# Patient Record
Sex: FEMALE | Race: WHITE | NOT HISPANIC OR LATINO | Employment: UNEMPLOYED | URBAN - METROPOLITAN AREA
[De-identification: names, ages, dates, MRNs, and addresses within clinical notes are randomized per-mention and may not be internally consistent; named-entity substitution may affect disease eponyms.]

---

## 2017-03-01 ENCOUNTER — LAB VISIT (OUTPATIENT)
Dept: LAB | Facility: HOSPITAL | Age: 37
End: 2017-03-01
Attending: OBSTETRICS & GYNECOLOGY
Payer: MEDICAID

## 2017-03-01 ENCOUNTER — OFFICE VISIT (OUTPATIENT)
Dept: OBSTETRICS AND GYNECOLOGY | Facility: CLINIC | Age: 37
End: 2017-03-01
Payer: MEDICAID

## 2017-03-01 VITALS
SYSTOLIC BLOOD PRESSURE: 110 MMHG | WEIGHT: 127.63 LBS | DIASTOLIC BLOOD PRESSURE: 60 MMHG | BODY MASS INDEX: 22.61 KG/M2 | HEIGHT: 63 IN

## 2017-03-01 DIAGNOSIS — R10.2 PELVIC PAIN IN FEMALE: ICD-10-CM

## 2017-03-01 DIAGNOSIS — Z00.00 ANNUAL PHYSICAL EXAM: Primary | ICD-10-CM

## 2017-03-01 DIAGNOSIS — Z01.419 ENCOUNTER FOR GYNECOLOGICAL EXAMINATION WITHOUT ABNORMAL FINDING: ICD-10-CM

## 2017-03-01 DIAGNOSIS — N76.0 ACUTE VAGINITIS: ICD-10-CM

## 2017-03-01 DIAGNOSIS — N80.9 ENDOMETRIOSIS: ICD-10-CM

## 2017-03-01 DIAGNOSIS — N94.6 DYSMENORRHEA: ICD-10-CM

## 2017-03-01 LAB
ALBUMIN SERPL BCP-MCNC: 4.4 G/DL
ALP SERPL-CCNC: 47 U/L
ALT SERPL W/O P-5'-P-CCNC: 13 U/L
ANION GAP SERPL CALC-SCNC: 8 MMOL/L
AST SERPL-CCNC: 15 U/L
BASOPHILS # BLD AUTO: 0.03 K/UL
BASOPHILS NFR BLD: 0.3 %
BILIRUB SERPL-MCNC: 0.4 MG/DL
BUN SERPL-MCNC: 9 MG/DL
CALCIUM SERPL-MCNC: 9.6 MG/DL
CHLORIDE SERPL-SCNC: 103 MMOL/L
CO2 SERPL-SCNC: 28 MMOL/L
CREAT SERPL-MCNC: 0.9 MG/DL
DIFFERENTIAL METHOD: ABNORMAL
EOSINOPHIL # BLD AUTO: 0 K/UL
EOSINOPHIL NFR BLD: 0.5 %
ERYTHROCYTE [DISTWIDTH] IN BLOOD BY AUTOMATED COUNT: 12.9 %
EST. GFR  (AFRICAN AMERICAN): >60 ML/MIN/1.73 M^2
EST. GFR  (NON AFRICAN AMERICAN): >60 ML/MIN/1.73 M^2
GLUCOSE SERPL-MCNC: 92 MG/DL
HCT VFR BLD AUTO: 41.3 %
HGB BLD-MCNC: 14.1 G/DL
LYMPHOCYTES # BLD AUTO: 1.4 K/UL
LYMPHOCYTES NFR BLD: 16.7 %
MCH RBC QN AUTO: 30.9 PG
MCHC RBC AUTO-ENTMCNC: 34.1 %
MCV RBC AUTO: 91 FL
MONOCYTES # BLD AUTO: 0.5 K/UL
MONOCYTES NFR BLD: 5.9 %
NEUTROPHILS # BLD AUTO: 6.6 K/UL
NEUTROPHILS NFR BLD: 76.6 %
PLATELET # BLD AUTO: 348 K/UL
PMV BLD AUTO: 10.2 FL
POTASSIUM SERPL-SCNC: 4.3 MMOL/L
PROT SERPL-MCNC: 7.4 G/DL
RBC # BLD AUTO: 4.56 M/UL
SODIUM SERPL-SCNC: 139 MMOL/L
TSH SERPL DL<=0.005 MIU/L-ACNC: 1.42 UIU/ML
WBC # BLD AUTO: 8.63 K/UL

## 2017-03-01 PROCEDURE — 80053 COMPREHEN METABOLIC PANEL: CPT

## 2017-03-01 PROCEDURE — 36415 COLL VENOUS BLD VENIPUNCTURE: CPT

## 2017-03-01 PROCEDURE — 85025 COMPLETE CBC W/AUTO DIFF WBC: CPT

## 2017-03-01 PROCEDURE — 99385 PREV VISIT NEW AGE 18-39: CPT | Mod: S$PBB,,, | Performed by: OBSTETRICS & GYNECOLOGY

## 2017-03-01 PROCEDURE — 84443 ASSAY THYROID STIM HORMONE: CPT

## 2017-03-01 PROCEDURE — 99999 PR PBB SHADOW E&M-NEW PATIENT-LVL III: CPT | Mod: PBBFAC,,, | Performed by: OBSTETRICS & GYNECOLOGY

## 2017-03-01 PROCEDURE — 86304 IMMUNOASSAY TUMOR CA 125: CPT

## 2017-03-01 RX ORDER — FOLIC ACID 1 MG/1
TABLET ORAL
Status: ON HOLD | COMMUNITY
Start: 2017-02-16 | End: 2017-03-30 | Stop reason: HOSPADM

## 2017-03-01 RX ORDER — FLUOXETINE 10 MG/1
CAPSULE ORAL
COMMUNITY
Start: 2013-05-07 | End: 2017-03-23 | Stop reason: CLARIF

## 2017-03-01 NOTE — PROGRESS NOTES
Subjective:      Chief Complaint:  PELVIC   PAIN    DYSMENORRHEA      IRREG  CYCLE  Chief Complaint   Patient presents with    Annual Exam       Menstrual History:    OB History     No data available                                                 GR      1    Menarche age: 11     Patient's last menstrual period was 2017.               Objective:        History of Present Illness AND  Examination detailed DICTATE:      HISTORY OF PRESENT ILLNESS:  The patient is a 36-year-old lady,  1, para   1, new patient to [facility], new patient to me.  The patient is complaining of   pelvic pain, dysmenorrhea of the irregular cycles, painful sexual activity and   dyspareunia.  The patient is not on any birth control medication, went to the ER   2 weeks ago in [place], which she was told that ovarian cyst, apparently did a   Pap smear, which was negative.  At that time not on any birth control pill.  At   the present has been on, in the past without any.  The patient is having   reasonably regular cycles, severe dysmenorrhea, painful and cramping, last four   months irregular cycles.  Pain increasing.  Apparently the patient has sudden   onset of pain, increasing in intensity, present since painful sexual activity.    Also, pain with urination and bowel movement, also complaining of some vaginal   discharge.    PHYSICAL EXAMINATION:  VITAL SIGNS:  Blood pressure 110/60, weight 127.  BREASTS:  No lumps, masses, discharge, skin changes, retraction, nipple changes.    Axilla negative.  ABDOMEN:  Normal.  No guarding, rebound, however some discomfort and pain on   deep palpation.  PELVIC:  External normal.  Vulva normal.  [place], urethral and [place] glands   are negative.  Vagina, mild discharge noted.  Cervix clear.  Uterus, normal   size, shape, position.  Right adnexa is normal.  Left is indurated most likely   an ovarian cyst, very painful, tender uterosacral ligaments, nodules, when it   was stretched it was  rather painful and the patient states that the pain she has   with sexual activity is comparable to the pain that she experienced with   examination.  RECTAL:  External is negative.    IMPRESSION:  Pelvic pain, dysmenorrhea, most likely endometriosis.    PLAN:  We will repeat a pelvic ultrasound.  We will do a CA-125.  CBC and   comprehensive metabolic panel and TSH and then we will decide on followup and   treatment after these procedures have been done.      JIV/HN  dd: 03/01/2017 13:36:56 (CST)  td: 03/02/2017 04:15:45 (CST)  Doc ID   #[id number]  Job ID #[id number]    CC:       Physical Exam   Constitutional: She is oriented to person, place, and time. She appears well-developed and well-nourished. No distress.   HENT:   Head: Normocephalic.   Mouth/Throat: Oropharynx is clear.  Eyes: Pupils are equal, round, and reactive to light.   Neck: Neck supple. No tracheal deviation present.   Cardiovascular: Normal rate, regular rhythm and normal heart sounds. No murmur heard.  Pulmonary/Chest: Effort normal and breath sounds normal. No respiratory distress. She has no wheezes. She has no rales. She exhibits no tenderness.   Abdominal: Bowel sounds are normal. She exhibits no distension and no mass. There is no tenderness. There is no rebound and no guarding.  Musculoskeletal: Normal range of motion.   Lymphadenopathy:        Right: No inguinal adenopathy present.        Left: No inguinal adenopathy present.   Neurological: She is alert and oriented.  Skin: Skin is warm. No rash noted.        Review of Systems  Review of Systems   Normal ROS:   Constitutional: Negative for fever, chills, activity change fatigue and unexpected weight change.   HENT: Negative for nosebleeds, congestion.  Eyes: Negative for visual disturbance.   Respiratory: Negative for shortness of breath and wheezing.    Cardiovascular: Negative for chest pain, palpitations.   Gastrointestinal: Negative for abdominal pain, diarrhea, constipation,    Musculoskeletal: Negative for back pain.   Allergic/Immunologic: Negative for environmental allergies and food allergies.   Neurological: Negative .   Hematological: Negative for adenopath   Psychiatric/Behavioral: Negative f    Assessment:      Diagnosis:PELVIC   PAIN   DYSMENORRHEA   ENDOMETRIOSIS       Plan:      Return in 2  week

## 2017-03-01 NOTE — PATIENT INSTRUCTIONS

## 2017-03-02 ENCOUNTER — TELEPHONE (OUTPATIENT)
Dept: OBSTETRICS AND GYNECOLOGY | Facility: CLINIC | Age: 37
End: 2017-03-02

## 2017-03-02 LAB
CANCER AG125 SERPL-ACNC: 33 U/ML
CANDIDA RRNA VAG QL PROBE: NEGATIVE
G VAGINALIS RRNA GENITAL QL PROBE: POSITIVE
T VAGINALIS RRNA GENITAL QL PROBE: NEGATIVE

## 2017-03-02 NOTE — TELEPHONE ENCOUNTER
-DR Francis,   This young lady is requesting to speak directly with you   Thank you,  Trish            ---- Message from Kathy Garcia sent at 3/2/2017 10:17 AM CST -----  Contact: self  Pt is requesting a call from provider in regards to previous discuss towards plan of care. Please call pt @ [phone]        Thanks

## 2017-03-06 DIAGNOSIS — N76.1 CHRONIC VAGINITIS: Primary | ICD-10-CM

## 2017-03-06 RX ORDER — METRONIDAZOLE 500 MG/1
500 TABLET ORAL 3 TIMES DAILY
Qty: 30 TABLET | Refills: 1 | Status: SHIPPED | OUTPATIENT
Start: 2017-03-06 | End: 2017-03-16

## 2017-03-07 ENCOUNTER — TELEPHONE (OUTPATIENT)
Dept: OBSTETRICS AND GYNECOLOGY | Facility: CLINIC | Age: 37
End: 2017-03-07

## 2017-03-07 NOTE — TELEPHONE ENCOUNTER
----- Message from Eugenio Francis MD sent at 3/6/2017 12:28 PM CST -----  Medication ordered, please   prescription at the pharmacy you have listed on file in our system .

## 2017-03-07 NOTE — TELEPHONE ENCOUNTER
Notes Recorded by Silvia Damico, LPN on 3/7/2017 at 1:30 PM  Called pt no answer. LM asking pt to return our call

## 2017-03-08 ENCOUNTER — TELEPHONE (OUTPATIENT)
Dept: OBSTETRICS AND GYNECOLOGY | Facility: CLINIC | Age: 37
End: 2017-03-08

## 2017-03-08 NOTE — TELEPHONE ENCOUNTER
Notes Recorded by Corey Stahl LPN on 3/8/2017 at 10:44 AM  MS LANDERS RETURNED CALL, INFORMED HER THAT DR CORDERO REVIEWED HER RESENT LAB RESULT AND THAT SHE HAS BACTERIAL VAGINOSIS, INFORMED HER THAT DR CORDERO SENT ANTIBX FLAGYL TO [facility], AND ALSO SHE CAN USE OVER THE COUNTER REPHRESH TO HELP WITH ITCHING, INFORMED THERE THERE ARE SEVERAL DIFFERENT TYPES SHE CAN USE, PT STATED HER UNDERSTANDING

## 2017-03-08 NOTE — TELEPHONE ENCOUNTER
Notes Recorded by Corey Stahl LPN on 3/8/2017 at 10:27 AM  2ND CALL ATTEMPT TO REACH PT TO GIVE HER LAB RESULTS  ------

## 2017-03-08 NOTE — TELEPHONE ENCOUNTER
----- Message from Kathy Garcia sent at 3/8/2017  9:36 AM CST -----  Contact: self  Pt  returned office call          Thanks

## 2017-03-09 ENCOUNTER — TELEPHONE (OUTPATIENT)
Dept: OBSTETRICS AND GYNECOLOGY | Facility: CLINIC | Age: 37
End: 2017-03-09

## 2017-03-09 NOTE — TELEPHONE ENCOUNTER
----- Message from Agustina Erazo sent at 3/9/2017  3:32 PM CST -----  Contact: [phone]  Pt wants to speak to the nurse  before scheduling her surgery Please call pt at your earliest convenience.  Thanks !

## 2017-03-09 NOTE — TELEPHONE ENCOUNTER
Spoke with pt. Pt stated she is in pain and needs rx. Pt informed that Dr. Francis would like for her to have her pelvic ultrasound done prior to giving pt anything for pain. Pt said that she did get an ultrasound at an ER that she went to and has requested that we call them to request it. Pt asked me to speak with Daphne [phone]. Informed pt that I would request ultrasound and contact her once it is received and physician has reviewed it.

## 2017-03-10 ENCOUNTER — TELEPHONE (OUTPATIENT)
Dept: OBSTETRICS AND GYNECOLOGY | Facility: CLINIC | Age: 37
End: 2017-03-10

## 2017-03-10 NOTE — TELEPHONE ENCOUNTER
RETURNED CALL TO PT , INFORMED HER THAT DR CORDERO IS NOT IN TODAY, SHE ASKED IF WE OBTAINED THE ENDO VAGINAL U.S. REPORT, INFORMED HER THAT WE HAD DR FRANKEL READ IT AND IT IS NORMAL, PT STATED SHE NEEDS SOMETHING FOR SEVERE ABD PAIN , INFORMED HER THAT THERE ARE NO DOCTORS HERE TODAY THAT CAN SEE HER , THAT WE CAN CHANGE HER NEXT WED. APPT TO MON WITH DR CORDERO, NEW APPT MADE FOR 3/13/17 @ 3:30PM, PT STATED HER UNDERSTANDING

## 2017-03-10 NOTE — TELEPHONE ENCOUNTER
----- Message from Peytonaraseli Teran sent at 3/10/2017  8:29 AM CST -----  Contact: self  Pt request to speak to the nurse regarding about a x-ray. Please contact the pt at [phone]. Thanks!

## 2017-03-13 ENCOUNTER — OFFICE VISIT (OUTPATIENT)
Dept: OBSTETRICS AND GYNECOLOGY | Facility: CLINIC | Age: 37
End: 2017-03-13
Payer: MEDICAID

## 2017-03-13 VITALS
WEIGHT: 125.88 LBS | SYSTOLIC BLOOD PRESSURE: 118 MMHG | BODY MASS INDEX: 22.3 KG/M2 | DIASTOLIC BLOOD PRESSURE: 77 MMHG | HEIGHT: 63 IN

## 2017-03-13 DIAGNOSIS — N83.202 CYST OF LEFT OVARY: ICD-10-CM

## 2017-03-13 DIAGNOSIS — R10.2 PELVIC PAIN IN FEMALE: Primary | ICD-10-CM

## 2017-03-13 DIAGNOSIS — N80.9 ENDOMETRIOSIS: ICD-10-CM

## 2017-03-13 PROCEDURE — 99213 OFFICE O/P EST LOW 20 MIN: CPT | Mod: PBBFAC | Performed by: OBSTETRICS & GYNECOLOGY

## 2017-03-13 PROCEDURE — 99212 OFFICE O/P EST SF 10 MIN: CPT | Mod: S$PBB,,, | Performed by: OBSTETRICS & GYNECOLOGY

## 2017-03-13 PROCEDURE — 99999 PR PBB SHADOW E&M-EST. PATIENT-LVL III: CPT | Mod: PBBFAC,,, | Performed by: OBSTETRICS & GYNECOLOGY

## 2017-03-13 RX ORDER — TRAMADOL HYDROCHLORIDE 50 MG/1
50 TABLET ORAL EVERY 6 HOURS PRN
Qty: 20 TABLET | Refills: 1 | Status: ON HOLD | OUTPATIENT
Start: 2017-03-13 | End: 2017-03-30 | Stop reason: HOSPADM

## 2017-03-13 RX ORDER — FLUOXETINE HYDROCHLORIDE 20 MG/1
CAPSULE ORAL
Status: ON HOLD | COMMUNITY
Start: 2017-03-04 | End: 2017-03-30 | Stop reason: HOSPADM

## 2017-03-13 RX ORDER — CEPHALEXIN 500 MG/1
CAPSULE ORAL
Status: ON HOLD | COMMUNITY
Start: 2017-03-04 | End: 2017-03-30 | Stop reason: HOSPADM

## 2017-03-13 RX ORDER — PHENAZOPYRIDINE HYDROCHLORIDE 200 MG/1
TABLET, FILM COATED ORAL
COMMUNITY
Start: 2017-03-04 | End: 2017-03-23

## 2017-03-13 RX ORDER — CYANOCOBALAMIN 1000 UG/ML
INJECTION, SOLUTION INTRAMUSCULAR; SUBCUTANEOUS
Status: ON HOLD | COMMUNITY
Start: 2017-03-06 | End: 2017-03-30 | Stop reason: HOSPADM

## 2017-03-13 NOTE — MR AVS SNAPSHOT
[facility] - OB/ GYN  [address] [facility] [address]  [address]  [place] [address]  Phone: [phone]                  Shruthi Richard   3/13/2017 3:30 PM   Office Visit    Description:  Female : 1980   Provider:  Eugenio Francis MD   Department:  [facility]/ GYN           Reason for Visit     Follow-up           Diagnoses this Visit        Comments    Pelvic pain in female    -  Primary     Endometriosis         Cyst of left ovary                To Do List           Future Appointments        Provider Department Dept Phone    3/13/2017 3:30 PM Eugenio Francis MD [facility] OB/ -[phone]      Goals (5 Years of Data)     None      Follow-Up and Disposition     Return in about 6 weeks (around 2017).       These Medications        Disp Refills Start End    tramadol (ULTRAM) 50 mg tablet 20 tablet 1 3/13/2017 3/13/2018    Take 1 tablet (50 mg total) by mouth every 6 (six) hours as needed for Pain. - Oral    Pharmacy: [facility] [address] [place] [address]. Ph #: [phone]         [facility] On Call     [facility] On Call Nurse Care Line -  Assistance  Registered nurses in the [facility] On Call Center provide clinical advisement, health education, appointment booking, and other advisory services.  Call for this free service at [phone].             Medications           Message regarding Medications     Verify the changes and/or additions to your medication regime listed below are the same as discussed with your clinician today.  If any of these changes or additions are incorrect, please notify your healthcare provider.        START taking these NEW medications        Refills    tramadol (ULTRAM) 50 mg tablet 1    Sig: Take 1 tablet (50 mg total) by mouth every 6 (six) hours as needed for Pain.    Class: Print    Route: Oral           Verify that the below list of medications is an accurate representation of the medications you are currently taking.  If none  "reported, the list may be blank. If incorrect, please contact your healthcare provider. Carry this list with you in case of emergency.           Current Medications     cyanocobalamin 1,000 mcg/mL injection     fluoxetine (PROZAC) 10 MG capsule PROZAC 20 MG CAPS    fluoxetine (PROZAC) 20 MG capsule     folic acid (FOLVITE) 1 MG tablet     cephALEXin (KEFLEX) 500 MG capsule     metronidazole (FLAGYL) 500 MG tablet Take 1 tablet (500 mg total) by mouth 3 (three) times daily.    phenazopyridine (PYRIDIUM) 200 MG tablet     tramadol (ULTRAM) 50 mg tablet Take 1 tablet (50 mg total) by mouth every 6 (six) hours as needed for Pain.           Clinical Reference Information           Your Vitals Were     BP Height Weight Last Period BMI    118/77 5' 3" (1.6 m) 57.1 kg (125 lb 14.1 oz) 03/01/2017 22.3 kg/m2      Blood Pressure          Most Recent Value    BP  118/77      Allergies as of 3/13/2017     No Known Allergies      Immunizations Administered on Date of Encounter - 3/13/2017     None      [facility] Sign-Up     Activating your [facility] account is as easy as 1-2-3!     1) Visit [email or web address], select Sign Up Now, enter this activation code and your date of birth, then select Next.  [email or web address]  Expires: 4/15/2017  2:15 PM      2) Create a username and password to use when you visit [facility] in the future and select a security question in case you lose your password and select Next.    3) Enter your e-mail address and click Sign Up!    Additional Information  If you have questions, please e-mail [email or web address] or call [phone] to talk to our [facility] staff. Remember, [facility] is NOT to be used for urgent needs. For medical emergencies, dial 911.         Language Assistance Services     ATTENTION: Language assistance services are available, free of charge. Please call [phone].      ATENCIÓN: Si habla español, tiene a ledesma disposición servicios gratuitos de asistencia lingüística. Llame al " [phone].     JAYLENE Ý: N?u b?n nói Ti?ng Vi?t, có các d?ch v? h? tr? ngôn ng? mi?n phí dành cho b?n. G?i s? [phone].         [facility] [place] - [facility]/ GYN complies with applicable Federal civil rights laws and does not discriminate on the basis of race, color, national origin, age, disability, or sex.

## 2017-03-13 NOTE — PROGRESS NOTES
Subjective:      Chief Complaint:    Chief Complaint   Patient presents with    Follow-up       Menstrual History:    OB History     No data available          Menarche age: 11     Patient's last menstrual period was 2017.           Objective:        History of Present Illness AND  Examination detailed DICTATE:       The patient is 36-year-old  1, para 1, was seen approximately a week to   10 days ago because of severe pelvic pain.  The suspected diagnosis at that time   was endometriosis.  The patient received the benefit of CBC, comprehensive   metabolic panel and CA-125.  The only abnormality is slightly elevated CA-125,   which is compatible with possibility of endometriosis, pelvic ultrasound at   [facility]. essentially was normal.  The patient is still complaining of   severe pain.    PLAN:  We gave her Ultram 50 mg every week q.12h.    RECOMMENDATIONS:  Laparoscopy as soon as possible to make the diagnosis and   possibly treat the endometriosis.      NANCY/IN  dd: 2017 13:20:01 (CDT)  td: 2017 09:03:40 (CDT)  Doc ID   #[id number]  Job ID #[id number]    CC:           Assessment:      Diagnosis: pelvic   Pain   endometriosis       Plan:      Return in 6  weeks

## 2017-03-16 ENCOUNTER — TELEPHONE (OUTPATIENT)
Dept: OBSTETRICS AND GYNECOLOGY | Facility: CLINIC | Age: 37
End: 2017-03-16

## 2017-03-16 NOTE — TELEPHONE ENCOUNTER
----- Message from Agustina Erazo sent at 3/16/2017  1:04 PM CDT -----  Contact: [phone]  Pt is wanting to know if she can get in with a male provider sooner than the 3/31 pt is trying to schedule with someone as soon as possible Please call pt at your earliest convenience.  Thanks !

## 2017-03-16 NOTE — TELEPHONE ENCOUNTER
Spoke with patient, patient aware Dr Francis does not perform any surgeries. Patient voiced understanding, and has been set up with a consult to discuss the lap per Dr Francis with Dr Rae on 3/31, no further questions.

## 2017-03-16 NOTE — TELEPHONE ENCOUNTER
----- Message from Agustina Erazo sent at 3/16/2017  8:46 AM CDT -----  Contact: [phone]  Pt is requesting for her surgery to be scheduled Please call pt at your earliest convenience.  Thanks !

## 2017-03-20 ENCOUNTER — OFFICE VISIT (OUTPATIENT)
Dept: OBSTETRICS AND GYNECOLOGY | Facility: CLINIC | Age: 37
End: 2017-03-20
Payer: MEDICAID

## 2017-03-20 VITALS
HEIGHT: 63 IN | DIASTOLIC BLOOD PRESSURE: 60 MMHG | SYSTOLIC BLOOD PRESSURE: 84 MMHG | WEIGHT: 126.75 LBS | BODY MASS INDEX: 22.46 KG/M2

## 2017-03-20 DIAGNOSIS — R10.2 PELVIC PAIN IN FEMALE: Primary | ICD-10-CM

## 2017-03-20 DIAGNOSIS — Z11.3 SCREEN FOR STD (SEXUALLY TRANSMITTED DISEASE): ICD-10-CM

## 2017-03-20 DIAGNOSIS — N80.9 ENDOMETRIOSIS: ICD-10-CM

## 2017-03-20 DIAGNOSIS — N83.202 CYST OF LEFT OVARY: ICD-10-CM

## 2017-03-20 DIAGNOSIS — N94.6 DYSMENORRHEA: ICD-10-CM

## 2017-03-20 DIAGNOSIS — Z12.4 CERVICAL CANCER SCREENING: ICD-10-CM

## 2017-03-20 PROCEDURE — 87624 HPV HI-RISK TYP POOLED RSLT: CPT

## 2017-03-20 PROCEDURE — 99999 PR PBB SHADOW E&M-EST. PATIENT-LVL III: CPT | Mod: PBBFAC,,, | Performed by: OBSTETRICS & GYNECOLOGY

## 2017-03-20 PROCEDURE — 99213 OFFICE O/P EST LOW 20 MIN: CPT | Mod: PBBFAC | Performed by: OBSTETRICS & GYNECOLOGY

## 2017-03-20 PROCEDURE — 99213 OFFICE O/P EST LOW 20 MIN: CPT | Mod: S$PBB,,, | Performed by: OBSTETRICS & GYNECOLOGY

## 2017-03-20 PROCEDURE — 87591 N.GONORRHOEAE DNA AMP PROB: CPT

## 2017-03-20 PROCEDURE — 88175 CYTOPATH C/V AUTO FLUID REDO: CPT

## 2017-03-20 NOTE — LETTER
March 21, 2017      Eugenio Francis MD  [address]  [address]  [facility] [address]           [facility]  [address] [facility]  [address]  [facility] [place] [facility] [address]  Phone: [phone]          Patient: Shruthi Richard   MR Number: [id number]   YOB: 1980   Date of Visit: 3/20/2017       Dear Dr. Eugenio Francis:    Thank you for referring Shruthi Richard to me for evaluation. Attached you will find relevant portions of my assessment and plan of care.    If you have questions, please do not hesitate to call me. I look forward to following Shruthi Richard along with you.    Sincerely,    Melo Lopez MD    Enclosure  CC:  No Recipients    If you would like to receive this communication electronically, please contact [email or web address] or [phone] to request more information on [employer] access.    For providers and/or their staff who would like to refer a patient to [facility], please contact us through our one-stop-shop provider referral line, [facility] , at [phone].    If you feel you have received this communication in error or would no longer like to receive these types of communications, please e-mail [email or web address]

## 2017-03-20 NOTE — MR AVS SNAPSHOT
"    [facility] [place] [place] - OB/ GYN  [address] [facility]  [address]  [place] [address]  Phone: [phone]                  Shruthi Richard   3/20/2017 2:30 PM   Office Visit    Description:  Female : 1980   Provider:  Melo Lopez MD   Department:  [facility] [place] [place] - OB/ GYN           Reason for Visit     Surgical Consult                To Do List           Goals (5 Years of Data)     None      [facility] On Call     [facility] On Call Nurse Care Line -  Assistance  Registered nurses in the [facility] On Call Center provide clinical advisement, health education, appointment booking, and other advisory services.  Call for this free service at [phone].             Medications           Message regarding Medications     Verify the changes and/or additions to your medication regime listed below are the same as discussed with your clinician today.  If any of these changes or additions are incorrect, please notify your healthcare provider.             Verify that the below list of medications is an accurate representation of the medications you are currently taking.  If none reported, the list may be blank. If incorrect, please contact your healthcare provider. Carry this list with you in case of emergency.           Current Medications     cephALEXin (KEFLEX) 500 MG capsule     cyanocobalamin 1,000 mcg/mL injection     fluoxetine (PROZAC) 10 MG capsule PROZAC 20 MG CAPS    fluoxetine (PROZAC) 20 MG capsule     folic acid (FOLVITE) 1 MG tablet     phenazopyridine (PYRIDIUM) 200 MG tablet     tramadol (ULTRAM) 50 mg tablet Take 1 tablet (50 mg total) by mouth every 6 (six) hours as needed for Pain.           Clinical Reference Information           Your Vitals Were     BP Height Weight Last Period BMI    84/60 (BP Location: Left arm, Patient Position: Sitting, BP Method: Manual) 5' 3" (1.6 m) 57.5 kg (126 lb 12.2 oz) 2017 (Exact Date) 22.46 kg/m2      Blood Pressure          Most Recent Value    BP  (!)  84/60    "   Allergies as of 3/20/2017     No Known Allergies      Immunizations Administered on Date of Encounter - 3/20/2017     None      [facility] Sign-Up     Activating your [facility] account is as easy as 1-2-3!     1) Visit [email or web address], select Sign Up Now, enter this activation code and your date of birth, then select Next.  [email or web address]  Expires: 4/15/2017  2:15 PM      2) Create a username and password to use when you visit [facility] in the future and select a security question in case you lose your password and select Next.    3) Enter your e-mail address and click Sign Up!    Additional Information  If you have questions, please e-mail [email or web address] or call [phone] to talk to our [facility] staff. Remember, [facility] is NOT to be used for urgent needs. For medical emergencies, dial 911.         Language Assistance Services     ATTENTION: Language assistance services are available, free of charge. Please call [phone].      ATENCIÓN: Si habla ulises, tiene a ledesma disposición servicios gratuitos de asistencia lingüística. Llame al [phone].     CHÚ Ý: N?u b?n nói Ti?ng Vi?t, có các d?ch v? h? tr? ngôn ng? mi?n phí dành cho b?n. G?i s? [phone].         [facility] [place]/ GYN complies with applicable Federal civil rights laws and does not discriminate on the basis of race, color, national origin, age, disability, or sex.

## 2017-03-21 DIAGNOSIS — G89.29 CHRONIC PELVIC PAIN IN FEMALE: Primary | ICD-10-CM

## 2017-03-21 DIAGNOSIS — R10.2 CHRONIC PELVIC PAIN IN FEMALE: Primary | ICD-10-CM

## 2017-03-21 LAB
C TRACH DNA SPEC QL NAA+PROBE: NOT DETECTED
N GONORRHOEA DNA SPEC QL NAA+PROBE: NOT DETECTED

## 2017-03-21 NOTE — PROGRESS NOTES
"[facility]  Ambulatory Clinic  Obstetrics & Gynecology    Visit Date:  3/20/2017    Chief Complaint:  Chronic pelvic pain    History of Present Illness:    Shruthi Richard is a 36 y.o. , new pt to me, referred by Dr. Francis for endometriosis and discuss laparoscopy.    Pt is requesting laparoscopy for further evaluation of her pelvic pain.    Pain last throughout month, worst with periods, and relieved with pain medications.      Pt had a recent pelvic ultrasound done at ED which showed a small physiologic left ovarian cyst; otherwise, no acute findings.    Periods are regular, heavy and painful.    Pt has attempted OCP in the past with some improvement in her symptoms but does not like the way OCP "changes my mood".    Pt is considering Mirena IUD.    Her current method of family planning is condoms/natural family planning.  Patient's last menstrual period was 2017 (exact date).    Pt denies h/o abnormal pap, last pap unsure.    Pt denies active sexually transmitted infections.    Pt performs monthly self breast examination, non-smoker, uses seat belts, and denies abuse.     Pt denies vaginal discharge, bloating, early satiety, unintentional weight loss, breast mass/skin changes, GI or urinary complaints.       present for visit.    Past History:  Gynecologic history as noted above.    Review of Systems:      GENERAL:  No fever, fatigue, excessive weight gain or loss  HEENT:  No headaches, hearing changes, visual disturbance  RESPIRATORY:  No cough, shortness of breath  CARDIOVASCULAR:  No chest pain, heart palpitations, leg swelling  BREAST:  No lump, pain, nipple discharge, skin changes  GASTROINTESTINAL:  No nausea, vomiting, constipation, diarrhea, rectal bleeding   GENITOURINARY:  See HPI  ENDOCRINE:  No heat or cold intolerance  HEMATOLOGIC:  No easy bruisability or bleeding   LYMPHATICS:  No enlarged nodes  MUSCULOSKELETAL:  No joint pain or swelling  SKIN:  No " "rash, lesions, jaundice  NEUROLOGIC:  No dizziness, weakness, syncope  PSYCHIATRIC:  No homicidal/suicidal ideations    Physical Exam:     BP (!) 84/60 (BP Location: Left arm, Patient Position: Sitting, BP Method: Manual)  Ht 5' 3" (1.6 m)  Wt 57.5 kg (126 lb 12.2 oz)  LMP 2017 (Exact Date)  BMI 22.46 kg/m2  Pulse 60, Resp rate 16  Patient's last menstrual period was 2017 (exact date).     GENERAL:  No acute distress, well-nourished  HEENT:  Atraumatic, anicteric, moist mucus membranes. Neck supple w/o masses.  BREAST:  Symmetric, nontender, no obvious masses, adenopathy, skin changes or nipple discharge.  LUNGS:  Clear to auscultation  HEART:  Regular rate and rhythm, no murmurs, gallops, or rubs  ABDOMEN:  Soft, non-tender, non-distended, normoactive bowel sounds, no obvious organomegaly  EXT:  Symmetric w/o cramping, claudication, or edema. +2 distal pulses.  SKIN:  No rashes or bruising  PSYCH:  Mood and affect appropriate  GENITOURINARY:  NFEG no lesion. No vaginal or cervical lesion. No bleeding or discharge. No CMT. Uterus and ovaries small, NT. Wet prep negative. Declined rectal exam. No obvious external lesions.    Chaperone present for exam.    UPT negative 3/20/2017    Pelvic U/S:  3/9/2017        Assessment:     36 y.o. :    1. Chronic pelvic pain  2. Possible endometriosis  3. Abnormal uterine bleeding  4. Dysmenorrhea    Plan:    We discussed her condition in great detail.  Pt is requesting laparoscopy for further evaluation of her pelvic pain.  We also discuss hormonal therapy including OCP and depo lupron which she is not interested at this time.  Pt is requesting Mirena IUD for cycle control and improve bleeding/dysmenorrhea.   Pelvic/abd precautions.  Risks, benefits, and alternatives to Mirena/laparoscopy reviewed.  We discussed supportive care measures, pelvic rest, NSAIDs prn.  Go to ER if pain worsened.      Pap smear.    Gonorrhea/chlamydia testing.    Encourage healthy " lifestyle modifications, monthly self breast exams, Ca/Vit D.    F/u with PCP for health maintenance.    Will schedule pre-op pending OR scheduling.    Return sooner as needed.      Pt voiced understanding.        Melo Lopez MD

## 2017-03-23 ENCOUNTER — OFFICE VISIT (OUTPATIENT)
Dept: OBSTETRICS AND GYNECOLOGY | Facility: CLINIC | Age: 37
End: 2017-03-23
Payer: MEDICAID

## 2017-03-23 ENCOUNTER — HOSPITAL ENCOUNTER (OUTPATIENT)
Dept: PREADMISSION TESTING | Facility: HOSPITAL | Age: 37
Discharge: HOME | End: 2017-03-23
Attending: OBSTETRICS & GYNECOLOGY
Payer: MEDICAID

## 2017-03-23 VITALS
BODY MASS INDEX: 22.35 KG/M2 | OXYGEN SATURATION: 98 % | HEART RATE: 67 BPM | DIASTOLIC BLOOD PRESSURE: 61 MMHG | RESPIRATION RATE: 16 BRPM | SYSTOLIC BLOOD PRESSURE: 91 MMHG | WEIGHT: 126.13 LBS | TEMPERATURE: 98 F | HEIGHT: 63 IN

## 2017-03-23 VITALS
WEIGHT: 127.13 LBS | SYSTOLIC BLOOD PRESSURE: 92 MMHG | HEIGHT: 63 IN | BODY MASS INDEX: 22.53 KG/M2 | DIASTOLIC BLOOD PRESSURE: 60 MMHG

## 2017-03-23 DIAGNOSIS — G89.29 CHRONIC PELVIC PAIN IN FEMALE: ICD-10-CM

## 2017-03-23 DIAGNOSIS — G89.29 CHRONIC PELVIC PAIN IN FEMALE: Primary | ICD-10-CM

## 2017-03-23 DIAGNOSIS — R10.2 CHRONIC PELVIC PAIN IN FEMALE: Primary | ICD-10-CM

## 2017-03-23 DIAGNOSIS — R10.2 CHRONIC PELVIC PAIN IN FEMALE: ICD-10-CM

## 2017-03-23 LAB
BASOPHILS # BLD AUTO: 0.03 K/UL
BASOPHILS NFR BLD: 0.4 %
DIFFERENTIAL METHOD: NORMAL
EOSINOPHIL # BLD AUTO: 0.1 K/UL
EOSINOPHIL NFR BLD: 1.4 %
ERYTHROCYTE [DISTWIDTH] IN BLOOD BY AUTOMATED COUNT: 12.6 %
HCG INTACT+B SERPL-ACNC: <1.2 MIU/ML
HCT VFR BLD AUTO: 40 %
HGB BLD-MCNC: 13.9 G/DL
HPV16 DNA SPEC QL NAA+PROBE: NEGATIVE
HPV16+18+H RISK 12 DNA CVX-IMP: NEGATIVE
HPV18 DNA SPEC QL NAA+PROBE: NEGATIVE
LYMPHOCYTES # BLD AUTO: 2.3 K/UL
LYMPHOCYTES NFR BLD: 28.5 %
MCH RBC QN AUTO: 30.2 PG
MCHC RBC AUTO-ENTMCNC: 34.8 %
MCV RBC AUTO: 87 FL
MONOCYTES # BLD AUTO: 0.4 K/UL
MONOCYTES NFR BLD: 5 %
NEUTROPHILS # BLD AUTO: 5.2 K/UL
NEUTROPHILS NFR BLD: 64.6 %
PLATELET # BLD AUTO: 319 K/UL
PMV BLD AUTO: 10.4 FL
RBC # BLD AUTO: 4.61 M/UL
WBC # BLD AUTO: 7.99 K/UL

## 2017-03-23 PROCEDURE — 85025 COMPLETE CBC W/AUTO DIFF WBC: CPT

## 2017-03-23 PROCEDURE — 99999 PR PBB SHADOW E&M-EST. PATIENT-LVL II: CPT | Mod: PBBFAC,,, | Performed by: OBSTETRICS & GYNECOLOGY

## 2017-03-23 PROCEDURE — 84702 CHORIONIC GONADOTROPIN TEST: CPT

## 2017-03-23 PROCEDURE — 36415 COLL VENOUS BLD VENIPUNCTURE: CPT

## 2017-03-23 PROCEDURE — 99499 UNLISTED E&M SERVICE: CPT | Mod: S$PBB,,, | Performed by: OBSTETRICS & GYNECOLOGY

## 2017-03-23 RX ORDER — ONDANSETRON 4 MG/1
8 TABLET, ORALLY DISINTEGRATING ORAL EVERY 8 HOURS PRN
Status: DISCONTINUED | OUTPATIENT
Start: 2017-03-23 | End: 2017-03-30 | Stop reason: HOSPADM

## 2017-03-23 RX ORDER — LAMOTRIGINE 25 MG/1
25 TABLET ORAL NIGHTLY
COMMUNITY
End: 2017-04-25 | Stop reason: DRUGHIGH

## 2017-03-23 RX ORDER — QUETIAPINE FUMARATE 50 MG/1
50 TABLET, FILM COATED ORAL NIGHTLY
Status: ON HOLD | COMMUNITY
End: 2017-03-30 | Stop reason: HOSPADM

## 2017-03-23 RX ORDER — LAMOTRIGINE 150 MG/1
150 TABLET ORAL NIGHTLY
Status: ON HOLD | COMMUNITY
End: 2017-03-30 | Stop reason: HOSPADM

## 2017-03-23 NOTE — IP AVS SNAPSHOT
[place] [address] [place] [address]  Phone: [phone]           Patient Discharge Instructions    Our goal is to set you up for success. This packet includes information on your condition, medications, and your home care. It will help you to care for yourself so you don't get sicker.     Please ask your nurse if you have any questions.        There are many details to remember when preparing for your surgery. Here is what you will need to do, please ask your nurse if there are more specific instructions and if you have any questions:    1. 24 hours before procedure Do not smoke or drink alcoholic beverages 24 hours prior to your procedure    2. Eating before procedure Do not eat or drink anything 8 hours before your procedure - this includes gum, mints, and candy.     3. Day of procedure Please remove all jewelry for the procedure. If you wear contact lenses, dentures, hearing aids or glasses, bring a container to put them in during your surgery and give to a family member for safekeeping.  If your doctor has scheduled you for an overnight stay, bring a small overnight bag with any personal items that you need.    4. After procedure Make arrangements in advance for transportation home by a responsible adult. It is not safe to drive a vehicle during the 24 hours following surgery.     PLEASE NOTE: You may be contacted the day before your surgery to confirm your surgery date and arrival time. The Surgery schedule has many variables which may affect the time of your surgery case. Family members should be available if your surgery time changes.                [facility]  Unless otherwise directed by your provider, please contact [facility], our nurse care line that is available for 24/7 assistance.     [phone] (toll-free)    Registered nurses in the [facility] provide clinical advisement, health education, appointment booking, and other advisory  services.                    ** Verify the list of medication(s) below is accurate and up to date. Carry this with you in case of emergency. If your medications have changed, please notify your healthcare provider.             Medication List      TAKE these medications        Additional Info                      cephALEXin 500 MG capsule   Commonly known as:  KEFLEX   Refills:  0      Begin Date    AM    Noon    PM    Bedtime       cyanocobalamin 1,000 mcg/mL injection   Refills:  0      Begin Date    AM    Noon    PM    Bedtime       fluoxetine 20 MG capsule   Commonly known as:  PROZAC   Refills:  0   What changed:  Another medication with the same name was removed. Continue taking this medication, and follow the directions you see here.      Begin Date    AM    Noon    PM    Bedtime       folic acid 1 MG tablet   Commonly known as:  FOLVITE   Refills:  0      Begin Date    AM    Noon    PM    Bedtime       * lamotrigine 150 MG Tab   Commonly known as:  LAMICTAL   Refills:  0   Dose:  150 mg    Instructions:  Take 150 mg by mouth every evening. Takes total 175 mg daily     Begin Date    AM    Noon    PM    Bedtime       * lamotrigine 25 MG tablet   Commonly known as:  LAMICTAL   Refills:  0   Dose:  25 mg    Instructions:  Take 25 mg by mouth every evening. Total 175 mg daily     Begin Date    AM    Noon    PM    Bedtime       NON FORMULARY MEDICATION   Refills:  0    Instructions:  Keto Os supplement for weight loss, management.     Begin Date    AM    Noon    PM    Bedtime       SEROQUEL 50 MG tablet   Refills:  0   Dose:  50 mg   Generic drug:  quetiapine    Instructions:  Take 50 mg by mouth every evening.     Begin Date    AM    Noon    PM    Bedtime       tramadol 50 mg tablet   Commonly known as:  ULTRAM   Quantity:  20 tablet   Refills:  1   Dose:  50 mg    Instructions:  Take 1 tablet (50 mg total) by mouth every 6 (six) hours as needed for Pain.     Begin Date    AM    Noon    PM    Bedtime       *  Notice:  This list has 2 medication(s) that are the same as other medications prescribed for you. Read the directions carefully, and ask your doctor or other care provider to review them with you.               Please bring to all follow up appointments:    1. A copy of your discharge instructions.  2. All medicines you are currently taking in their original bottles.  3. Identification and insurance card.    Please arrive 15 minutes ahead of scheduled appointment time.    Please call 24 hours in advance if you must reschedule your appointment and/or time.        Your Future Surgeries/Procedures     Mar 30, 2017   Surgery with Melo Lopez MD   [facility] ([facility])    [place] [id number] [phone]                  Discharge Instructions         Your surgery is scheduled for __Thursday March 30, 2017__________________.    Call [phone] between 2 p.m. and 5 p.m. on   __Wednesday_____________ to find out your arrival time for the day of your surgery.      Please report to SAME DAY SURGERY UNIT on the 2nd FLOOR at _______ a.m.  Use front door entrance. The doors open at 0530 am.          INSTRUCTIONS IMPORTANT!!!  ¨ Do not eat or drink after 12 midnight-including water. OK to brush teeth, no   gum, candy or mints!      _x___  Prep instructions:    SHOWER     _x___  Please shower using Hibiclens soap the night before AND  the morning of  your surgery/procedure. Do not use Hibiclens on your face or genitals        x       If your surgery is around your belly button (Navel) be sure to wash inside your  belly button also. Rinse hibiclens off completely.  _x___  No shaving of procedural area at least 4-5 days before surgery due to increased risk of skin irritation and/or possible infection.  _x___  Do not wear makeup, including mascara. WEARING EYE MAKEUP MAY LEAD TO SERIOUS EYE INJURY during surgery.  _x___  No powder, lotions or creams to your body.  _x___  You may  "wear only deodorant on the day of surgery.  _x___  Please remove all jewelry, including piercings and leave at home.  _x___  No money or valuables needed. Please leave at home.  You may bring your cell phone.  _x___  If going home the same day, arrange for a ride home. You will not be able to   drive if Anesthesia was used.    _x___  Wear loose fitting clothing. Allow for dressings, bandages.  _x___  Stop Aspirin, Ibuprofen, Motrin and Aleve at least 3-5 days before surgery, unless otherwise instructed by your doctor, or the nurse.              You MAY use Tylenol/acetaminophen until day of surgery.  _x___  Call MD for temperature above 101 degrees.        _x___ Stop taking any Fish Oil supplement or any Vitamins that contain Vitamin E at least 5 days prior to surgery.          I have read or had read and explained to me, and understand the above information.  Additional comments or instructions:Please call   [phone] if you have any questions regarding the instructions above.                 Admission Information     Date & Time Provider Department CSN    3/23/2017  3:30 PM Melo Lopez MD [facility] [id number]      Care Providers     Provider Role Specialty Primary office phone    Melo Lopez MD Attending Provider Obstetrics and Gynecology [phone]      Your Vitals Were     BP Pulse Temp Resp Height Weight    91/61 (BP Location: Left arm, Patient Position: Sitting, BP Method: Automatic) 67 97.5 °F (36.4 °C) (Oral) 16 5' 3" (1.6 m) 57.2 kg (126 lb 1.7 oz)    Last Period SpO2 BMI          02/22/2017 (Exact Date) 98% 22.34 kg/m2        Recent Lab Values     No lab values to display.      Allergies as of 3/23/2017        Reactions    Gluten Protein Other (See Comments)    Sever stomach ache, very tired feeling      Advance Directives     An advance directive is a document which, in the event you are no longer able to make decisions for yourself, tells your healthcare team what kind of treatment " you do or do not want to receive, or who you would like to make those decisions for you.  If you do not currently have an advance directive, [facility] encourages you to create one.  For more information call:  [phone]), [phone] ([phone]),  or log on to [email or web address].        Language Assistance Services     ATTENTION: Language assistance services are available, free of charge. Please call [phone].      ATENCIÓN: Si habla espcindy, tiene a ledesma disposición servicios gratuitos de asistencia lingüística. Llame al [phone].     CHÚ Ý: N?u b?n nói Ti?ng Vi?t, có các d?ch v? h? tr? ngôn ng? mi?n phí dành cho b?n. G?i s? [phone].        [facility] Sign-Up     Activating your [facility] account is as easy as 1-2-3!     1) Visit [email or web address], select Sign Up Now, enter this activation code and your date of birth, then select Next.  [email or web address]  Expires: 4/15/2017  2:15 PM      2) Create a username and password to use when you visit [facility] in the future and select a security question in case you lose your password and select Next.    3) Enter your e-mail address and click Sign Up!    Additional Information  If you have questions, please e-mail [email or web address] or call [phone] to talk to our [facility] staff. Remember, [facility] is NOT to be used for urgent needs. For medical emergencies, dial 911.          [facility] complies with applicable Federal civil rights laws and does not discriminate on the basis of race, color, national origin, age, disability, or sex.

## 2017-03-23 NOTE — PRE-PROCEDURE INSTRUCTIONS
Pre-operative instructions, medication directives and pain scales reviewed with patient.  Instructed to wash with Hibiclens prior to surgery as directed.   All questions the patient had  were answered. Re-assurance about surgical procedure and day of surgery routine given as needed. The patient verbalized understanding of the pre-op instructions.

## 2017-03-23 NOTE — DISCHARGE INSTRUCTIONS
Your surgery is scheduled for __Thursday March 30, 2017__________________.    Call [phone] between 2 p.m. and 5 p.m. on   __Wednesday_____________ to find out your arrival time for the day of your surgery.      Please report to SAME DAY SURGERY UNIT on the 2nd FLOOR at _______ a.m.  Use front door entrance. The doors open at 0530 am.          INSTRUCTIONS IMPORTANT!!!  ¨ Do not eat or drink after 12 midnight-including water. OK to brush teeth, no   gum, candy or mints!      _x___  Prep instructions:    SHOWER     _x___  Please shower using Hibiclens soap the night before AND  the morning of  your surgery/procedure. Do not use Hibiclens on your face or genitals        x       If your surgery is around your belly button (Navel) be sure to wash inside your  belly button also. Rinse hibiclens off completely.  _x___  No shaving of procedural area at least 4-5 days before surgery due to increased risk of skin irritation and/or possible infection.  _x___  Do not wear makeup, including mascara. WEARING EYE MAKEUP MAY LEAD TO SERIOUS EYE INJURY during surgery.  _x___  No powder, lotions or creams to your body.  _x___  You may wear only deodorant on the day of surgery.  _x___  Please remove all jewelry, including piercings and leave at home.  _x___  No money or valuables needed. Please leave at home.  You may bring your cell phone.  _x___  If going home the same day, arrange for a ride home. You will not be able to   drive if Anesthesia was used.    _x___  Wear loose fitting clothing. Allow for dressings, bandages.  _x___  Stop Aspirin, Ibuprofen, Motrin and Aleve at least 3-5 days before surgery, unless otherwise instructed by your doctor, or the nurse.              You MAY use Tylenol/acetaminophen until day of surgery.  _x___  Call MD for temperature above 101 degrees.        _x___ Stop taking any Fish Oil supplement or any Vitamins that contain Vitamin E at least 5 days prior to surgery.          I have read or had  read and explained to me, and understand the above information.  Additional comments or instructions:Please call   [phone] if you have any questions regarding the instructions above.

## 2017-03-23 NOTE — MR AVS SNAPSHOT
[facility] - OB/ GYN  [address] [facility] [address]  [place] [address]  Phone: [phone]                  Shruthi Richard   3/23/2017 2:00 PM   Office Visit    Description:  Female : 1980   Provider:  Melo Lopez MD   Department:  [facility] - OB/ [facility]YN           Reason for Visit     Pre-op Exam                To Do List           Future Appointments        Provider Department Dept Phone    3/23/2017  3:30 PM PRE-ADMIT 1, [facility] [phone]      Your Future Surgeries/Procedures     Mar 30, 2017   Surgery with Melo Lopez MD   [facility] ([facility])    [address] [place] [address] [phone]              Goals (5 Years of Data)     None      [facility]     [facility] Nurse Care Line -  Assistance  Registered nurses in the [facility] provide clinical advisement, health education, appointment booking, and other advisory services.  Call for this free service at [phone].             Medications           Message regarding Medications     Verify the changes and/or additions to your medication regime listed below are the same as discussed with your clinician today.  If any of these changes or additions are incorrect, please notify your healthcare provider.             Verify that the below list of medications is an accurate representation of the medications you are currently taking.  If none reported, the list may be blank. If incorrect, please contact your healthcare provider. Carry this list with you in case of emergency.           Current Medications     cephALEXin (KEFLEX) 500 MG capsule     cyanocobalamin 1,000 mcg/mL injection     fluoxetine (PROZAC) 10 MG capsule PROZAC 20 MG CAPS    fluoxetine (PROZAC) 20 MG capsule     folic acid (FOLVITE) 1 MG tablet     phenazopyridine (PYRIDIUM) 200 MG tablet     tramadol (ULTRAM) 50 mg tablet Take 1 tablet (50 mg total) by mouth every 6  "(six) hours as needed for Pain.           Clinical Reference Information           Your Vitals Were     BP Height Weight Last Period BMI    92/60 (BP Location: Left arm, Patient Position: Sitting, BP Method: Manual) 5' 3" (1.6 m) 57.6 kg (127 lb 1.5 oz) 02/22/2017 (Exact Date) 22.51 kg/m2      Blood Pressure          Most Recent Value    BP  92/60      Allergies as of 3/23/2017     No Known Allergies      Immunizations Administered on Date of Encounter - 3/23/2017     None      [facility] Sign-Up     Activating your [facility] account is as easy as 1-2-3!     1) Visit [email or web address], select Sign Up Now, enter this activation code and your date of birth, then select Next.  [email or web address]  Expires: 4/15/2017  2:15 PM      2) Create a username and password to use when you visit [facility] in the future and select a security question in case you lose your password and select Next.    3) Enter your e-mail address and click Sign Up!    Additional Information  If you have questions, please e-mail [email or web address] or call [phone] to talk to our [facility] staff. Remember, [facility] is NOT to be used for urgent needs. For medical emergencies, dial 911.         Language Assistance Services     ATTENTION: Language assistance services are available, free of charge. Please call [phone].      ATENCIÓN: Si habla español, tiene a ledesma disposición servicios gratuitos de asistencia lingüística. Llame al [phone].     [facility] Ý: N?u b?n nói Ti?ng Vi?t, có các d?ch v? h? tr? ngôn ng? mi?n phí dành cho b?n. G?i s? [phone].         [facility] complies with applicable Federal civil rights laws and does not discriminate on the basis of race, color, national origin, age, disability, or sex.        "

## 2017-03-24 ENCOUNTER — TELEPHONE (OUTPATIENT)
Dept: OBSTETRICS AND GYNECOLOGY | Facility: CLINIC | Age: 37
End: 2017-03-24

## 2017-03-24 NOTE — TELEPHONE ENCOUNTER
----- Message from Peyton Teran sent at 3/24/2017 10:58 AM CDT -----  Contact: [facility] Pharmacist  Regarding about the Mirena. Request to speak to the nurse because there were some information missing on the form. Please contact [phone]. Thanks!

## 2017-03-26 NOTE — H&P
"[facility]  History & Physical  Obstetrics & Gynecology    Visit Date:  3/23/2017    Chief Complaint:  Pre-op for diagnostic laparoscopy    History of Present Illness:      Shruthi Richard is a 36 y.o.  here for pre-op for a diagnostic laparoscopy.    Pt is requesting pelvic laparoscopy due to chronic pelvic pain and possible endometriosis.     Pt states "I need to know what is going one inside my pelvis, I am in pain all the time".     Pt is resolute in her decision to proceed with surgery.    Otherwise, pt is in her usual state of health.    Significant other present for visit.    Past Medical History:      Diagnosis Date    Bipolar affective disorder, rapid cycling     bipolar 1    Vaginal delivery     x1     Past Surgical History:      History reviewed. No pertinent surgical history.    Medications:     Current Outpatient Prescriptions on File Prior to Visit   Medication Sig Dispense Refill    cephALEXin (KEFLEX) 500 MG capsule       cyanocobalamin 1,000 mcg/mL injection       fluoxetine (PROZAC) 20 MG capsule       folic acid (FOLVITE) 1 MG tablet       tramadol (ULTRAM) 50 mg tablet Take 1 tablet (50 mg total) by mouth every 6 (six) hours as needed for Pain. 20 tablet 1     Allergies:     Allergen Reactions    Gluten protein Other (See Comments)     Sever stomach ache, very tired feeling     Gynecologic History:      Denies active STI or h/o abnormal Pap     Social History:      Denies tobacco, alcohol abuse or illicit drug use  Denies domestic abuse     Family History:      Noncontributory    Review of Systems:      GENERAL:  No fever, fatigue, excessive weight gain or loss  HEENT:  No head injury, headaches, hearing changes, visual disturbance  RESPIRATORY:  No cough, shortness of breath  CARDIOVASCULAR:  No chest pain, heart palpitations, leg swelling  GASTROINTESTINAL:  No nausea, vomiting, constipation, diarrhea, abd pain, rectal bleeding   GENITOURINARY:  See " "HPI.  HEMATOLOGIC:  No easy bruisability or bleeding   LYMPHATICS:  No enlarged nodes  MUSCULOSKELETAL:  No joint pain or swelling  SKIN:  No rash, lesions, jaundice  NEUROLOGIC:  No dizziness, weakness, syncope  PSYCHIATRIC:  No depression, anxiety or mood swings     Physical Exam:     BP 92/60 (BP Location: Left arm, Patient Position: Sitting, BP Method: Manual)  Ht 5' 3" (1.6 m)  Wt 57.6 kg (127 lb 1.5 oz)  LMP 2017 (Exact Date)  BMI 22.51 kg/m2  Pulse 68, Resp rate 16     GENERAL: NAD, well-nourished  HEENT: NCAT, moist mucus membranes, anicteric, neck supple  LUNGS: CTA-B  HEART: RRR  ABDOMEN: Diffuse tenderness in lower pelvis. Non-distended. Normoactive BS. No obvious organomegaly.  EXT: Symmetric w/o cramping, claudication, or edema. +2 distal pulses.  SKIN: No rashes or bruising  NEURO: Grossly intact bilaterally  PSYCH: Mood and affect appropriate  GENITOURINARY:  NFEG no lesion. No vaginal or cervical lesion. No bleeding or discharge. No CMT. Mild uterine tenderness. Uterus and ovaries small, mobile. Wet prep negative. Declined rectal exam. No obvious external lesions.    Labs/studies:    Lab Results   Component Value Date    WBC 7.99 2017    HGB 13.9 2017    HCT 40.0 2017    MCV 87 2017     2017         Assessment:     36 y.o.  with:    1. Chronic pelvic pain  2. Dysmenorrhea    Plan:    We discussed her condition in great detail.  The proposed procedure will be a diagnostic laparoscopy, possible lysis of pelvic adhesions and ablation of endometrial implants if feasible as determined at the time of surgery.  Pt is resolute in her decision to proceed with surgery.        Pt was informed of the risks, benefits, and alternatives of pelvic laparoscopy.  Risks included but were not limited to bleeding, infection, injury to surrounding organs or tissues, injury to bladder and/or urinary tract, injury to bowel and/or intestinal obstruction, damage to major " blood vessels, hemorrhage requiring transfusion of blood products, hysterectomy +/- bilateral salpingo-oophorectomy due to injury or bleeding, ovarian failure requiring hormone administration, pulmonary embolism, fistula formation, urinary and/or fecal incontinence, sexual dysfunction/pain, chronic pain, hernia, failure of wound to heal, permanent/disfiguring scarring, and even death.  In the event of a hysterectomy, pt was counseled extensively on the inability to become pregnant and expressed an understanding of this.  Pt was also counseled that a bilateral oophorectomy will result in surgical menopause for pre/perimenopausal women and the long term health consequences thereof.  Pt was counseled in the possibility of laparotomy if extensive adhesions or bleeding were encountered.  Pt was counseled on the risk additional future surgery and/or treatment due to complications resulting from this surgery.  The risks, benefits, and alternatives to blood transfusion was also discussed.  Pt expressed an understanding of risks involved, all questions answered, and informed consent was obtained for the procedure and blood transfusion.    Surgery tentatively scheduled for 3/30/2017.    Pre-op instructions reviewed.      All questions answered, pt voiced understanding.        Melo Lopez MD

## 2017-03-26 NOTE — PROGRESS NOTES
Pt here for pre-op for diagnostic laparoscopy.  Please see pre-op H&P for complete details.    Melo Lopez MD

## 2017-03-29 ENCOUNTER — ANESTHESIA EVENT (OUTPATIENT)
Dept: SURGERY | Facility: HOSPITAL | Age: 37
End: 2017-03-29
Payer: MEDICAID

## 2017-03-30 ENCOUNTER — SURGERY (OUTPATIENT)
Age: 37
End: 2017-03-30

## 2017-03-30 ENCOUNTER — ANESTHESIA (OUTPATIENT)
Dept: SURGERY | Facility: HOSPITAL | Age: 37
End: 2017-03-30
Payer: MEDICAID

## 2017-03-30 ENCOUNTER — HOSPITAL ENCOUNTER (OUTPATIENT)
Facility: HOSPITAL | Age: 37
Discharge: HOME | End: 2017-03-30
Attending: OBSTETRICS & GYNECOLOGY | Admitting: OBSTETRICS & GYNECOLOGY
Payer: MEDICAID

## 2017-03-30 VITALS
DIASTOLIC BLOOD PRESSURE: 54 MMHG | HEIGHT: 63 IN | HEART RATE: 64 BPM | OXYGEN SATURATION: 99 % | TEMPERATURE: 98 F | RESPIRATION RATE: 20 BRPM | BODY MASS INDEX: 22.35 KG/M2 | WEIGHT: 126.13 LBS | SYSTOLIC BLOOD PRESSURE: 79 MMHG

## 2017-03-30 DIAGNOSIS — G89.29 CHRONIC FEMALE PELVIC PAIN: ICD-10-CM

## 2017-03-30 DIAGNOSIS — R10.2 CHRONIC PELVIC PAIN IN FEMALE: ICD-10-CM

## 2017-03-30 DIAGNOSIS — G89.29 CHRONIC PELVIC PAIN IN FEMALE: ICD-10-CM

## 2017-03-30 DIAGNOSIS — Z98.890 STATUS POST LAPAROSCOPY: Primary | ICD-10-CM

## 2017-03-30 DIAGNOSIS — R10.2 CHRONIC FEMALE PELVIC PAIN: ICD-10-CM

## 2017-03-30 LAB
ABO + RH BLD: NORMAL
BLD GP AB SCN CELLS X3 SERPL QL: NORMAL
POCT GLUCOSE: 92 MG/DL (ref 70–110)

## 2017-03-30 PROCEDURE — 25000003 PHARM REV CODE 250: Performed by: REGISTERED NURSE

## 2017-03-30 PROCEDURE — 25000003 PHARM REV CODE 250: Performed by: ANESTHESIOLOGY

## 2017-03-30 PROCEDURE — 63600175 PHARM REV CODE 636 W HCPCS: Performed by: ANESTHESIOLOGY

## 2017-03-30 PROCEDURE — 86850 RBC ANTIBODY SCREEN: CPT

## 2017-03-30 PROCEDURE — 71000039 HC RECOVERY, EACH ADD'L HOUR: Performed by: OBSTETRICS & GYNECOLOGY

## 2017-03-30 PROCEDURE — 86900 BLOOD TYPING SEROLOGIC ABO: CPT

## 2017-03-30 PROCEDURE — 63600175 PHARM REV CODE 636 W HCPCS: Performed by: REGISTERED NURSE

## 2017-03-30 PROCEDURE — 71000015 HC POSTOP RECOV 1ST HR: Performed by: OBSTETRICS & GYNECOLOGY

## 2017-03-30 PROCEDURE — 71000016 HC POSTOP RECOV ADDL HR: Performed by: OBSTETRICS & GYNECOLOGY

## 2017-03-30 PROCEDURE — 36000708 HC OR TIME LEV III 1ST 15 MIN: Performed by: OBSTETRICS & GYNECOLOGY

## 2017-03-30 PROCEDURE — 25000003 PHARM REV CODE 250: Performed by: OBSTETRICS & GYNECOLOGY

## 2017-03-30 PROCEDURE — 37000009 HC ANESTHESIA EA ADD 15 MINS: Performed by: OBSTETRICS & GYNECOLOGY

## 2017-03-30 PROCEDURE — 37000008 HC ANESTHESIA 1ST 15 MINUTES: Performed by: OBSTETRICS & GYNECOLOGY

## 2017-03-30 PROCEDURE — 36000709 HC OR TIME LEV III EA ADD 15 MIN: Performed by: OBSTETRICS & GYNECOLOGY

## 2017-03-30 PROCEDURE — 49320 DIAG LAPARO SEPARATE PROC: CPT | Mod: ,,, | Performed by: OBSTETRICS & GYNECOLOGY

## 2017-03-30 PROCEDURE — 71000033 HC RECOVERY, INTIAL HOUR: Performed by: OBSTETRICS & GYNECOLOGY

## 2017-03-30 PROCEDURE — D9220A PRA ANESTHESIA: Mod: QX,CRNA,, | Performed by: REGISTERED NURSE

## 2017-03-30 PROCEDURE — D9220A PRA ANESTHESIA: Mod: QK,ANES,, | Performed by: ANESTHESIOLOGY

## 2017-03-30 RX ORDER — IBUPROFEN 600 MG/1
600 TABLET ORAL EVERY 6 HOURS PRN
Qty: 60 TABLET | Refills: 0 | Status: SHIPPED | OUTPATIENT
Start: 2017-03-30 | End: 2017-04-13 | Stop reason: SDUPTHER

## 2017-03-30 RX ORDER — LIDOCAINE HYDROCHLORIDE 10 MG/ML
1 INJECTION, SOLUTION EPIDURAL; INFILTRATION; INTRACAUDAL; PERINEURAL ONCE AS NEEDED
Status: DISCONTINUED | OUTPATIENT
Start: 2017-03-30 | End: 2017-03-30 | Stop reason: HOSPADM

## 2017-03-30 RX ORDER — DIPHENHYDRAMINE HYDROCHLORIDE 50 MG/ML
25 INJECTION INTRAMUSCULAR; INTRAVENOUS EVERY 4 HOURS PRN
Status: DISCONTINUED | OUTPATIENT
Start: 2017-03-30 | End: 2017-03-30 | Stop reason: HOSPADM

## 2017-03-30 RX ORDER — LIDOCAINE HCL/PF 100 MG/5ML
SYRINGE (ML) INTRAVENOUS
Status: DISCONTINUED | OUTPATIENT
Start: 2017-03-30 | End: 2017-03-30

## 2017-03-30 RX ORDER — ROCURONIUM BROMIDE 10 MG/ML
INJECTION, SOLUTION INTRAVENOUS
Status: DISCONTINUED | OUTPATIENT
Start: 2017-03-30 | End: 2017-03-30

## 2017-03-30 RX ORDER — FENTANYL CITRATE 50 UG/ML
INJECTION, SOLUTION INTRAMUSCULAR; INTRAVENOUS
Status: DISCONTINUED | OUTPATIENT
Start: 2017-03-30 | End: 2017-03-30

## 2017-03-30 RX ORDER — KETOROLAC TROMETHAMINE 30 MG/ML
30 INJECTION, SOLUTION INTRAMUSCULAR; INTRAVENOUS EVERY 6 HOURS
Status: DISCONTINUED | OUTPATIENT
Start: 2017-03-30 | End: 2017-03-30 | Stop reason: HOSPADM

## 2017-03-30 RX ORDER — OXYCODONE AND ACETAMINOPHEN 5; 325 MG/1; MG/1
1 TABLET ORAL EVERY 4 HOURS PRN
Qty: 30 TABLET | Refills: 0 | Status: SHIPPED | OUTPATIENT
Start: 2017-03-30 | End: 2017-04-13

## 2017-03-30 RX ORDER — SODIUM CHLORIDE, SODIUM LACTATE, POTASSIUM CHLORIDE, CALCIUM CHLORIDE 600; 310; 30; 20 MG/100ML; MG/100ML; MG/100ML; MG/100ML
INJECTION, SOLUTION INTRAVENOUS CONTINUOUS
Status: DISCONTINUED | OUTPATIENT
Start: 2017-03-30 | End: 2017-03-30 | Stop reason: SDUPTHER

## 2017-03-30 RX ORDER — PROPOFOL 10 MG/ML
VIAL (ML) INTRAVENOUS
Status: DISCONTINUED | OUTPATIENT
Start: 2017-03-30 | End: 2017-03-30

## 2017-03-30 RX ORDER — DIPHENHYDRAMINE HCL 25 MG
25 CAPSULE ORAL EVERY 4 HOURS PRN
Status: DISCONTINUED | OUTPATIENT
Start: 2017-03-30 | End: 2017-03-30 | Stop reason: HOSPADM

## 2017-03-30 RX ORDER — DIPHENHYDRAMINE HYDROCHLORIDE 50 MG/ML
25 INJECTION INTRAMUSCULAR; INTRAVENOUS EVERY 6 HOURS PRN
Status: DISCONTINUED | OUTPATIENT
Start: 2017-03-30 | End: 2017-03-30 | Stop reason: HOSPADM

## 2017-03-30 RX ORDER — ONDANSETRON 2 MG/ML
INJECTION INTRAMUSCULAR; INTRAVENOUS
Status: DISCONTINUED
Start: 2017-03-30 | End: 2017-03-30 | Stop reason: HOSPADM

## 2017-03-30 RX ORDER — HYDROCODONE BITARTRATE AND ACETAMINOPHEN 5; 325 MG/1; MG/1
1 TABLET ORAL EVERY 4 HOURS PRN
Status: DISCONTINUED | OUTPATIENT
Start: 2017-03-30 | End: 2017-03-30 | Stop reason: HOSPADM

## 2017-03-30 RX ORDER — NEOSTIGMINE METHYLSULFATE 1 MG/ML
INJECTION, SOLUTION INTRAVENOUS
Status: DISCONTINUED | OUTPATIENT
Start: 2017-03-30 | End: 2017-03-30

## 2017-03-30 RX ORDER — GLYCOPYRROLATE 0.2 MG/ML
INJECTION INTRAMUSCULAR; INTRAVENOUS
Status: DISCONTINUED | OUTPATIENT
Start: 2017-03-30 | End: 2017-03-30

## 2017-03-30 RX ORDER — HYDROMORPHONE HYDROCHLORIDE 2 MG/ML
0.2 INJECTION, SOLUTION INTRAMUSCULAR; INTRAVENOUS; SUBCUTANEOUS EVERY 5 MIN PRN
Status: COMPLETED | OUTPATIENT
Start: 2017-03-30 | End: 2017-03-30

## 2017-03-30 RX ORDER — ONDANSETRON 8 MG/1
8 TABLET, ORALLY DISINTEGRATING ORAL EVERY 8 HOURS PRN
Status: DISCONTINUED | OUTPATIENT
Start: 2017-03-30 | End: 2017-03-30 | Stop reason: HOSPADM

## 2017-03-30 RX ORDER — KETOROLAC TROMETHAMINE 30 MG/ML
INJECTION, SOLUTION INTRAMUSCULAR; INTRAVENOUS
Status: DISCONTINUED | OUTPATIENT
Start: 2017-03-30 | End: 2017-03-30

## 2017-03-30 RX ORDER — ONDANSETRON 2 MG/ML
INJECTION INTRAMUSCULAR; INTRAVENOUS
Status: DISCONTINUED | OUTPATIENT
Start: 2017-03-30 | End: 2017-03-30

## 2017-03-30 RX ORDER — IBUPROFEN 600 MG/1
600 TABLET ORAL EVERY 6 HOURS PRN
Status: DISCONTINUED | OUTPATIENT
Start: 2017-03-30 | End: 2017-03-30 | Stop reason: HOSPADM

## 2017-03-30 RX ORDER — IBUPROFEN 400 MG/1
800 TABLET ORAL EVERY 8 HOURS
Status: DISCONTINUED | OUTPATIENT
Start: 2017-03-31 | End: 2017-03-30 | Stop reason: HOSPADM

## 2017-03-30 RX ORDER — MEPERIDINE HYDROCHLORIDE 50 MG/ML
12.5 INJECTION INTRAMUSCULAR; INTRAVENOUS; SUBCUTANEOUS ONCE AS NEEDED
Status: DISCONTINUED | OUTPATIENT
Start: 2017-03-30 | End: 2017-03-30 | Stop reason: HOSPADM

## 2017-03-30 RX ORDER — PHENYLEPHRINE HYDROCHLORIDE 10 MG/ML
INJECTION INTRAVENOUS
Status: DISCONTINUED | OUTPATIENT
Start: 2017-03-30 | End: 2017-03-30

## 2017-03-30 RX ORDER — SODIUM CHLORIDE 0.9 G/100ML
IRRIGANT IRRIGATION
Status: DISCONTINUED | OUTPATIENT
Start: 2017-03-30 | End: 2017-03-30 | Stop reason: HOSPADM

## 2017-03-30 RX ORDER — SODIUM CHLORIDE, SODIUM LACTATE, POTASSIUM CHLORIDE, CALCIUM CHLORIDE 600; 310; 30; 20 MG/100ML; MG/100ML; MG/100ML; MG/100ML
INJECTION, SOLUTION INTRAVENOUS CONTINUOUS
Status: DISCONTINUED | OUTPATIENT
Start: 2017-03-30 | End: 2017-03-30 | Stop reason: HOSPADM

## 2017-03-30 RX ORDER — MIDAZOLAM HYDROCHLORIDE 1 MG/ML
INJECTION, SOLUTION INTRAMUSCULAR; INTRAVENOUS
Status: DISCONTINUED | OUTPATIENT
Start: 2017-03-30 | End: 2017-03-30

## 2017-03-30 RX ORDER — LORAZEPAM 2 MG/ML
0.25 INJECTION INTRAMUSCULAR ONCE AS NEEDED
Status: COMPLETED | OUTPATIENT
Start: 2017-03-30 | End: 2017-03-30

## 2017-03-30 RX ADMIN — HYDROMORPHONE HYDROCHLORIDE 0.2 MG: 2 INJECTION INTRAMUSCULAR; INTRAVENOUS; SUBCUTANEOUS at 01:03

## 2017-03-30 RX ADMIN — ROCURONIUM BROMIDE 40 MG: 10 INJECTION, SOLUTION INTRAVENOUS at 12:03

## 2017-03-30 RX ADMIN — HYDROCODONE BITARTRATE AND ACETAMINOPHEN 1 TABLET: 5; 325 TABLET ORAL at 03:03

## 2017-03-30 RX ADMIN — ONDANSETRON 4 MG: 2 INJECTION, SOLUTION INTRAMUSCULAR; INTRAVENOUS at 01:03

## 2017-03-30 RX ADMIN — HYDROMORPHONE HYDROCHLORIDE 0.2 MG: 2 INJECTION INTRAMUSCULAR; INTRAVENOUS; SUBCUTANEOUS at 02:03

## 2017-03-30 RX ADMIN — SODIUM CHLORIDE 500 ML: 0.9 IRRIGANT IRRIGATION at 12:03

## 2017-03-30 RX ADMIN — PHENYLEPHRINE HYDROCHLORIDE 100 MCG: 10 INJECTION INTRAVENOUS at 12:03

## 2017-03-30 RX ADMIN — DIPHENHYDRAMINE HYDROCHLORIDE 25 MG: 50 INJECTION INTRAMUSCULAR; INTRAVENOUS at 02:03

## 2017-03-30 RX ADMIN — GLYCOPYRROLATE 0.6 MG: 0.2 INJECTION, SOLUTION INTRAMUSCULAR; INTRAVENOUS at 01:03

## 2017-03-30 RX ADMIN — MIDAZOLAM HYDROCHLORIDE 2 MG: 1 INJECTION, SOLUTION INTRAMUSCULAR; INTRAVENOUS at 12:03

## 2017-03-30 RX ADMIN — FENTANYL CITRATE 100 MCG: 50 INJECTION INTRAMUSCULAR; INTRAVENOUS at 12:03

## 2017-03-30 RX ADMIN — LIDOCAINE HYDROCHLORIDE 100 MG: 20 INJECTION, SOLUTION INTRAVENOUS at 12:03

## 2017-03-30 RX ADMIN — LORAZEPAM 0.25 MG: 2 INJECTION, SOLUTION INTRAMUSCULAR; INTRAVENOUS at 02:03

## 2017-03-30 RX ADMIN — GLYCOPYRROLATE 0.2 MG: 0.2 INJECTION, SOLUTION INTRAMUSCULAR; INTRAVENOUS at 12:03

## 2017-03-30 RX ADMIN — NEOSTIGMINE METHYLSULFATE 5 MG: 1 INJECTION INTRAVENOUS at 01:03

## 2017-03-30 RX ADMIN — KETOROLAC TROMETHAMINE 30 MG: 30 INJECTION, SOLUTION INTRAMUSCULAR; INTRAVENOUS at 01:03

## 2017-03-30 RX ADMIN — PROPOFOL 170 MG: 10 INJECTION, EMULSION INTRAVENOUS at 12:03

## 2017-03-30 RX ADMIN — SODIUM CHLORIDE, SODIUM LACTATE, POTASSIUM CHLORIDE, AND CALCIUM CHLORIDE: .6; .31; .03; .02 INJECTION, SOLUTION INTRAVENOUS at 11:03

## 2017-03-30 NOTE — DISCHARGE INSTRUCTIONS
***  BATHING:                   You may shower after your dressing is removed, but no tub baths, hot tubs, saunas or swimming until you see the doctor.    DRESSING:  ? Remove your bandage ________________. If there are skin tapes over the incision, leave them in place. They will start to come off in 5-7 days.  ACTIVITY LEVEL: If you have received sedation or an anesthetic, you may feel sleepy for   several hours. Rest until you are more awake. Gradually resume your normal activities  ? No heavy lifting or straining, nothing over 10 lbs., like a gallon of milk.  ? Pelvic rest- no sex, tampons or douching until follow up or instructed by doctor.  DIET:  You may resume your home diet. If nausea is present, increase your diet gradually with fluids and bland foods.    Medications:  Pain medication should be taken only if needed and as directed. If antibiotics are prescribed, the medication should be taken until completed. You will be given an updated list of you medications.  ? No driving, alcoholic beverages or signing legal documents for next 24 hours or while taking pain medication    CALL THE DOCTOR:    For any obvious bleeding (some dried blood over the incision is normal).      Redness, swelling, foul smell around incision or fever over 101.   Shortness of breath, Coughing up Bloody Sputum or Pains or Swelling in your calves.   Persistent pain or nausea not relieved by medication.   If vaginal bleeding is in excess of a normal period.   Problems urinating    If any unusual problems or difficulties occur contact your doctor. If you cannot contact your doctor but feel your signs and symptoms warrant a physicians attention return to the emergency room.    Fall Prevention  Millions of people fall every year and injure themselves. You may have had anesthesia or sedation which may increase your risk of falling. You may have health issues that put you at an increased risk of falling.     Here are ways to reduce  your risk of falling.  ·   · Make your home safe by keeping walkways clear of objects you may trip over.  · Use non-slip pads under rugs. Do not use area rugs or small throw rugs.  · Use non-slip mats in bathtubs and showers.  · Install handrails and lights on staircases.  · Do not walk in poorly lit areas.  · Do not stand on chairs or wobbly ladders.  · Use caution when reaching overhead or looking upward. This position can cause a loss of balance.  · Be sure your shoes fit properly, have non-slip bottoms and are in good condition.   · Wear shoes both inside and out. Avoid going barefoot or wearing slippers.  · Be cautious when going up and down stairs, curbs, and when walking on uneven sidewalks.  · If your balance is poor, consider using a cane or walker.  · If your fall was related to alcohol use, stop or limit alcohol intake.   · If your fall was related to use of sleeping medicines, talk to your doctor about this. You may need to reduce your dosage at bedtime if you awaken during the night to go to the bathroom.    · To reduce the need for nighttime bathroom trips:  ¨ Avoid drinking fluids for several hours before going to bed  ¨ Empty your bladder before going to bed  ¨ Men can keep a urinal at the bedside  · Stay as active as you can. Balance, flexibility, strength, and endurance all come from exercise. They all play a role in preventing falls. Ask your healthcare provider which types of activity are right for you.  · Get your vision checked on a regular basis.  · If you have pets, know where they are before you stand up or walk so you don't trip over them.  · Use night lights.

## 2017-03-30 NOTE — INTERVAL H&P NOTE
The patient has been examined and the H&P has been reviewed:    I concur with the findings and no changes have occurred since H&P was written.    Anesthesia/Surgery risks, benefits and alternative options discussed and understood by patient/family.          Active Hospital Problems    Diagnosis  POA    Chronic female pelvic pain [R10.2, G89.29]  Yes      Resolved Hospital Problems    Diagnosis Date Resolved POA   No resolved problems to display.

## 2017-03-30 NOTE — ANESTHESIA POSTPROCEDURE EVALUATION
"Anesthesia Post Evaluation    Patient: Shruthi Richard    Procedure(s) Performed: Procedure(s) (LRB):  LAPAROSCOPY-DIAGNOSTIC (N/A)    Final Anesthesia Type: general  Patient location during evaluation: PACU  Patient participation: Yes- Able to Participate  Level of consciousness: awake  Post-procedure vital signs: reviewed and stable  Pain management: adequate  Airway patency: patent  PONV status at discharge: No PONV  Anesthetic complications: no      Cardiovascular status: stable  Respiratory status: unassisted  Hydration status: euvolemic  Follow-up not needed.        Visit Vitals    BP (!) 99/55    Pulse (!) 58    Temp 36.6 °C (97.9 °F) (Oral)    Resp (!) 21    Ht 5' 3" (1.6 m)    Wt 57.2 kg (126 lb 1.7 oz)    LMP 02/22/2017 (Exact Date)    SpO2 99%    BMI 22.34 kg/m2       Pain/Sangita Score: Pain Assessment Performed: Yes (3/30/2017  1:21 PM)  Presence of Pain: complains of pain/discomfort (3/30/2017  1:21 PM)  Pain Rating Prior to Med Admin: 10 (3/30/2017  1:56 PM)  Sangita Score: 10 (3/30/2017  1:50 PM)      "

## 2017-03-30 NOTE — OP NOTE
[facility]   Operative Report   Obstetrics & Gynecology     Date of Surgery:  3/30/2017     Surgeon:  Melo Lopez MD     Preoperative diagnosis:     Chronic pelvic pain     Postoperative diagnosis:     Chronic pelvic pain  Adenomyosis    Procedure performed:      Diagnostic laparoscopy    Anesthesia:  General      IV Fluids:  700 mL of LR     Urine Output:  400 mL, clear at end of procedure       Estimated Blood Loss:  <5 mL with no replacements     Specimens:  None                Findings:       Boggy uterus; otherwise normal appearing  Right and left fallopian tubes and ovaries normal appearing  Anterior and posterior cul-de-sac clear  Pelvic side walls clear without gross lymphadenopathy  Omentum, appendix, small and large bowel in operative field normal appearing  Liver edge with normal appearance    Complications:  None    Indications for the Procedure:      See H&P for complete details.  In brief, Shruthi Richard is a 36 y.o.  with chronic pelvic pain for diagnostic laparoscopy.  Pt is resolute in her decision to proceed with surgery.    Pt was informed of the risks, benefits, and alternatives of pelvic laparoscopy. Risks included but were not limited to bleeding, infection, injury to surrounding organs or tissues, injury to bladder and/or urinary tract, injury to bowel and/or intestinal obstruction, damage to major blood vessels, hemorrhage requiring transfusion of blood products, hysterectomy +/- bilateral salpingo-oophorectomy due to injury or bleeding, ovarian failure requiring hormone administration, pulmonary embolism, fistula formation, urinary and/or fecal incontinence, sexual dysfunction/pain, chronic pain, hernia, failure of wound to heal, permanent/disfiguring scarring, and even death. In the event of a hysterectomy, pt was counseled extensively on the inability to become pregnant and expressed an understanding of this. Pt was also counseled that a bilateral  oophorectomy will result in surgical menopause for pre/perimenopausal women and the long term health consequences thereof. Pt was counseled in the possibility of laparotomy if extensive adhesions or bleeding were encountered. Pt was counseled on the risk additional future surgery and/or treatment due to complications resulting from this surgery. The risks, benefits, and alternatives to blood transfusion was also discussed. Pt expressed an understanding of risks involved, all questions answered, and informed consent was obtained for the procedure and blood transfusion.     Description of the Procedure:      Pt was taken to the operating room where a time out was performed to confirm the correct patient and correct procedure.  Pt was then placed in the dorsal lithotomy position with legs supported in Norris stirrups and care was taken to pad all pressure points.  General anesthesia was obtained without difficulty.  A Pat hugger was placed to maintain control of core body temperature.  Pt was then prepped and draped in a normal sterile fashion.  A santillan catheter was inserted.  A weighted speculum was then inserted into the vaginal.  The anterior lip of cervix was visualized and grasped using a single-tooth tenaculum.  The cervix was serially dilated with Hegar dilators for the introduction of a SCI SolutionlLookmash uterine manipulator.  The weight speculum and single tooth tenaculum was then removed.  Hemostasis was noted.  Attention was turned to the abdomen for laparoscopy.  A small vertical incision was made infraumbilical.  The Veress needle was introduced into the peritoneum.  Placement of the needle was confirmed with normal saline drop test.  Pneumoperitoneum was then established with ~3 liters of carbon dioxide and the Veress needle removed.  A 5 mm trocar was inserted through the paraumbilical incision into the peritoneum under direct visualization with the laparoscope and the pneumoperitoneum was maintained using carbon  dioxide.  One 5 mm secondary port was placed in right lower quadrant.  Survery of the pelvis revealed the above findings.  No obvious endometrial implants noted.  The pneumoperitoneum was evacuated.  All instruments and ports where removed from abdomen under direct visualization.  Excellent hemostasis was noted.  All skin incisions were closed using 4-0 vicryl.  The uterine manipulator was removed from the cervix.  Good hemostasis was confirmed.  Pt tolerated the procedure well.  Sponge, lap and needle counts were correct time two.  Pt was transferred to the PACU in stable condition.  Findings and expectations were discussed with pt.  All of her questions were answered to her satisfaction and pt voiced understanding.       Melo Lopez MD

## 2017-03-30 NOTE — ANESTHESIA PREPROCEDURE EVALUATION
03/30/2017  Shruthi Richard is a 36 y.o., female.    [facility] Anesthesia Evaluation    I have reviewed the Patient Summary Reports.     I have reviewed the Medications.     Review of Systems  Anesthesia Hx:  Neg history of prior surgery.   Hematology/Oncology:  Hematology Normal        Pulmonary:  Pulmonary Normal    Renal/:  Renal/ Normal     Hepatic/GI:  Hepatic/GI Normal    Endocrine:  Endocrine Normal    Psych:   Psychiatric History          Physical Exam  General:  Well nourished    Airway/Jaw/Neck:  Airway Findings: Mouth Opening: Normal Tongue: Normal  General Airway Assessment: Adult  Mallampati: II  TM Distance: 4 - 6 cm  Jaw/Neck Findings:  Neck ROM: Normal ROM      Dental:  Dental Findings: In tact   Chest/Lungs:  Chest/Lungs Findings: Normal Respiratory Rate     Heart/Vascular:  Heart Findings: Rate: Normal        Mental Status:  Mental Status Findings:  Cooperative         Anesthesia Plan  Type of Anesthesia, risks & benefits discussed:  Anesthesia Type:  general  Patient's Preference:   Intra-op Monitoring Plan: standard ASA monitors  Intra-op Monitoring Plan Comments:   Post Op Pain Control Plan:   Post Op Pain Control Plan Comments:   Induction:   IV  Beta Blocker:  Patient is not currently on a Beta-Blocker (No further documentation required).       Informed Consent: Patient understands risks and agrees with Anesthesia plan.  Questions answered. Anesthesia consent signed with patient.  ASA Score: 2     Day of Surgery Review of History & Physical:    H&P update referred to the provider.         Ready For Surgery From Anesthesia Perspective.

## 2017-03-30 NOTE — IP AVS SNAPSHOT
[place] [address] [address] [place] [address]  Phone: [phone]           Patient Discharge Instructions   Our goal is to set you up for success. This packet includes information on your condition, medications, and your home care.  It will help you care for yourself to prevent having to return to the hospital.     Please ask your nurse if you have any questions.      There are many details to remember when preparing to leave the hospital. Here is what you will need to do:    1. Take your medicine. If you are prescribed medications, review your Medication List on the following pages. You may have new medications to  at the pharmacy and others that you'll need to stop taking. Review the instructions for how and when to take your medications. Talk with your doctor or nurses if you are unsure of what to do.     2. Go to your follow-up appointments. Specific follow-up information is listed in the following pages. Your may be contacted by a nurse or clinical provider about future appointments. Be sure we have all of the phone numbers to reach you. Please contact your provider's office if you are unable to make an appointment.     3. Watch for warning signs. Your doctor or nurse will give you detailed warning signs to watch for and when to call for assistance. These instructions may also include educational information about your condition. If you experience any of warning signs to your health, call your doctor.           [facility]  Unless otherwise directed by your provider, please   contact [facility], our nurse care line   that is available for 24/7 assistance.     [phone] (toll-free)     Registered nurses in the [facility]   provide: appointment scheduling, clinical advisement, health education, and other advisory services.                  ** Verify the list of medication(s) below is accurate and up to date. Carry this with you in case of emergency.  If your medications have changed, please notify your healthcare provider.             Medication List      START taking these medications        Additional Info                      ibuprofen 600 MG tablet   Commonly known as:  ADVIL,MOTRIN   Quantity:  60 tablet   Refills:  0   Dose:  600 mg    Instructions:  Take 1 tablet (600 mg total) by mouth every 6 (six) hours as needed for Pain.     Begin Date    AM    Noon    PM    Bedtime       oxycodone-acetaminophen 5-325 mg per tablet   Commonly known as:  PERCOCET   Quantity:  30 tablet   Refills:  0   Dose:  1 tablet    Instructions:  Take 1 tablet by mouth every 4 (four) hours as needed for Pain.     Begin Date    AM    Noon    PM    Bedtime         CHANGE how you take these medications        Additional Info                      lamotrigine 25 MG tablet   Commonly known as:  LAMICTAL   Refills:  0   Dose:  25 mg   What changed:  Another medication with the same name was removed. Continue taking this medication, and follow the directions you see here.    Instructions:  Take 25 mg by mouth every evening. Total 175 mg daily     Begin Date    AM    Noon    PM    Bedtime         STOP taking these medications     cephALEXin 500 MG capsule   Commonly known as:  KEFLEX       cyanocobalamin 1,000 mcg/mL injection       fluoxetine 20 MG capsule   Commonly known as:  PROZAC       folic acid 1 MG tablet   Commonly known as:  FOLVITE       NON FORMULARY MEDICATION       SEROQUEL 50 MG tablet   Generic drug:  quetiapine       tramadol 50 mg tablet   Commonly known as:  ULTRAM            Where to Get Your Medications      These medications were sent to [facility] [id number] [address]     Phone:  [phone]     ibuprofen 600 MG tablet    oxycodone-acetaminophen 5-325 mg per tablet                  Please bring to all follow up appointments:    1. A copy of your discharge instructions.  2. All medicines you are  currently taking in their original bottles.  3. Identification and insurance card.    Please arrive 15 minutes ahead of scheduled appointment time.    Please call 24 hours in advance if you must reschedule your appointment and/or time.        Follow-up Information     Follow up with Melo Lopez MD. Schedule an appointment as soon as possible for a visit in 4 weeks.    Specialty:  Obstetrics and Gynecology    Contact information:    [address] [place], [place] [id number]  [phone]          Discharge Instructions     Future Orders    Activity as tolerated     Call MD for:  difficulty breathing, headache or visual disturbances     Call MD for:  extreme fatigue     Call MD for:  hives     Call MD for:  persistent dizziness or light-headedness     Call MD for:  persistent nausea and vomiting     Call MD for:  redness, tenderness, or signs of infection (pain, swelling, redness, odor or green/yellow discharge around incision site)     Call MD for:  severe uncontrolled pain     Call MD for:  temperature >100.4     Diet general     Questions:    Total calories:      Fat restriction, if any:      Protein restriction, if any:      Na restriction, if any:      Fluid restriction:      Additional restrictions:      No dressing needed         Discharge Instructions       ***  BATHING:                   You may shower after your dressing is removed, but no tub baths, hot tubs, saunas or swimming until you see the doctor.    DRESSING:  ? Remove your bandage ________________. If there are skin tapes over the incision, leave them in place. They will start to come off in 5-7 days.  ACTIVITY LEVEL: If you have received sedation or an anesthetic, you may feel sleepy for   several hours. Rest until you are more awake. Gradually resume your normal activities  ? No heavy lifting or straining, nothing over 10 lbs., like a gallon of milk.  ? Pelvic rest- no sex, tampons or douching until follow up or instructed by  doctor.  DIET:  You may resume your home diet. If nausea is present, increase your diet gradually with fluids and bland foods.    Medications:  Pain medication should be taken only if needed and as directed. If antibiotics are prescribed, the medication should be taken until completed. You will be given an updated list of you medications.  ? No driving, alcoholic beverages or signing legal documents for next 24 hours or while taking pain medication    CALL THE DOCTOR:    For any obvious bleeding (some dried blood over the incision is normal).      Redness, swelling, foul smell around incision or fever over 101.   Shortness of breath, Coughing up Bloody Sputum or Pains or Swelling in your calves.   Persistent pain or nausea not relieved by medication.   If vaginal bleeding is in excess of a normal period.   Problems urinating    If any unusual problems or difficulties occur contact your doctor. If you cannot contact your doctor but feel your signs and symptoms warrant a physicians attention return to the emergency room.    Fall Prevention  Millions of people fall every year and injure themselves. You may have had anesthesia or sedation which may increase your risk of falling. You may have health issues that put you at an increased risk of falling.     Here are ways to reduce your risk of falling.  ·   · Make your home safe by keeping walkways clear of objects you may trip over.  · Use non-slip pads under rugs. Do not use area rugs or small throw rugs.  · Use non-slip mats in bathtubs and showers.  · Install handrails and lights on staircases.  · Do not walk in poorly lit areas.  · Do not stand on chairs or wobbly ladders.  · Use caution when reaching overhead or looking upward. This position can cause a loss of balance.  · Be sure your shoes fit properly, have non-slip bottoms and are in good condition.   · Wear shoes both inside and out. Avoid going barefoot or wearing slippers.  · Be cautious when going up  "and down stairs, curbs, and when walking on uneven sidewalks.  · If your balance is poor, consider using a cane or walker.  · If your fall was related to alcohol use, stop or limit alcohol intake.   · If your fall was related to use of sleeping medicines, talk to your doctor about this. You may need to reduce your dosage at bedtime if you awaken during the night to go to the bathroom.    · To reduce the need for nighttime bathroom trips:  ¨ Avoid drinking fluids for several hours before going to bed  ¨ Empty your bladder before going to bed  ¨ Men can keep a urinal at the bedside  · Stay as active as you can. Balance, flexibility, strength, and endurance all come from exercise. They all play a role in preventing falls. Ask your healthcare provider which types of activity are right for you.  · Get your vision checked on a regular basis.  · If you have pets, know where they are before you stand up or walk so you don't trip over them.  · Use night lights.    Discharge References/Attachments     LAPAROSCOPY, HOW IT IS DONE (ENGLISH)    LAPAROSCOPY, PELVIC (ENGLISH)    LAPAROSCOPY, WHY IT IS DONE (ENGLISH)    ACETAMINOPHEN; OXYCODONE TABLETS (ENGLISH)    IBUPROFEN TABLETS AND CAPSULES (ENGLISH)        Primary Diagnosis     Your primary diagnosis was:  Chronic Pelvic Pain In Female      Admission Information     Date & Time Provider Department CSN    3/30/2017 10:15 AM Melo Lopez MD [facility] [id number]      Care Providers     Provider Role Specialty Primary office phone    Melo Lopez MD Attending Provider Obstetrics and Gynecology [phone]    Melo Lopez MD Surgeon  Obstetrics and Gynecology [phone]      Your Vitals Were     BP Pulse Temp Resp Height Weight    79/52 (BP Location: Right arm, Patient Position: Lying, BP Method: Automatic) 62 98 °F (36.7 °C) (Oral) 20 5' 3" (1.6 m) 57.2 kg (126 lb 1.7 oz)    Last Period SpO2 BMI          02/22/2017 (Exact Date) 99% 22.34 kg/m2      "   Recent Lab Values     No lab values to display.      Allergies as of 3/30/2017        Reactions    Gluten Protein Other (See Comments)    Sever stomach ache, very tired feeling      Advance Directives     An advance directive is a document which, in the event you are no longer able to make decisions for yourself, tells your healthcare team what kind of treatment you do or do not want to receive, or who you would like to make those decisions for you.  If you do not currently have an advance directive, [facility] encourages you to create one.  For more information call:  [phone]), [phone] ([phone]),  or log on to [email or web address].        Language Assistance Services     ATTENTION: Language assistance services are available, free of charge. Please call [phone].      ATENCIÓN: Si habla español, tiene a ledesma disposición servicios gratuitos de asistencia lingüística. Llame al [phone].     [facility] Ý: N?u b?n nói Ti?ng Vi?t, có các d?ch v? h? tr? ngôn ng? mi?n phí dành cho b?n. G?i s? [phone].        [facility] Sign-Up     Activating your [facility] account is as easy as 1-2-3!     1) Visit [email or web address], select Sign Up Now, enter this activation code and your date of birth, then select Next.  [email or web address]  Expires: 4/15/2017  2:15 PM      2) Create a username and password to use when you visit [facility] in the future and select a security question in case you lose your password and select Next.    3) Enter your e-mail address and click Sign Up!    Additional Information  If you have questions, please e-mail [email or web address] or call [phone] to talk to our [facility] staff. Remember, [facility] is NOT to be used for urgent needs. For medical emergencies, dial 911.          [facility] complies with applicable Federal civil rights laws and does not discriminate on the basis of race, color, national origin, age, disability, or sex.

## 2017-03-31 DIAGNOSIS — R11.0 POSTOPERATIVE NAUSEA: Primary | ICD-10-CM

## 2017-03-31 DIAGNOSIS — Z98.890 POSTOPERATIVE NAUSEA: Primary | ICD-10-CM

## 2017-03-31 RX ORDER — PROMETHAZINE HYDROCHLORIDE 12.5 MG/1
12.5 TABLET ORAL EVERY 6 HOURS PRN
Qty: 20 TABLET | Refills: 0 | Status: SHIPPED | OUTPATIENT
Start: 2017-03-31 | End: 2017-12-11

## 2017-03-31 NOTE — DISCHARGE SUMMARY
"[facility]    Discharge Summary  Obstetrics & Gynecology      Date of Admission:  3/30/2017    Date of Discharge:  3/30/2017    Date of Surgery:  3/30/2017    Admitting Diagnosis:       Chronic pelvic pain     Discharge Diagnosis:    Same    Procedure performed:      Diagnostic laparoscopy    Brief Hospital Course:      See H&P for complete details. In brief, Shruthi Richard is a 36 y.o.  with chronic pelvic pain for diagnostic laparoscopy.  Pt tolerated the surgery well without complication.  Please see operative report for further details.  Following the surgery, pt was transferred to the PACU in stable condition.  Pt had an uncomplicated post-operative course.  Prior to discharge, pt was without major complaints.  Her pain was well-controlled.  Her vital signs where physiologic and stable.  Physical exam showed no acute findings.  Pt had no active vaginal bleeding.  Surgical incisions was hemostatic.  Pt was ambulating, tolerating oral intake, and voiding without difficulty.  Pt had satisfied routine post-op discharge criteria and expressed the desire to go home.        Pertinent Labs/Studies:      Recent Results (from the past 336 hour(s))   CBC auto differential    Collection Time: 17  4:10 PM   Result Value Ref Range    WBC 7.99 3.90 - 12.70 K/uL    Hemoglobin 13.9 12.0 - 16.0 g/dL    Hematocrit 40.0 37.0 - 48.5 %    Platelets 319 150 - 350 K/uL     Discharge Exam:      Temp:  [97.6 °F (36.4 °C)-98 °F (36.7 °C)] 98 °F (36.7 °C)  Pulse:  [56-74] 64  Resp:  [16-20] 20  SpO2:  [92 %-100 %] 99 %  BP: ()/(48-58) 79/54    BP (!) 79/54 (BP Location: Right arm, Patient Position: Lying, BP Method: Automatic)  Pulse 64  Temp 98 °F (36.7 °C) (Oral)   Resp 20  Ht 5' 3" (1.6 m)  Wt 57.2 kg (126 lb 1.7 oz)  LMP 2017 (Exact Date)  SpO2 99%  BMI 22.34 kg/m2    GENERAL:  No acute distress. Alert and oriented x 3.   NECK:  Supple, FROM.  LUNGS:  Clear to auscultation " bilaterally.   HEART:  Regular rate and rhythm with physiologic heart sounds.   ABDOMEN:  Soft, non-tender, no guarding, rigidity, or rebound.   INCISION:  Clean, dry, & intact.   EXT:  No cramping, claudication or edema.  Good skin turgor.  +2 distal pulses, symmetric.  Full range of motion.   NEURO:  Grossly intact bilaterally.   PSYCH:  Mood and affect appropriate.   PERINEUM:  Intact, no bleeding.    Discharge Condition:  Stable      Discharge Disposition:  Home       Discharge Medications:     Motrin 600 mg 1 tab p.o. q6h prn pain, disp #60, refill 0  Percocet 5/325 mg 1 tab p.o. q4-6h prn breakthrough pain, disp #30, refill 0   Phenergan 12.5 mg 1 tab p.o. q6h prn nausea, disp #20, refill 0  Discharge Activites:      Activities as tolerated with adequate rest  Pelvic rest for 4 weeks   No heavy lifting greater 10 lbs or vigorous activities   Showers only  Avoid driving and operating machinery while taking narcotics or other sedative medications.  Patient voiced understanding.    Discharge Diet:  Regular diet    Discharge Instructions:      Post-op care instructions and precautions, clinical/sugical findings, and hospital course reviewed with patient prior to discharge.  All of her questions were answered to her satisfaction.  Pt voiced understanding and agrees with discharge.  Pt was instructed to contact the clinic or emergency department for any concerns including but not limited to an increase in her vaginal bleeding, abdominal pain, foul vaginal discharge, weakness, decrease activity level, decrease appetite, difficulty with voiding or having bowel movements, wound breakdown, perineal pain or breakdown, fever >100.4, shortness of breath, chest pain, dizziness or feeling faint, pain or swelling in her extremities, headaches not relieved with rest and Tylenol, abnormal bleeding/bruising, or any other health concerns.      Follow-up Visit:  4 weeks for post-op visit pending pathology results, or sooner if any  problems.       Melo Lopez MD

## 2017-04-04 ENCOUNTER — TELEPHONE (OUTPATIENT)
Dept: OBSTETRICS AND GYNECOLOGY | Facility: CLINIC | Age: 37
End: 2017-04-04

## 2017-04-04 NOTE — TELEPHONE ENCOUNTER
Surya for pt stating her BC device has arrived at the office and to call the office when she begins her cycle to schedule an apt for insertion. aide

## 2017-04-10 ENCOUNTER — TELEPHONE (OUTPATIENT)
Dept: OBSTETRICS AND GYNECOLOGY | Facility: CLINIC | Age: 37
End: 2017-04-10

## 2017-04-10 NOTE — TELEPHONE ENCOUNTER
----- Message from Kathy Garcia sent at 4/10/2017 10:48 AM CDT -----  Contact: self  Pt is requesting a direct call from provider. Does not want to speak with nurse  [phone]      Thanks

## 2017-04-10 NOTE — TELEPHONE ENCOUNTER
----- Message from Millie Palacios sent at 4/10/2017 10:24 AM CDT -----  Contact: self  Pt is requesting a call back concerning her mirana.[phone]      04/10/2017 10:43 AM  mirena insertion appt scheduled.

## 2017-04-13 ENCOUNTER — TELEPHONE (OUTPATIENT)
Dept: OBSTETRICS AND GYNECOLOGY | Facility: CLINIC | Age: 37
End: 2017-04-13

## 2017-04-13 DIAGNOSIS — G89.29 CHRONIC PELVIC PAIN IN FEMALE: Primary | ICD-10-CM

## 2017-04-13 DIAGNOSIS — R10.2 CHRONIC PELVIC PAIN IN FEMALE: Primary | ICD-10-CM

## 2017-04-13 RX ORDER — IBUPROFEN 600 MG/1
600 TABLET ORAL EVERY 6 HOURS PRN
Qty: 60 TABLET | Refills: 0 | Status: SHIPPED | OUTPATIENT
Start: 2017-04-13 | End: 2017-10-05 | Stop reason: ALTCHOICE

## 2017-04-13 RX ORDER — OXYCODONE AND ACETAMINOPHEN 7.5; 325 MG/1; MG/1
1 TABLET ORAL EVERY 4 HOURS PRN
Qty: 30 TABLET | Refills: 0 | Status: SHIPPED | OUTPATIENT
Start: 2017-04-13 | End: 2017-04-26 | Stop reason: SDUPTHER

## 2017-04-13 NOTE — TELEPHONE ENCOUNTER
----- Message from Emily Vieira sent at 4/13/2017  4:10 PM CDT -----  Contact: Self  Pt states she's in severe pain and med's are not working. Pt can be reached @ [phone].    04/13/2017 4:24 PM  Request routed to  for different rx for pain

## 2017-04-13 NOTE — TELEPHONE ENCOUNTER
Pt is requesting refill of her pain medication.  Pt has chronic pelvic pain.  Pt is s/p diagnostic laparoscopy on 3/30/17.  Pt was advised to call office to make follow up appointment.  Pelvic/GI precautions.  Go to urgent care/ED if pain worsened.   Voiced understanding.

## 2017-04-19 ENCOUNTER — TELEPHONE (OUTPATIENT)
Dept: OBSTETRICS AND GYNECOLOGY | Facility: CLINIC | Age: 37
End: 2017-04-19

## 2017-04-19 NOTE — TELEPHONE ENCOUNTER
----- Message from Peytonaraseli Teran sent at 4/19/2017 10:50 AM CDT -----  Contact: self   Pt request to reschedule her mirena insertion appointment to this week. Pt states that she started her cycle yesterday and she was told that the mirena has to be insert when she is on her period. Please contact the pt at [phone]. Thanks!       Date 11:01 AM  mirena insertion rescheduled to 4/25/17

## 2017-04-25 ENCOUNTER — TELEPHONE (OUTPATIENT)
Dept: OBSTETRICS AND GYNECOLOGY | Facility: CLINIC | Age: 37
End: 2017-04-25

## 2017-04-25 ENCOUNTER — PROCEDURE VISIT (OUTPATIENT)
Dept: OBSTETRICS AND GYNECOLOGY | Facility: CLINIC | Age: 37
End: 2017-04-25
Payer: MEDICAID

## 2017-04-25 VITALS
DIASTOLIC BLOOD PRESSURE: 70 MMHG | SYSTOLIC BLOOD PRESSURE: 102 MMHG | BODY MASS INDEX: 22.86 KG/M2 | WEIGHT: 129 LBS | HEIGHT: 63 IN

## 2017-04-25 DIAGNOSIS — Z30.430 ENCOUNTER FOR INSERTION OF MIRENA IUD: Primary | ICD-10-CM

## 2017-04-25 DIAGNOSIS — Z32.02 NEGATIVE PREGNANCY TEST: ICD-10-CM

## 2017-04-25 LAB
B-HCG UR QL: NEGATIVE
CTP QC/QA: YES

## 2017-04-25 PROCEDURE — 81025 URINE PREGNANCY TEST: CPT | Mod: PBBFAC | Performed by: OBSTETRICS & GYNECOLOGY

## 2017-04-25 PROCEDURE — 58300 INSERT INTRAUTERINE DEVICE: CPT | Mod: PBBFAC | Performed by: OBSTETRICS & GYNECOLOGY

## 2017-04-25 PROCEDURE — 58300 INSERT INTRAUTERINE DEVICE: CPT | Mod: S$PBB,,, | Performed by: OBSTETRICS & GYNECOLOGY

## 2017-04-25 RX ORDER — LAMOTRIGINE 150 MG/1
150 TABLET ORAL DAILY
COMMUNITY
Start: 2017-03-25 | End: 2020-02-28 | Stop reason: CLARIF

## 2017-04-25 RX ORDER — CLONAZEPAM 2 MG/1
2 TABLET ORAL 2 TIMES DAILY
COMMUNITY
Start: 2017-04-24 | End: 2020-02-28 | Stop reason: CLARIF

## 2017-04-25 RX ORDER — LEVONORGESTREL 52 MG/1
INTRAUTERINE DEVICE INTRAUTERINE
Refills: 0 | COMMUNITY
Start: 2017-03-24 | End: 2017-05-08

## 2017-04-25 RX ORDER — FLUOXETINE HYDROCHLORIDE 20 MG/1
40 CAPSULE ORAL
COMMUNITY
Start: 2017-04-03 | End: 2017-10-05 | Stop reason: DRUGHIGH

## 2017-04-25 RX ORDER — PANTOPRAZOLE SODIUM 40 MG/1
TABLET, DELAYED RELEASE ORAL
COMMUNITY
Start: 2017-04-18 | End: 2017-10-05 | Stop reason: ALTCHOICE

## 2017-04-25 NOTE — MR AVS SNAPSHOT
[facility] - OB/ GYN  [address] [facility] [address]  [place] [address]  Phone: [phone]                  Shruthi Richard   2017 10:40 AM   Procedure visit    Description:  Female : 1980   Provider:  Melo Lopez MD   Department:  [facility] OB/ GYN           Reason for Visit     Procedure           Diagnoses this Visit        Comments    Negative pregnancy test    -  Primary            To Do List           Future Appointments        Provider Department Dept Phone    2017 10:30 AM Melo Lopez MD [facility] OB/ -[phone]      Goals (5 Years of Data)     None      [facility] On Call     [facility] On Call Nurse Care Line -  Assistance  Unless otherwise directed by your provider, please contact [facility], our nurse care line that is available for  assistance.     Registered nurses in the [facility] provide: appointment scheduling, clinical advisement, health education, and other advisory services.  Call: [phone] (toll free)               Medications           Message regarding Medications     Verify the changes and/or additions to your medication regime listed below are the same as discussed with your clinician today.  If any of these changes or additions are incorrect, please notify your healthcare provider.             Verify that the below list of medications is an accurate representation of the medications you are currently taking.  If none reported, the list may be blank. If incorrect, please contact your healthcare provider. Carry this list with you in case of emergency.           Current Medications     clonazePAM (KLONOPIN) 2 MG Tab     fluoxetine (PROZAC) 20 MG capsule     ibuprofen (ADVIL,MOTRIN) 600 MG tablet Take 1 tablet (600 mg total) by mouth every 6 (six) hours as needed for Pain.    lamotrigine (LAMICTAL) 100 MG tablet     MIRENA 20 mcg/24 hr (5 years) IUD TO BE INSERTED ONE TIME BY PRESCRIBER. ROUTE INTRAUTERINE.     "oxycodone-acetaminophen (PERCOCET) 7.5-325 mg per tablet Take 1 tablet by mouth every 4 (four) hours as needed for Pain.    pantoprazole (PROTONIX) 40 MG tablet     promethazine (PHENERGAN) 12.5 MG Tab Take 1 tablet (12.5 mg total) by mouth every 6 (six) hours as needed (nausea).           Clinical Reference Information           Your Vitals Were     BP Height Weight Last Period BMI    102/70 (BP Location: Left arm, Patient Position: Sitting, BP Method: Manual) 5' 3" (1.6 m) 58.5 kg (128 lb 15.5 oz) 04/17/2017 (Exact Date) 22.85 kg/m2      Blood Pressure          Most Recent Value    BP  102/70      Allergies as of 4/25/2017     Gluten Protein      Immunizations Administered on Date of Encounter - 4/25/2017     None      [facility] Sign-Up     Activating your [facility] account is as easy as 1-2-3!     1) Visit [email or web address], select Sign Up Now, enter this activation code and your date of birth, then select Next.  [email or web address]  Expires: 6/9/2017 11:11 AM      2) Create a username and password to use when you visit [facility] in the future and select a security question in case you lose your password and select Next.    3) Enter your e-mail address and click Sign Up!    Additional Information  If you have questions, please e-mail [email or web address] or call [phone] to talk to our [facility] staff. Remember, [facility] is NOT to be used for urgent needs. For medical emergencies, dial 911.         Language Assistance Services     ATTENTION: Language assistance services are available, free of charge. Please call [phone].      ATENCIÓN: Si habla español, tiene a ledesma disposición servicios gratuitos de asistencia lingüística. Llame al [phone].     CHÚ Ý: N?u b?n nói Ti?ng Vi?t, có các d?ch v? h? tr? ngôn ng? mi?n phí dành cho b?n. G?i s? [phone].         [facility] - OB/ GYN complies with applicable Federal civil rights laws and does not discriminate on the basis of race, color, national origin, " age, disability, or sex.

## 2017-04-25 NOTE — TELEPHONE ENCOUNTER
----- Message from Peyton NEENA Teran sent at 4/25/2017  3:16 PM CDT -----  Contact: self   Pt request pain medication. Pt states that she is in a lot pain due to mirena insertion. Please contact the pt at [phone]. Thanks!

## 2017-04-25 NOTE — TELEPHONE ENCOUNTER
Spoke to pt and informed her she can take OCP for pain either Aleve or Advil and she informed me she can't because she has ulcers. I also informed her what she is feeling is normal for the next 3 days but she insist it's painful because she already has uterine pain. Told her I will send Dr. Lopez a message and see if he can call something in. Pt also spoke to the nurse Trish and she will be sending a message to the MD for a refill on percocet. kt

## 2017-04-25 NOTE — PROCEDURES
"[facility]  Ambulatory Clinic   Obstetrics & Gynecology    Date:  2017    Procedure:  Mirena IUD insertion    LMP:  Patient's last menstrual period was 2017 (exact date).    UPT:  Negative    Indication:  IUD contraception    History:  Shruthi Richard is a 36 y.o. , here for Mirena IUD insertion.  Pt has no major complaints today.   Pt is doing well post-op from diagnostic laparoscopy.  Incisions well healed.    Consents:  We discussed the risks, benefits, indications, and alternatives to IUD use. She understands that with IUD insertion there is a risk of bleeding, infection, uterine perforation, and expulsion of device. All of her questions were answered to her satisfaction. Pt voiced understanding, Informed consents obtained.     Vitals:  /70 (BP Location: Left arm, Patient Position: Sitting, BP Method: Manual)  Ht 5' 3" (1.6 m)  Wt 58.5 kg (128 lb 15.5 oz)  LMP 2017 (Exact Date)  BMI 22.85 kg/m2    Physical Exam:  Abdomen soft, non-tender, no masses. External genitalia, vaginal wall and cervix without gross abnormality.  Bimanual exam reveals a small, non-tender, mid-plain uterus, without adnexal mass or tenderness.     Procedure Details:      A time out was performed to confirmed the correct patient and procedure.    The cervix was visualized with a speculum.     Cervix was cleaned with betadine.      A single tooth tenaculum was placed on the anterior lip of the cervix.     The uterus sounds to ~8 cm using sterile technique.     The IUD was loaded and placed high in the uterine fundus without difficulty using sterile technique.     The string was then cut with a ~3 cm tail.    The tenaculum and speculum were removed.      The patient tolerated the procedure well.  Sterile technique was maintained.  Hemostasis noted.  VSSAF, pain scale 0/10 at end of procedure.    Assessment:      Encounter for insertion of intrauterine contraceptive device (Mirena, Lot: " [id number] EXP: 12/19)    Plan:      Post IUD placement counseling and precautions discussed.    Manage post IUD placement pain with NSAIDS, Tylenol prn.    Pt advised on how to check for IUD strings.    Pt was clearly reminded that the Mirena IUD will need to be removed within 5 years from date of insertion.    Pt is also requesting a trial of 1 month of depo lupron for her severe dysmenorrhea/chronic pelvic pain.  Risks, benefits, and alternatives to lupron reviewed.  Will order and call pt for injection.    Return 4 weeks for IUD check, or sooner for any concerns.  All questions answered, pt voiced understanding.  Go to ER for any emergencies.        Melo Lopez MD

## 2017-04-26 ENCOUNTER — TELEPHONE (OUTPATIENT)
Dept: OBSTETRICS AND GYNECOLOGY | Facility: CLINIC | Age: 37
End: 2017-04-26

## 2017-04-26 DIAGNOSIS — G89.29 CHRONIC PELVIC PAIN IN FEMALE: ICD-10-CM

## 2017-04-26 DIAGNOSIS — R10.2 CHRONIC PELVIC PAIN IN FEMALE: ICD-10-CM

## 2017-04-26 RX ORDER — OXYCODONE AND ACETAMINOPHEN 7.5; 325 MG/1; MG/1
1 TABLET ORAL EVERY 4 HOURS PRN
Qty: 30 TABLET | Refills: 0 | Status: SHIPPED | OUTPATIENT
Start: 2017-04-26 | End: 2017-05-08 | Stop reason: SDUPTHER

## 2017-04-26 NOTE — TELEPHONE ENCOUNTER
----- Message from Corey Stahl LPN sent at 4/25/2017  3:47 PM CDT -----  Contact: self   Hi Dr Lopez,   What would you like me to tell this pt. She did have Percocet ordered on 4/13/17,  She states she is having like a deep, stabbing pain. She has an ulcer so she can not take ibuprofen, i told her to apply moist heat /heating pad to help relax her muscles and reduce the spasms. What should we do next  Thanks   neida    ----- Message -----     From: Peyton Teran     Sent: 4/25/2017   3:16 PM       To: Jessica [facility] Staff    Pt request pain medication. Pt states that she is in a lot pain due to mirena insertion. Please contact the pt at [phone]. Thanks!

## 2017-04-26 NOTE — TELEPHONE ENCOUNTER
Pt called, requesting refill of percocet.  Risks, benefits, and alternatives to percocet reviewed.  Pt advised to do not drink alcohol, drive, operate heavy machinery, work or swim while taking this medication as it can cause mental impairment.  Voiced understanding.

## 2017-05-02 ENCOUNTER — TELEPHONE (OUTPATIENT)
Dept: OBSTETRICS AND GYNECOLOGY | Facility: CLINIC | Age: 37
End: 2017-05-02

## 2017-05-02 DIAGNOSIS — G89.29 CHRONIC PELVIC PAIN IN FEMALE: Primary | ICD-10-CM

## 2017-05-02 DIAGNOSIS — R10.2 CHRONIC PELVIC PAIN IN FEMALE: Primary | ICD-10-CM

## 2017-05-02 DIAGNOSIS — N94.6 DYSMENORRHEA: ICD-10-CM

## 2017-05-02 NOTE — TELEPHONE ENCOUNTER
----- Message from Talita Acosta sent at 5/1/2017  3:24 PM CDT -----  Contact: self  Patient is asking if depo injection came in ?     [phone]  LL

## 2017-05-08 ENCOUNTER — OFFICE VISIT (OUTPATIENT)
Dept: OBSTETRICS AND GYNECOLOGY | Facility: CLINIC | Age: 37
End: 2017-05-08
Payer: MEDICAID

## 2017-05-08 ENCOUNTER — TELEPHONE (OUTPATIENT)
Dept: OBSTETRICS AND GYNECOLOGY | Facility: CLINIC | Age: 37
End: 2017-05-08

## 2017-05-08 VITALS
DIASTOLIC BLOOD PRESSURE: 60 MMHG | WEIGHT: 128.75 LBS | SYSTOLIC BLOOD PRESSURE: 115 MMHG | HEIGHT: 63 IN | BODY MASS INDEX: 22.81 KG/M2

## 2017-05-08 DIAGNOSIS — N94.6 DYSMENORRHEA: ICD-10-CM

## 2017-05-08 DIAGNOSIS — R10.2 CHRONIC PELVIC PAIN IN FEMALE: Primary | ICD-10-CM

## 2017-05-08 DIAGNOSIS — Z30.432 ENCOUNTER FOR IUD REMOVAL: ICD-10-CM

## 2017-05-08 DIAGNOSIS — G89.29 CHRONIC PELVIC PAIN IN FEMALE: Primary | ICD-10-CM

## 2017-05-08 PROCEDURE — 58301 REMOVE INTRAUTERINE DEVICE: CPT | Mod: PBBFAC | Performed by: OBSTETRICS & GYNECOLOGY

## 2017-05-08 PROCEDURE — 99999 PR PBB SHADOW E&M-EST. PATIENT-LVL II: CPT | Mod: PBBFAC,,, | Performed by: OBSTETRICS & GYNECOLOGY

## 2017-05-08 PROCEDURE — 99213 OFFICE O/P EST LOW 20 MIN: CPT | Mod: 25,S$PBB,, | Performed by: OBSTETRICS & GYNECOLOGY

## 2017-05-08 PROCEDURE — 58301 REMOVE INTRAUTERINE DEVICE: CPT | Mod: S$PBB,,, | Performed by: OBSTETRICS & GYNECOLOGY

## 2017-05-08 PROCEDURE — 99212 OFFICE O/P EST SF 10 MIN: CPT | Mod: PBBFAC | Performed by: OBSTETRICS & GYNECOLOGY

## 2017-05-08 RX ORDER — OXYCODONE AND ACETAMINOPHEN 7.5; 325 MG/1; MG/1
1 TABLET ORAL EVERY 4 HOURS PRN
Qty: 30 TABLET | Refills: 0 | Status: SHIPPED | OUTPATIENT
Start: 2017-05-08 | End: 2017-06-14 | Stop reason: SDUPTHER

## 2017-05-08 NOTE — TELEPHONE ENCOUNTER
----- Message from Jacob Conway sent at 5/8/2017  9:29 AM CDT -----  Contact: self  Pt is scheduled for an appt today for pain from her IUD. She wants to know if Dr. Lopez would be able to remove it today. Please contact her at [phone].      Thanks   Spoke with pt and informed her that I spoke with  and he stated that she can have her iud removed when she comes in today

## 2017-05-08 NOTE — PROGRESS NOTES
"[facility]  Ambulatory Clinic  Obstetrics & Gynecology    Visit Date:  2017    Chief Complaint:  Chronic pelvic pain, requesting IUD removal    History of Present Illness:    Shruthi Richard is a 36 y.o.  with chronic pelvic pain here requesting IUD removal.    Mirena IUD was placed at her last visit.    Pt reports more pain since IUD placed and is requesting removal.    Pt is interested in trying depo lupron, if her insurance will approved it, or depo provera if she is not able to get lupron.    Pt is considering pregnancy in the near future and will consider total laparoscopic hysterectomy after her pregnancy.    Pt also requesting a refill of her pain medication.    Pt denies vaginal discharge, bloating, early satiety, unintentional weight loss, breast mass/skin changes, GI or urinary complaints.       present for visit.    Review of Systems:      GENERAL:  No fever, fatigue, excessive weight gain or loss  GASTROINTESTINAL:  No nausea, vomiting, constipation, diarrhea, rectal bleeding   GENITOURINARY:  See HPI    Physical Exam:     /60 (BP Location: Left arm, Patient Position: Sitting, BP Method: Manual)  Ht 5' 3" (1.6 m)  Wt 58.4 kg (128 lb 12 oz)  LMP 2017 (Exact Date)  BMI 22.81 kg/m2  Pulse 68, Resp rate 18     GENERAL:  No acute distress, well-nourished  HEENT:  Atraumatic, anicteric, moist mucus membranes. Neck supple w/o masses.  LUNGS:  Clear to auscultation  HEART:  Regular rate and rhythm, no murmurs, gallops, or rubs  ABDOMEN:  Soft, diffuse tenderness in lower pelvis, non-distended, normoactive bowel sounds, no obvious organomegaly  EXT:  Symmetric w/o cramping, claudication, or edema. +2 distal pulses.  PSYCH:  Mood and affect appropriate   GENITOURINARY:  NFEG no lesion. No vaginal or cervical lesion. No bleeding or discharge. No CMT. Uterus and ovaries small, NT. IUD removed. Wet prep negative. Declined rectal exam. No obvious external " lesions.    Chaperone present for exam.    Procedure Note for IUD removal:  (CPT 99690)    A time out was performed to confirmed the correct patient and procedure.  Verbal consent obtained.  A speculum was placed and the cervix was prepped with betadine.  The IUD string was visualized at the external OS and removed with grasping forceps without difficulty.  Patient tolerated the procedure well.  Sterile technique maintained.  Hemostasis noted.  VSSAF, pain scale 0/10 at end of procedure.    Assessment:     36 y.o. :    1. Chronic pelvic pain  2. Adenomyosis  3. Dysmenorrhea    Plan:    We discussed her condition in great detail.  Pt will like to try depo lupron if her insurance will approved.  If not, pt will try depo provera.  Risks, benefits, and alternatives to depo lupro reviewed, rx sent to [facility].  Refill percocet.  Please do not drink alcohol, drive, operate heavy machinery, work or swim while taking this medication as it can cause mental impairment.  Review supportive care measures, pelvic rest, NSAIDs prn.  Go to ER if pain worsened.      Follow-up in 3 months or sooner as needed.      Voiced understanding.        Melo Lopez MD

## 2017-05-10 ENCOUNTER — TELEPHONE (OUTPATIENT)
Dept: OBSTETRICS AND GYNECOLOGY | Facility: CLINIC | Age: 37
End: 2017-05-10

## 2017-05-10 DIAGNOSIS — Z30.41 FAMILY PLANNING, BCP (BIRTH CONTROL PILLS) MAINTENANCE: Primary | ICD-10-CM

## 2017-05-10 RX ORDER — LEVONORGESTREL AND ETHINYL ESTRADIOL 100-20(84)
1 KIT ORAL DAILY
Qty: 90 TABLET | Refills: 3 | Status: SHIPPED | OUTPATIENT
Start: 2017-05-10 | End: 2017-05-15

## 2017-05-10 NOTE — TELEPHONE ENCOUNTER
----- Message from Azalia Lerma MA sent at 5/10/2017  2:02 PM CDT -----  Contact: self  Patient has decided to go with OCP because she feel she has more control over discontinuing the pill if she does not like the side effects. (ins. Is not covering the depo any ways) Thanks   ----- Message -----     From: Kathy Garcia     Sent: 5/10/2017   1:42 PM       To: [facility] Staff    Pt is requesting a call to discuss birth control. Pt states that insurance will not cover shot and is requesting pill. Please call her @ [phone]        Thanks

## 2017-05-15 ENCOUNTER — TELEPHONE (OUTPATIENT)
Dept: OBSTETRICS AND GYNECOLOGY | Facility: CLINIC | Age: 37
End: 2017-05-15

## 2017-05-15 DIAGNOSIS — Z30.011 OCP (ORAL CONTRACEPTIVE PILLS) INITIATION: Primary | ICD-10-CM

## 2017-05-15 RX ORDER — NORGESTIMATE AND ETHINYL ESTRADIOL 0.25-0.035
1 KIT ORAL DAILY
Qty: 90 TABLET | Refills: 3 | Status: SHIPPED | OUTPATIENT
Start: 2017-05-15 | End: 2017-10-05

## 2017-05-15 NOTE — TELEPHONE ENCOUNTER
----- Message from Kathy Garcia sent at 5/12/2017  3:37 PM CDT -----  Contact: self  Pt is requesting a different rx for birth control, one that is covered by insurance. Please call pt @ [phone]        Thanks

## 2017-06-05 PROBLEM — Z00.00 ANNUAL PHYSICAL EXAM: Status: RESOLVED | Noted: 2017-03-01 | Resolved: 2017-06-05

## 2017-06-14 ENCOUNTER — OFFICE VISIT (OUTPATIENT)
Dept: OBSTETRICS AND GYNECOLOGY | Facility: CLINIC | Age: 37
End: 2017-06-14
Payer: MEDICAID

## 2017-06-14 VITALS
BODY MASS INDEX: 22.89 KG/M2 | SYSTOLIC BLOOD PRESSURE: 115 MMHG | WEIGHT: 129.19 LBS | DIASTOLIC BLOOD PRESSURE: 74 MMHG | HEIGHT: 63 IN

## 2017-06-14 DIAGNOSIS — G89.29 CHRONIC PELVIC PAIN IN FEMALE: Primary | ICD-10-CM

## 2017-06-14 DIAGNOSIS — N80.03 UTERUS, ADENOMYOSIS: ICD-10-CM

## 2017-06-14 DIAGNOSIS — N80.9 ENDOMETRIOSIS: ICD-10-CM

## 2017-06-14 DIAGNOSIS — R10.2 CHRONIC PELVIC PAIN IN FEMALE: Primary | ICD-10-CM

## 2017-06-14 PROCEDURE — 99213 OFFICE O/P EST LOW 20 MIN: CPT | Mod: S$PBB,,, | Performed by: OBSTETRICS & GYNECOLOGY

## 2017-06-14 PROCEDURE — 99212 OFFICE O/P EST SF 10 MIN: CPT | Mod: PBBFAC | Performed by: OBSTETRICS & GYNECOLOGY

## 2017-06-14 PROCEDURE — 99999 PR PBB SHADOW E&M-EST. PATIENT-LVL II: CPT | Mod: PBBFAC,,, | Performed by: OBSTETRICS & GYNECOLOGY

## 2017-06-14 RX ORDER — OXYCODONE AND ACETAMINOPHEN 7.5; 325 MG/1; MG/1
1 TABLET ORAL EVERY 4 HOURS PRN
Qty: 30 TABLET | Refills: 0 | Status: SHIPPED | OUTPATIENT
Start: 2017-06-14 | End: 2017-07-25 | Stop reason: SDUPTHER

## 2017-06-16 ENCOUNTER — TELEPHONE (OUTPATIENT)
Dept: OBSTETRICS AND GYNECOLOGY | Facility: CLINIC | Age: 37
End: 2017-06-16

## 2017-06-16 NOTE — TELEPHONE ENCOUNTER
Spoke with pt needs a written referral faxed to the Pain management clinic that she would like to go to.Pts referral will be faxed to [phone] per her request along with last office visit note.

## 2017-06-16 NOTE — TELEPHONE ENCOUNTER
----- Message from Francine Beasley sent at 6/16/2017 12:57 PM CDT -----  Contact: self [phone]  Pt is requesting to speak to you regarding a referral to Pain management . Pls call pt [phone]. Thanks.......Gabbi

## 2017-06-21 ENCOUNTER — TELEPHONE (OUTPATIENT)
Dept: OBSTETRICS AND GYNECOLOGY | Facility: CLINIC | Age: 37
End: 2017-06-21

## 2017-06-21 NOTE — TELEPHONE ENCOUNTER
----- Message from Emily Vieira sent at 6/21/2017  1:26 PM CDT -----  Contact: Self  Pt requesting to have medical records faxed to pain management @ [phone]. Pt can be reached @ [phone].      Patient is aware that she must sign a medical records release form with [facility] or the pain manag. Clinic. sal

## 2017-06-26 PROBLEM — N80.03 UTERUS, ADENOMYOSIS: Status: ACTIVE | Noted: 2017-06-26

## 2017-06-26 PROBLEM — N76.0 ACUTE VAGINITIS: Status: RESOLVED | Noted: 2017-03-01 | Resolved: 2017-06-26

## 2017-06-26 PROBLEM — R10.2 PELVIC PAIN IN FEMALE: Status: RESOLVED | Noted: 2017-03-01 | Resolved: 2017-06-26

## 2017-06-26 NOTE — PROGRESS NOTES
"[facility]  Ambulatory Clinic  Obstetrics & Gynecology    Visit Date:  2017    Chief Complaint:  F/u chronic pelvic pain    History of Present Illness:    Shruthi Richard is a 36 y.o.  here for follow up with chronic pelvic pain.    Pelvic pain is likely secondary to adenomyosis and lesser degree endometriosis.    Pt has been on OCP since her last visit with some improvement in her symptoms.    A trial of depo lupron sent to pharmacy last visit but due to pt's  insurance, she was unable to obtain the medication.    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    Pt is requesting a refill of narcotic pain medication and is requesting referral for pain management.    Pt denies vaginal discharge, bloating, early satiety, unintentional weight loss, breast mass/skin changes, GI or urinary complaints.      Daughter present for visit.    Review of Systems:      GENERAL:  No fever, fatigue, excessive weight gain or loss  GASTROINTESTINAL:  No nausea, vomiting, constipation, diarrhea, rectal bleeding   GENITOURINARY:  See HPI    Physical Exam:     /74 (BP Location: Left arm, Patient Position: Sitting, BP Method: Manual)   Ht 5' 3" (1.6 m)   Wt 58.6 kg (129 lb 3 oz)   LMP 2017 (Exact Date)   BMI 22.88 kg/m²   Pulse 60, Resp rate 16     GENERAL:  No acute distress, well-nourished  LUNGS:  Clear to auscultation  HEART:  Regular rate and rhythm, no murmurs, gallops, or rubs  ABDOMEN:  Soft, mild diffuse tenderness in lower pelvis, non-distended, normoactive bowel sounds, no obvious organomegaly  EXT:  Symmetric w/o cramping, claudication, or edema. +2 distal pulses.  PSYCH:  Mood and affect appropriate   PELVIC: Pt declined.    Chaperone present for exam.    Assessment:     36 y.o. :    1. Chronic pelvic pain  2. Adenomyosis  3. Endometriosis     Plan:    We discussed her " condition in great detail.  Continue OCP.  Risks, benefits, and alternatives to OCP reviewed.  Discuss pelvic physical therapy.  Pt is requesting refill of percocet.  Refer to pain management.  Pt cautioned on drossiness with percocet.  Supportive care measures reviewed, NSAIDs prn.  Pelvic precautions.    F/u in 3 months, or sooner as needed.      Voiced understanding.        Melo Lopez MD

## 2017-06-28 ENCOUNTER — TELEPHONE (OUTPATIENT)
Dept: OBSTETRICS AND GYNECOLOGY | Facility: CLINIC | Age: 37
End: 2017-06-28

## 2017-06-28 NOTE — TELEPHONE ENCOUNTER
----- Message from Agustina Erazo sent at 6/28/2017  3:51 PM CDT -----  Contact: [phone]  Pt is following up to see if the provider received a fax Please call pt at your earliest convenience.  Thanks!

## 2017-06-29 ENCOUNTER — TELEPHONE (OUTPATIENT)
Dept: OBSTETRICS AND GYNECOLOGY | Facility: CLINIC | Age: 37
End: 2017-06-29

## 2017-06-29 NOTE — TELEPHONE ENCOUNTER
----- Message from Emily Vieira sent at 6/29/2017  2:59 PM CDT -----  Contact: Self  Pt called to get recommendation for pain management. Pt can be reached @  [phone]

## 2017-06-30 NOTE — TELEPHONE ENCOUNTER
Called pt no answer. LM letting her know that Dr. Lopez did not have any specific pain management doctors that he would recommend however he would advise her to seek care closer to her place of residence in [place].

## 2017-07-24 ENCOUNTER — TELEPHONE (OUTPATIENT)
Dept: OBSTETRICS AND GYNECOLOGY | Facility: CLINIC | Age: 37
End: 2017-07-24

## 2017-07-24 NOTE — TELEPHONE ENCOUNTER
----- Message from Emily Vieira sent at 7/24/2017  2:39 PM CDT -----  Contact: Self  Pt states she's in a lot of pain and wants to know if she can come in for sooner apt. Pt can be reached @  [phone]

## 2017-07-24 NOTE — TELEPHONE ENCOUNTER
Spoke with pt. Pt wants to discuss surgery for pelvic pain. Appointment scheduled to come in tomorrow. Pt requesting rx for pain in the meantime until she is seen. Pt informed I would route request to Dr. Lopez and contact her with his response.

## 2017-07-25 ENCOUNTER — OFFICE VISIT (OUTPATIENT)
Dept: OBSTETRICS AND GYNECOLOGY | Facility: CLINIC | Age: 37
End: 2017-07-25
Payer: MEDICAID

## 2017-07-25 VITALS
HEIGHT: 63 IN | BODY MASS INDEX: 23.71 KG/M2 | DIASTOLIC BLOOD PRESSURE: 72 MMHG | SYSTOLIC BLOOD PRESSURE: 114 MMHG | WEIGHT: 133.81 LBS

## 2017-07-25 DIAGNOSIS — R10.2 CHRONIC PELVIC PAIN IN FEMALE: Primary | ICD-10-CM

## 2017-07-25 DIAGNOSIS — G89.29 CHRONIC PELVIC PAIN IN FEMALE: Primary | ICD-10-CM

## 2017-07-25 PROCEDURE — 99999 PR PBB SHADOW E&M-EST. PATIENT-LVL II: CPT | Mod: PBBFAC,,, | Performed by: OBSTETRICS & GYNECOLOGY

## 2017-07-25 PROCEDURE — 99212 OFFICE O/P EST SF 10 MIN: CPT | Mod: PBBFAC | Performed by: OBSTETRICS & GYNECOLOGY

## 2017-07-25 PROCEDURE — 99213 OFFICE O/P EST LOW 20 MIN: CPT | Mod: S$PBB,,, | Performed by: OBSTETRICS & GYNECOLOGY

## 2017-07-25 RX ORDER — OXYCODONE AND ACETAMINOPHEN 10; 325 MG/1; MG/1
1 TABLET ORAL EVERY 6 HOURS PRN
Qty: 30 TABLET | Refills: 0 | Status: SHIPPED | OUTPATIENT
Start: 2017-07-25 | End: 2017-10-05 | Stop reason: SDUPTHER

## 2017-07-25 NOTE — TELEPHONE ENCOUNTER
----- Message from Talita Acosta sent at 7/24/2017  3:50 PM CDT -----  Contact: SELF  Calling regarding request for pain medicine . She states she is excruciating pelvic pain . She states even if it's 1 or 2 pills to get her through until her appt tomorrow .      LL

## 2017-07-28 NOTE — PROGRESS NOTES
"[facility]  Ambulatory Clinic  Obstetrics & Gynecology    Visit Date:  2017    Chief Complaint:  F/u chronic pelvic pain    History of Present Illness:    Shruthi Richard is a 36 y.o.  here for follow up with chronic pelvic pain.    Pelvic pain is secondary to suspected adenomyosis and endometriosis.    Pt was referred for pain management since last visit.  Pt has not been able f/u due to problem with "insurance".    Pt is requesting a refill of narcotic pain medication.    Pt is currently on OCP with some improvement in her symptoms.    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    Pt denies vaginal discharge, bloating, early satiety, unintentional weight loss, breast mass/skin changes, GI or urinary complaints.      Review of Systems:      GENERAL:  No fever, fatigue, excessive weight gain or loss  GASTROINTESTINAL:  No nausea, vomiting, constipation, diarrhea, rectal bleeding   GENITOURINARY:  See HPI    Physical Exam:     /72 (BP Location: Left arm, Patient Position: Sitting, BP Method: Manual)   Ht 5' 3" (1.6 m)   Wt 60.7 kg (133 lb 13.1 oz)   LMP 2017 (Exact Date)   BMI 23.71 kg/m²   Pulse 72, Resp rate 18     GENERAL:  No acute distress, well-nourished  LUNGS:  Clear to auscultation  HEART:  Regular rate and rhythm, no murmurs, gallops, or rubs  ABDOMEN:  Soft, mild diffuse tenderness in lower pelvis, non-distended, normoactive bowel sounds, no obvious organomegaly  EXT:  Symmetric w/o cramping, claudication, or edema. +2 distal pulses.  PSYCH:  Mood and affect appropriate   GENITOURINARY:  NFEG no lesion. No vaginal or cervical lesion. No bleeding or discharge. No CMT. Uterus and ovaries small, NT. Wet prep negative. Declined rectal exam. No obvious external lesions.    Chaperone present for exam.    Assessment:     36 y.o. :    1. Chronic pelvic pain  2. Suspect adenomyosis and " endometriosis     Plan:    We discussed her pelvic pain.  No acute GYN findings on pelvic exam.  No alarming sxs today or acute GI/pelvic findings.  Discussed supportive care measures.  NSAIDs prn.  Refill percocet per pt request.  Pt was clearly advised on risk of chronic opiate use and possible dependence.  Pt cautioned to do not drink alcohol, drive, operate heavy machinery, work or swim while taking this medication as it can cause mental impairment.    Continue menstrual control with OCP.  Risks, benefits, and alternatives to OCP reviewed.    Pt was advised to f/u with her PCP to explore other non-GYN causes of her pelvic pain including GI evaluation.  Timely f/u strongly advised.    Pt was referred to pain management for chronic pain management.  List of community provider given to pt.    F/u in 6 months or sooner as needed.      Voiced understanding.        Melo Lopez MD

## 2017-08-02 ENCOUNTER — TELEPHONE (OUTPATIENT)
Dept: OBSTETRICS AND GYNECOLOGY | Facility: CLINIC | Age: 37
End: 2017-08-02

## 2017-08-02 NOTE — TELEPHONE ENCOUNTER
----- Message from Talita Acosta sent at 8/2/2017  2:04 PM CDT -----  Contact: self  Patient is returning Michela's call .    [phone]      --------------------------------------------------  8/2/17 @ 5911n (sherry)   SPOKE WITH MS LANDERS, SHE STATE THAT THE PAIN CLINIC SHE WAS REFERRED TO ([facility]) IS REQUIRING A WRITTEN REFERRAL AND A COPY OF THE PT'S CHART , THEIR FAX # is [phone].    MESSAGE SENT TO YONG AND DR BHATIA

## 2017-08-02 NOTE — TELEPHONE ENCOUNTER
8/2/17 @ 1552 (JOSE)  SPOKE WITH MS LANDERS, INFORMED HER THAT TO GET HER CHART SENT TO ANOTHER PHYSICIANS OFFICE SHE WILL NEED TO COME TO MEDICAL RECORDS AND SIGN A RELEASE FORM FOR MEDICAL RECORDS TO SEND HER CHART OUT.  ALSO INFORMED HER THAT DR BHATIA SUGGEST THAT SHE TRY [facility], THE [facility] AND EVEN CALL MEDICAID TO FIND OUT WHERE SHE CAN GO TO GET HELP WITH HER PAIN, INFORMED HER THAT SHE DOES NOT NEED A REFERRAL BECAUSE SHE HAS MEDICAID AND THAT SHE NEEDS TO ESTABLISH CARE WITH THE NEW PLACE, PT STATED HER UNDERSTANDING

## 2017-08-02 NOTE — TELEPHONE ENCOUNTER
----- Message from Ekta Santo sent at 8/2/2017 12:00 PM CDT -----  Contact: self  Pt calling to discuss ongoing uterine pain. Please call [phone].

## 2017-08-07 ENCOUNTER — TELEPHONE (OUTPATIENT)
Dept: OBSTETRICS AND GYNECOLOGY | Facility: CLINIC | Age: 37
End: 2017-08-07

## 2017-08-07 NOTE — TELEPHONE ENCOUNTER
----- Message from Talita Acosta sent at 8/7/2017 10:44 AM CDT -----  Contact: SELF  Requesting an appt for today . She is upset & in pain .    [phone]    LL

## 2017-09-26 ENCOUNTER — TELEPHONE (OUTPATIENT)
Dept: OBSTETRICS AND GYNECOLOGY | Facility: CLINIC | Age: 37
End: 2017-09-26

## 2017-09-26 NOTE — TELEPHONE ENCOUNTER
----- Message from Ekta Santo sent at 9/26/2017 12:01 PM CDT -----  Contact: self  Pt calling to discuss pain medication. Please call [phone].

## 2017-09-26 NOTE — TELEPHONE ENCOUNTER
Spoke with pt  She is requesting a refill on rx for pain. Pt said that Dr. Lopez said he would fill it for her every three months. Pt informed that Dr. Lopez is not in the office at the moment but that I would route a message to him. Pt will be contacted once rx is submitted.

## 2017-10-05 ENCOUNTER — OFFICE VISIT (OUTPATIENT)
Dept: OBSTETRICS AND GYNECOLOGY | Facility: CLINIC | Age: 37
End: 2017-10-05
Payer: MEDICAID

## 2017-10-05 ENCOUNTER — TELEPHONE (OUTPATIENT)
Dept: OBSTETRICS AND GYNECOLOGY | Facility: CLINIC | Age: 37
End: 2017-10-05

## 2017-10-05 VITALS
WEIGHT: 130.06 LBS | HEIGHT: 63 IN | DIASTOLIC BLOOD PRESSURE: 82 MMHG | SYSTOLIC BLOOD PRESSURE: 118 MMHG | BODY MASS INDEX: 23.04 KG/M2

## 2017-10-05 DIAGNOSIS — G89.29 CHRONIC PELVIC PAIN IN FEMALE: Primary | ICD-10-CM

## 2017-10-05 DIAGNOSIS — R10.2 CHRONIC PELVIC PAIN IN FEMALE: Primary | ICD-10-CM

## 2017-10-05 PROBLEM — N83.202 CYST OF LEFT OVARY: Status: RESOLVED | Noted: 2017-03-13 | Resolved: 2017-10-05

## 2017-10-05 PROBLEM — Z01.419 ENCOUNTER FOR GYNECOLOGICAL EXAMINATION WITHOUT ABNORMAL FINDING: Status: RESOLVED | Noted: 2017-03-01 | Resolved: 2017-10-05

## 2017-10-05 PROCEDURE — 99999 PR PBB SHADOW E&M-EST. PATIENT-LVL III: CPT | Mod: PBBFAC,,, | Performed by: OBSTETRICS & GYNECOLOGY

## 2017-10-05 PROCEDURE — 99213 OFFICE O/P EST LOW 20 MIN: CPT | Mod: PBBFAC | Performed by: OBSTETRICS & GYNECOLOGY

## 2017-10-05 PROCEDURE — 99213 OFFICE O/P EST LOW 20 MIN: CPT | Mod: S$PBB,,, | Performed by: OBSTETRICS & GYNECOLOGY

## 2017-10-05 RX ORDER — OXYCODONE AND ACETAMINOPHEN 10; 325 MG/1; MG/1
1 TABLET ORAL EVERY 6 HOURS PRN
Qty: 30 TABLET | Refills: 0 | Status: SHIPPED | OUTPATIENT
Start: 2017-10-05 | End: 2017-10-20 | Stop reason: SDUPTHER

## 2017-10-05 RX ORDER — LIDOCAINE AND PRILOCAINE 25; 25 MG/G; MG/G
CREAM TOPICAL
Refills: 0 | COMMUNITY
Start: 2017-08-24 | End: 2017-12-11

## 2017-10-05 RX ORDER — FLUOXETINE HYDROCHLORIDE 40 MG/1
40 CAPSULE ORAL DAILY
COMMUNITY
End: 2020-02-28 | Stop reason: CLARIF

## 2017-10-05 NOTE — PROGRESS NOTES
"[facility]  Ambulatory Clinic  Obstetrics & Gynecology    Visit Date:  10/5/2017    Chief Complaint:  F/u chronic pelvic pain    History of Present Illness:    Shruthi Richard is a 36 y.o.  here for follow up with chronic pelvic pain.  Pelvic pain is secondary to suspected adenomyosis and endometriosis.  Pt states she is seeing pain management in [place] and had back nerve block procedure with some help.  Pt is requesting a refill of narcotic pain medication.  Pt was prescribed OCP but admits she does not do take them consistently.  Pt denies vaginal discharge, bloating, early satiety, unintentional weight loss, breast mass/skin changes, GI or urinary complaints.      Review of Systems:      GENERAL:  No fever, fatigue, excessive weight gain or loss  GASTROINTESTINAL:  No nausea, vomiting, constipation, diarrhea, rectal bleeding   GENITOURINARY:  See HPI    Physical Exam:     /82 (BP Location: Left arm, Patient Position: Sitting, BP Method: Small (Manual))   Ht 5' 3" (1.6 m)   Wt 59 kg (130 lb 1.1 oz)   LMP 10/02/2017 (Exact Date)   BMI 23.04 kg/m²   Pulse 64, Resp rate 16     GENERAL:  No acute distress, well-nourished  LUNGS:  CTA-B  HEART:  RRR, no murmurs, gallops, or rubs  ABDOMEN:  Soft, mild diffuse tenderness in lower pelvis, non-distended, normoactive bowel sounds, no obvious organomegaly  EXT:  Symmetric w/o cramping, claudication, or edema. +2 distal pulses.  PSYCH:  Mood and affect appropriate     PELVIC:  NFEG no lesion. No vaginal or cervical lesion. No bleeding or discharge. No CMT. Uterus and ovaries small, NT. Wet prep negative. Declined rectal exam. No obvious external lesions.    Chaperone present for exam.    Assessment:     36 y.o. :    1. Chronic pelvic pain, suspect adenomyosis and endometriosis     Plan:    We discussed her pelvic pain.  No acute GYN findings on pelvic exam.  No alarming sxs today or acute GI/pelvic findings.  Discussed " supportive care measures.  NSAIDs prn.  Refill percocet per pt request.    Pt was clearly advised on risk of chronic opiate use and possible dependence.    Pt cautioned to do not drink alcohol, drive, operate heavy machinery, work or swim while taking this medication as it can cause mental impairment.  Continue menstrual control with OCP.  Risks, benefits, and alternatives to OCP reviewed.  F/u with pain management, pt advised to sign medical release so we can review her treatment with pain mgt.  F/u in 6 months or sooner as needed.    Pt voiced understanding.        Melo Lopez MD

## 2017-10-20 ENCOUNTER — OFFICE VISIT (OUTPATIENT)
Dept: OBSTETRICS AND GYNECOLOGY | Facility: CLINIC | Age: 37
End: 2017-10-20
Payer: MEDICAID

## 2017-10-20 VITALS
WEIGHT: 130.06 LBS | HEIGHT: 63 IN | DIASTOLIC BLOOD PRESSURE: 64 MMHG | SYSTOLIC BLOOD PRESSURE: 112 MMHG | BODY MASS INDEX: 23.04 KG/M2

## 2017-10-20 DIAGNOSIS — N94.6 DYSMENORRHEA: ICD-10-CM

## 2017-10-20 DIAGNOSIS — R10.2 CHRONIC PELVIC PAIN IN FEMALE: Primary | ICD-10-CM

## 2017-10-20 DIAGNOSIS — G89.29 CHRONIC PELVIC PAIN IN FEMALE: Primary | ICD-10-CM

## 2017-10-20 PROCEDURE — 99213 OFFICE O/P EST LOW 20 MIN: CPT | Mod: PBBFAC | Performed by: OBSTETRICS & GYNECOLOGY

## 2017-10-20 PROCEDURE — 99213 OFFICE O/P EST LOW 20 MIN: CPT | Mod: S$PBB,,, | Performed by: OBSTETRICS & GYNECOLOGY

## 2017-10-20 PROCEDURE — 99999 PR PBB SHADOW E&M-EST. PATIENT-LVL III: CPT | Mod: PBBFAC,,, | Performed by: OBSTETRICS & GYNECOLOGY

## 2017-10-20 RX ORDER — OXYCODONE AND ACETAMINOPHEN 10; 325 MG/1; MG/1
1 TABLET ORAL EVERY 6 HOURS PRN
Qty: 30 TABLET | Refills: 0 | Status: SHIPPED | OUTPATIENT
Start: 2017-10-20 | End: 2017-11-22 | Stop reason: SDUPTHER

## 2017-10-26 ENCOUNTER — NURSE TRIAGE (OUTPATIENT)
Dept: ADMINISTRATIVE | Facility: CLINIC | Age: 37
End: 2017-10-26

## 2017-10-27 ENCOUNTER — TELEPHONE (OUTPATIENT)
Dept: OBSTETRICS AND GYNECOLOGY | Facility: CLINIC | Age: 37
End: 2017-10-27

## 2017-10-27 DIAGNOSIS — R10.2 CHRONIC PELVIC PAIN IN FEMALE: Primary | ICD-10-CM

## 2017-10-27 DIAGNOSIS — G89.29 CHRONIC PELVIC PAIN IN FEMALE: Primary | ICD-10-CM

## 2017-10-27 DIAGNOSIS — N94.6 DYSMENORRHEA: ICD-10-CM

## 2017-10-27 NOTE — TELEPHONE ENCOUNTER
Reason for Disposition   [1] Caller requesting NON-URGENT health information AND [2] PCP's office is the best resource    Protocols used: ST INFORMATION ONLY CALL-A-    Dr. Wilson with [facility]  is calling for GYN plan for patient. None noted on chart and advised to called Dr. Lopez office directly.

## 2017-10-27 NOTE — PROGRESS NOTES
"[facility]  Ambulatory Clinic  Obstetrics & Gynecology    Visit Date:  10/20/2017    Chief Complaint:  F/u chronic pelvic pain    History of Present Illness:    Shruthi Richard is a 36 y.o.  here for follow up with chronic pelvic pain.    Pelvic pain is due to suspected adenomyosis and endometriosis.    Pt states she is seeing pain management in [place] for nerve block procedures with only temporary relief.  Pt is here today with her  specifically requesting hysterectomy.  Pt no longer desires medical mgt or other conservative therapy.  Pt is resolute in her decision to proceed with hysterectomy.  Pt has attempt medical mgt with OCP but admits she does not do take them consistently.  Pt states she does not want to pursue any other further attempts at medical mgt or other conservative therapy, and is resolute in her decision to proceed with hysterectomy.   "I can't go on living in pain, I want my uterus out."   "My periods are debilitating, I am in bed all day, I can't function like this".     "I have made up my mind, we don't want anymore kids, and I am ready for hysterectomy".  Pt denies vaginal discharge, bloating, early satiety, unintentional weight loss, breast mass/skin changes, GI or urinary complaints.     is present for entire visit.    Review of Systems:      GENERAL:  No fever, fatigue, excessive weight gain or loss  GASTROINTESTINAL:  No nausea, vomiting, constipation, diarrhea, rectal bleeding   GENITOURINARY:  See HPI    Physical Exam:     /64 (BP Location: Right arm, Patient Position: Sitting, BP Method: Large (Manual))   Ht 5' 3" (1.6 m)   Wt 59 kg (130 lb 1.1 oz)   LMP 10/02/2017 (Exact Date)   BMI 23.04 kg/m²     GENERAL:  No acute distress, well-nourished  LUNGS:  CTA-B  HEART:  RRR, no murmurs, gallops, or rubs  ABDOMEN:  Soft, mild diffuse tenderness in lower pelvis, non-distended, normoactive bowel sounds, no obvious organomegaly  EXT:  " Symmetric w/o cramping, claudication, or edema. +2 distal pulses.  PSYCH:  Mood and affect appropriate   PELVIC:  Pt declined today.  Importance of exam discussed.    Chaperone present for exam.    Assessment:     36 y.o. :    1. Chronic pelvic pain, suspect adenomyosis and endometriosis   2. Severe desmenorrhea    Plan:    We discussed her chronic pelvic pain in great detail.  After a lengthy discussion regarding various management options, pt and  is specifically requesting hysterectomy and any other further attempts at medical mgt or other conservative therapy.  Pt is resolute in her decision to proceed with hysterectomy.  The proposed procedure will be a total laparoscopic hysterectomy with ovarian conservation.  Risks, benefits, and alternatives to hysterectomy reviewed.  Pt understands the implication on her decision on her future reproductive options.    Refill percocet per pt request.  NSAIDs prn.  Pt was clearly advised on risk of chronic opiate use and possible dependence.  Pt cautioned to do not drink alcohol, drive, operate heavy machinery, work or swim while taking this medication as it can cause mental impairment.  F/u with pain management in meantime.  Pelvic/adnexal precautions.    Will schedule surgery for ~2017 per pt request pending OR scheduling.    Return for pre-op early December, or sooner as needed.    Pt/ voiced understanding.        Melo Lopez MD

## 2017-10-27 NOTE — TELEPHONE ENCOUNTER
Reason for Disposition   [1] SEVERE abdominal pain (e.g., excruciating) AND [2] present > 1 hour    Protocols used: ST VAGINAL [id number]

## 2017-10-27 NOTE — TELEPHONE ENCOUNTER
"  Answer Assessment - Initial Assessment Questions  1. DISCHARGE: "Describe the discharge." (e.g., white, yellow, green, gray, foamy, cottage cheese-like)      Clear wit small amount blood   2. ODOR: "Is there a bad odor?"      No   3. ONSET: "When did the discharge begin?"      5:30 pm   4. RASH: "Is there a rash in that area?" If so, ask: "Describe it." (e.g., redness, blisters, sores, bumps)      No   5. ABDOMINAL PAIN: "Are you having any abdominal pain?" If yes: "What does it feel like? " (e.g., crampy, dull, intermittent, constant)       Severe   6. ABDOMINAL PAIN SEVERITY: If present, ask: "How bad is it?"  (e.g., mild, moderate, severe)   - MILD - doesn't interfere with normal activities    - MODERATE - interferes with normal activities or awakens from sleep    - SEVERE - patient doesn't want to move (R/O peritonitis)       severe  7. CAUSE: "What do you think is causing the discharge?"      History of hterine pain    8. OTHER SYMPTOMS: "Do you have any other symptoms?" (e.g., fever, itching, vaginal bleeding, pain with urination)      No   9. PREGNANCY: "Is there any chance you are pregnant?" "When was your last menstrual period?"      No    Protocols used: ST VAGINAL [id number]    "

## 2017-10-30 ENCOUNTER — TELEPHONE (OUTPATIENT)
Dept: OBSTETRICS AND GYNECOLOGY | Facility: CLINIC | Age: 37
End: 2017-10-30

## 2017-10-30 NOTE — TELEPHONE ENCOUNTER
----- Message from Melo Lopez MD sent at 10/27/2017  2:03 PM CDT -----  Regarding: Preop packet  Demetrius Grant,    Please prepare preop packet for pt.    Shruthi Richard          MRN [id number]    Surgery date:  12/21/17    Time:  7:30AM    Procedure:  total laparoscopic hysterectomy    Dx:  chronic pelvic pain, dysmenorrhea    1) Notify pt of surgery date/time     2) Schedule pt pre-op appointment     3) Block assistant schedule - Dr. Olivarez    4) Put in [employer] calender - DONE      Thank you!

## 2017-11-20 ENCOUNTER — TELEPHONE (OUTPATIENT)
Dept: OBSTETRICS AND GYNECOLOGY | Facility: CLINIC | Age: 37
End: 2017-11-20

## 2017-11-20 DIAGNOSIS — G89.29 CHRONIC PELVIC PAIN IN FEMALE: ICD-10-CM

## 2017-11-20 DIAGNOSIS — R10.2 CHRONIC PELVIC PAIN IN FEMALE: ICD-10-CM

## 2017-11-20 NOTE — TELEPHONE ENCOUNTER
----- Message from Jami Moy sent at 11/20/2017 11:35 AM CST -----  Contact: PT /[phone]  Calling TO get refill on [employer] [facility] /Aidan Latham

## 2017-11-21 ENCOUNTER — TELEPHONE (OUTPATIENT)
Dept: OBSTETRICS AND GYNECOLOGY | Facility: CLINIC | Age: 37
End: 2017-11-21

## 2017-11-21 NOTE — TELEPHONE ENCOUNTER
----- Message from Agustina Erazo sent at 11/21/2017  1:14 PM CST -----  Contact: [phone]  Pt is following up on her refill request for percocet Please call pt at your earliest convenience.  Thanks !

## 2017-11-22 ENCOUNTER — TELEPHONE (OUTPATIENT)
Dept: OBSTETRICS AND GYNECOLOGY | Facility: CLINIC | Age: 37
End: 2017-11-22

## 2017-11-22 RX ORDER — OXYCODONE AND ACETAMINOPHEN 10; 325 MG/1; MG/1
1 TABLET ORAL EVERY 6 HOURS PRN
Qty: 30 TABLET | Refills: 0 | Status: ON HOLD | OUTPATIENT
Start: 2017-11-22 | End: 2017-12-15

## 2017-11-22 NOTE — TELEPHONE ENCOUNTER
11/22/17 @ 1629 (JOSE)  CALLED PT AND INFORMED HER THAT DR BHATIA SENT PERCOCET ORDER TO [facility] , PT STATED HER UNDERSTANDING

## 2017-12-11 ENCOUNTER — HOSPITAL ENCOUNTER (OUTPATIENT)
Dept: PREADMISSION TESTING | Facility: HOSPITAL | Age: 37
Discharge: HOME | End: 2017-12-11
Attending: OBSTETRICS & GYNECOLOGY
Payer: MEDICAID

## 2017-12-11 ENCOUNTER — OFFICE VISIT (OUTPATIENT)
Dept: OBSTETRICS AND GYNECOLOGY | Facility: CLINIC | Age: 37
End: 2017-12-11
Payer: MEDICAID

## 2017-12-11 VITALS
BODY MASS INDEX: 23.39 KG/M2 | WEIGHT: 132 LBS | HEART RATE: 71 BPM | RESPIRATION RATE: 18 BRPM | HEIGHT: 63 IN | TEMPERATURE: 97 F

## 2017-12-11 VITALS
SYSTOLIC BLOOD PRESSURE: 102 MMHG | BODY MASS INDEX: 23.43 KG/M2 | HEIGHT: 63 IN | WEIGHT: 132.25 LBS | DIASTOLIC BLOOD PRESSURE: 60 MMHG

## 2017-12-11 DIAGNOSIS — G89.29 CHRONIC PELVIC PAIN IN FEMALE: Primary | ICD-10-CM

## 2017-12-11 DIAGNOSIS — R10.2 CHRONIC PELVIC PAIN IN FEMALE: ICD-10-CM

## 2017-12-11 DIAGNOSIS — Z01.818 PRE-OP TESTING: Primary | ICD-10-CM

## 2017-12-11 DIAGNOSIS — R10.2 CHRONIC PELVIC PAIN IN FEMALE: Primary | ICD-10-CM

## 2017-12-11 DIAGNOSIS — N94.6 DYSMENORRHEA: ICD-10-CM

## 2017-12-11 DIAGNOSIS — G89.29 CHRONIC PELVIC PAIN IN FEMALE: ICD-10-CM

## 2017-12-11 LAB
B-HCG UR QL: NEGATIVE
BASOPHILS # BLD AUTO: 0.02 K/UL
BASOPHILS NFR BLD: 0.3 %
DIFFERENTIAL METHOD: NORMAL
EOSINOPHIL # BLD AUTO: 0.2 K/UL
EOSINOPHIL NFR BLD: 2.4 %
ERYTHROCYTE [DISTWIDTH] IN BLOOD BY AUTOMATED COUNT: 13.3 %
HCT VFR BLD AUTO: 38 %
HGB BLD-MCNC: 12.8 G/DL
LYMPHOCYTES # BLD AUTO: 2.4 K/UL
LYMPHOCYTES NFR BLD: 32.8 %
MCH RBC QN AUTO: 29.8 PG
MCHC RBC AUTO-ENTMCNC: 33.7 G/DL
MCV RBC AUTO: 88 FL
MONOCYTES # BLD AUTO: 0.7 K/UL
MONOCYTES NFR BLD: 9.3 %
NEUTROPHILS # BLD AUTO: 4 K/UL
NEUTROPHILS NFR BLD: 55.2 %
PLATELET # BLD AUTO: 304 K/UL
PMV BLD AUTO: 10.3 FL
RBC # BLD AUTO: 4.3 M/UL
WBC # BLD AUTO: 7.23 K/UL

## 2017-12-11 PROCEDURE — 36415 COLL VENOUS BLD VENIPUNCTURE: CPT

## 2017-12-11 PROCEDURE — 99999 PR PBB SHADOW E&M-EST. PATIENT-LVL III: CPT | Mod: PBBFAC,,, | Performed by: OBSTETRICS & GYNECOLOGY

## 2017-12-11 PROCEDURE — 99213 OFFICE O/P EST LOW 20 MIN: CPT | Mod: PBBFAC | Performed by: OBSTETRICS & GYNECOLOGY

## 2017-12-11 PROCEDURE — 81025 URINE PREGNANCY TEST: CPT

## 2017-12-11 PROCEDURE — 99499 UNLISTED E&M SERVICE: CPT | Mod: S$PBB,,, | Performed by: OBSTETRICS & GYNECOLOGY

## 2017-12-11 PROCEDURE — 85025 COMPLETE CBC W/AUTO DIFF WBC: CPT

## 2017-12-11 RX ORDER — MUPIROCIN 20 MG/G
OINTMENT TOPICAL
Status: CANCELLED | OUTPATIENT
Start: 2017-12-11

## 2017-12-11 RX ORDER — QUETIAPINE FUMARATE 50 MG/1
50 TABLET, FILM COATED ORAL NIGHTLY
COMMUNITY
End: 2018-01-10 | Stop reason: CLARIF

## 2017-12-11 NOTE — DISCHARGE INSTRUCTIONS
Your surgery is scheduled for___12/15/2017______________.    Call [phone] between 2 pm and 5 pm ___12/14/2017_________ to find out your arrival time for the day of surgery.    Report to SAME DAY SURGERY UNIT at _______am on the 2nd floor of the hospital.  Use the front entrance of the hospital before 6 am.  If you need wheelchair assistance, call [phone] from your cell phone,  or call 0 from the courtesy phone in the hospital lobby.    Important instructions:   Do not eat or drink after 12 midnight, including water.  It is okay to brush your teeth.  Do not have gum, candy or mints.     Take only these medications with a small swallow of water on the morning of your surgery._lamictal, prozac__________        Return to the hospital lab on _12/13/2017 or 12/14/2017_________for additional blood test.    For this procedure you will shower at home the night before and also the morning of surgery with HIBICLENS soap provided by the pre op nurse. Do not use this soap on your face or genitals. You will shower a third time here at the hospital on the morning of surgery. Rinse completely after each shower.  Please place clean linens on your bed the night before surgery. Please wear fresh clean clothing after each shower.  No shaving of procedural area at least 4-5 days before surgery due to   increased risk of skin irritation and/or possible infection.     Do not wear make- up, including mascara.     You may wear deodorant only.      Do not wear powder, body lotion or cologne.     Do not wear any jewelry or have any metal on your body.     Please bring any documents given to you by your doctor.     If you are going home on the same day of surgery, you must have arrangements for a ride home.  You will not be able to drive home if you were given anesthesia or sedation.     Stop taking Aspirin, Ibuprofen, Motrin and Aleve at least 7 days before your surgery. You may use Tylenol.     Stop taking fish oil and vitamin  E for least 7 days before surgery.     Wear loose fitting clothes allowing for bandages.     Please leave money and valuables home.       You may bring your cell phone.     Call the doctor if fever or illness should occur before your surgery.    Call [phone] to contact us here at Pre Op Center if needed.

## 2017-12-14 ENCOUNTER — LAB VISIT (OUTPATIENT)
Dept: LAB | Facility: HOSPITAL | Age: 37
End: 2017-12-14
Attending: OBSTETRICS & GYNECOLOGY
Payer: MEDICAID

## 2017-12-14 DIAGNOSIS — Z01.818 PRE-OP TESTING: ICD-10-CM

## 2017-12-14 LAB
ABO + RH BLD: NORMAL
BLD GP AB SCN CELLS X3 SERPL QL: NORMAL

## 2017-12-14 PROCEDURE — 36415 COLL VENOUS BLD VENIPUNCTURE: CPT

## 2017-12-14 PROCEDURE — 86901 BLOOD TYPING SEROLOGIC RH(D): CPT

## 2017-12-14 PROCEDURE — 86900 BLOOD TYPING SEROLOGIC ABO: CPT

## 2017-12-14 NOTE — H&P
[facility]  History & Physical  Obstetrics & Gynecology    Visit Date:  2017    Chief Complaint:  Pre-op for total laparoscopic hysterectomy     History of Present Illness:      Shruthi Richard is a 37 y.o. , here for pre-op for a total laparoscopic hysterectomy.    Pt is requesting definitive treatment with hysterectomy due to chronic pelvic pain and debilitating dysmenorrhea.     Pt is resolute in her decision to proceed with surgery and has declined further medical management or other conservative therapies.      Otherwise, pt is in her usual state of health.    Past Medical History:      Bipolar disorder    Past Surgical History:      Pelvic diagnostic laparoscopy    Medications:     Current Outpatient Prescriptions on File Prior to Visit   Medication Sig Dispense Refill    clonazePAM (KLONOPIN) 2 MG Tab Take 2 mg by mouth 2 (two) times daily.       fluoxetine (PROZAC) 40 MG capsule Take 40 mg by mouth once daily.      lamoTRIgine (LAMICTAL) 150 MG Tab Take 150 mg by mouth once daily.       oxyCODONE-acetaminophen (PERCOCET)  mg per tablet Take 1 tablet by mouth every 6 (six) hours as needed for Pain. 30 tablet 0    QUEtiapine (SEROQUEL) 50 MG tablet Take 50 mg by mouth every evening.       Allergies:     Allergen Reactions    Gluten protein Other (See Comments)     Sever stomach ache, very tired feeling     Gynecologic History:      Denies active STI or abnormal Pap     Social History:      Denies tobacco, alcohol abuse or illicit drug use  Denies domestic abuse     Family History:      Noncontributory    Review of Systems:      GENERAL:  No fever, fatigue, excessive weight gain or loss  HEENT:  No head injury, headaches, hearing changes, visual disturbance  RESPIRATORY:  No cough, shortness of breath  CARDIOVASCULAR:  No chest pain, heart palpitations, leg swelling  GASTROINTESTINAL:  No nausea, vomiting, constipation, diarrhea, abd pain, rectal bleeding  "  GENITOURINARY:  See HPI.  HEMATOLOGIC:  No easy bruisability or bleeding   LYMPHATICS:  No enlarged nodes  MUSCULOSKELETAL:  No joint pain or swelling  SKIN:  No rash, lesions, jaundice  NEUROLOGIC:  No dizziness, weakness, syncope  PSYCHIATRIC:  No depression, anxiety or mood swings     Physical Exam:     /60 (BP Location: Left arm, Patient Position: Sitting, BP Method: Small (Manual))   Ht 5' 3" (1.6 m)   Wt 60 kg (132 lb 4.4 oz)   BMI 23.43 kg/m²      GENERAL: NAD, well-nourished  HEENT: NCAT, moist mucus membranes, anicteric, neck supple  LUNGS: CTA-B  HEART: RRR  ABDOMEN: Soft, non-distented, diffuse lower abd/pelvic pain. Normoactive BS. No obvious organomegaly.  EXT: Symmetric w/o cramping, claudication, or edema. +2 distal pulses.  SKIN: No rashes or bruising  NEURO: Grossly intact bilaterally  PSYCH: Mood and affect appropriate  PELVIC:  NFEG no lesion. No vaginal or cervical lesion. No bleeding or discharge. No CMT. Uterus and ovaries small, NT. Wet prep negative. Declined rectal exam. No obvious external lesions.    Labs/studies:    Lab Results   Component Value Date    WBC 7.23 2017    HGB 12.8 2017    HCT 38.0 2017    MCV 88 2017     2017     Assessment:     37 y.o.  with:    1. Chronic pelvic pain  2. Debilitating dysmenorrhea    Plan:    We discussed her condition in great detail.  The proposed procedure will be a total laparoscopic hysterectomy, bilateral salpingectomy, and ovarian conservation (if the ovaries are not damaged or diseased) if feasible all of which as determined at the time of surgery.  Pt has declined medical management or other conservative therapies, and is resolute in her decision to proceed with surgery.        Pt was informed of the risks, benefits, and alternatives of total laparoscopic hysterectomy.  Risks included but were not limited to bleeding, infection, injury to surrounding organs or tissues, injury to bladder and/or " urinary tract, injury to bowel and/or intestinal obstruction, damage to major blood vessels, hemorrhage requiring transfusion of blood products, ovarian failure requiring hormone administration, pulmonary embolism, fistula formation, urinary and/or fecal incontinence, sexual dysfunction/pain, chronic pain, hernia, failure of wound to heal, permanent/disfiguring scarring, and even death.  In the event of a supracervical hysterectomy, she was also counseled on the need for long term follow-up with pap smears and the possibility of future trachelectomy.  Pt was counseled extensively on the inability to become pregnant following a hysterectomy and expressed an understanding of this.  Pt was also counseled that a bilateral oophorectomy will result in surgical menopause for pre/perimenopausal women and the long term health consequences thereof.  Pt was counseled in the possibility of laparotomy if extensive adhesions or bleeding were encountered.  Pt was counseled on the cancer risk associated with the use of a uterine morcellator.  Pt was counseled that hysterectomy and/or oophorectomy may not result in the resolution of her pain symptoms.  Pt was also counseled on the risk of uterine and/or ovarian cancer diagnosed on post-op pathology which may require additional future surgery and/or treatment.  The risks, benefits, and alternatives to blood transfusion was also discussed.  Pt expressed an understanding of risks involved, all questions answered, and informed consent was obtained for the procedure and blood transfusion.    Surgery tentatively scheduled for 12/15/2017.    Pre-op instructions reviewed.      Pt medically cleared for surgery by PCP.  All questions answered, pt voiced understanding.        Melo Lopez MD

## 2017-12-14 NOTE — PROGRESS NOTES
Pt here for pre-op for total laparoscopic hysterectomy.  Please see pre-op H&P for complete details.    Melo Lopez MD

## 2017-12-15 ENCOUNTER — ANESTHESIA EVENT (OUTPATIENT)
Dept: SURGERY | Facility: HOSPITAL | Age: 37
End: 2017-12-15
Payer: MEDICAID

## 2017-12-15 ENCOUNTER — ANESTHESIA (OUTPATIENT)
Dept: SURGERY | Facility: HOSPITAL | Age: 37
End: 2017-12-15
Payer: MEDICAID

## 2017-12-15 ENCOUNTER — HOSPITAL ENCOUNTER (OUTPATIENT)
Facility: HOSPITAL | Age: 37
Discharge: HOME | End: 2017-12-15
Attending: OBSTETRICS & GYNECOLOGY | Admitting: OBSTETRICS & GYNECOLOGY
Payer: MEDICAID

## 2017-12-15 ENCOUNTER — SURGERY (OUTPATIENT)
Age: 37
End: 2017-12-15

## 2017-12-15 VITALS
BODY MASS INDEX: 23.39 KG/M2 | HEART RATE: 66 BPM | HEIGHT: 63 IN | OXYGEN SATURATION: 98 % | RESPIRATION RATE: 16 BRPM | SYSTOLIC BLOOD PRESSURE: 86 MMHG | TEMPERATURE: 98 F | WEIGHT: 132 LBS | DIASTOLIC BLOOD PRESSURE: 42 MMHG

## 2017-12-15 DIAGNOSIS — Z90.710 STATUS POST LAPAROSCOPIC HYSTERECTOMY: Primary | ICD-10-CM

## 2017-12-15 DIAGNOSIS — N94.6 DYSMENORRHEA: ICD-10-CM

## 2017-12-15 DIAGNOSIS — G89.29 CHRONIC PELVIC PAIN IN FEMALE: ICD-10-CM

## 2017-12-15 DIAGNOSIS — R10.2 CHRONIC PELVIC PAIN IN FEMALE: ICD-10-CM

## 2017-12-15 LAB
BASOPHILS # BLD AUTO: 0.01 K/UL
BASOPHILS NFR BLD: 0.1 %
DIFFERENTIAL METHOD: ABNORMAL
EOSINOPHIL # BLD AUTO: 0 K/UL
EOSINOPHIL NFR BLD: 0.3 %
ERYTHROCYTE [DISTWIDTH] IN BLOOD BY AUTOMATED COUNT: 13 %
HCT VFR BLD AUTO: 31.6 %
HGB BLD-MCNC: 10.8 G/DL
LYMPHOCYTES # BLD AUTO: 1.3 K/UL
LYMPHOCYTES NFR BLD: 11.6 %
MCH RBC QN AUTO: 29.8 PG
MCHC RBC AUTO-ENTMCNC: 34.2 G/DL
MCV RBC AUTO: 87 FL
MONOCYTES # BLD AUTO: 0.7 K/UL
MONOCYTES NFR BLD: 5.8 %
NEUTROPHILS # BLD AUTO: 9.4 K/UL
NEUTROPHILS NFR BLD: 82.2 %
PLATELET # BLD AUTO: 238 K/UL
PMV BLD AUTO: 10 FL
POCT GLUCOSE: 87 MG/DL (ref 70–110)
RBC # BLD AUTO: 3.62 M/UL
WBC # BLD AUTO: 11.42 K/UL

## 2017-12-15 PROCEDURE — 36415 COLL VENOUS BLD VENIPUNCTURE: CPT

## 2017-12-15 PROCEDURE — 71000016 HC POSTOP RECOV ADDL HR: Performed by: OBSTETRICS & GYNECOLOGY

## 2017-12-15 PROCEDURE — 71000033 HC RECOVERY, INTIAL HOUR: Performed by: OBSTETRICS & GYNECOLOGY

## 2017-12-15 PROCEDURE — 63600175 PHARM REV CODE 636 W HCPCS: Performed by: NURSE ANESTHETIST, CERTIFIED REGISTERED

## 2017-12-15 PROCEDURE — D9220A PRA ANESTHESIA: Mod: CRNA,,, | Performed by: NURSE ANESTHETIST, CERTIFIED REGISTERED

## 2017-12-15 PROCEDURE — 63600175 PHARM REV CODE 636 W HCPCS: Performed by: ANESTHESIOLOGY

## 2017-12-15 PROCEDURE — 37000008 HC ANESTHESIA 1ST 15 MINUTES: Performed by: OBSTETRICS & GYNECOLOGY

## 2017-12-15 PROCEDURE — 37000009 HC ANESTHESIA EA ADD 15 MINS: Performed by: OBSTETRICS & GYNECOLOGY

## 2017-12-15 PROCEDURE — 25000003 PHARM REV CODE 250: Performed by: ANESTHESIOLOGY

## 2017-12-15 PROCEDURE — 27201423 OPTIME MED/SURG SUP & DEVICES STERILE SUPPLY: Performed by: OBSTETRICS & GYNECOLOGY

## 2017-12-15 PROCEDURE — D9220A PRA ANESTHESIA: Mod: ANES,,, | Performed by: ANESTHESIOLOGY

## 2017-12-15 PROCEDURE — 25000003 PHARM REV CODE 250: Performed by: NURSE ANESTHETIST, CERTIFIED REGISTERED

## 2017-12-15 PROCEDURE — C2628 CATHETER, OCCLUSION: HCPCS | Performed by: OBSTETRICS & GYNECOLOGY

## 2017-12-15 PROCEDURE — 88307 TISSUE EXAM BY PATHOLOGIST: CPT

## 2017-12-15 PROCEDURE — 25000003 PHARM REV CODE 250: Performed by: OBSTETRICS & GYNECOLOGY

## 2017-12-15 PROCEDURE — 71000039 HC RECOVERY, EACH ADD'L HOUR: Performed by: OBSTETRICS & GYNECOLOGY

## 2017-12-15 PROCEDURE — 85025 COMPLETE CBC W/AUTO DIFF WBC: CPT

## 2017-12-15 PROCEDURE — 36000711: Performed by: OBSTETRICS & GYNECOLOGY

## 2017-12-15 PROCEDURE — 71000015 HC POSTOP RECOV 1ST HR: Performed by: OBSTETRICS & GYNECOLOGY

## 2017-12-15 PROCEDURE — 88307 TISSUE EXAM BY PATHOLOGIST: CPT | Mod: 26,,,

## 2017-12-15 PROCEDURE — 58571 TLH W/T/O 250 G OR LESS: CPT | Mod: ,,, | Performed by: OBSTETRICS & GYNECOLOGY

## 2017-12-15 PROCEDURE — 63600175 PHARM REV CODE 636 W HCPCS: Performed by: OBSTETRICS & GYNECOLOGY

## 2017-12-15 PROCEDURE — 58571 TLH W/T/O 250 G OR LESS: CPT | Mod: 80,,, | Performed by: OBSTETRICS & GYNECOLOGY

## 2017-12-15 PROCEDURE — 36000710: Performed by: OBSTETRICS & GYNECOLOGY

## 2017-12-15 RX ORDER — SIMETHICONE 80 MG
80 TABLET,CHEWABLE ORAL EVERY 4 HOURS PRN
Status: DISCONTINUED | OUTPATIENT
Start: 2017-12-15 | End: 2017-12-15 | Stop reason: HOSPADM

## 2017-12-15 RX ORDER — OXYCODONE AND ACETAMINOPHEN 10; 325 MG/1; MG/1
1 TABLET ORAL EVERY 4 HOURS PRN
Status: DISCONTINUED | OUTPATIENT
Start: 2017-12-15 | End: 2017-12-15 | Stop reason: HOSPADM

## 2017-12-15 RX ORDER — ONDANSETRON 2 MG/ML
INJECTION INTRAMUSCULAR; INTRAVENOUS
Status: DISCONTINUED | OUTPATIENT
Start: 2017-12-15 | End: 2017-12-15

## 2017-12-15 RX ORDER — DIPHENHYDRAMINE HCL 25 MG
25 CAPSULE ORAL EVERY 4 HOURS PRN
Status: DISCONTINUED | OUTPATIENT
Start: 2017-12-15 | End: 2017-12-15 | Stop reason: HOSPADM

## 2017-12-15 RX ORDER — FENTANYL CITRATE 50 UG/ML
25 INJECTION, SOLUTION INTRAMUSCULAR; INTRAVENOUS EVERY 5 MIN PRN
Status: DISCONTINUED | OUTPATIENT
Start: 2017-12-15 | End: 2017-12-15 | Stop reason: HOSPADM

## 2017-12-15 RX ORDER — ONDANSETRON 4 MG/1
8 TABLET, ORALLY DISINTEGRATING ORAL EVERY 8 HOURS PRN
Status: DISCONTINUED | OUTPATIENT
Start: 2017-12-15 | End: 2017-12-15 | Stop reason: HOSPADM

## 2017-12-15 RX ORDER — SODIUM CHLORIDE 0.9 % (FLUSH) 0.9 %
3 SYRINGE (ML) INJECTION
Status: DISCONTINUED | OUTPATIENT
Start: 2017-12-15 | End: 2017-12-15 | Stop reason: HOSPADM

## 2017-12-15 RX ORDER — PROPOFOL 10 MG/ML
VIAL (ML) INTRAVENOUS
Status: DISCONTINUED | OUTPATIENT
Start: 2017-12-15 | End: 2017-12-15

## 2017-12-15 RX ORDER — GLYCOPYRROLATE 0.2 MG/ML
INJECTION INTRAMUSCULAR; INTRAVENOUS
Status: DISCONTINUED | OUTPATIENT
Start: 2017-12-15 | End: 2017-12-15

## 2017-12-15 RX ORDER — IBUPROFEN 600 MG/1
600 TABLET ORAL EVERY 6 HOURS PRN
Qty: 60 TABLET | Refills: 0 | Status: SHIPPED | OUTPATIENT
Start: 2017-12-15 | End: 2018-01-10 | Stop reason: ALTCHOICE

## 2017-12-15 RX ORDER — OXYCODONE AND ACETAMINOPHEN 10; 325 MG/1; MG/1
1 TABLET ORAL EVERY 6 HOURS PRN
Qty: 30 TABLET | Refills: 0 | Status: SHIPPED | OUTPATIENT
Start: 2017-12-15 | End: 2017-12-20 | Stop reason: SDUPTHER

## 2017-12-15 RX ORDER — FENTANYL CITRATE 50 UG/ML
INJECTION, SOLUTION INTRAMUSCULAR; INTRAVENOUS
Status: DISCONTINUED | OUTPATIENT
Start: 2017-12-15 | End: 2017-12-15

## 2017-12-15 RX ORDER — MIDAZOLAM HYDROCHLORIDE 1 MG/ML
INJECTION, SOLUTION INTRAMUSCULAR; INTRAVENOUS
Status: DISCONTINUED | OUTPATIENT
Start: 2017-12-15 | End: 2017-12-15

## 2017-12-15 RX ORDER — SUCCINYLCHOLINE CHLORIDE 20 MG/ML
INJECTION INTRAMUSCULAR; INTRAVENOUS
Status: DISCONTINUED | OUTPATIENT
Start: 2017-12-15 | End: 2017-12-15

## 2017-12-15 RX ORDER — METOCLOPRAMIDE HYDROCHLORIDE 5 MG/ML
INJECTION INTRAMUSCULAR; INTRAVENOUS
Status: DISCONTINUED | OUTPATIENT
Start: 2017-12-15 | End: 2017-12-15

## 2017-12-15 RX ORDER — HYDROMORPHONE HYDROCHLORIDE 2 MG/ML
0.2 INJECTION, SOLUTION INTRAMUSCULAR; INTRAVENOUS; SUBCUTANEOUS EVERY 5 MIN PRN
Status: COMPLETED | OUTPATIENT
Start: 2017-12-15 | End: 2017-12-15

## 2017-12-15 RX ORDER — ROCURONIUM BROMIDE 10 MG/ML
INJECTION, SOLUTION INTRAVENOUS
Status: DISCONTINUED | OUTPATIENT
Start: 2017-12-15 | End: 2017-12-15

## 2017-12-15 RX ORDER — ACETAMINOPHEN 10 MG/ML
1000 INJECTION, SOLUTION INTRAVENOUS ONCE
Status: COMPLETED | OUTPATIENT
Start: 2017-12-15 | End: 2017-12-15

## 2017-12-15 RX ORDER — LORAZEPAM 2 MG/ML
0.25 INJECTION INTRAMUSCULAR ONCE AS NEEDED
Status: DISCONTINUED | OUTPATIENT
Start: 2017-12-15 | End: 2017-12-15 | Stop reason: HOSPADM

## 2017-12-15 RX ORDER — CEFAZOLIN SODIUM 2 G/50ML
2 SOLUTION INTRAVENOUS
Status: COMPLETED | OUTPATIENT
Start: 2017-12-15 | End: 2017-12-15

## 2017-12-15 RX ORDER — IBUPROFEN 600 MG/1
600 TABLET ORAL EVERY 6 HOURS PRN
Status: DISCONTINUED | OUTPATIENT
Start: 2017-12-15 | End: 2017-12-15 | Stop reason: HOSPADM

## 2017-12-15 RX ORDER — MUPIROCIN 20 MG/G
OINTMENT TOPICAL
Status: DISCONTINUED | OUTPATIENT
Start: 2017-12-15 | End: 2017-12-15 | Stop reason: HOSPADM

## 2017-12-15 RX ORDER — LIDOCAINE HCL/PF 100 MG/5ML
SYRINGE (ML) INTRAVENOUS
Status: DISCONTINUED | OUTPATIENT
Start: 2017-12-15 | End: 2017-12-15

## 2017-12-15 RX ORDER — SODIUM CHLORIDE, SODIUM LACTATE, POTASSIUM CHLORIDE, CALCIUM CHLORIDE 600; 310; 30; 20 MG/100ML; MG/100ML; MG/100ML; MG/100ML
INJECTION, SOLUTION INTRAVENOUS CONTINUOUS PRN
Status: DISCONTINUED | OUTPATIENT
Start: 2017-12-15 | End: 2017-12-15

## 2017-12-15 RX ADMIN — FENTANYL CITRATE 50 MCG: 50 INJECTION INTRAMUSCULAR; INTRAVENOUS at 08:12

## 2017-12-15 RX ADMIN — FENTANYL CITRATE 100 MCG: 50 INJECTION INTRAMUSCULAR; INTRAVENOUS at 07:12

## 2017-12-15 RX ADMIN — ACETAMINOPHEN 1000 MG: 10 INJECTION, SOLUTION INTRAVENOUS at 09:12

## 2017-12-15 RX ADMIN — METOCLOPRAMIDE 10 MG: 5 INJECTION, SOLUTION INTRAMUSCULAR; INTRAVENOUS at 07:12

## 2017-12-15 RX ADMIN — LIDOCAINE HYDROCHLORIDE 100 MG: 20 INJECTION, SOLUTION INTRAVENOUS at 07:12

## 2017-12-15 RX ADMIN — SODIUM CHLORIDE, SODIUM LACTATE, POTASSIUM CHLORIDE, AND CALCIUM CHLORIDE: .6; .31; .03; .02 INJECTION, SOLUTION INTRAVENOUS at 07:12

## 2017-12-15 RX ADMIN — HYDROMORPHONE HYDROCHLORIDE 0.2 MG: 2 INJECTION INTRAMUSCULAR; INTRAVENOUS; SUBCUTANEOUS at 09:12

## 2017-12-15 RX ADMIN — HYDROMORPHONE HYDROCHLORIDE 0.2 MG: 2 INJECTION INTRAMUSCULAR; INTRAVENOUS; SUBCUTANEOUS at 10:12

## 2017-12-15 RX ADMIN — ONDANSETRON 4 MG: 2 INJECTION, SOLUTION INTRAMUSCULAR; INTRAVENOUS at 07:12

## 2017-12-15 RX ADMIN — FENTANYL CITRATE 25 MCG: 50 INJECTION INTRAMUSCULAR; INTRAVENOUS at 09:12

## 2017-12-15 RX ADMIN — PROPOFOL 100 MG: 10 INJECTION, EMULSION INTRAVENOUS at 07:12

## 2017-12-15 RX ADMIN — MUPIROCIN: 20 OINTMENT TOPICAL at 06:12

## 2017-12-15 RX ADMIN — GLYCOPYRROLATE 0.2 MG: 0.2 INJECTION, SOLUTION INTRAMUSCULAR; INTRAVENOUS at 07:12

## 2017-12-15 RX ADMIN — SUCCINYLCHOLINE CHLORIDE 100 MG: 20 INJECTION, SOLUTION INTRAMUSCULAR; INTRAVENOUS at 07:12

## 2017-12-15 RX ADMIN — SODIUM CHLORIDE, SODIUM LACTATE, POTASSIUM CHLORIDE, AND CALCIUM CHLORIDE 500 ML: 600; 310; 30; 20 INJECTION, SOLUTION INTRAVENOUS at 10:12

## 2017-12-15 RX ADMIN — ROCURONIUM BROMIDE 5 MG: 10 INJECTION, SOLUTION INTRAVENOUS at 07:12

## 2017-12-15 RX ADMIN — SODIUM CHLORIDE, SODIUM LACTATE, POTASSIUM CHLORIDE, AND CALCIUM CHLORIDE: .6; .31; .03; .02 INJECTION, SOLUTION INTRAVENOUS at 08:12

## 2017-12-15 RX ADMIN — MIDAZOLAM HYDROCHLORIDE 2 MG: 1 INJECTION, SOLUTION INTRAMUSCULAR; INTRAVENOUS at 07:12

## 2017-12-15 RX ADMIN — OXYCODONE HYDROCHLORIDE AND ACETAMINOPHEN 1 TABLET: 10; 325 TABLET ORAL at 11:12

## 2017-12-15 RX ADMIN — CEFAZOLIN SODIUM 2 G: 2 SOLUTION INTRAVENOUS at 07:12

## 2017-12-15 RX ADMIN — ROCURONIUM BROMIDE 20 MG: 10 INJECTION, SOLUTION INTRAVENOUS at 07:12

## 2017-12-15 NOTE — OR NURSING
Pts pain unrelieved with 0.8mg dilaudid 25mcg Fentanyl and 1 G OFIRMEV IVPB. MD notified and aware. Dr. Doe at bedside to  pt. Instructed to continue to administered pain meds as ordered. Will continue to monitor pt for any changes.

## 2017-12-15 NOTE — PROGRESS NOTES
BP noted below:     12/15/17 1005   Vital Signs   BP (!) 83/46     MD notified and aware. New orders noted for 500  Cc LR bolus. Will continue to monitor pt for any changes.

## 2017-12-15 NOTE — PLAN OF CARE
Problem: Patient Care Overview  Goal: Plan of Care Review  Outcome: Ongoing (interventions implemented as appropriate)   12/15/17 1109   Coping/Psychosocial   Plan Of Care Reviewed With patient     Recovery care complete. No acute events during recovery phase. AA/O. Sangita score 10/10. No N/V. Afebrile. Adequate spo2s maintained via RA; no SOB noted. All other VSS. NSR per monitor. PRN pain medication administered per MD order. IVF infusing. DSG x3 to ABD  c/d/i with no redness nor swelling noted. Report given to LILI Da Silva RN in [facility]. Pt transported to [facility] via stretcher. Family notified.      Problem: Hysterectomy (Adult)  Goal: Signs and Symptoms of Listed Potential Problems Will be Absent, Minimized or Managed (Hysterectomy)  Signs and symptoms of listed potential problems will be absent, minimized or managed by discharge/transition of care (reference Hysterectomy (Adult) CPG).   Outcome: Ongoing (interventions implemented as appropriate)   12/15/17 1109   Hysterectomy   Problems Assessed (Hysterectomy) all   Problems Present (Hysterectomy) pain     Goal: Anesthesia/Sedation Recovery  Outcome: Outcome(s) achieved Date Met: 12/15/17   12/15/17 1109   Goal/Outcome Evaluation   Anesthesia/Sedation Recovery criteria met for transfer

## 2017-12-15 NOTE — NURSING TRANSFER
Nursing Transfer Note      12/15/2017     Transfer To: [facility]; PER handoff given to LILI Da Silva RN    Transfer From: PACU    Transfer via stretcher    Transfer with IVF    Transported by [employer] sent: N/A    Chart send with patient: Yes    Notified: spouse per Ms. Sahu in family waiting room

## 2017-12-15 NOTE — DISCHARGE INSTRUCTIONS
***  BATHING:                   You may shower after your dressing is removed, but no tub baths, hot tubs, saunas or swimming until you see the doctor.    DRESSING:  ? Remove your bandage ________________. If there are skin tapes over the incision, leave them in place. They will start to come off in 5-7 days.  ACTIVITY LEVEL: If you have received sedation or an anesthetic, you may feel sleepy for   several hours. Rest until you are more awake. Gradually resume your normal activities  ? No heavy lifting or straining, nothing over 10 lbs., like a gallon of milk.  ? Pelvic rest- no sex, tampons or douching until follow up or instructed by doctor.  DIET:  You may resume your home diet. If nausea is present, increase your diet gradually with fluids and bland foods.    Medications:  Pain medication should be taken only if needed and as directed. If antibiotics are prescribed, the medication should be taken until completed. You will be given an updated list of you medications.  ? No driving, alcoholic beverages or signing legal documents for next 24 hours or while taking pain medication    CALL THE DOCTOR:    For any obvious bleeding (some dried blood over the incision is normal).      Redness, swelling, foul smell around incision or fever over 101.   Shortness of breath, Coughing up Bloody Sputum or Pains or Swelling in your calves.   Persistent pain or nausea not relieved by medication.   If vaginal bleeding is in excess of a normal period.   Problems urinating    If any unusual problems or difficulties occur contact your doctor. If you cannot contact your doctor but feel your signs and symptoms warrant a physicians attention return to the emergency room.      Discharge Instructions for Laparoscopic Hysterectomy  You had a procedure called laparoscopic hysterectomy. A surgeon removed your uterus using instruments inserted through small incisions in your abdomen. These incisions may be tender or sore. You may  also have pain in your upper back or shoulders. This is from the gas used to enlarge your abdomen to allow your doctor to see inside your pelvis and perform the procedure. This pain usually goes away in a day or two. It usually takes from 1 to 4 weeks to recover from laparoscopic hysterectomy. Remember, though, that recovery time varies from woman to woman. Here's what you can do to speed your recovery following surgery.  Home care   · Continue the coughing and deep breathing exercises that you learned in the hospital.  · Take your medications exactly as directed by your doctor.  · Avoid constipation.  ¨ Eat fruits, vegetables, and whole grains.  ¨ Drink 6 to 8 glasses of water a day, unless told to do otherwise.  ¨ Use a laxative or a mild stool softener if your doctor says it's OK.  · Shower as usual. Wash your incisions with mild soap and water. Pat dry.  · Don't use oils, powders, or lotions on your incisions.  · Don't put anything in your vagina until your doctor says it's safe to do so. Don't use tampons or douches. Don't have sex.  · If you had both ovaries removed, report hot flashes, mood swings, and irritability to your doctor. There may be medications that can help you.  Activity  · Ask your doctor when you can start driving again. It's usually okay to drive as soon as you are free of pain and able to move comfortably from side to side. Don't drive while you are still taking opioid pain medications.  · Ask others to help with chores and errands while you recover.  · Dont lift anything heavier than 10 pounds for 6 weeks.  · Dont vacuum or do other strenuous activities until the doctor says it's OK.  · Walk as often as you feel able.  · Don't drive for a few days after the surgery. You may drive as soon as you are able to move comfortably from side to side and when you are no longer taking narcotics.  · Climb stairs slowly and pause after every few steps.  Follow-up care  Make a follow-up appointment as  directed by our staff.     When to call your doctor  Call your doctor right away if you have any of the following:  · Fever above 100.4°F (38°C) or chills  · Bright red vaginal bleeding or vaginal bleeding that soaks more than one sanitary pad per hour  · A foul smelling discharge from the vagina  · Trouble urinating or burning when you urinate  · Severe pain or bloating in your abdomen  · Redness, swelling, or drainage at your incision sites  · Shortness of breath or chest pain  · Nausea and vomiting   Date Last Reviewed: 5/19/2015  © 4933-8320 [employer]. [address] [place] [id number]. All rights reserved. This information is not intended as a substitute for professional medical care. Always follow your healthcare professional's instructions.        Caring for Yourself After Hysterectomy     Staying active can help you feel better in mind and body.     After you recover from your hysterectomy, you may feel better than you have in a long time. An active, healthy lifestyle and regular medical care can help you continue to feel good.  Being intimate  After a hysterectomy, sex can be as pleasurable as it was before. Follow your surgeons instructions on when you can resume having intercourse. This is usually within 4 to 8 weeks after the procedure. Other types of sexual activity may be possible sooner. If you experience vaginal dryness during sex, use a lubricant. Be aware that a hysterectomy prevents pregnancy, but does not protect you against sexually transmitted diseases. If you have any concerns, discuss them with your partner and your healthcare provider.  Being aware of your emotions  After a hysterectomy, you may feel relieved to be free of symptoms. You may also feel sad about the changes in your body. If your ovaries were removed, you may go through some natural mood swings as your hormones adjust. Note how you are feeling from day to day. Talk to your healthcare provider if youre  concerned about emotions you are feeling.  Ongoing healthcare  Regular physical exams help to ensure your general health and well-being:  · You will continue to need routine breast exams and pelvic exams. This includes Pap tests if you still have a cervix or if you have a history of certain types of dysplasia or cervical cancer.   · If youre taking hormone therapy, you will need follow-up visits with your healthcare provider to fine-tune your dosage.  What to know about hormone therapy  Hormone therapy (HT) is medicine to replace the hormones made by your ovaries. It may be advised if your ovaries are removed and you have not yet gone through natural menopause. HT helps decrease hot flashes, vaginal dryness, and other symptoms of menopause. It may help reduce bone loss and lessen your risk of developing osteoporosis. But HT may also increase the risk of certain types of health problems in some women. Discuss the pros and cons of HT with your healthcare provider.   Date Last Reviewed: 3/1/2017  © 2833-4032 [employer]. [address] [place] [address]. All rights reserved. This information is not intended as a substitute for professional medical care. Always follow your healthcare professional's instructions.    Fall Prevention  Millions of people fall every year and injure themselves. You may have had anesthesia or sedation which may increase your risk of falling. You may have health issues that put you at an increased risk of falling.     Here are ways to reduce your risk of falling.  ·   · Make your home safe by keeping walkways clear of objects you may trip over.  · Use non-slip pads under rugs. Do not use area rugs or small throw rugs.  · Use non-slip mats in bathtubs and showers.  · Install handrails and lights on staircases.  · Do not walk in poorly lit areas.  · Do not stand on chairs or wobbly ladders.  · Use caution when reaching overhead or looking upward. This position can cause a  loss of balance.  · Be sure your shoes fit properly, have non-slip bottoms and are in good condition.   · Wear shoes both inside and out. Avoid going barefoot or wearing slippers.  · Be cautious when going up and down stairs, curbs, and when walking on uneven sidewalks.  · If your balance is poor, consider using a cane or walker.  · If your fall was related to alcohol use, stop or limit alcohol intake.   · If your fall was related to use of sleeping medicines, talk to your doctor about this. You may need to reduce your dosage at bedtime if you awaken during the night to go to the bathroom.    · To reduce the need for nighttime bathroom trips:  ¨ Avoid drinking fluids for several hours before going to bed  ¨ Empty your bladder before going to bed  ¨ Men can keep a urinal at the bedside  · Stay as active as you can. Balance, flexibility, strength, and endurance all come from exercise. They all play a role in preventing falls. Ask your healthcare provider which types of activity are right for you.  · Get your vision checked on a regular basis.  · If you have pets, know where they are before you stand up or walk so you don't trip over them.  · Use night lights.

## 2017-12-15 NOTE — DISCHARGE SUMMARY
[facility]    Obstetrics & Gynecology  Discharge Summary      Patient Name:  Shruthi Richrad  MRN:  [id number]  Admission Date:  12/15/2017  Discharge Date:   12/15/2017  Hospital Length of Stay:  0  Attending Physician:  Melo Lopez MD  Discharging Physician:  Melo Lopez MD  Primary Care Provider:  Valente Erazo Jr, MD  Date of Surgery:  12/15/2017    Admitting Diagnosis:       Chronic pelvic pain  Debilitating dysmenorrhea  Failed conservative therapy     Discharge Diagnosis:    S/p total laparoscopic hysterectomy and bilateral salpingectomy    Procedure performed:      Total laparoscopic hysterectomy  Bilateral salpingectomy    Brief Hospital Course:      See H&P for complete details.  In brief, Shruthi Richard is a 37 y.o.  with chronic pelvic pain and debilitating dysmenorrhea requesting definitive surgical therapy with total laparoscopic hysterectomy.  Pt declined further medical management or other conservative therapies.  Pt tolerated the surgery well without complication.  Please see operative report for further details.  Following the surgery, pt was transferred to the PACU in stable condition.  Pt had an uncomplicated post-operative course.  Prior to discharge, pt was without major complaints.  Her pain was well-controlled.  Her vital signs where physiologic and stable.  Physical exam showed no acute findings.  Pt was ambulating, tolerating oral intake, and voiding without difficulty.  Pt had satisfied routine post-op discharge criteria and expressed the desire to go home.        Pertinent Labs/Studies:      Recent Results (from the past 336 hour(s))   CBC auto differential    Collection Time: 12/15/17  1:58 PM   Result Value Ref Range    WBC 11.42 3.90 - 12.70 K/uL    Hemoglobin 10.8 (L) 12.0 - 16.0 g/dL    Hematocrit 31.6 (L) 37.0 - 48.5 %    Platelets 238 150 - 350 K/uL   CBC auto differential    Collection Time: 17  1:00 PM   Result Value Ref Range    WBC 7.23  "3.90 - 12.70 K/uL    Hemoglobin 12.8 12.0 - 16.0 g/dL    Hematocrit 38.0 37.0 - 48.5 %    Platelets 304 150 - 350 K/uL     Discharge Exam:      Temp:  [97.7 °F (36.5 °C)-98.1 °F (36.7 °C)] 97.7 °F (36.5 °C)  Pulse:  [] 82  Resp:  [10-20] 14  SpO2:  [95 %-100 %] 96 %  BP: ()/(44-62) 94/58    BP (!) 94/58   Pulse 82   Temp 97.7 °F (36.5 °C) (Oral)   Resp 14   Ht 5' 3" (1.6 m)   Wt 59.9 kg (132 lb)   LMP 11/22/2017 (Approximate)   SpO2 96%   BMI 23.38 kg/m²     Intake/Output Summary (Last 24 hours) at 12/15/17 1440  Last data filed at 12/15/17 1200   Gross per 24 hour   Intake             5400 ml   Output             1225 ml   Net             4175 ml     GENERAL:  No acute distress. Alert and oriented x 3.   NECK:  Supple, FROM.  LUNGS:  Clear to auscultation bilaterally.   HEART:  Regular rate and rhythm with physiologic heart sounds.   ABDOMEN:  Soft, non-tender, no guarding, rigidity, or rebound.   INCISION:  Clean, dry, & intact.   EXT:  No cramping, claudication or edema.  Good skin turgor.  +2 distal pulses, symmetric.  Full range of motion.   NEURO:  Grossly intact bilaterally.   PSYCH:  Mood and affect appropriate.   PERINEUM:  Intact with no active vaginal bleeding    Discharge Condition:  Stable      Discharge Disposition:  Home       Discharge Medications:     Motrin 600 mg 1 tab p.o. q6h prn pain, disp #60, refill 0  Percocet 10/325 mg 1 tab p.o. q4-6h prn breakthrough pain, disp #30, refill 0   Resume home medications as previously prescribed    Discharge Activites:      Activities as tolerated with adequate rest  Pelvic rest for 6 weeks   No heavy lifting greater 10 lbs or vigorous activities   Showers only  Avoid driving and operating machinery while taking narcotics or other sedative medications.  Patient voiced understanding.    Discharge Diet:  Regular diet    Discharge Instructions:      Post-op care instructions and precautions, clinical/sugical findings, and hospital course " reviewed with patient prior to discharge.  All of her questions were answered to her satisfaction.  Pt voiced understanding.  Pt was instructed to contact the clinic or emergency department for any concerns including but not limited to an increase in her vaginal bleeding, abdominal pain, foul vaginal discharge, weakness, decrease activity level, decrease appetite, difficulty with voiding or having bowel movements, wound breakdown, perineal pain or breakdown, fever >100.4, shortness of breath, chest pain, dizziness or feeling faint, pain or swelling in her extremities, headaches not relieved with rest and Tylenol, abnormal bleeding/bruising, or any other health concerns.      Follow-up Visit:  2 weeks for post-op visit pending pathology results, or sooner if any problems.       Melo Lopez MD

## 2017-12-15 NOTE — TRANSFER OF CARE
"Anesthesia Transfer of Care Note    Patient: Shruthi Richard    Procedure(s) Performed: Procedure(s) (LRB):  HYSTERECTOMY-TOTAL LAPAROSCOPIC (TLH) (N/A)    Patient location: PACU    Anesthesia Type: general    Transport from OR: Transported from OR on room air with adequate spontaneous ventilation    Post pain: adequate analgesia    Post assessment: no apparent anesthetic complications and tolerated procedure well    Post vital signs: stable    Level of consciousness: awake    Nausea/Vomiting: no nausea/vomiting    Complications: none    Transfer of care protocol was followed      Last vitals:   Visit Vitals  BP (!) 118/57 (BP Location: Left arm, Patient Position: Lying)   Pulse 100   Temp 36.7 °C (98.1 °F) (Oral)   Resp 18   Ht 5' 3" (1.6 m)   Wt 59.9 kg (132 lb)   LMP 11/22/2017 (Approximate)   SpO2 100%   BMI 23.38 kg/m²     "

## 2017-12-15 NOTE — INTERVAL H&P NOTE
The patient has been examined and the H&P has been reviewed:    I concur with the findings and no changes have occurred since H&P was written.    Anesthesia/Surgery risks, benefits and alternative options discussed and understood by patient/family.          Active Hospital Problems    Diagnosis  POA    Chronic pelvic pain in female [R10.2, G89.29]  Yes      Resolved Hospital Problems    Diagnosis Date Resolved POA   No resolved problems to display.

## 2017-12-15 NOTE — OR NURSING
Pt does not have any pain medicine om board post-op in \[facility]\"". Dr. Lopez notified and aware with new orders noted for PO pain medication. \[facility]\"" notified.

## 2017-12-15 NOTE — OP NOTE
[facility]   Operative Report   Obstetrics & Gynecology     Date of Surgery:  12/15/2017     Surgeon:  Melo Lopez MD     Assistant:  Yonathan Olivarez MD       Preoperative diagnosis:     Chronic pelvic pain  Debilitating dysmenorrhea  Failed conservative therapy     Postoperative diagnosis:     Same    Procedure performed:      Total laparoscopic hysterectomy  Bilateral salpingectomy    Anesthesia:  General      IV Fluids:  2500 mL of LR     Urine Output:  400 mL, clear at end of procedure       Estimated Blood Loss:  50 mL with no replacements     Specimens: Uterus  Bilateral fallopian tubes     Findings:       Boggy, redden, soft appearing uterus, suspect adenomyosis  Right ovary normal appearing  Left ovary normal appearing  Normal appearing bilateral tubes  Anterior and posterior cul-de-sac clear  Pelvic side walls clear without gross lymphadenopathy  Omentum, small and large bowel in operative field normal appearing  Liver edge with normal appearence    Complications:  None    Indications for the Procedure:      See H&P for complete details.  In brief, Shruthi Richard is a 37 y.o.  with chronic pelvic pain and debilitating dysmenorrhea requesting definitive surgical therapy with total laparoscopic hysterectomy.  Pt declined further medical management or other conservative therapies.      Pt was informed of the risks, benefits, and alternatives of a laparoscopic hysterectomy.  Risks included but were not limited to bleeding, infection, injury to surrounding organs or tissues, injury to bladder and/or urinary tract, injury to bowel and/or intestinal obstruction, damage to major blood vessels, hemorrhage requiring transfusion of blood products, ovarian failure requiring hormone administration, pulmonary embolism, fistula formation, urinary and/or fecal incontinence, sexual dysfunction/pain, chronic pain, hernia, failure of wound to heal, permanent/disfiguring scarring, and even death.   In the event of a supracervical hysterectomy, she was also counseled on the need for long term follow-up with pap smears and the possibility of future trachelectomy.  Pt was counseled extensively on the inability to become pregnant following a hysterectomy and expressed an understanding of this.  Pt was also counseled that a bilateral oophorectomy will result in surgical menopause for pre/perimenopausal women.  Pt was counseled in the possibility of laparotomy if extensive adhesions or bleeding were encountered.  Pt was counseled on the cancer risk associated with the use of a uterine morcellator.  Pt was counseled that hysterectomy and/or oophorectomy may not result in the resolution of her pain symptoms.  She was also counseled on the risks, benefits, and alternatives to blood transfusion.  Pt expressed an understanding of risks involved, all questions answered, and informed consent was obtained for the procedure and blood transfusion.    Description of the Procedure:      Pt was taken to the operating room where a time out was performed to confirm the correct patient and correct procedure.  Preoperative prophylactic IV antibiotics was administered prior to the procedure.  Pt was then placed in the dorsal lithotomy position with legs supported in Norris stirrups and care was taken to pad all pressure points.  General anesthesia was obtained without difficulty.  A Pat hugger was placed to maintain control of core body temperature.  Pt was then prepped and draped in a normal sterile fashion.  A santillan catheter was inserted.  ROBBIE uterine manipulator was placed without difficulty.  Attention was turned to the abdomen for laparoscopy.  A small vertical incision was made infraumbilical.  The Veress needle was introduced into the peritoneum.  Placement of the needle was confirmed with normal saline drop test.  Pneumoperitoneum was then established with ~3 liters of carbon dioxide and the Veress needle removed.  A 11 mm  trocar was inserted through the paraumbilical incision into the peritoneum without difficulty and pneumoperitoneum was then maintained using carbon dioxide.   Next, two additional small incisions where made for the secondary trocars, one in the right and left lower quadrant approximately 3 cm from iliac crest.  Two 5 mm ports where introduced under direct visualization.  Survey of the pelvis revealed the above findings.  Attention was then turned to the right side of uterus.  The uterine fundus was grasped with a single tooth tenaculum.  The pelvic courses of the ureters where identified to be well out of the surgical field.  The EnSeal was then used to coagulate and cut the round ligament followed by the utero-ovarian ligament in similar fashion.  The EnSeal was then used to coagulate and cut down the broad ligament to the uterine artery.  A bladder flap was then created and dissected across the uterus at the level of the lower uterine segment.  The bladder was pushed down.  The uterine arteries where then coagulate and cut using the EnSeal.  Attention was turned to the contralateral side of the uterus which was dissected in a similar fashion. Following bilateral ligation of the uterine vessels, the uterus appeared completely blanched.  The cardinal ligaments were identified, coagulated and cut using the EnSeal.  The uterus was then elevated using the uterine manipulator and the level of dissection was noted to be adequate for colpotomy.  The colpotomy incision was made using the Harmonic spatula and the incision was extended circumferentially.  Care was taken to avoid shortening the vaginal cuff.  The uterus was then retracted into the vagina with the uterine manipulator.  The vaginal cuff was closed laparoscopically using V-Loc absorbable suture on an endostitch in an interrupted fashion.  A bilateral salpingectomy then was performed using Enseal and removed through the umbilical port.  Inspection of vaginal cuff  showed good hemostasis with no obvious evidence of injury to bladder, ureters, or bowel.  The pelvic cavity was then throughly irrigated and again excellent hemostasis was noted.  All instruments and ports where removed from abdomen under direct visualization with the laparoscope.  The pneumoperitoneum was evacuated and the paraumbilical port was removed.   Again, hemostasis was assured.  All skin incisions were closed using 4-0 vicryl.  Pt tolerated the procedure well.  All instruments were removed from vagina.  Sponge, lap and needle counts were correct time two.  Pt was transferred to the PACU in stable condition.  Findings and expectations were later discussed with pt.  All of her questions were answered to her satisfaction and pt voiced understanding.       Melo Lopez MD

## 2017-12-15 NOTE — ANESTHESIA PREPROCEDURE EVALUATION
12/15/2017  Shruthi Richard is a 37 y.o., female.    Anesthesia Evaluation     I have reviewed the Nursing Notes.      Review of Systems  Anesthesia Hx:  No problems with previous Anesthesia   Social:  Non-Smoker    Cardiovascular:  Cardiovascular Normal Exercise tolerance: good     Pulmonary:  Pulmonary Normal    Renal/:  Renal/ Normal     Hepatic/GI:  Hepatic/GI Normal    Neurological:  Neurology Normal    Endocrine:  Endocrine Normal    Psych:   Psychiatric History          Physical Exam  General:  Well nourished    Airway/Jaw/Neck:  AIRWAY FINDINGS: Normal           Mental Status:  Mental Status Findings: Normal        Anesthesia Plan  Type of Anesthesia, risks & benefits discussed:  Anesthesia Type:  general  Patient's Preference:   Intra-op Monitoring Plan: standard ASA monitors  Intra-op Monitoring Plan Comments:   Post Op Pain Control Plan:   Post Op Pain Control Plan Comments:   Induction:   IV  Beta Blocker:  Patient is not currently on a Beta-Blocker (No further documentation required).       Informed Consent: Patient understands risks and agrees with Anesthesia plan.  Questions answered. Anesthesia consent signed with patient.  ASA Score: 2     Day of Surgery Review of History & Physical:    H&P update referred to the surgeon.         Ready For Surgery From Anesthesia Perspective.

## 2017-12-19 NOTE — ANESTHESIA POSTPROCEDURE EVALUATION
"Anesthesia Post Evaluation    Patient: Shruthi Richard    Procedure(s) Performed: Procedure(s) (LRB):  HYSTERECTOMY-TOTAL LAPAROSCOPIC (TLH) Bilateral Salpingectomy (N/A)  SALPINGECTOMY-LAPAROSCOPIC (Bilateral)    Final Anesthesia Type: general  Patient location during evaluation: PACU  Patient participation: Yes- Able to Participate  Level of consciousness: awake and alert and oriented  Post-procedure vital signs: reviewed and stable  Airway patency: patent  PONV status at discharge: No PONV  Anesthetic complications: no      Cardiovascular status: blood pressure returned to baseline  Respiratory status: unassisted and spontaneous ventilation  Hydration status: euvolemic  Follow-up not needed.        Visit Vitals  BP (!) 86/42 (BP Location: Left arm, Patient Position: Lying)   Pulse 66   Temp 36.8 °C (98.2 °F) (Oral)   Resp 16   Ht 5' 3" (1.6 m)   Wt 59.9 kg (132 lb)   LMP 11/22/2017 (Approximate)   SpO2 98%   BMI 23.38 kg/m²       Pain/Sangita Score: No Data Recorded      "

## 2017-12-20 ENCOUNTER — HOSPITAL ENCOUNTER (EMERGENCY)
Facility: HOSPITAL | Age: 37
Discharge: HOME | End: 2017-12-20
Attending: EMERGENCY MEDICINE
Payer: MEDICAID

## 2017-12-20 ENCOUNTER — TELEPHONE (OUTPATIENT)
Dept: OBSTETRICS AND GYNECOLOGY | Facility: CLINIC | Age: 37
End: 2017-12-20

## 2017-12-20 VITALS
WEIGHT: 130 LBS | SYSTOLIC BLOOD PRESSURE: 104 MMHG | HEIGHT: 63 IN | OXYGEN SATURATION: 99 % | TEMPERATURE: 99 F | RESPIRATION RATE: 20 BRPM | HEART RATE: 75 BPM | DIASTOLIC BLOOD PRESSURE: 55 MMHG | BODY MASS INDEX: 23.04 KG/M2

## 2017-12-20 DIAGNOSIS — G89.18 POST-OP PAIN: ICD-10-CM

## 2017-12-20 DIAGNOSIS — R35.0 URINARY FREQUENCY: Primary | ICD-10-CM

## 2017-12-20 DIAGNOSIS — G89.29 CHRONIC PELVIC PAIN IN FEMALE: ICD-10-CM

## 2017-12-20 DIAGNOSIS — R10.2 CHRONIC PELVIC PAIN IN FEMALE: ICD-10-CM

## 2017-12-20 LAB
ANION GAP SERPL CALC-SCNC: 6 MMOL/L
BACTERIA #/AREA URNS HPF: NORMAL /HPF
BILIRUB UR QL STRIP: NEGATIVE
BUN SERPL-MCNC: 5 MG/DL
CALCIUM SERPL-MCNC: 8.4 MG/DL
CHLORIDE SERPL-SCNC: 106 MMOL/L
CLARITY UR: CLEAR
CO2 SERPL-SCNC: 25 MMOL/L
COLOR UR: YELLOW
CREAT SERPL-MCNC: 0.7 MG/DL
EST. GFR  (AFRICAN AMERICAN): >60 ML/MIN/1.73 M^2
EST. GFR  (NON AFRICAN AMERICAN): >60 ML/MIN/1.73 M^2
GLUCOSE SERPL-MCNC: 91 MG/DL
GLUCOSE UR QL STRIP: NEGATIVE
HGB UR QL STRIP: NEGATIVE
KETONES UR QL STRIP: NEGATIVE
LEUKOCYTE ESTERASE UR QL STRIP: ABNORMAL
MICROSCOPIC COMMENT: NORMAL
NITRITE UR QL STRIP: NEGATIVE
PH UR STRIP: 8 [PH] (ref 5–8)
POTASSIUM SERPL-SCNC: 4.5 MMOL/L
PROT UR QL STRIP: NEGATIVE
RBC #/AREA URNS HPF: 1 /HPF (ref 0–4)
SODIUM SERPL-SCNC: 137 MMOL/L
SP GR UR STRIP: 1.01 (ref 1–1.03)
SQUAMOUS #/AREA URNS HPF: 14 /HPF
URN SPEC COLLECT METH UR: ABNORMAL
UROBILINOGEN UR STRIP-ACNC: NEGATIVE EU/DL
WBC #/AREA URNS HPF: 4 /HPF (ref 0–5)

## 2017-12-20 PROCEDURE — 25000003 PHARM REV CODE 250: Performed by: EMERGENCY MEDICINE

## 2017-12-20 PROCEDURE — 87186 SC STD MICRODIL/AGAR DIL: CPT

## 2017-12-20 PROCEDURE — 87086 URINE CULTURE/COLONY COUNT: CPT

## 2017-12-20 PROCEDURE — 99283 EMERGENCY DEPT VISIT LOW MDM: CPT

## 2017-12-20 PROCEDURE — 80048 BASIC METABOLIC PNL TOTAL CA: CPT

## 2017-12-20 PROCEDURE — 81000 URINALYSIS NONAUTO W/SCOPE: CPT

## 2017-12-20 PROCEDURE — 87077 CULTURE AEROBIC IDENTIFY: CPT

## 2017-12-20 PROCEDURE — 36415 COLL VENOUS BLD VENIPUNCTURE: CPT

## 2017-12-20 PROCEDURE — 87088 URINE BACTERIA CULTURE: CPT

## 2017-12-20 RX ORDER — HYDROCODONE BITARTRATE AND ACETAMINOPHEN 5; 325 MG/1; MG/1
1 TABLET ORAL
Status: COMPLETED | OUTPATIENT
Start: 2017-12-20 | End: 2017-12-20

## 2017-12-20 RX ORDER — OXYCODONE AND ACETAMINOPHEN 10; 325 MG/1; MG/1
1 TABLET ORAL EVERY 6 HOURS PRN
Qty: 30 TABLET | Refills: 0 | Status: SHIPPED | OUTPATIENT
Start: 2017-12-20 | End: 2017-12-22 | Stop reason: SDUPTHER

## 2017-12-20 RX ADMIN — HYDROCODONE BITARTRATE AND ACETAMINOPHEN 1 TABLET: 5; 325 TABLET ORAL at 04:12

## 2017-12-20 NOTE — ED PROVIDER NOTES
"Encounter Date: 12/20/2017    SCRIBE #1 NOTE: I, Ankit Matthews, am scribing for, and in the presence of, Dr. Rojas .       History     Chief Complaint   Patient presents with    Urinary Retention     vaginal hysterectomy last thursday 12/20/2017 2:58 PM     Chief complaint: Urinary retention       Shruthi Richard is a 37 y.o. female with a hx of vaginal delivery and Bipolar affective disorder who presents to the ED with complaints of urinary urgency and retention x 6 days after a transvaginal total hysterectomy and sublingual oophorectomy. Pt reports frequent trips to the restroom but only urinates "trickles." She states she drinks a lot of water and coffee despite the decrease in urine output. Pt spoke to her OB/GYN and was advised to present to the ED to r/o urinary retention. Allergens include Gluten Protein.       The history is provided by the patient.     Review of patient's allergies indicates:   Allergen Reactions    Gluten protein Other (See Comments)     Sever stomach ache, very tired feeling     Past Medical History:   Diagnosis Date    Bipolar affective disorder, rapid cycling     bipolar 1    Vaginal delivery     x1     Past Surgical History:   Procedure Laterality Date    ABDOMINAL SURGERY      exploratory laparotomy     History reviewed. No pertinent family history.  Social History   Substance Use Topics    Smoking status: Never Smoker    Smokeless tobacco: Never Used    Alcohol use No     Review of Systems   Constitutional: Negative for fever.   HENT: Negative for sore throat.    Eyes: Negative for redness.   Respiratory: Negative for shortness of breath.    Cardiovascular: Negative for chest pain.   Gastrointestinal: Negative for nausea.   Genitourinary: Positive for decreased urine volume and urgency. Negative for dysuria.   Musculoskeletal: Negative for back pain.   Skin: Negative for rash.   Neurological: Negative for weakness.   Hematological: Does not bruise/bleed easily. "       Physical Exam     Initial Vitals [12/20/17 1408]   BP Pulse Resp Temp SpO2   (!) 104/55 75 20 98.7 °F (37.1 °C) 99 %      MAP       71.33         Physical Exam    Nursing note and vitals reviewed.  Constitutional: She appears well-developed and well-nourished.  Non-toxic appearance. No distress.   HENT:   Head: Normocephalic and atraumatic.   Eyes: EOM are normal. Pupils are equal, round, and reactive to light.   Neck: Normal range of motion. Neck supple. No neck rigidity. No JVD present.   Cardiovascular: Normal rate, regular rhythm, normal heart sounds and intact distal pulses. Exam reveals no gallop and no friction rub.    No murmur heard.  Pulmonary/Chest: Breath sounds normal. She has no wheezes. She has no rhonchi. She has no rales.   Abdominal: Soft. Bowel sounds are normal. She exhibits no distension. There is tenderness in the suprapubic area. There is no rigidity, no rebound and no guarding.   Well healed laparoscopic incision without signs of infections.    Musculoskeletal: Normal range of motion.   Neurological: She is alert and oriented to person, place, and time. She has normal strength and normal reflexes. No cranial nerve deficit or sensory deficit. She exhibits normal muscle tone. Coordination normal. GCS eye subscore is 4. GCS verbal subscore is 5. GCS motor subscore is 6.   Skin: Skin is warm and dry.   Psychiatric: She has a normal mood and affect. Her speech is normal and behavior is normal. She is not actively hallucinating.         ED Course   Procedures  Labs Reviewed   URINALYSIS - Abnormal; Notable for the following:        Result Value    Leukocytes, UA Trace (*)     All other components within normal limits   BASIC METABOLIC PANEL - Abnormal; Notable for the following:     BUN, Bld 5 (*)     Calcium 8.4 (*)     Anion Gap 6 (*)     All other components within normal limits   CULTURE, URINE   URINALYSIS MICROSCOPIC             Medical Decision Making:   History:   Old Medical Records:  I decided to obtain old medical records.  Initial Assessment:   37-year-old woman who presents for evaluation for urinary retention.  She has no evidence of urinary retention on bedside ultrasound, post void residual <100.  No evidence of definite UTI.  Occasional bacteria with 4 white blood cells and 1 rbc, nitrite negative.  We'll function normal.  Discussed with OB/GYN on call at [facility] who suggested increasing her ibuprofen to 800 mg 3 times a day on top of her narcotic pain medication.  She is to follow-up with Dr. Lopez.  She is discharged in no acute distress.            Scribe Attestation:   Scribe #1: I performed the above scribed service and the documentation accurately describes the services I performed. I attest to the accuracy of the note.    I, Bob Barrera, personally performed the services described in this documentation. All medical record entries made by the scribe were at my direction and in my presence.  I have reviewed the chart and agree that the record reflects my personal performance and is accurate and complete. Alok Rojas MD.  7:26 PM 12/21/2017          ED Course      Clinical Impression:     1. Urinary frequency    2. Post-op pain                               Alok Rojas MD  12/21/17 1926

## 2017-12-20 NOTE — TELEPHONE ENCOUNTER
----- Message from Yari Del Angel sent at 12/20/2017 11:50 AM CST -----  Contact: self  Refill request for--  oxyCODONE-acetaminophen (PERCOCET)  mg per tablet--- Pharmacy is [facility] in [place]. Pt call back [phone].

## 2017-12-20 NOTE — TELEPHONE ENCOUNTER
Patient has been advised to go to ER for urination retention following surgery. Patient stated she lives an hour away from the office and has been told to go the nearest ER or urgent care. Patient stated she is undecided if she is going to go to ER or urgent care. sal

## 2017-12-22 ENCOUNTER — TELEPHONE (OUTPATIENT)
Dept: OBSTETRICS AND GYNECOLOGY | Facility: CLINIC | Age: 37
End: 2017-12-22

## 2017-12-22 DIAGNOSIS — R10.2 CHRONIC PELVIC PAIN IN FEMALE: ICD-10-CM

## 2017-12-22 DIAGNOSIS — G89.29 CHRONIC PELVIC PAIN IN FEMALE: ICD-10-CM

## 2017-12-22 DIAGNOSIS — G89.18 POSTOPERATIVE PAIN: Primary | ICD-10-CM

## 2017-12-22 RX ORDER — OXYCODONE AND ACETAMINOPHEN 10; 325 MG/1; MG/1
1 TABLET ORAL EVERY 6 HOURS PRN
Qty: 24 TABLET | Refills: 0 | Status: SHIPPED | OUTPATIENT
Start: 2017-12-22 | End: 2018-01-10 | Stop reason: ALTCHOICE

## 2017-12-22 NOTE — TELEPHONE ENCOUNTER
----- Message from Alysa Wilburn sent at 12/22/2017  4:16 PM CST -----  Contact: self  Patient is following up on pain meds. Patient can be reached at [phone].        thanks

## 2017-12-22 NOTE — TELEPHONE ENCOUNTER
----- Message from Jami Moy sent at 12/22/2017 12:11 PM CST -----  Contact: PT/ [phone]  Calling TO speak with nurse regarding medication that was called in certain way for Ins to cover. Pt states [facility] states script was incorrect ,for pain med

## 2017-12-22 NOTE — TELEPHONE ENCOUNTER
Spoke with pharmacy. Pharmacy only gave pt 6 percocets because they didn't have 30 on hand. Remaining rx was voided. Message will be routed to Dr. Lopez so that he can send in new rx for #24 percocets.

## 2017-12-22 NOTE — TELEPHONE ENCOUNTER
Called pt. PT stated pharmacy would not give her the rx for pain because the directions did not change and pt cannot get an early refill because her insurance would not cover it. Pt stated that the refill need to have different directions. I let pt know that I would route a message to Dr. Lopez so that he is aware

## 2017-12-24 LAB — BACTERIA UR CULT: NORMAL

## 2017-12-27 ENCOUNTER — TELEPHONE (OUTPATIENT)
Dept: OBSTETRICS AND GYNECOLOGY | Facility: CLINIC | Age: 37
End: 2017-12-27

## 2017-12-27 DIAGNOSIS — N30.90 CYSTITIS: Primary | ICD-10-CM

## 2017-12-27 RX ORDER — SULFAMETHOXAZOLE AND TRIMETHOPRIM 800; 160 MG/1; MG/1
1 TABLET ORAL 2 TIMES DAILY
Qty: 10 TABLET | Refills: 0 | Status: SHIPPED | OUTPATIENT
Start: 2017-12-27 | End: 2018-01-01

## 2017-12-27 NOTE — TELEPHONE ENCOUNTER
Pt c/o mild dysuria.  Empiric antibx therapy with Bactrim DS bid x 5 days for UTI.  Order UA C&S.  Increase fluids.  Phenazopyridine (Azo OTC) for bladder discomfort.  UTI precautions and hygiene advice.  Pt advised to schedule post-op visit.  Voiced understanding.

## 2017-12-27 NOTE — TELEPHONE ENCOUNTER
----- Message from Azalia Lerma MA sent at 12/27/2017  4:16 PM CST -----  Contact: Self  Please advise  ----- Message -----  From: Emily Vieira  Sent: 12/27/2017   2:43 PM  To: Jessica SUTTON Staff    Pt states she recently had surgery & has had an UTI since then. Pt states ABX are not working & would like to be advised how to handle. Pt can be reached @ [phone].

## 2018-01-02 ENCOUNTER — TELEPHONE (OUTPATIENT)
Dept: OBSTETRICS AND GYNECOLOGY | Facility: CLINIC | Age: 38
End: 2018-01-02

## 2018-01-02 NOTE — TELEPHONE ENCOUNTER
----- Message from Francine Beasley sent at 1/2/2018  8:44 AM CST -----  Contact: self [phone]  Pt is requesting to speak to you regarding a UTI. Pls call pt [phone]. Pt does have a appt on 1-10-18 for post op . Thanks......Gabbi

## 2018-01-10 ENCOUNTER — OFFICE VISIT (OUTPATIENT)
Dept: OBSTETRICS AND GYNECOLOGY | Facility: CLINIC | Age: 38
End: 2018-01-10
Payer: MEDICAID

## 2018-01-10 VITALS
HEIGHT: 63 IN | SYSTOLIC BLOOD PRESSURE: 115 MMHG | DIASTOLIC BLOOD PRESSURE: 84 MMHG | WEIGHT: 136.69 LBS | BODY MASS INDEX: 24.22 KG/M2

## 2018-01-10 DIAGNOSIS — Z09 POSTOP CHECK: Primary | ICD-10-CM

## 2018-01-10 DIAGNOSIS — Z90.710 S/P LAPAROSCOPIC HYSTERECTOMY: ICD-10-CM

## 2018-01-10 PROCEDURE — 99499 UNLISTED E&M SERVICE: CPT | Mod: S$PBB,,, | Performed by: OBSTETRICS & GYNECOLOGY

## 2018-01-10 PROCEDURE — 99999 PR PBB SHADOW E&M-EST. PATIENT-LVL III: CPT | Mod: PBBFAC,,, | Performed by: OBSTETRICS & GYNECOLOGY

## 2018-01-10 PROCEDURE — 99213 OFFICE O/P EST LOW 20 MIN: CPT | Mod: PBBFAC | Performed by: OBSTETRICS & GYNECOLOGY

## 2018-01-10 RX ORDER — CHLORPROMAZINE HYDROCHLORIDE 100 MG/1
TABLET, FILM COATED ORAL
COMMUNITY
Start: 2018-01-09 | End: 2018-12-18

## 2018-01-22 NOTE — PROGRESS NOTES
"[facility]  Ambulatory Clinic  Obstetrics & Gynecology    Visit Date:  1/10/2018    Chief Complaint:  Post-op visit    Subjective:      Shruthi Richard is a 37 y.o.  here for post-op visit.  Pt is s/p total laparoscopic hysterectomy secondary to chronic pelvic pain, dysmenorrhea, failed medical mgt on 2015.  Pt states she is doing well and has no major complaints today.  Pt has resumed regular activities and states she pleased with surgical results stating "my pelvic pain is finally gone".  Pt denies any vaginal bleeding, discharge, pain, incisional problems, GI or  complaints.  Significant other present for visit.    Review of Systems:      Constitutional:  No fever, fatigue  HENT:  No congestion, hearing changes  Eyes:  No visual disturbance  Respiratory:  No cough, shortness of breath  Cardiovascular:  No chest pain, leg swelling  Gastrointestinal:  No abdominal pain, constipation, blood in stool   Genitourinary:  No dysuria, frequency  Musculoskeletal:  No back pain, arthralgias  Skin:  No rash, jaundice  Neurological:  No dizziness, weakness, headache    Objective:     /84 (BP Location: Left arm, Patient Position: Sitting, BP Method: Small (Manual))   Ht 5' 3" (1.6 m)   Wt 62 kg (136 lb 11 oz)   LMP 12/15/2017 (Exact Date)   BMI 24.21 kg/m²      GENERAL:  NAD, well-nourished  HEENT:  NCAT, moist mucus membranes. Neck supple w/o masses.  LUNGS:  CTA-B  HEART:  RRR, no murmurs, gallops, or rubs  ABDOMEN:  Soft, non-tender, non-distended. Normoactive BS. No obvious organomegaly.   Incision - clean, dry, and intact.  Healing appropriately without signs of infection.    EXT:  Symmetric w/o cramping, claudication, or edema. +2 distal pulses.  SKIN:  No rashes or bruising  NEURO:  Grossly intact bilaterally  PSYCH:  Mood and affect appropriate  PELVIC:  Pt declined today.    Chaperone present for exam.    Laboratory:    Lab Results   Component Value Date    WBC 11.42 " 12/15/2017    HGB 10.8 (L) 12/15/2017    HCT 31.6 (L) 12/15/2017    MCV 87 12/15/2017     12/15/2017     Sugical pathology:    Uterus and cervix, and bilateral fallopian tubes-  Cervix-squamous metaplasia, chronic cervicitis, and nabothian cyst.  Endometrium-secretory  Myometrium-no significant histopathologic abnormality  Segments of fallopian tubes with fimbriated ends (2)-benign peritubal cysts.    Surgical pathology reviewed with pt.    Assessment:    37 y.o.  s/p total laparoscopic hysterectomy secondary to chronic pelvic pain, dysmenorrhea, failed medical mgt on 2015 - doing well.    Plan:    Post-op care instructions and precautions reviewed.  Surgical findings and pathology discussed with pt.  Pelvic rest x 6 wks post-op.  Iron supplementation with fergon, colace as directed.  Anemia precautions. Motrin and percocet prn pain.  Wound care instructions and precaution  Return in 4 weeks for post-op visit, or sooner for any concerns.  All of her questions were answered to her satisfactions, pt voiced understanding.       Melo Lopez MD

## 2018-01-29 ENCOUNTER — TELEPHONE (OUTPATIENT)
Dept: OBSTETRICS AND GYNECOLOGY | Facility: CLINIC | Age: 38
End: 2018-01-29

## 2018-01-29 NOTE — TELEPHONE ENCOUNTER
Spoke with pt. Pt thinks she may have a UTI. Pt advised to schedule an appointment. Pt said she could not come in because she is in school. Pt stated she would just go to an urgent care center instead

## 2018-01-29 NOTE — TELEPHONE ENCOUNTER
----- Message from Nikki Golden sent at 1/29/2018  8:13 AM CST -----  Contact: Self   Patient says she had surgery on the 5th and she has really bad cramping now. Please call patient  at [phone].

## 2018-12-18 ENCOUNTER — OFFICE VISIT (OUTPATIENT)
Dept: OBSTETRICS AND GYNECOLOGY | Facility: CLINIC | Age: 38
End: 2018-12-18
Payer: MEDICAID

## 2018-12-18 VITALS
WEIGHT: 124.75 LBS | DIASTOLIC BLOOD PRESSURE: 74 MMHG | HEIGHT: 63 IN | BODY MASS INDEX: 22.11 KG/M2 | SYSTOLIC BLOOD PRESSURE: 110 MMHG

## 2018-12-18 DIAGNOSIS — B96.89 BV (BACTERIAL VAGINOSIS): ICD-10-CM

## 2018-12-18 DIAGNOSIS — Z12.39 BREAST CANCER SCREENING: ICD-10-CM

## 2018-12-18 DIAGNOSIS — Z01.419 WELL WOMAN EXAM WITH ROUTINE GYNECOLOGICAL EXAM: Primary | ICD-10-CM

## 2018-12-18 DIAGNOSIS — N89.8 VAGINAL DISCHARGE: ICD-10-CM

## 2018-12-18 DIAGNOSIS — N76.0 BV (BACTERIAL VAGINOSIS): ICD-10-CM

## 2018-12-18 PROCEDURE — 99999 PR PBB SHADOW E&M-EST. PATIENT-LVL III: CPT | Mod: PBBFAC,,, | Performed by: OBSTETRICS & GYNECOLOGY

## 2018-12-18 PROCEDURE — 99213 OFFICE O/P EST LOW 20 MIN: CPT | Mod: PBBFAC | Performed by: OBSTETRICS & GYNECOLOGY

## 2018-12-18 PROCEDURE — 99396 PREV VISIT EST AGE 40-64: CPT | Mod: S$PBB,,, | Performed by: OBSTETRICS & GYNECOLOGY

## 2018-12-18 RX ORDER — ONDANSETRON 8 MG/1
TABLET, ORALLY DISINTEGRATING ORAL
COMMUNITY
Start: 2018-10-20 | End: 2018-12-18

## 2018-12-18 RX ORDER — PROMETHAZINE HYDROCHLORIDE 25 MG/1
TABLET ORAL
COMMUNITY
Start: 2018-10-20 | End: 2018-12-18

## 2018-12-18 RX ORDER — FLUCONAZOLE 150 MG/1
150 TABLET ORAL
Qty: 6 TABLET | Refills: 0 | Status: SHIPPED | OUTPATIENT
Start: 2018-12-18 | End: 2019-02-04

## 2018-12-18 RX ORDER — KETOROLAC TROMETHAMINE 10 MG/1
TABLET, FILM COATED ORAL
COMMUNITY
Start: 2018-11-29 | End: 2020-02-28 | Stop reason: CLARIF

## 2018-12-18 RX ORDER — BUTALBITAL, ACETAMINOPHEN AND CAFFEINE 50; 325; 40 MG/1; MG/1; MG/1
TABLET ORAL
COMMUNITY
Start: 2018-09-10 | End: 2018-12-18

## 2018-12-18 RX ORDER — METRONIDAZOLE 500 MG/1
500 TABLET ORAL EVERY 12 HOURS
Qty: 28 TABLET | Refills: 0 | Status: SHIPPED | OUTPATIENT
Start: 2018-12-18 | End: 2019-01-01

## 2018-12-18 RX ORDER — CHLORPROMAZINE HYDROCHLORIDE 25 MG/1
TABLET, FILM COATED ORAL
COMMUNITY
Start: 2018-11-06 | End: 2018-12-18

## 2018-12-18 RX ORDER — ONABOTULINUMTOXINA 200 [USP'U]/1
INJECTION, POWDER, LYOPHILIZED, FOR SOLUTION INTRADERMAL; INTRAMUSCULAR
COMMUNITY
Start: 2018-12-11 | End: 2019-02-04

## 2018-12-18 NOTE — PROGRESS NOTES
"[facility]  Ambulatory Clinic  Obstetrics & Gynecology    Visit Date:  2018    Chief Complaint:  Annual GYN exam, vaginal discharge    History of Present Illness:    Shruthi Richard is a 38 y.o.  here for annual GYN exam with c/o vagina discharge.  Pt described discharge as clear, fishy, non bloody, without pelvic pain or abnormal vaginal bleeding for past few days.  Pt s/p TLH with ovarian conversation secondary to chronic pelvic pain in 2017.  Pt reports feeling "so much better" since surgery and is please with results.  Pt denies active sexually transmitted infections.  Pt performs monthly self breast examination, non-smoker, uses seat belts, and denies abuse.   Pt denies vaginal bleeding, vaginal discharge, pelvic pain, bloating, early satiety, unintentional weight loss, breast mass/skin changes, GI or urinary complaints.     present for visit.    Past History:  Gynecologic history as noted above.    Review of Systems:      GENERAL:  No fever, fatigue, excessive weight gain or loss  HEENT:  Chrnoic migraine headaches see roxie.  Denies hearing changes, visual disturbance.  RESPIRATORY:  No cough, shortness of breath  CARDIOVASCULAR:  No chest pain, heart palpitations, leg swelling  BREAST:  No lump, pain, nipple discharge, skin changes  GASTROINTESTINAL:  No nausea, vomiting, constipation, diarrhea, rectal bleeding   GENITOURINARY:  See HPI  ENDOCRINE:  No heat or cold intolerance  HEMATOLOGIC:  No easy bruisability or bleeding   LYMPHATICS:  No enlarged nodes  MUSCULOSKELETAL:  No joint pain or swelling  SKIN:  No rash, lesions, jaundice  NEUROLOGIC:  No dizziness, weakness, syncope  PSYCHIATRIC:  No homicidal/suicidal ideations    Physical Exam:     /74 (BP Location: Right arm, Patient Position: Sitting, BP Method: Medium (Manual))   Ht 5' 3" (1.6 m)   Wt 56.6 kg (124 lb 12.5 oz)   LMP 12/15/2017 (Exact Date)   BMI 22.10 kg/m²      GENERAL:  No acute " distress, well-nourished  HEENT:  Atraumatic, anicteric, moist mucus membranes, neck supple w/o masses.  BREAST:  Symmetric, nontender, no obvious masses, adenopathy, skin changes or nipple discharge.  LUNGS:  Clear to auscultation  HEART:  Regular rate and rhythm, no murmurs, gallops, or rubs  ABDOMEN:  Soft, non-tender, non-distended, normoactive bowel sounds, no obvious organomegaly  EXT:  Symmetric w/o cramping, claudication, or edema. +2 distal pulses.  SKIN:  No rashes or bruising  PSYCH:  Mood and affect appropriate    GENITOURINARY:  VULVAR:  Female external genitalia w/o any obvious lesions. Female hair distribution. Adequate perineal body. Normal urethral meatus. No gross lymphadenopathy.   VAGINA:  Pink, moist, well-rugated. Adequate support. No significant cystocele or rectocele. No obvious lesion. Mild clear fishy discharge.  CERVIX:   Surgically absent. No cuff lesions or tenderness.     UTERUS:  Surgically absent.   ADNEXA:  No masses, non-tender   RECTAL:  Declined. No obvious external lesions.    Chaperone present for exam.    Assessment:     38 y.o.  with h/o TLH with ovarian conservation:    1. Well woman gynecologic exam  2. Bacterial vaginosis    Plan:    A gynecologic health assessment was performed with age appropriate counseling.    Cervical cancer screening - pap not clinically indicated after hysterectomy for benign indications.    STI screening - pt declined.  Safe sex discussed.      Order screening mammogram, pt advised to call and schedule.    Flagyl 500 mg po bid x 7 days for bacterial vaginosis.  No alcohol while on antibx.  Hygiene advice.  Pt also requesting an Rx for diflucan for future use for yeast infection.     Encourage healthy lifestyle modifications, monthly self breast exams, Ca/Vit D, f/u with PCP for health maintenance.    Return in 1 year for annual or sooner as needed.      Pt voiced understanding.        Melo Lopez MD

## 2019-01-30 ENCOUNTER — TELEPHONE (OUTPATIENT)
Dept: OBSTETRICS AND GYNECOLOGY | Facility: CLINIC | Age: 39
End: 2019-01-30

## 2019-01-30 NOTE — TELEPHONE ENCOUNTER
----- Message from Yari chetanvidhya sent at 1/30/2019  3:21 PM CST -----  Contact: self - [phone]  Pt asking to speak to Dr. Lopez. She states she lives very far and would like to consult with him before making an appt to come in.     Pt wanted to make an appointment. Appointment made for feb.4,2019 1:40pm

## 2019-02-04 ENCOUNTER — OFFICE VISIT (OUTPATIENT)
Dept: OBSTETRICS AND GYNECOLOGY | Facility: CLINIC | Age: 39
End: 2019-02-04
Payer: MEDICAID

## 2019-02-04 VITALS
BODY MASS INDEX: 22.15 KG/M2 | HEIGHT: 63 IN | WEIGHT: 125 LBS | SYSTOLIC BLOOD PRESSURE: 102 MMHG | DIASTOLIC BLOOD PRESSURE: 56 MMHG

## 2019-02-04 DIAGNOSIS — G43.109 MIGRAINE WITH AURA AND WITHOUT STATUS MIGRAINOSUS, NOT INTRACTABLE: Primary | ICD-10-CM

## 2019-02-04 PROBLEM — G89.29 CHRONIC PELVIC PAIN IN FEMALE: Status: RESOLVED | Noted: 2017-12-15 | Resolved: 2019-02-04

## 2019-02-04 PROBLEM — N94.6 DYSMENORRHEA: Status: RESOLVED | Noted: 2017-03-01 | Resolved: 2019-02-04

## 2019-02-04 PROBLEM — N80.9 ENDOMETRIOSIS: Status: RESOLVED | Noted: 2017-03-01 | Resolved: 2019-02-04

## 2019-02-04 PROBLEM — Z98.890 STATUS POST LAPAROSCOPY: Status: RESOLVED | Noted: 2017-03-30 | Resolved: 2019-02-04

## 2019-02-04 PROBLEM — R10.2 CHRONIC PELVIC PAIN IN FEMALE: Status: RESOLVED | Noted: 2017-12-15 | Resolved: 2019-02-04

## 2019-02-04 PROBLEM — N80.03 UTERUS, ADENOMYOSIS: Status: RESOLVED | Noted: 2017-06-26 | Resolved: 2019-02-04

## 2019-02-04 PROCEDURE — 99999 PR PBB SHADOW E&M-EST. PATIENT-LVL III: CPT | Mod: PBBFAC,,, | Performed by: OBSTETRICS & GYNECOLOGY

## 2019-02-04 PROCEDURE — 99213 OFFICE O/P EST LOW 20 MIN: CPT | Mod: PBBFAC | Performed by: OBSTETRICS & GYNECOLOGY

## 2019-02-04 PROCEDURE — 99213 PR OFFICE/OUTPT VISIT, EST, LEVL III, 20-29 MIN: ICD-10-PCS | Mod: S$PBB,,, | Performed by: OBSTETRICS & GYNECOLOGY

## 2019-02-04 PROCEDURE — 99213 OFFICE O/P EST LOW 20 MIN: CPT | Mod: S$PBB,,, | Performed by: OBSTETRICS & GYNECOLOGY

## 2019-02-04 PROCEDURE — 99999 PR PBB SHADOW E&M-EST. PATIENT-LVL III: ICD-10-PCS | Mod: PBBFAC,,, | Performed by: OBSTETRICS & GYNECOLOGY

## 2019-02-04 RX ORDER — PROCHLORPERAZINE MALEATE 10 MG
TABLET ORAL
COMMUNITY
Start: 2019-01-09 | End: 2020-05-21 | Stop reason: ALTCHOICE

## 2019-02-05 NOTE — PROGRESS NOTES
"[facility]  Ambulatory Clinic  Obstetrics & Gynecology    Visit Date:  2019    Chief Complaint:  "Requesting checking hormone levels"    History of Present Illness:    Shruthi Richard is a 38 y.o.  here requesting checking hormone levels.  Pt is specifically requesting FSH, LH, and estradiol to rule out "hormonal causes" for her migraine headaches.  Pt has h/o total laparoscopic hysterectomy with ovarian conservation.  Pt reports chronic migraine HA.  Pt is seeing neurologist and reports having extensive head imaging that has been unrevealing.    Pt states she has attempted mutiple therapies for migraine HA including head botox.  Currently, pt does not have HA, reports last HA few days ago.   Pt GYN exam is up to date.  Pt denies vaginal bleeding, vaginal discharge, pelvic pain, bloating, early satiety, unintentional weight loss, breast mass/skin changes, GI or urinary complaints.     present for visit.    Past History:  Gynecologic history as noted above.    Review of Systems:      GENERAL:  No fever, fatigue, excessive weight gain or loss  HEENT:  Chrnoic migraine headaches see roxie.  Denies hearing changes, visual disturbance.  RESPIRATORY:  No cough, shortness of breath  CARDIOVASCULAR:  No chest pain, heart palpitations, leg swelling  BREAST:  No lump, pain, nipple discharge, skin changes  GASTROINTESTINAL:  No nausea, vomiting, constipation, diarrhea, rectal bleeding   GENITOURINARY:  See HPI  ENDOCRINE:  No heat or cold intolerance  NEUROLOGIC:  No dizziness, weakness, syncope  PSYCHIATRIC:  No homicidal/suicidal ideations    Physical Exam:     BP (!) 102/56   Ht 5' 3" (1.6 m)   Wt 56.7 kg (125 lb 0 oz)   LMP 12/15/2017 (Exact Date)   BMI 22.14 kg/m²      GENERAL:  No acute distress, well-nourished  HEENT:  Atraumatic, anicteric, moist mucus membranes, neck supple w/o masses.  LUNGS:  Clear to auscultation  HEART:  Regular rate and rhythm, no murmurs, gallops, or " rubs  ABDOMEN:  Soft, non-tender, non-distended, normoactive bowel sounds, no obvious organomegaly  EXT:  Symmetric w/o cramping, claudication, or edema. +2 distal pulses.  SKIN:  No rashes or bruising  PSYCH:  Mood and affect appropriate  NEURO:  Grossly intact bilaterally    Chaperone present for exam.    Assessment:     38 y.o.  with h/o TLH with ovarian conservation:    1. Chronic migraine headache    Plan:    We discussed physiology of migraine headaches.    Order FSH, LH, estradiol per pt request.  Pt was advised that hormones testing will mostly likely unrevealing/normal in a pre-menopausal pt.  Pt encourage to follow up with neurology and explore homeopathic/alternative therapies for migraines.  Supportive measures discussed.  Neuro precautions.    Screening mammogram ordred, pt advised to call and schedule.    Encourage healthy lifestyle modifications.    F/u with PCP for health maintenance.    Return in 1 year for annual GYN or sooner as needed.      Pt voiced understanding.        Melo Lopez MD

## 2019-11-21 ENCOUNTER — HOSPITAL ENCOUNTER (EMERGENCY)
Facility: HOSPITAL | Age: 39
Discharge: HOME | End: 2019-11-21
Attending: EMERGENCY MEDICINE
Payer: MEDICAID

## 2019-11-21 VITALS
TEMPERATURE: 98 F | HEART RATE: 48 BPM | DIASTOLIC BLOOD PRESSURE: 51 MMHG | HEIGHT: 63 IN | SYSTOLIC BLOOD PRESSURE: 91 MMHG | WEIGHT: 120 LBS | BODY MASS INDEX: 21.26 KG/M2 | RESPIRATION RATE: 20 BRPM | OXYGEN SATURATION: 100 %

## 2019-11-21 DIAGNOSIS — G43.009 MIGRAINE WITHOUT AURA AND WITHOUT STATUS MIGRAINOSUS, NOT INTRACTABLE: Primary | ICD-10-CM

## 2019-11-21 PROCEDURE — 96374 THER/PROPH/DIAG INJ IV PUSH: CPT

## 2019-11-21 PROCEDURE — 63600175 PHARM REV CODE 636 W HCPCS: Performed by: EMERGENCY MEDICINE

## 2019-11-21 PROCEDURE — 96361 HYDRATE IV INFUSION ADD-ON: CPT

## 2019-11-21 PROCEDURE — 96375 TX/PRO/DX INJ NEW DRUG ADDON: CPT

## 2019-11-21 PROCEDURE — 99284 EMERGENCY DEPT VISIT MOD MDM: CPT | Mod: 25

## 2019-11-21 RX ORDER — SODIUM CHLORIDE 9 MG/ML
1000 INJECTION, SOLUTION INTRAVENOUS
Status: COMPLETED | OUTPATIENT
Start: 2019-11-21 | End: 2019-11-21

## 2019-11-21 RX ORDER — METOCLOPRAMIDE HYDROCHLORIDE 5 MG/ML
10 INJECTION INTRAMUSCULAR; INTRAVENOUS
Status: COMPLETED | OUTPATIENT
Start: 2019-11-21 | End: 2019-11-21

## 2019-11-21 RX ORDER — DIPHENHYDRAMINE HYDROCHLORIDE 50 MG/ML
12.5 INJECTION INTRAMUSCULAR; INTRAVENOUS
Status: COMPLETED | OUTPATIENT
Start: 2019-11-21 | End: 2019-11-21

## 2019-11-21 RX ADMIN — SODIUM CHLORIDE 1000 ML: 0.9 INJECTION, SOLUTION INTRAVENOUS at 02:11

## 2019-11-21 RX ADMIN — DIPHENHYDRAMINE HYDROCHLORIDE 12.5 MG: 50 INJECTION INTRAMUSCULAR; INTRAVENOUS at 02:11

## 2019-11-21 RX ADMIN — METOCLOPRAMIDE 10 MG: 5 INJECTION, SOLUTION INTRAMUSCULAR; INTRAVENOUS at 02:11

## 2019-11-21 NOTE — ED PROVIDER NOTES
Encounter Date: 11/21/2019       History 38-year-old female presents emergency department she has a history of migraines she is currently under the care of Dr. Mickey Nugent she states she had Botox injections for her headache office and was supposed to be have a direct admit for her headache however she states that she did not want to be admitted so she did not come for admission patient however shows up today in the emergency department with complaint of headache she states that she has tried all of her medications without relief although she states she has not taken any medications today patient denies associated fevers she states that this headache is similar to her previous headaches in location and intensity     Chief Complaint   Patient presents with    Migraine     for 10 days, hx of migraines     HPI  Review of patient's allergies indicates:   Allergen Reactions    Gluten protein Other (See Comments)     Sever stomach ache, very tired feeling    Compazine [prochlorperazine edisylate]      Anxiety, itching     Past Medical History:   Diagnosis Date    Bipolar affective disorder, rapid cycling     bipolar 1    Migraine headache     Vaginal delivery     x1     Past Surgical History:   Procedure Laterality Date    ABDOMINAL SURGERY      exploratory laparotomy    HYSTERECTOMY  12/15/2017     History reviewed. No pertinent family history.  Social History     Tobacco Use    Smoking status: Never Smoker    Smokeless tobacco: Never Used   Substance Use Topics    Alcohol use: No    Drug use: No     Review of Systems   Constitutional: Negative.    HENT: Negative.    Respiratory: Negative.    Cardiovascular: Negative.    Gastrointestinal: Negative.    Endocrine: Negative.    Genitourinary: Negative.    Musculoskeletal: Negative.    Skin: Negative.    Allergic/Immunologic: Negative.    Neurological: Positive for headaches.   Hematological: Negative.    Psychiatric/Behavioral: Negative.    All other systems  reviewed and are negative.      Physical Exam     Initial Vitals [11/21/19 1121]   BP Pulse Resp Temp SpO2   (!) 107/51 75 16 98.2 °F (36.8 °C) 99 %      MAP       --         Physical Exam    Nursing note and vitals reviewed.  Constitutional: She appears well-developed and well-nourished.   HENT:   Head: Normocephalic and atraumatic.   Right Ear: External ear normal.   Left Ear: External ear normal.   Nose: Nose normal.   Mouth/Throat: Oropharynx is clear and moist.   Eyes: Conjunctivae and EOM are normal. Pupils are equal, round, and reactive to light. Right eye exhibits no discharge. Left eye exhibits no discharge.   Neck: Normal range of motion. Neck supple.   Cardiovascular: Normal rate, regular rhythm, normal heart sounds and intact distal pulses.   Pulmonary/Chest: Breath sounds normal.   Abdominal: Soft. Bowel sounds are normal.   Musculoskeletal: Normal range of motion.   Neurological: She is alert and oriented to person, place, and time. She has normal strength and normal reflexes. No cranial nerve deficit or sensory deficit. She displays a negative Romberg sign. Gait normal. GCS score is 15. GCS eye subscore is 4. GCS verbal subscore is 5. GCS motor subscore is 6.   Skin: Skin is warm and dry.   Psychiatric: She has a normal mood and affect.         ED Course   Procedures  Labs Reviewed - No data to display       Imaging Results    None          Medical Decision Making:   Initial Assessment:   Migraine headache  Differential Diagnosis:   Migraine headache, SAH, Infectious etiology   ED Management:  38-year-old female presents emergency room she states she has headaches on a daily daily basis and is followed by Dr. Mickey Nugent her neurologist recently seen 2 days ago had Botox injections in was instructed to go to the hospital as a direct admit however patient did not come to the hospital for admission she shows appear today requesting further evaluation and/or admission she states however this is a normal  "headache for her she denies worse headache of her life denies is 0 vomiting. Patient appears very well she appears to be in no distress. She reports she has tried all her home medications without relief of symptoms however she states to me she has not taken any medications today.  I did speak with Dr. Mickey Nugent NP, who also came to the emergency department and evaluated the patient personally Dr. Mickey Nugent reports to administer either Phenergan or Benadryl to the patient and discharged her home.  Patient was given Reglan and Benadryl and IV fluids.  She reports a mild relief of her symptoms she states she is ready to go home she reports that she thought she would be admitted to the hospital which is why she came today.  Patient has had no fevers to suggest any type of infectious etiology she denies worse headache of her life or any changes in her normal headaches.  No further imaging or labs were performed, the patient reports to me that she has had every test done and the world to find out why she has headaches "                                 Clinical Impression:       ICD-10-CM ICD-9-CM   1. Migraine without aura and without status migrainosus, not intractable G43.009 346.10                             Agustina Chapman, NATALIYA  11/21/19 1631    "

## 2019-11-21 NOTE — DISCHARGE INSTRUCTIONS
Please follow-up with your neurologist as directed for further evaluation and definitive care  Return if condition becomes worse for any concerns

## 2019-11-30 NOTE — TELEPHONE ENCOUNTER
----- Message from Annette Golden sent at 10/5/2017  9:06 AM CDT -----  Patient is returning Michela's call for her pain medication. Please call at [phone] Thank you!  -----------------------------------------------------------------------  10/5/17 @ 0915a ([facility])  CALL ATTEMPT TO PT UNSUCCESSFUL , MESSAGE LEFT FOR PT TO RETURN CALL TO OFFICE CONCERNING PAIN MEDICATION  
----- Message from Annette Golden sent at 10/5/2017  9:19 AM CDT -----  Patient is returning Trish's call for her pain medication. Please call at [phone] Thank you!  -----------------------------------------------------------  10/5/17 @ 7706L ([facility])  SPOKE WITH MS [facility]ERS, SHE STATED SHE IS IN PAIN WITH HER UTERUS AND IS COMING IN TODAY TO TALK TO DR BHATIA ABOUT A HYSTERECTOMY, SHE HAS GONE TO [facility] THEY DID A PLEXUS BLOCK AND ALSO BURNED NERVE ENDINGS BUT SHE STILL HAS PAIN, THEY WILL NOT GIVE HER PAIN MEDICATION DUE TO HER INSURANCE, SHE IS REQUESTING TO HAVE SOMETHING UNTIL SHE HAS HER HYSTERECTOMY.     MESSAGE FORWARDED TO DR BHATIA FOR LAURA  
increased lateral sway/decreased vitaly

## 2019-12-09 ENCOUNTER — TELEPHONE (OUTPATIENT)
Dept: NEUROLOGY | Facility: CLINIC | Age: 39
End: 2019-12-09

## 2019-12-09 NOTE — TELEPHONE ENCOUNTER
----- Message from Belinda Garcia sent at 12/9/2019  3:03 PM CST -----  Contact: pt  Pt needs appointment for Dr. Mcqueen referred    Pt can be reached at [phone]

## 2019-12-10 ENCOUNTER — TELEPHONE (OUTPATIENT)
Dept: NEUROLOGY | Facility: CLINIC | Age: 39
End: 2019-12-10

## 2019-12-10 NOTE — TELEPHONE ENCOUNTER
----- Message from Kristen Garcia sent at 12/10/2019  9:05 AM CST -----  Contact: patient  Type: Needs Medical Advice  Who Called:  patient  Symptoms (please be specific):  Headaches/migraines  How long has patient had these symptoms: I year in a half  Best Call Back Number: [phone]  Additional Information: Patient states Dr. Baker has referred her to Dr. Mcqueen, patient can get referral from him but has been to the ER multiple times this year.  Patient was diagnosed with Status Migranious.  Please call to advise.  Thanks!

## 2019-12-10 NOTE — TELEPHONE ENCOUNTER
Called patient and informed we are booked out and have no medicaid slots available. Informed patient that I could give her [facility] number and patient was fine with that until she found out Dr. Mcqueen does not go to [facility]. Patient stated she only wanted to see Dr. Mcqueen. Informed patient that I do not know when the next medicaid slot will open up and that we are already booking out till next year. Patient stated to add her to the wait list.

## 2019-12-10 NOTE — TELEPHONE ENCOUNTER
Called patient and left message in regards to scheduling. Informed of no medicaid slots available in message at this time. Provided [place] neurology number in message as well as our number if patient wants to call back.

## 2020-02-13 ENCOUNTER — HOSPITAL ENCOUNTER (EMERGENCY)
Facility: HOSPITAL | Age: 40
Discharge: HOME | End: 2020-02-14
Attending: EMERGENCY MEDICINE
Payer: MEDICAID

## 2020-02-13 VITALS
SYSTOLIC BLOOD PRESSURE: 103 MMHG | BODY MASS INDEX: 22.32 KG/M2 | WEIGHT: 126 LBS | DIASTOLIC BLOOD PRESSURE: 58 MMHG | HEART RATE: 58 BPM | HEIGHT: 63 IN | TEMPERATURE: 98 F | OXYGEN SATURATION: 100 % | RESPIRATION RATE: 14 BRPM

## 2020-02-13 DIAGNOSIS — G44.221 CHRONIC TENSION-TYPE HEADACHE, INTRACTABLE: Primary | ICD-10-CM

## 2020-02-13 LAB
ALBUMIN SERPL BCP-MCNC: 4.2 G/DL (ref 3.5–5.2)
ALP SERPL-CCNC: 39 U/L (ref 55–135)
ALT SERPL W/O P-5'-P-CCNC: 22 U/L (ref 10–44)
ANION GAP SERPL CALC-SCNC: 7 MMOL/L (ref 8–16)
AST SERPL-CCNC: 29 U/L (ref 10–40)
BASOPHILS # BLD AUTO: 0.04 K/UL (ref 0–0.2)
BASOPHILS NFR BLD: 0.4 % (ref 0–1.9)
BILIRUB SERPL-MCNC: 0.9 MG/DL (ref 0.1–1)
BUN SERPL-MCNC: 13 MG/DL (ref 6–20)
CALCIUM SERPL-MCNC: 9.1 MG/DL (ref 8.7–10.5)
CHLORIDE SERPL-SCNC: 101 MMOL/L (ref 95–110)
CO2 SERPL-SCNC: 28 MMOL/L (ref 23–29)
CREAT SERPL-MCNC: 0.7 MG/DL (ref 0.5–1.4)
DIFFERENTIAL METHOD: NORMAL
EOSINOPHIL # BLD AUTO: 0.2 K/UL (ref 0–0.5)
EOSINOPHIL NFR BLD: 2.3 % (ref 0–8)
ERYTHROCYTE [DISTWIDTH] IN BLOOD BY AUTOMATED COUNT: 13.3 % (ref 11.5–14.5)
EST. GFR  (AFRICAN AMERICAN): >60 ML/MIN/1.73 M^2
EST. GFR  (NON AFRICAN AMERICAN): >60 ML/MIN/1.73 M^2
GLUCOSE SERPL-MCNC: 91 MG/DL (ref 70–110)
HCT VFR BLD AUTO: 37.3 % (ref 37–48.5)
HGB BLD-MCNC: 12.1 G/DL (ref 12–16)
IMM GRANULOCYTES # BLD AUTO: 0.02 K/UL (ref 0–0.04)
IMM GRANULOCYTES NFR BLD AUTO: 0.2 % (ref 0–0.5)
LYMPHOCYTES # BLD AUTO: 2.9 K/UL (ref 1–4.8)
LYMPHOCYTES NFR BLD: 32.1 % (ref 18–48)
MCH RBC QN AUTO: 28.2 PG (ref 27–31)
MCHC RBC AUTO-ENTMCNC: 32.4 G/DL (ref 32–36)
MCV RBC AUTO: 87 FL (ref 82–98)
MONOCYTES # BLD AUTO: 0.6 K/UL (ref 0.3–1)
MONOCYTES NFR BLD: 6.3 % (ref 4–15)
NEUTROPHILS # BLD AUTO: 5.3 K/UL (ref 1.8–7.7)
NEUTROPHILS NFR BLD: 58.7 % (ref 38–73)
NRBC BLD-RTO: 0 /100 WBC
PLATELET # BLD AUTO: 310 K/UL (ref 150–350)
PMV BLD AUTO: 10.7 FL (ref 9.2–12.9)
POTASSIUM SERPL-SCNC: 4.2 MMOL/L (ref 3.5–5.1)
PROT SERPL-MCNC: 6.8 G/DL (ref 6–8.4)
RBC # BLD AUTO: 4.29 M/UL (ref 4–5.4)
SODIUM SERPL-SCNC: 136 MMOL/L (ref 136–145)
WBC # BLD AUTO: 8.98 K/UL (ref 3.9–12.7)

## 2020-02-13 PROCEDURE — 99284 EMERGENCY DEPT VISIT MOD MDM: CPT | Mod: 25

## 2020-02-13 PROCEDURE — 25000003 PHARM REV CODE 250

## 2020-02-13 PROCEDURE — 63600175 PHARM REV CODE 636 W HCPCS: Performed by: EMERGENCY MEDICINE

## 2020-02-13 PROCEDURE — 96361 HYDRATE IV INFUSION ADD-ON: CPT

## 2020-02-13 PROCEDURE — 96374 THER/PROPH/DIAG INJ IV PUSH: CPT

## 2020-02-13 PROCEDURE — 80053 COMPREHEN METABOLIC PANEL: CPT

## 2020-02-13 PROCEDURE — 85025 COMPLETE CBC W/AUTO DIFF WBC: CPT

## 2020-02-13 RX ORDER — PANTOPRAZOLE SODIUM 40 MG/1
40 TABLET, DELAYED RELEASE ORAL DAILY
COMMUNITY
End: 2020-02-28 | Stop reason: CLARIF

## 2020-02-13 RX ORDER — FOLIC ACID 1 MG/1
1 TABLET ORAL DAILY
COMMUNITY
End: 2020-02-28 | Stop reason: CLARIF

## 2020-02-13 RX ORDER — TETRACAINE HYDROCHLORIDE 5 MG/ML
2 SOLUTION OPHTHALMIC
Status: COMPLETED | OUTPATIENT
Start: 2020-02-13 | End: 2020-02-13

## 2020-02-13 RX ORDER — DIPHENHYDRAMINE HYDROCHLORIDE 50 MG/ML
12.5 INJECTION INTRAMUSCULAR; INTRAVENOUS
Status: COMPLETED | OUTPATIENT
Start: 2020-02-13 | End: 2020-02-14

## 2020-02-13 RX ORDER — BUTALBITAL, ASPIRIN, AND CAFFEINE 325; 50; 40 MG/1; MG/1; MG/1
1 CAPSULE ORAL EVERY 4 HOURS PRN
Status: ON HOLD | COMMUNITY
End: 2020-09-24 | Stop reason: HOSPADM

## 2020-02-13 RX ORDER — GABAPENTIN 400 MG/1
400 CAPSULE ORAL 3 TIMES DAILY
COMMUNITY
End: 2020-02-28 | Stop reason: CLARIF

## 2020-02-13 RX ORDER — KETOROLAC TROMETHAMINE 30 MG/ML
15 INJECTION, SOLUTION INTRAMUSCULAR; INTRAVENOUS
Status: COMPLETED | OUTPATIENT
Start: 2020-02-13 | End: 2020-02-14

## 2020-02-13 RX ORDER — METOCLOPRAMIDE HYDROCHLORIDE 5 MG/ML
10 INJECTION INTRAMUSCULAR; INTRAVENOUS
Status: COMPLETED | OUTPATIENT
Start: 2020-02-13 | End: 2020-02-13

## 2020-02-13 RX ADMIN — TETRACAINE HYDROCHLORIDE 2 DROP: 5 SOLUTION OPHTHALMIC at 10:02

## 2020-02-13 RX ADMIN — SODIUM CHLORIDE, SODIUM LACTATE, POTASSIUM CHLORIDE, AND CALCIUM CHLORIDE 1000 ML: .6; .31; .03; .02 INJECTION, SOLUTION INTRAVENOUS at 10:02

## 2020-02-13 RX ADMIN — METOCLOPRAMIDE 10 MG: 5 INJECTION, SOLUTION INTRAMUSCULAR; INTRAVENOUS at 10:02

## 2020-02-14 PROCEDURE — 63600175 PHARM REV CODE 636 W HCPCS: Performed by: EMERGENCY MEDICINE

## 2020-02-14 PROCEDURE — 96375 TX/PRO/DX INJ NEW DRUG ADDON: CPT

## 2020-02-14 RX ADMIN — KETOROLAC TROMETHAMINE 15 MG: 30 INJECTION, SOLUTION INTRAMUSCULAR at 12:02

## 2020-02-14 RX ADMIN — DIPHENHYDRAMINE HYDROCHLORIDE 12.5 MG: 50 INJECTION, SOLUTION INTRAMUSCULAR; INTRAVENOUS at 12:02

## 2020-02-14 NOTE — ED PROVIDER NOTES
"Encounter Date: 2/13/2020       History     Chief Complaint   Patient presents with    Eye Pain     told have optic neuritis and possibl MS / reports "pressure behind both eyes / fluid coming from nose when bending over" seen by optho two weeks pta / spoke to them on phone and told to come to ED      39-year-old female with a past medical history of chronic migraines, bipolar disorder presents emergency department with headache.  The patient has been followed for the past year and half by Dr. Mickey Nugent for her chronic headaches.  She has reportedly been on multiple different medications with no relief of symptoms. She was referred to Dr. Coates with Ophthalmology for further evaluation. There has been no formal diagnosis, but reportedly concern for optic neuritis.  Patient states that she has pressure behind both eyes that is worse on the right side.  She intermittently has blurry vision and floaters.  The patient states that she has been experiencing the symptoms at least once a week for past year.  She has had outpatient workups with both Ophthalmology and Neurology including MRI that were reportedly normal however were not performed in our system.  Patient states that today she developed her typical eye pressure/headache.  It is associated with nausea but no vomiting.  She states that she bent forward and fluid came out of her nose.  She denies any other congestion.  She was concerned that it may be CSF.  She called Dr. Mccord who instructed her to come to the emergency department.  She denies any thunderclap nature.  She has no fevers, chills or neck stiffness.        Review of patient's allergies indicates:   Allergen Reactions    Gluten protein Other (See Comments)     Sever stomach ache, very tired feeling    Compazine [prochlorperazine edisylate]      Anxiety, itching     Past Medical History:   Diagnosis Date    Bipolar affective disorder, rapid cycling     bipolar 1    Migraine headache     " Vaginal delivery     x1     Past Surgical History:   Procedure Laterality Date    ABDOMINAL SURGERY      exploratory laparotomy    HYSTERECTOMY  12/15/2017     No family history on file.  Social History     Tobacco Use    Smoking status: Never Smoker    Smokeless tobacco: Never Used   Substance Use Topics    Alcohol use: No    Drug use: No     Review of Systems   Constitutional: Negative for chills, diaphoresis, fatigue and fever.   HENT: Negative for congestion.    Eyes: Positive for visual disturbance.   Respiratory: Negative for cough, shortness of breath, wheezing and stridor.    Cardiovascular: Negative for chest pain and palpitations.   Gastrointestinal: Positive for nausea. Negative for abdominal distention, diarrhea and vomiting.   Genitourinary: Negative for dysuria, frequency, hematuria and urgency.   Musculoskeletal: Negative for back pain.   Skin: Negative for pallor.   Neurological: Positive for headaches. Negative for weakness, light-headedness and numbness.   Psychiatric/Behavioral: Negative for confusion.   All other systems reviewed and are negative.      Physical Exam     Initial Vitals [02/13/20 2016]   BP Pulse Resp Temp SpO2   (!) 145/76 66 14 98.3 °F (36.8 °C) 99 %      MAP       --         Physical Exam    Nursing note and vitals reviewed.  Constitutional: She appears well-developed and well-nourished. No distress.   HENT:   Head: Normocephalic and atraumatic.   Small serous effusions behind bilateral TMs, no sinus tenderness, posterior pharynx unremarkable   Eyes: Conjunctivae and EOM are normal. Pupils are equal, round, and reactive to light.   Intra-ocular pressure 12 bilaterally, no nystagmus   Neck: Normal range of motion. Neck supple.   No meningismus   Cardiovascular: Normal rate, regular rhythm, normal heart sounds and intact distal pulses.   Pulmonary/Chest: Breath sounds normal. She has no wheezes. She has no rhonchi. She has no rales.   Abdominal: Soft. Bowel sounds are  normal. She exhibits no distension. There is no tenderness. There is no rebound.   Musculoskeletal: Normal range of motion.   Neurological: She is alert and oriented to person, place, and time. She has normal strength. No cranial nerve deficit or sensory deficit. GCS score is 15. GCS eye subscore is 4. GCS verbal subscore is 5. GCS motor subscore is 6.   Skin: Skin is warm and dry. Capillary refill takes less than 2 seconds.   Psychiatric: Thought content normal.         ED Course   Procedures  Labs Reviewed   COMPREHENSIVE METABOLIC PANEL - Abnormal; Notable for the following components:       Result Value    Alkaline Phosphatase 39 (*)     Anion Gap 7 (*)     All other components within normal limits   CBC W/ AUTO DIFFERENTIAL          Imaging Results    None          Medical Decision Making:   ED Management:  39-year-old female presents emergency department with a headache. Differential includes tension, migraine, complex migraine, optic neuritis, vascular headache, dehydration, cluster headache, postconcussive syndrome, meningoencephalitis, SAH/ICH, malignancy.  The patient has a normal neurological exam.  Bedside ocular ultrasound reveals optic nerves at upper limits of normal bilaterally.  No gross papilledema.  Patient was given IV fluids and Reglan.  I did call Dr. Azar who states that he is not familiar with the case and that the patient can see him in the morning for close follow-up.  I also discussed the case with Dr. Lundberg who is on-call for Dr. Mickey Nugent.  He also states that he is not familiar with the patient's case.  He recommended admit the patient if she has intractable symptoms to admit and they will evaluate her in the morning to decide on further imaging.  At this point and only that the patient has any indication for an emergent MRI.  Additionally I do not think a CT scan would be of benefit at this point given her chronic sx.  Given her persistent headache my plan was to admit the  patient for further evaluation including possible inpatient MRI.  However, the patient does not want to be admitted.  She states she would rather follow up with Dr. Mccord in the morning and follow up with her scheduled MRI next week.    I think this is reasonable given her normal exam. Given her symptoms I advised her to take antihistamines and decongestants to help with possible sinus congestion. Detailed return precautions were discussed.    Martha Downs MD  Emergency Medicine  02/13/2020 11:38 PM                                     Clinical Impression:       ICD-10-CM ICD-9-CM   1. Chronic tension-type headache, intractable G44.221 339.12                             Martha Downs MD  02/13/20 2338

## 2020-02-14 NOTE — DISCHARGE INSTRUCTIONS
Go to Dr Mccord's office in the morning. Follow up closely with Dr Mickey Nugent as well. Drink plenty of fluids. Take a nonsedating antihistamine such as zyrtec or claritin daily and a decongestant such as sudafed to help with symptoms. Also use flonase daily. Return at once for worsening symptoms.

## 2020-02-14 NOTE — ED TRIAGE NOTES
Pt reports she has hx of migraines which she sees neurology about. Neurology told her to see an optomotrist who stated pt has fluid behind he eyes. Pt states lately when she looks down she has noted some leaking of clear fluid bleeived to be spinal fluid. Pt reports severe headache behind bilateral eyes. Pt was told she has optic neuritis and was told to come to get an MRI with contrast.

## 2020-02-28 ENCOUNTER — HOSPITAL ENCOUNTER (OUTPATIENT)
Facility: HOSPITAL | Age: 40
Discharge: HOME | End: 2020-03-02
Attending: EMERGENCY MEDICINE | Admitting: INTERNAL MEDICINE
Payer: MEDICAID

## 2020-02-28 DIAGNOSIS — R51.9 INTRACTABLE HEADACHE, UNSPECIFIED CHRONICITY PATTERN, UNSPECIFIED HEADACHE TYPE: Primary | ICD-10-CM

## 2020-02-28 PROBLEM — H46.9 OPTIC NEURITIS: Status: ACTIVE | Noted: 2020-02-28

## 2020-02-28 PROBLEM — F41.9 ANXIETY: Status: ACTIVE | Noted: 2020-02-28

## 2020-02-28 PROBLEM — F31.9 BIPOLAR DEPRESSION: Status: ACTIVE | Noted: 2020-02-28

## 2020-02-28 PROCEDURE — 96374 THER/PROPH/DIAG INJ IV PUSH: CPT

## 2020-02-28 PROCEDURE — G0378 HOSPITAL OBSERVATION PER HR: HCPCS

## 2020-02-28 PROCEDURE — 94761 N-INVAS EAR/PLS OXIMETRY MLT: CPT

## 2020-02-28 PROCEDURE — 63600175 PHARM REV CODE 636 W HCPCS: Performed by: EMERGENCY MEDICINE

## 2020-02-28 PROCEDURE — 25000003 PHARM REV CODE 250: Performed by: INTERNAL MEDICINE

## 2020-02-28 PROCEDURE — 99285 EMERGENCY DEPT VISIT HI MDM: CPT | Mod: 25

## 2020-02-28 PROCEDURE — 63600175 PHARM REV CODE 636 W HCPCS: Performed by: NURSE PRACTITIONER

## 2020-02-28 PROCEDURE — 96375 TX/PRO/DX INJ NEW DRUG ADDON: CPT

## 2020-02-28 RX ORDER — CLONAZEPAM 1 MG/1
1 TABLET ORAL 2 TIMES DAILY PRN
Status: DISCONTINUED | OUTPATIENT
Start: 2020-02-28 | End: 2020-03-02 | Stop reason: HOSPADM

## 2020-02-28 RX ORDER — LANOLIN ALCOHOL/MO/W.PET/CERES
800 CREAM (GRAM) TOPICAL
Status: DISCONTINUED | OUTPATIENT
Start: 2020-02-28 | End: 2020-03-02 | Stop reason: HOSPADM

## 2020-02-28 RX ORDER — KETOROLAC TROMETHAMINE 30 MG/ML
30 INJECTION, SOLUTION INTRAMUSCULAR; INTRAVENOUS EVERY 8 HOURS PRN
Status: DISCONTINUED | OUTPATIENT
Start: 2020-02-28 | End: 2020-03-02 | Stop reason: HOSPADM

## 2020-02-28 RX ORDER — LAMOTRIGINE 150 MG/1
150 TABLET ORAL 2 TIMES DAILY
COMMUNITY

## 2020-02-28 RX ORDER — GABAPENTIN 300 MG/1
600 CAPSULE ORAL 3 TIMES DAILY
Status: DISCONTINUED | OUTPATIENT
Start: 2020-02-28 | End: 2020-03-02 | Stop reason: HOSPADM

## 2020-02-28 RX ORDER — PANTOPRAZOLE SODIUM 40 MG/1
40 TABLET, DELAYED RELEASE ORAL 2 TIMES DAILY
Status: DISCONTINUED | OUTPATIENT
Start: 2020-02-28 | End: 2020-03-02 | Stop reason: HOSPADM

## 2020-02-28 RX ORDER — FLUOXETINE HYDROCHLORIDE 20 MG/1
20 CAPSULE ORAL DAILY
COMMUNITY

## 2020-02-28 RX ORDER — HYDROCODONE BITARTRATE AND ACETAMINOPHEN 5; 325 MG/1; MG/1
1 TABLET ORAL EVERY 6 HOURS PRN
Status: DISCONTINUED | OUTPATIENT
Start: 2020-02-28 | End: 2020-03-02 | Stop reason: HOSPADM

## 2020-02-28 RX ORDER — ONDANSETRON 2 MG/ML
4 INJECTION INTRAMUSCULAR; INTRAVENOUS EVERY 6 HOURS PRN
Status: DISCONTINUED | OUTPATIENT
Start: 2020-02-28 | End: 2020-03-02 | Stop reason: HOSPADM

## 2020-02-28 RX ORDER — GABAPENTIN 600 MG/1
600 TABLET ORAL 3 TIMES DAILY
COMMUNITY
End: 2020-05-21 | Stop reason: ALTCHOICE

## 2020-02-28 RX ORDER — POTASSIUM CHLORIDE 20 MEQ/15ML
40 SOLUTION ORAL
Status: DISCONTINUED | OUTPATIENT
Start: 2020-02-28 | End: 2020-03-02 | Stop reason: HOSPADM

## 2020-02-28 RX ORDER — ACETAMINOPHEN 325 MG/1
650 TABLET ORAL EVERY 4 HOURS PRN
Status: DISCONTINUED | OUTPATIENT
Start: 2020-02-28 | End: 2020-03-02 | Stop reason: HOSPADM

## 2020-02-28 RX ORDER — ONDANSETRON 2 MG/ML
4 INJECTION INTRAMUSCULAR; INTRAVENOUS
Status: COMPLETED | OUTPATIENT
Start: 2020-02-28 | End: 2020-02-28

## 2020-02-28 RX ORDER — LAMOTRIGINE 25 MG/1
25 TABLET ORAL 2 TIMES DAILY
Status: DISCONTINUED | OUTPATIENT
Start: 2020-02-28 | End: 2020-03-01

## 2020-02-28 RX ORDER — PANTOPRAZOLE SODIUM 40 MG/1
40 TABLET, DELAYED RELEASE ORAL 2 TIMES DAILY
COMMUNITY
End: 2021-06-29

## 2020-02-28 RX ORDER — FLUOXETINE HYDROCHLORIDE 20 MG/1
20 CAPSULE ORAL DAILY
Status: DISCONTINUED | OUTPATIENT
Start: 2020-02-29 | End: 2020-03-02 | Stop reason: HOSPADM

## 2020-02-28 RX ORDER — CLONAZEPAM 2 MG/1
2 TABLET ORAL 2 TIMES DAILY PRN
COMMUNITY

## 2020-02-28 RX ADMIN — HYDROCODONE BITARTRATE AND ACETAMINOPHEN 1 TABLET: 5; 325 TABLET ORAL at 08:02

## 2020-02-28 RX ADMIN — PANTOPRAZOLE SODIUM 40 MG: 40 TABLET, DELAYED RELEASE ORAL at 08:02

## 2020-02-28 RX ADMIN — GABAPENTIN 600 MG: 300 CAPSULE ORAL at 08:02

## 2020-02-28 RX ADMIN — ONDANSETRON 4 MG: 2 INJECTION INTRAMUSCULAR; INTRAVENOUS at 04:02

## 2020-02-28 RX ADMIN — KETOROLAC TROMETHAMINE 30 MG: 30 INJECTION, SOLUTION INTRAMUSCULAR at 04:02

## 2020-02-28 RX ADMIN — LAMOTRIGINE 25 MG: 25 TABLET ORAL at 08:02

## 2020-02-28 NOTE — SUBJECTIVE & OBJECTIVE
Past Medical History:   Diagnosis Date    Anxiety     Bipolar affective disorder, rapid cycling     bipolar 1    Depression     GERD (gastroesophageal reflux disease)     Migraine headache     Vaginal delivery     x1       Past Surgical History:   Procedure Laterality Date    ABDOMINAL SURGERY      exploratory laparotomy    HYSTERECTOMY  12/15/2017       Review of patient's allergies indicates:   Allergen Reactions    Gluten protein Other (See Comments)     Sever stomach ache, very tired feeling    Prochlorperazine edisylate      Anxiety, itching  Other reaction(s): Unknown       No current facility-administered medications on file prior to encounter.      Current Outpatient Medications on File Prior to Encounter   Medication Sig    butalbital-aspirin-caffeine -40 mg (FIORINAL) -40 mg Cap Take 1 capsule by mouth every 4 (four) hours as needed (migrain).    clonazePAM (KLONOPIN) 2 MG Tab Take 2 mg by mouth 3 (three) times daily.    FLUoxetine 20 MG capsule Take 20 mg by mouth once daily.    gabapentin (NEURONTIN) 600 MG tablet Take 600 mg by mouth 3 (three) times daily.    lamoTRIgine (LAMICTAL) 150 MG Tab Take 25 mg by mouth 2 (two) times daily.    onabotulinumtoxina (BOTOX) 200 unit SolR Inject 200 Units into the muscle once.    pantoprazole (PROTONIX) 40 MG tablet Take 40 mg by mouth 2 (two) times daily.    prochlorperazine (COMPAZINE) 10 MG tablet     [DISCONTINUED] clonazePAM (KLONOPIN) 2 MG Tab Take 2 mg by mouth 2 (two) times daily.     [DISCONTINUED] fluoxetine (PROZAC) 40 MG capsule Take 40 mg by mouth once daily.    [DISCONTINUED] folic acid (FOLVITE) 1 MG tablet Take 1 mg by mouth once daily.    [DISCONTINUED] gabapentin (NEURONTIN) 400 MG capsule Take 400 mg by mouth 3 (three) times daily.    [DISCONTINUED] ketorolac (TORADOL) 10 mg tablet     [DISCONTINUED] lamoTRIgine (LAMICTAL) 150 MG Tab Take 150 mg by mouth once daily.     [DISCONTINUED] pantoprazole (PROTONIX) 40  MG tablet Take 40 mg by mouth once daily.     Family History     None        Tobacco Use    Smoking status: Never Smoker    Smokeless tobacco: Never Used   Substance and Sexual Activity    Alcohol use: No    Drug use: No    Sexual activity: Yes     Partners: Male     Birth control/protection: OCP     Review of Systems   Constitutional: Negative for activity change and appetite change.   HENT: Negative for congestion and dental problem.    Eyes: Negative for discharge and itching.   Respiratory: Negative for shortness of breath.    Cardiovascular: Negative for chest pain.   Gastrointestinal: Negative for abdominal distention and abdominal pain.   Endocrine: Negative for cold intolerance.   Genitourinary: Negative for difficulty urinating and dysuria.   Musculoskeletal: Negative for arthralgias and back pain.   Skin: Negative for color change.   Neurological: Positive for headaches. Negative for dizziness and facial asymmetry.   Hematological: Negative for adenopathy.   Psychiatric/Behavioral: Negative for agitation and behavioral problems.     Objective:     Vital Signs (Most Recent):  Temp: 98.6 °F (37 °C) (02/28/20 1429)  Pulse: 85 (02/28/20 1630)  Resp: 16 (02/28/20 1429)  BP: (!) 93/50 (02/28/20 1630)  SpO2: 97 % (02/28/20 1630) Vital Signs (24h Range):  Temp:  [98.6 °F (37 °C)] 98.6 °F (37 °C)  Pulse:  [85-86] 85  Resp:  [16] 16  SpO2:  [97 %] 97 %  BP: ()/(50-57) 93/50     Weight: 58.1 kg (128 lb)  Body mass index is 22.67 kg/m².    Physical Exam   Constitutional: She is oriented to person, place, and time. No distress.   Eyes: Pupils are equal, round, and reactive to light. EOM are normal.   Neck: Neck supple.   Cardiovascular: Normal rate.   Pulmonary/Chest: Effort normal and breath sounds normal.   Abdominal: Soft. Bowel sounds are normal.   Musculoskeletal: Normal range of motion. She exhibits no edema.   Neurological: She is alert and oriented to person, place, and time.   Skin: Skin is warm.    Psychiatric: She has a normal mood and affect.   Nursing note and vitals reviewed.        CRANIAL NERVES     CN III, IV, VI   Pupils are equal, round, and reactive to light.  Extraocular motions are normal.

## 2020-02-28 NOTE — ASSESSMENT & PLAN NOTE
No evidence of anxiety or panic attack at the moment  Her medication list showing that she take 6 mg of Klonopin a day

## 2020-02-28 NOTE — ASSESSMENT & PLAN NOTE
Patient will get admitted for a lumbar puncture  Recent MRI of the brain was normal  Patient's neurologist will be consulted  She is clinically intact and there are no focal neurological deficits including eye examination

## 2020-02-28 NOTE — HPI
39-year-old lady getting admitted with intractable headache, blurry vision and double vision  The symptom has been going on for about 3 weeks and she saw a neurologist as an outpatient  Every week ago she had an MRI of the brain which was normal and following she was referred to an ophthalmologist  Per patient of the mall just diagnosed her with optic neuritis and some papilledema  She is awaiting to see a neuro ophthalmologist in the month of April  Today her symptoms when worse and she came to the ER following her neurologist advice  Per patient she will be having a lumbar puncture while she is here  She denies any other issues.

## 2020-02-28 NOTE — H&P
[facility]  History & Physical    Patient Name: Shruthi Richard  MRN: [id number]  Admission Date: 2/28/2020  Attending Physician: Kuldip Nelson MD   Primary Care Provider: Valente Erazo Jr, MD         Patient information was obtained from patient and ER records.     Subjective:     Principal Problem:Intractable headache    Chief Complaint:   Chief Complaint   Patient presents with    HAND TINGLING     X 2 DAYS    Blurred Vision     X 2 WEEKS        HPI: 39-year-old lady getting admitted with intractable headache, blurry vision and double vision  The symptom has been going on for about 3 weeks and she saw a neurologist as an outpatient  Every week ago she had an MRI of the brain which was normal and following she was referred to an ophthalmologist  Per patient of the mall just diagnosed her with optic neuritis and some papilledema  She is awaiting to see a neuro ophthalmologist in the month of April  Today her symptoms when worse and she came to the ER following her neurologist advice  Per patient she will be having a lumbar puncture while she is here  She denies any other issues.    Past Medical History:   Diagnosis Date    Anxiety     Bipolar affective disorder, rapid cycling     bipolar 1    Depression     GERD (gastroesophageal reflux disease)     Migraine headache     Vaginal delivery     x1       Past Surgical History:   Procedure Laterality Date    ABDOMINAL SURGERY      exploratory laparotomy    HYSTERECTOMY  12/15/2017       Review of patient's allergies indicates:   Allergen Reactions    Gluten protein Other (See Comments)     Sever stomach ache, very tired feeling    Prochlorperazine edisylate      Anxiety, itching  Other reaction(s): Unknown       No current facility-administered medications on file prior to encounter.      Current Outpatient Medications on File Prior to Encounter   Medication Sig    butalbital-aspirin-caffeine -40 mg (FIORINAL)  -40 mg Cap Take 1 capsule by mouth every 4 (four) hours as needed (migrain).    clonazePAM (KLONOPIN) 2 MG Tab Take 2 mg by mouth 3 (three) times daily.    FLUoxetine 20 MG capsule Take 20 mg by mouth once daily.    gabapentin (NEURONTIN) 600 MG tablet Take 600 mg by mouth 3 (three) times daily.    lamoTRIgine (LAMICTAL) 150 MG Tab Take 25 mg by mouth 2 (two) times daily.    onabotulinumtoxina (BOTOX) 200 unit SolR Inject 200 Units into the muscle once.    pantoprazole (PROTONIX) 40 MG tablet Take 40 mg by mouth 2 (two) times daily.    prochlorperazine (COMPAZINE) 10 MG tablet     [DISCONTINUED] clonazePAM (KLONOPIN) 2 MG Tab Take 2 mg by mouth 2 (two) times daily.     [DISCONTINUED] fluoxetine (PROZAC) 40 MG capsule Take 40 mg by mouth once daily.    [DISCONTINUED] folic acid (FOLVITE) 1 MG tablet Take 1 mg by mouth once daily.    [DISCONTINUED] gabapentin (NEURONTIN) 400 MG capsule Take 400 mg by mouth 3 (three) times daily.    [DISCONTINUED] ketorolac (TORADOL) 10 mg tablet     [DISCONTINUED] lamoTRIgine (LAMICTAL) 150 MG Tab Take 150 mg by mouth once daily.     [DISCONTINUED] pantoprazole (PROTONIX) 40 MG tablet Take 40 mg by mouth once daily.     Family History     None        Tobacco Use    Smoking status: Never Smoker    Smokeless tobacco: Never Used   Substance and Sexual Activity    Alcohol use: No    Drug use: No    Sexual activity: Yes     Partners: Male     Birth control/protection: OCP     Review of Systems   Constitutional: Negative for activity change and appetite change.   HENT: Negative for congestion and dental problem.    Eyes: Negative for discharge and itching.   Respiratory: Negative for shortness of breath.    Cardiovascular: Negative for chest pain.   Gastrointestinal: Negative for abdominal distention and abdominal pain.   Endocrine: Negative for cold intolerance.   Genitourinary: Negative for difficulty urinating and dysuria.   Musculoskeletal: Negative for  arthralgias and back pain.   Skin: Negative for color change.   Neurological: Positive for headaches. Negative for dizziness and facial asymmetry.   Hematological: Negative for adenopathy.   Psychiatric/Behavioral: Negative for agitation and behavioral problems.     Objective:     Vital Signs (Most Recent):  Temp: 98.6 °F (37 °C) (02/28/20 1429)  Pulse: 85 (02/28/20 1630)  Resp: 16 (02/28/20 1429)  BP: (!) 93/50 (02/28/20 1630)  SpO2: 97 % (02/28/20 1630) Vital Signs (24h Range):  Temp:  [98.6 °F (37 °C)] 98.6 °F (37 °C)  Pulse:  [85-86] 85  Resp:  [16] 16  SpO2:  [97 %] 97 %  BP: ()/(50-57) 93/50     Weight: 58.1 kg (128 lb)  Body mass index is 22.67 kg/m².    Physical Exam   Constitutional: She is oriented to person, place, and time. No distress.   Eyes: Pupils are equal, round, and reactive to light. EOM are normal.   Neck: Neck supple.   Cardiovascular: Normal rate.   Pulmonary/Chest: Effort normal and breath sounds normal.   Abdominal: Soft. Bowel sounds are normal.   Musculoskeletal: Normal range of motion. She exhibits no edema.   Neurological: She is alert and oriented to person, place, and time.   Skin: Skin is warm.   Psychiatric: She has a normal mood and affect.   Nursing note and vitals reviewed.        CRANIAL NERVES     CN III, IV, VI   Pupils are equal, round, and reactive to light.  Extraocular motions are normal.            Assessment/Plan:     * Intractable headache  Patient will get admitted for a lumbar puncture  Recent MRI of the brain was normal  Patient's neurologist will be consulted  She is clinically intact and there are no focal neurological deficits including eye examination      Optic neuritis  Per patient recently she was diagnosed with optic neuritis and papilledema  She is awaiting to see a neuro Ophthalmology MD in the month of April      Anxiety  No evidence of anxiety or panic attack at the moment  Her medication list showing that she take 6 mg of Klonopin a day      Bipolar  depression  Suffers from bipolar depression with manic episodes  Stable issue      VTE Risk Mitigation (From admission, onward)         Ordered     IP VTE LOW RISK PATIENT  Once      02/28/20 1643     Place KOLTON hose  Until discontinued      02/28/20 1643     Place sequential compression device  Until discontinued      02/28/20 1643                   Bhavik Gil MD  Department of Hospital Medicine   [facility]

## 2020-02-28 NOTE — ED NOTES
Pressure behind eyes and seeing double/blurred vision, weakness and tingling off and on in both arms and hands  Had an MRI down  Neurologist Told pt to come to Er to get lumbar puncture

## 2020-02-28 NOTE — ASSESSMENT & PLAN NOTE
Per patient recently she was diagnosed with optic neuritis and papilledema  She is awaiting to see a neuro Ophthalmology MD in the month of April

## 2020-02-28 NOTE — CONSULTS
[facility]  Neurology  Consult Note    Patient Name: Shruthi Richard  MRN: [id number]  Admission Date: 2/28/2020  Hospital Length of Stay: 0 days  Code Status: No Order   Attending Provider: Bhavik Gil MD   Consulting Provider: Dr. Mickey Nugent  Consulting Practitioner: Natasha Pink [facility]  Primary Care Physician: Valente Erazo Jr, MD  Principal Problem:<principal problem not specified>    Consults  Subjective:     Chief Complaint:  Blurry Vision, Headache, Tingling of hands    HPI: 39-year-old lady getting admitted with intractable headache, blurry vision and double vision  The symptom has been going on for about 3 weeks and she saw a neurologist as an outpatient  Every week ago she had an MRI of the brain which was normal and following she was referred to an ophthalmologist  Per patient of the mall just diagnosed her with optic neuritis and some papilledema  She is awaiting to see a neuro ophthalmologist in the month of April  Today her symptoms when worse and she came to the ER following her neurologist advice  Per patient she will be having a lumbar puncture while she is here  She denies any other issues.    Neurology Consult Note:  Patient seen, examined, and plan of care discussed with Dr. Mickey Nugent. Patient who is known to the clinic presented to ER after being advised to come to ER by the clinic.  She was recently diagnosed with optic neuritis. She reports having problems with her vision such as diplopia and blurry vision over the last month and has worsened recently.  She also reports tingling in her hands with R>L over the last two weeks.  Additionally, patient c/o HA with nausea over the last few days.  She was seen in clinic recently and MRI was done 1 week ago according to the patient. Patient states she takes gabapentin 800mg TID. On exam, patient noted to have diplopia and decreased sensation to right hand. Discussed with Dr. Mickey Nugent who also spoke with patient. Decision made by patient and  Dr. Mickey Nugent to be admitted, treat headaches, and possibly have LP performed on Monday d/t vision changes.     Called to evaluate patient in ER. Plan of care discussed with Dr. Mickey Nugent and Dr. Nelson (ER Physician).     Past Medical History:   Diagnosis Date    Anxiety     Bipolar affective disorder, rapid cycling     bipolar 1    Depression     GERD (gastroesophageal reflux disease)     Migraine headache     Vaginal delivery     x1       Past Surgical History:   Procedure Laterality Date    ABDOMINAL SURGERY      exploratory laparotomy    HYSTERECTOMY  12/15/2017       Review of patient's allergies indicates:   Allergen Reactions    Gluten protein Other (See Comments)     Sever stomach ache, very tired feeling    Prochlorperazine edisylate      Anxiety, itching  Other reaction(s): Unknown       Current Neurological Medications:   Gabapentin 800mg TID    No current facility-administered medications on file prior to encounter.      Current Outpatient Medications on File Prior to Encounter   Medication Sig    butalbital-aspirin-caffeine -40 mg (FIORINAL) -40 mg Cap Take 1 capsule by mouth every 4 (four) hours as needed (migrain).    clonazePAM (KLONOPIN) 2 MG Tab Take 2 mg by mouth 2 (two) times daily.     fluoxetine (PROZAC) 40 MG capsule Take 40 mg by mouth once daily.    folic acid (FOLVITE) 1 MG tablet Take 1 mg by mouth once daily.    gabapentin (NEURONTIN) 400 MG capsule Take 400 mg by mouth 3 (three) times daily.    ketorolac (TORADOL) 10 mg tablet     lamoTRIgine (LAMICTAL) 150 MG Tab Take 150 mg by mouth once daily.     onabotulinumtoxina (BOTOX) 200 unit SolR Inject 200 Units into the muscle once.    pantoprazole (PROTONIX) 40 MG tablet Take 40 mg by mouth once daily.    prochlorperazine (COMPAZINE) 10 MG tablet       Family History     None        Tobacco Use    Smoking status: Never Smoker    Smokeless tobacco: Never Used   Substance and Sexual Activity    Alcohol  use: No    Drug use: No    Sexual activity: Yes     Partners: Male     Birth control/protection: OCP     Review of Systems   Constitutional: Negative.    HENT: Negative.    Eyes: Positive for visual disturbance.   Respiratory: Negative.    Cardiovascular: Negative.    Gastrointestinal: Negative.    Endocrine: Negative.    Genitourinary: Negative.    Musculoskeletal: Negative.    Allergic/Immunologic: Negative.    Neurological: Positive for numbness and headaches.   Hematological: Negative.    Psychiatric/Behavioral: Negative.      Objective:     Vital Signs (Most Recent):  Temp: 98.6 °F (37 °C) (02/28/20 1429)  Pulse: 86 (02/28/20 1429)  Resp: 16 (02/28/20 1429)  BP: (!) 115/57 (02/28/20 1429)  SpO2: 97 % (02/28/20 1429) Vital Signs (24h Range):  Temp:  [98.6 °F (37 °C)] 98.6 °F (37 °C)  Pulse:  [86] 86  Resp:  [16] 16  SpO2:  [97 %] 97 %  BP: (115)/(57) 115/57     Weight: 58.1 kg (128 lb)  Body mass index is 22.67 kg/m².    Physical Exam   Constitutional: She is oriented to person, place, and time. She appears well-developed and well-nourished.   HENT:   Head: Normocephalic and atraumatic.   Eyes: Pupils are equal, round, and reactive to light. EOM are normal.   Neck: Normal range of motion. Neck supple.   Cardiovascular: Normal rate, regular rhythm, normal heart sounds and intact distal pulses.   Pulmonary/Chest: Effort normal and breath sounds normal.   Abdominal: Soft. Bowel sounds are normal.   Musculoskeletal: Normal range of motion.   Neurological: She is alert and oriented to person, place, and time. A cranial nerve deficit and sensory deficit is present.   Skin: Skin is warm and dry. Capillary refill takes less than 2 seconds.   Psychiatric: She has a normal mood and affect. Her behavior is normal. Judgment and thought content normal.       NEUROLOGICAL EXAMINATION:     MENTAL STATUS   Oriented to person, place, and time.     CRANIAL NERVES     CN III, IV, VI   Pupils are equal, round, and reactive to  light.  Extraocular motions are normal.       Significant Labs:  CMP  Sodium   Date Value Ref Range Status   02/29/2020 138 136 - 145 mmol/L Final     Potassium   Date Value Ref Range Status   02/29/2020 3.7 3.5 - 5.1 mmol/L Final     Chloride   Date Value Ref Range Status   02/29/2020 101 95 - 110 mmol/L Final     CO2   Date Value Ref Range Status   02/29/2020 28 23 - 29 mmol/L Final     Glucose   Date Value Ref Range Status   02/29/2020 91 70 - 110 mg/dL Final     BUN, Bld   Date Value Ref Range Status   02/29/2020 16 6 - 20 mg/dL Final     Creatinine   Date Value Ref Range Status   02/29/2020 0.7 0.5 - 1.4 mg/dL Final     Calcium   Date Value Ref Range Status   02/29/2020 8.8 8.7 - 10.5 mg/dL Final     Total Protein   Date Value Ref Range Status   02/29/2020 6.3 6.0 - 8.4 g/dL Final     Albumin   Date Value Ref Range Status   02/29/2020 3.8 3.5 - 5.2 g/dL Final     Total Bilirubin   Date Value Ref Range Status   02/29/2020 0.7 0.1 - 1.0 mg/dL Final     Comment:     For infants and newborns, interpretation of results should be based  on gestational age, weight and in agreement with clinical  observations.  Premature Infant recommended reference ranges:  Up to 24 hours.............<8.0 mg/dL  Up to 48 hours............<12.0 mg/dL  3-5 days..................<15.0 mg/dL  6-29 days.................<15.0 mg/dL       Alkaline Phosphatase   Date Value Ref Range Status   02/29/2020 49 (L) 55 - 135 U/L Final     AST   Date Value Ref Range Status   02/29/2020 35 10 - 40 U/L Final     ALT   Date Value Ref Range Status   02/29/2020 50 (H) 10 - 44 U/L Final     Anion Gap   Date Value Ref Range Status   02/29/2020 9 8 - 16 mmol/L Final     eGFR if    Date Value Ref Range Status   02/29/2020 >60.0 >60 mL/min/1.73 m^2 Final     eGFR if non    Date Value Ref Range Status   02/29/2020 >60.0 >60 mL/min/1.73 m^2 Final     Comment:     Calculation used to obtain the estimated glomerular filtration  rate  (eGFR) is the CKD-EPI equation.        Lab Results   Component Value Date    WBC 6.57 02/29/2020    HGB 11.1 (L) 02/29/2020    HCT 34.8 (L) 02/29/2020    MCV 87 02/29/2020     02/29/2020       Significant Imaging:   MRI Brain w/o Contrast 2/28/2020  There are no signal abnormalities on diffusion weighted imaging to  suggest acute infarct.There is no extra-axial fluid. The ventricular  system and cortical sulci are normal in size for the patient's age.  Parenchymal signal is normal. The midline structures and  craniocervical junction are normal. The major intracranial physiologic  flow voids are present. There are no signal abnormalities of the  orbits, the paranasal sinuses or the skull base.    Assessment and Plan:    1. Intractable Headache  -MRI Brain w/o Contrast  -Toradol 30mg every 8hrs as needed for headache with pain 7/10 or greater  -Frequent Neuro Checks  -Dr. Mickey Nugent discussed with patient possibility of LP on Monday    2. Diplopia  -MRI w/o Contrast  -Fall Precautions  -Being followed by opthalmology     There are no hospital problems to display for this patient.      VTE Risk Mitigation (From admission, onward)    None          Thank you for your consult. I will follow-up with patient. Please contact us if you have any additional questions.    Natasha Pink, ALETA  Neurology  [facility]    I, Dr. Mickey Nugent, discussed care with my advanced practitioner and agree with above. I have reviewed patient clinical presentation, work up, impression and plan.

## 2020-02-28 NOTE — ED PROVIDER NOTES
Encounter Date: 2/28/2020       History     Chief Complaint   Patient presents with    HAND TINGLING     X 2 DAYS    Blurred Vision     X 2 WEEKS     Patient with a history headaches and double vision.  She is under care of a neurologist.  She had a recent ophtho exam and was diagnosed with possible optic neuritis.  She continues with symptoms of occasional blurry vision and double vision.  She continues with a frontal headache. She does have intermittent paresthesias and feelings of clumsiness to both hands.  No fever or chills. No neck stiffness.  Patient called her neurologist and was told to come here for further evaluation.  Patient did have MRI approximately 1 week ago with no new symptoms currently.        Review of patient's allergies indicates:   Allergen Reactions    Gluten protein Other (See Comments)     Sever stomach ache, very tired feeling    Prochlorperazine edisylate      Anxiety, itching  Other reaction(s): Unknown     Past Medical History:   Diagnosis Date    Anxiety     Bipolar affective disorder, rapid cycling     bipolar 1    Depression     GERD (gastroesophageal reflux disease)     Migraine headache     Vaginal delivery     x1     Past Surgical History:   Procedure Laterality Date    ABDOMINAL SURGERY      exploratory laparotomy    HYSTERECTOMY  12/15/2017     No family history on file.  Social History     Tobacco Use    Smoking status: Never Smoker    Smokeless tobacco: Never Used   Substance Use Topics    Alcohol use: No    Drug use: No     Review of Systems   Constitutional: Negative for chills and fever.   HENT: Negative for congestion.    Eyes: Positive for visual disturbance.   Respiratory: Negative for shortness of breath.    Cardiovascular: Negative for chest pain and palpitations.   Gastrointestinal: Negative for abdominal pain and vomiting.   Genitourinary: Negative for dysuria.   Musculoskeletal: Negative for joint swelling.   Neurological: Negative for headaches.    Psychiatric/Behavioral: Negative for confusion.       Physical Exam     Initial Vitals [02/28/20 1429]   BP Pulse Resp Temp SpO2   (!) 115/57 86 16 98.6 °F (37 °C) 97 %      MAP       --         Physical Exam    Nursing note and vitals reviewed.  Constitutional: She is not diaphoretic. No distress.   HENT:   Head: Normocephalic and atraumatic.   Eyes: Conjunctivae and EOM are normal. Pupils are equal, round, and reactive to light.   Neck: Normal range of motion.   Cardiovascular: Normal rate.   Pulmonary/Chest: Breath sounds normal.   Abdominal: Soft. There is no tenderness.   Musculoskeletal: Normal range of motion.   Neurological: She is alert and oriented to person, place, and time. She has normal strength. No cranial nerve deficit or sensory deficit.   No gross deficits   Skin: No rash noted.   Psychiatric: She has a normal mood and affect.         ED Course   Procedures  Labs Reviewed - No data to display       Imaging Results    None          Medical Decision Making:   History:   Old Medical Records: I decided to obtain old medical records.  ED Management:  Patient presents with some vague neurologic deficits with headache. Patient is also currently being worked up for optic neuritis.  Discussed with patient's neurologist Dr. Mickey Nugent.  He desires no testing in the emergency department.  Neurology did evaluate in the emergency department.  Recommend admission with treatment of headache. They did not desire any laboratory or imaging from the emergency department.  Hospitalist consulted for admission                                 Clinical Impression:       ICD-10-CM ICD-9-CM   1. Intractable headache, unspecified chronicity pattern, unspecified headache type R51 784.0                                Kuldip Nelson MD  02/28/20 [id number]

## 2020-02-29 LAB
ALBUMIN SERPL BCP-MCNC: 3.8 G/DL (ref 3.5–5.2)
ALP SERPL-CCNC: 49 U/L (ref 55–135)
ALT SERPL W/O P-5'-P-CCNC: 50 U/L (ref 10–44)
ANION GAP SERPL CALC-SCNC: 9 MMOL/L (ref 8–16)
AST SERPL-CCNC: 35 U/L (ref 10–40)
BASOPHILS # BLD AUTO: 0.05 K/UL (ref 0–0.2)
BASOPHILS NFR BLD: 0.8 % (ref 0–1.9)
BILIRUB SERPL-MCNC: 0.7 MG/DL (ref 0.1–1)
BUN SERPL-MCNC: 16 MG/DL (ref 6–20)
CALCIUM SERPL-MCNC: 8.8 MG/DL (ref 8.7–10.5)
CHLORIDE SERPL-SCNC: 101 MMOL/L (ref 95–110)
CO2 SERPL-SCNC: 28 MMOL/L (ref 23–29)
CREAT SERPL-MCNC: 0.7 MG/DL (ref 0.5–1.4)
DIFFERENTIAL METHOD: ABNORMAL
EOSINOPHIL # BLD AUTO: 0.2 K/UL (ref 0–0.5)
EOSINOPHIL NFR BLD: 3.5 % (ref 0–8)
ERYTHROCYTE [DISTWIDTH] IN BLOOD BY AUTOMATED COUNT: 13 % (ref 11.5–14.5)
EST. GFR  (AFRICAN AMERICAN): >60 ML/MIN/1.73 M^2
EST. GFR  (NON AFRICAN AMERICAN): >60 ML/MIN/1.73 M^2
GLUCOSE SERPL-MCNC: 91 MG/DL (ref 70–110)
HCT VFR BLD AUTO: 34.8 % (ref 37–48.5)
HGB BLD-MCNC: 11.1 G/DL (ref 12–16)
IMM GRANULOCYTES # BLD AUTO: 0.02 K/UL (ref 0–0.04)
IMM GRANULOCYTES NFR BLD AUTO: 0.3 % (ref 0–0.5)
LYMPHOCYTES # BLD AUTO: 2.6 K/UL (ref 1–4.8)
LYMPHOCYTES NFR BLD: 40.2 % (ref 18–48)
MAGNESIUM SERPL-MCNC: 2.1 MG/DL (ref 1.6–2.6)
MCH RBC QN AUTO: 27.9 PG (ref 27–31)
MCHC RBC AUTO-ENTMCNC: 31.9 G/DL (ref 32–36)
MCV RBC AUTO: 87 FL (ref 82–98)
MONOCYTES # BLD AUTO: 0.5 K/UL (ref 0.3–1)
MONOCYTES NFR BLD: 7.5 % (ref 4–15)
NEUTROPHILS # BLD AUTO: 3.1 K/UL (ref 1.8–7.7)
NEUTROPHILS NFR BLD: 47.7 % (ref 38–73)
NRBC BLD-RTO: 0 /100 WBC
PLATELET # BLD AUTO: 309 K/UL (ref 150–350)
PMV BLD AUTO: 10.5 FL (ref 9.2–12.9)
POTASSIUM SERPL-SCNC: 3.7 MMOL/L (ref 3.5–5.1)
PROT SERPL-MCNC: 6.3 G/DL (ref 6–8.4)
RBC # BLD AUTO: 3.98 M/UL (ref 4–5.4)
SODIUM SERPL-SCNC: 138 MMOL/L (ref 136–145)
WBC # BLD AUTO: 6.57 K/UL (ref 3.9–12.7)

## 2020-02-29 PROCEDURE — 85025 COMPLETE CBC W/AUTO DIFF WBC: CPT

## 2020-02-29 PROCEDURE — 96376 TX/PRO/DX INJ SAME DRUG ADON: CPT

## 2020-02-29 PROCEDURE — 83735 ASSAY OF MAGNESIUM: CPT

## 2020-02-29 PROCEDURE — 94761 N-INVAS EAR/PLS OXIMETRY MLT: CPT

## 2020-02-29 PROCEDURE — 63600175 PHARM REV CODE 636 W HCPCS: Performed by: INTERNAL MEDICINE

## 2020-02-29 PROCEDURE — 25000003 PHARM REV CODE 250: Performed by: INTERNAL MEDICINE

## 2020-02-29 PROCEDURE — 80053 COMPREHEN METABOLIC PANEL: CPT

## 2020-02-29 PROCEDURE — G0378 HOSPITAL OBSERVATION PER HR: HCPCS

## 2020-02-29 PROCEDURE — 36415 COLL VENOUS BLD VENIPUNCTURE: CPT

## 2020-02-29 RX ADMIN — HYDROCODONE BITARTRATE AND ACETAMINOPHEN 1 TABLET: 5; 325 TABLET ORAL at 08:02

## 2020-02-29 RX ADMIN — LAMOTRIGINE 25 MG: 25 TABLET ORAL at 08:02

## 2020-02-29 RX ADMIN — HYDROCODONE BITARTRATE AND ACETAMINOPHEN 1 TABLET: 5; 325 TABLET ORAL at 02:02

## 2020-02-29 RX ADMIN — GABAPENTIN 600 MG: 300 CAPSULE ORAL at 02:02

## 2020-02-29 RX ADMIN — GABAPENTIN 600 MG: 300 CAPSULE ORAL at 08:02

## 2020-02-29 RX ADMIN — ONDANSETRON HYDROCHLORIDE 4 MG: 2 SOLUTION INTRAMUSCULAR; INTRAVENOUS at 09:02

## 2020-02-29 RX ADMIN — FLUOXETINE 20 MG: 20 CAPSULE ORAL at 08:02

## 2020-02-29 NOTE — ASSESSMENT & PLAN NOTE
Patient is admitted for a lumbar puncture  Recent MRI of the brain was normal  Neurology is consulted-Lumbar puncture is planned on 03/02.  She is clinically intact and there are no focal neurological deficits including eye examination

## 2020-02-29 NOTE — PROGRESS NOTES
[facility]  Neurology  Progress Note    Patient Name: Shruthi Richard  MRN: [id number]  Admission Date: 2/28/2020  Hospital Length of Stay: 0 days  Code Status: Full Code   Attending Provider: Mati Quach MD   Consulting Provider: Dr. Mickey Nugent  Consulting Practitioner: Natasha Pink [facility]  Primary Care Physician: Valente Erazo Jr, MD  Principal Problem:Intractable headache      Subjective:     HPI: 39-year-old lady getting admitted with intractable headache, blurry vision and double vision  The symptom has been going on for about 3 weeks and she saw a neurologist as an outpatient  Every week ago she had an MRI of the brain which was normal and following she was referred to an ophthalmologist  Per patient of the mall just diagnosed her with optic neuritis and some papilledema  She is awaiting to see a neuro ophthalmologist in the month of April  Today her symptoms when worse and she came to the ER following her neurologist advice  Per patient she will be having a lumbar puncture while she is here  She denies any other issues.    Neurology Consult Note:  Patient seen, examined, and plan of care discussed with Dr. Mickey Nugent. Patient who is known to the clinic presented to ER after being advised to come to ER by the clinic.  She was recently diagnosed with optic neuritis. She reports having problems with her vision such as diplopia and blurry vision over the last month and has worsened recently.  She also reports tingling in her hands with R>L over the last two weeks.  Additionally, patient c/o HA with nausea over the last few days.  She was seen in clinic recently and MRI was done 1 week ago according to the patient. Dr. Mickey Nugent spoke with patient in ER about being admitted and having LP done on Monday or being discharged and returning to clinic on Monday. Patient decided to stay in hospital over weekend for LP on Monday. This morning patient is doing well. She denies any complications over night.  She reports she continues to have pressure behind her eyes and c/o blurry vision. MRI done yesterday and was negative. Patient has also had MRV and MRA done in 6/2019 which were negative for any acute findings.  Carotid ultrasound also performed in 6/2019 was negative for any significant stenosis. Will continue to monitor patient over weekend with plan of having LP done on Monday.        Past Medical History:   Diagnosis Date    Anxiety     Bipolar affective disorder, rapid cycling     bipolar 1    Depression     GERD (gastroesophageal reflux disease)     Migraine headache     Vaginal delivery     x1       Past Surgical History:   Procedure Laterality Date    ABDOMINAL SURGERY      exploratory laparotomy    HYSTERECTOMY  12/15/2017       Review of patient's allergies indicates:   Allergen Reactions    Gluten protein Other (See Comments)     Sever stomach ache, very tired feeling    Prochlorperazine edisylate      Anxiety, itching  Other reaction(s): Unknown       Current Neurological Medications:   Gabapentin 600mg TID  Lamictal 25mg BID    No current facility-administered medications on file prior to encounter.      Current Outpatient Medications on File Prior to Encounter   Medication Sig    butalbital-aspirin-caffeine -40 mg (FIORINAL) -40 mg Cap Take 1 capsule by mouth every 4 (four) hours as needed (migrain).    clonazePAM (KLONOPIN) 2 MG Tab Take 2 mg by mouth 3 (three) times daily.    FLUoxetine 20 MG capsule Take 20 mg by mouth once daily.    gabapentin (NEURONTIN) 600 MG tablet Take 600 mg by mouth 3 (three) times daily.    lamoTRIgine (LAMICTAL) 150 MG Tab Take 25 mg by mouth 2 (two) times daily.    onabotulinumtoxina (BOTOX) 200 unit SolR Inject 200 Units into the muscle once.    pantoprazole (PROTONIX) 40 MG tablet Take 40 mg by mouth 2 (two) times daily.    prochlorperazine (COMPAZINE) 10 MG tablet       Family History     None        Tobacco Use    Smoking status: Never  Smoker    Smokeless tobacco: Never Used   Substance and Sexual Activity    Alcohol use: No    Drug use: No    Sexual activity: Yes     Partners: Male     Birth control/protection: OCP     Review of Systems   Constitutional: Negative.    HENT: Negative.    Eyes: Positive for visual disturbance.   Respiratory: Negative.    Cardiovascular: Negative.    Gastrointestinal: Negative.    Endocrine: Negative.    Genitourinary: Negative.    Musculoskeletal: Negative.    Allergic/Immunologic: Negative.    Neurological: Positive for numbness and headaches.   Hematological: Negative.    Psychiatric/Behavioral: Negative.      Objective:     Vital Signs (Most Recent):  Temp: 97.8 °F (36.6 °C) (02/29/20 0720)  Pulse: 69 (02/29/20 0720)  Resp: 16 (02/29/20 0720)  BP: (!) 91/58 (02/29/20 0720)  SpO2: 99 % (02/29/20 0720) Vital Signs (24h Range):  Temp:  [97.6 °F (36.4 °C)-98.6 °F (37 °C)] 97.8 °F (36.6 °C)  Pulse:  [66-86] 69  Resp:  [15-18] 16  SpO2:  [97 %-100 %] 99 %  BP: ()/(50-59) 91/58     Weight: 58 kg (127 lb 13.9 oz)  Body mass index is 22.65 kg/m².    Physical Exam   Constitutional: She is oriented to person, place, and time. She appears well-developed and well-nourished.   HENT:   Head: Normocephalic and atraumatic.   Eyes: Pupils are equal, round, and reactive to light. EOM are normal.   Neck: Normal range of motion. Neck supple.   Cardiovascular: Normal rate, regular rhythm, normal heart sounds and intact distal pulses.   Pulmonary/Chest: Effort normal and breath sounds normal.   Abdominal: Soft. Bowel sounds are normal.   Musculoskeletal: Normal range of motion.   Neurological: She is alert and oriented to person, place, and time. A cranial nerve deficit and sensory deficit is present.   Skin: Skin is warm and dry. Capillary refill takes less than 2 seconds.   Psychiatric: She has a normal mood and affect. Her behavior is normal. Judgment and thought content normal.       NEUROLOGICAL EXAMINATION:     MENTAL  STATUS   Oriented to person, place, and time.     CRANIAL NERVES     CN III, IV, VI   Pupils are equal, round, and reactive to light.  Extraocular motions are normal.       Significant Labs:  CMP  Sodium   Date Value Ref Range Status   02/29/2020 138 136 - 145 mmol/L Final     Potassium   Date Value Ref Range Status   02/29/2020 3.7 3.5 - 5.1 mmol/L Final     Chloride   Date Value Ref Range Status   02/29/2020 101 95 - 110 mmol/L Final     CO2   Date Value Ref Range Status   02/29/2020 28 23 - 29 mmol/L Final     Glucose   Date Value Ref Range Status   02/29/2020 91 70 - 110 mg/dL Final     BUN, Bld   Date Value Ref Range Status   02/29/2020 16 6 - 20 mg/dL Final     Creatinine   Date Value Ref Range Status   02/29/2020 0.7 0.5 - 1.4 mg/dL Final     Calcium   Date Value Ref Range Status   02/29/2020 8.8 8.7 - 10.5 mg/dL Final     Total Protein   Date Value Ref Range Status   02/29/2020 6.3 6.0 - 8.4 g/dL Final     Albumin   Date Value Ref Range Status   02/29/2020 3.8 3.5 - 5.2 g/dL Final     Total Bilirubin   Date Value Ref Range Status   02/29/2020 0.7 0.1 - 1.0 mg/dL Final     Comment:     For infants and newborns, interpretation of results should be based  on gestational age, weight and in agreement with clinical  observations.  Premature Infant recommended reference ranges:  Up to 24 hours.............<8.0 mg/dL  Up to 48 hours............<12.0 mg/dL  3-5 days..................<15.0 mg/dL  6-29 days.................<15.0 mg/dL       Alkaline Phosphatase   Date Value Ref Range Status   02/29/2020 49 (L) 55 - 135 U/L Final     AST   Date Value Ref Range Status   02/29/2020 35 10 - 40 U/L Final     ALT   Date Value Ref Range Status   02/29/2020 50 (H) 10 - 44 U/L Final     Anion Gap   Date Value Ref Range Status   02/29/2020 9 8 - 16 mmol/L Final     eGFR if    Date Value Ref Range Status   02/29/2020 >60.0 >60 mL/min/1.73 m^2 Final     eGFR if non    Date Value Ref Range Status    02/29/2020 >60.0 >60 mL/min/1.73 m^2 Final     Comment:     Calculation used to obtain the estimated glomerular filtration  rate (eGFR) is the CKD-EPI equation.        Lab Results   Component Value Date    WBC 6.57 02/29/2020    HGB 11.1 (L) 02/29/2020    HCT 34.8 (L) 02/29/2020    MCV 87 02/29/2020     02/29/2020       Significant Imaging:   MRI Brain w/o Contrast 2/28/2020  There are no signal abnormalities on diffusion weighted imaging to suggest acute infarct.There is no extra-axial fluid. The ventricular  system and cortical sulci are normal in size for the patient's age. Parenchymal signal is normal. The midline structures and  craniocervical junction are normal. The major intracranial physiologic flow voids are present. There are no signal abnormalities of the  orbits, the paranasal sinuses or the skull base.    Assessment and Plan:    1. Intractable Headache  -MRI Brain w/o Contrast-negative for any acute findings  -Toradol 30mg every 8hrs as needed for headache with pain 7/10 or greater  -Frequent Neuro Checks  -Dr. Mickey Nugent discussed with patient possibility of LP on Monday    2. Diplopia  -MRI w/o Contrast-negative for any acute findings  -Fall Precautions  -Being followed by ophthalmology    Continue to monitor patient with plan of LP on Monday.    Patient to follow up with [facility] at [phone] within 2 weeks from discharge.     Stroke education was provided including stroke risk factors modification and any acute neurological changes including weakness, confusion, visual changes to come straight to the ER.     Plan of care discussed with patient. All questions answered.                                                 Active Diagnoses:    Diagnosis Date Noted POA    PRINCIPAL PROBLEM:  Intractable headache [R51] 02/28/2020 Unknown    Optic neuritis [H46.9] 02/28/2020 Unknown    Bipolar depression [F31.9] 02/28/2020 Unknown    Anxiety [F41.9] 02/28/2020 Unknown       Problems Resolved During this Admission:       VTE Risk Mitigation (From admission, onward)         Ordered     IP VTE LOW RISK PATIENT  Once      02/28/20 1643     Place KOLTON hose  Until discontinued      02/28/20 1643     Place sequential compression device  Until discontinued      02/28/20 1643                Thank you for your consult. I will follow-up with patient. Please contact us if you have any additional questions.    Natasha Pink, ALETA  Neurology  [facility]  I, Dr. Mickey Nugent, have personally seen and examined the patient with my advanced provider and agree with above. I personally did a focused exam, and reviewed all necessary clinical information. I discussed my management plan with my NP and agree with above. Bluriness of vision. LP Monday. Risk of worsening HA, and bleed, and infection explained to patient.

## 2020-02-29 NOTE — ASSESSMENT & PLAN NOTE
Per patient recently she was diagnosed with optic neuritis and papilledema  She is awaiting to see a neuro Ophthalmology MD in the month of April  Waiting for lumbar puncture.  As per my discussion with Neurology patient does not have multiple sclerosis

## 2020-02-29 NOTE — PROGRESS NOTES
[facility] Medicine  Progress Note    DOS: 02/29/2020    Patient Name: Shruthi Richard  MRN: [id number]  Patient Class: OP- Observation   Admission Date: 2/28/2020  Length of Stay: 0 days  Attending Physician: Mati Quach MD  Primary Care Provider: Valente Erazo Jr, MD        Subjective:     Principal Problem:Intractable headache        HPI:  39-year-old lady getting admitted with intractable headache, blurry vision and double vision  The symptom has been going on for about 3 weeks and she saw a neurologist as an outpatient  Every week ago she had an MRI of the brain which was normal and following she was referred to an ophthalmologist  Per patient of the mall just diagnosed her with optic neuritis and some papilledema  She is awaiting to see a neuro ophthalmologist in the month of April  Today her symptoms when worse and she came to the ER following her neurologist advice  Per patient she will be having a lumbar puncture while she is here  She denies any other issues.    Overview/Hospital Course:  02/29:  Patient was seen and examined bedside.  She denies any new symptoms however still complains of blurry vision more pronounced on the right side.  Headache is better.  No acute events overnight as per nursing staff.  She is waiting for lumbar puncture.     Interval History: Patient was seen and examined bedside.  She denies any new symptoms however still complains of blurry vision more pronounced on the right side.  Headache is better.  No acute events overnight as per nursing staff.  She is waiting for lumbar puncture.       Review of Systems   Constitutional: Negative for activity change and appetite change.   HENT: Positive for voice change. Negative for congestion and dental problem.    Eyes: Negative for discharge and itching.   Respiratory: Negative for shortness of breath.    Cardiovascular: Negative for chest pain.   Gastrointestinal: Negative for abdominal distention and  abdominal pain.   Endocrine: Negative for cold intolerance.   Genitourinary: Negative for difficulty urinating and dysuria.   Musculoskeletal: Negative for arthralgias and back pain.   Skin: Negative for color change.   Neurological: Positive for headaches. Negative for dizziness and facial asymmetry.   Hematological: Negative for adenopathy.   Psychiatric/Behavioral: Negative for agitation and behavioral problems.   All other systems reviewed and are negative.    Objective:     Vital Signs (Most Recent):  Temp: 97.8 °F (36.6 °C) (02/29/20 0720)  Pulse: 69 (02/29/20 0720)  Resp: 16 (02/29/20 0720)  BP: (!) 91/58 (02/29/20 0720)  SpO2: 99 % (02/29/20 0720) Vital Signs (24h Range):  Temp:  [97.6 °F (36.4 °C)-98.6 °F (37 °C)] 97.8 °F (36.6 °C)  Pulse:  [66-86] 69  Resp:  [15-18] 16  SpO2:  [97 %-100 %] 99 %  BP: ()/(50-59) 91/58     Weight: 58 kg (127 lb 13.9 oz)  Body mass index is 22.65 kg/m².  No intake or output data in the 24 hours ending 02/29/20 1426   Physical Exam   Constitutional: She is oriented to person, place, and time. No distress.   Eyes: Pupils are equal, round, and reactive to light. EOM are normal.   Neck: Neck supple.   Cardiovascular: Normal rate.   Pulmonary/Chest: Effort normal and breath sounds normal.   Abdominal: Soft. Bowel sounds are normal.   Musculoskeletal: Normal range of motion. She exhibits no edema.   Neurological: She is alert and oriented to person, place, and time.   Skin: Skin is warm.   Psychiatric: She has a normal mood and affect.   Nursing note and vitals reviewed.      Significant Labs: All pertinent labs within the past 24 hours have been reviewed.    Significant Imaging: I have reviewed all pertinent imaging results/findings within the past 24 hours.      Assessment/Plan:      * Intractable headache  Patient is admitted for a lumbar puncture  Recent MRI of the brain was normal  Neurology is consulted-Lumbar puncture is planned on 03/02.  She is clinically intact and  there are no focal neurological deficits including eye examination      Anxiety  No evidence of anxiety or panic attack at the moment  Her medication list showing that she take 6 mg of Klonopin a day    Bipolar depression  Suffers from bipolar depression with manic episodes  Stable issue      Optic neuritis  Per patient recently she was diagnosed with optic neuritis and papilledema  She is awaiting to see a neuro Ophthalmology MD in the month of April  Waiting for lumbar puncture.  As per my discussion with Neurology patient does not have multiple sclerosis        VTE Risk Mitigation (From admission, onward)         Ordered     IP VTE LOW RISK PATIENT  Once      02/28/20 1643     Place KOLTON hose  Until discontinued      02/28/20 1643     Place sequential compression device  Until discontinued      02/28/20 1643                      Mati Quach MD  Department of Hospital Medicine   [facility] hospital

## 2020-02-29 NOTE — HOSPITAL COURSE
02/29:  Patient was seen and examined bedside.  She denies any new symptoms however still complains of blurry vision more pronounced on the right side.  Headache is better.  No acute events overnight as per nursing staff.  She is waiting for lumbar puncture.     03/01:  Patient was seen and examined at bedside.  Still complains of headache and retro-orbital pain. No acute events overnight as per nursing staff.  She is waiting for lumbar puncture tomorrow morning.     03/02:  Patient was seen and examined bedside.  She reports some improvement in her her headache and retro-orbital pain with 1 dose of Decadron yesterday.  She underwent lumbar puncture today without any postprocedure complications.  I discussed case with Neurology again recommended 1 more dose of IV Decadron.  Patient was cleared for discharge by Neurology.  She was discharged home in hemodynamically stable condition.  She was advised to follow up with Neurology and follow up with all LP studies.     Review of systems:  Mild headache otherwise comprehensive review of system negative    Physical Exam   Constitutional: She is oriented to person, place, and time. No distress.   Eyes: Pupils are equal, round, and reactive to light. EOM are normal.   Neck: Neck supple.   Cardiovascular: Normal rate.   Pulmonary/Chest: Effort normal and breath sounds normal.   Abdominal: Soft. Bowel sounds are normal.   Musculoskeletal: Normal range of motion. She exhibits no edema.   Neurological: She is alert and oriented to person, place, and time.   Skin: Skin is warm.   Psychiatric: She has a normal mood and affect.     Nursing note and vitals reviewed.

## 2020-02-29 NOTE — SUBJECTIVE & OBJECTIVE
Interval History: Patient was seen and examined bedside.  She denies any new symptoms however still complains of blurry vision more pronounced on the right side.  Headache is better.  No acute events overnight as per nursing staff.  She is waiting for lumbar puncture.       Review of Systems   Constitutional: Negative for activity change and appetite change.   HENT: Positive for voice change. Negative for congestion and dental problem.    Eyes: Negative for discharge and itching.   Respiratory: Negative for shortness of breath.    Cardiovascular: Negative for chest pain.   Gastrointestinal: Negative for abdominal distention and abdominal pain.   Endocrine: Negative for cold intolerance.   Genitourinary: Negative for difficulty urinating and dysuria.   Musculoskeletal: Negative for arthralgias and back pain.   Skin: Negative for color change.   Neurological: Positive for headaches. Negative for dizziness and facial asymmetry.   Hematological: Negative for adenopathy.   Psychiatric/Behavioral: Negative for agitation and behavioral problems.   All other systems reviewed and are negative.    Objective:     Vital Signs (Most Recent):  Temp: 97.8 °F (36.6 °C) (02/29/20 0720)  Pulse: 69 (02/29/20 0720)  Resp: 16 (02/29/20 0720)  BP: (!) 91/58 (02/29/20 0720)  SpO2: 99 % (02/29/20 0720) Vital Signs (24h Range):  Temp:  [97.6 °F (36.4 °C)-98.6 °F (37 °C)] 97.8 °F (36.6 °C)  Pulse:  [66-86] 69  Resp:  [15-18] 16  SpO2:  [97 %-100 %] 99 %  BP: ()/(50-59) 91/58     Weight: 58 kg (127 lb 13.9 oz)  Body mass index is 22.65 kg/m².  No intake or output data in the 24 hours ending 02/29/20 1426   Physical Exam   Constitutional: She is oriented to person, place, and time. No distress.   Eyes: Pupils are equal, round, and reactive to light. EOM are normal.   Neck: Neck supple.   Cardiovascular: Normal rate.   Pulmonary/Chest: Effort normal and breath sounds normal.   Abdominal: Soft. Bowel sounds are normal.   Musculoskeletal:  Normal range of motion. She exhibits no edema.   Neurological: She is alert and oriented to person, place, and time.   Skin: Skin is warm.   Psychiatric: She has a normal mood and affect.   Nursing note and vitals reviewed.      Significant Labs: All pertinent labs within the past 24 hours have been reviewed.    Significant Imaging: I have reviewed all pertinent imaging results/findings within the past 24 hours.

## 2020-03-01 LAB
ALBUMIN SERPL BCP-MCNC: 3.6 G/DL (ref 3.5–5.2)
ALP SERPL-CCNC: 68 U/L (ref 55–135)
ALT SERPL W/O P-5'-P-CCNC: 113 U/L (ref 10–44)
ANION GAP SERPL CALC-SCNC: 7 MMOL/L (ref 8–16)
AST SERPL-CCNC: 70 U/L (ref 10–40)
BASOPHILS # BLD AUTO: 0.04 K/UL (ref 0–0.2)
BASOPHILS NFR BLD: 0.6 % (ref 0–1.9)
BILIRUB SERPL-MCNC: 0.7 MG/DL (ref 0.1–1)
BUN SERPL-MCNC: 17 MG/DL (ref 6–20)
CALCIUM SERPL-MCNC: 8.7 MG/DL (ref 8.7–10.5)
CHLORIDE SERPL-SCNC: 100 MMOL/L (ref 95–110)
CO2 SERPL-SCNC: 30 MMOL/L (ref 23–29)
CREAT SERPL-MCNC: 0.7 MG/DL (ref 0.5–1.4)
DIFFERENTIAL METHOD: ABNORMAL
EOSINOPHIL # BLD AUTO: 0.2 K/UL (ref 0–0.5)
EOSINOPHIL NFR BLD: 3.5 % (ref 0–8)
ERYTHROCYTE [DISTWIDTH] IN BLOOD BY AUTOMATED COUNT: 13.2 % (ref 11.5–14.5)
EST. GFR  (AFRICAN AMERICAN): >60 ML/MIN/1.73 M^2
EST. GFR  (NON AFRICAN AMERICAN): >60 ML/MIN/1.73 M^2
GLUCOSE SERPL-MCNC: 91 MG/DL (ref 70–110)
HCT VFR BLD AUTO: 33.6 % (ref 37–48.5)
HGB BLD-MCNC: 10.8 G/DL (ref 12–16)
IMM GRANULOCYTES # BLD AUTO: 0.01 K/UL (ref 0–0.04)
IMM GRANULOCYTES NFR BLD AUTO: 0.2 % (ref 0–0.5)
LYMPHOCYTES # BLD AUTO: 3.1 K/UL (ref 1–4.8)
LYMPHOCYTES NFR BLD: 46.8 % (ref 18–48)
MAGNESIUM SERPL-MCNC: 2.1 MG/DL (ref 1.6–2.6)
MCH RBC QN AUTO: 28.2 PG (ref 27–31)
MCHC RBC AUTO-ENTMCNC: 32.1 G/DL (ref 32–36)
MCV RBC AUTO: 88 FL (ref 82–98)
MONOCYTES # BLD AUTO: 0.5 K/UL (ref 0.3–1)
MONOCYTES NFR BLD: 7.6 % (ref 4–15)
NEUTROPHILS # BLD AUTO: 2.7 K/UL (ref 1.8–7.7)
NEUTROPHILS NFR BLD: 41.3 % (ref 38–73)
NRBC BLD-RTO: 0 /100 WBC
PLATELET # BLD AUTO: 307 K/UL (ref 150–350)
PMV BLD AUTO: 10.3 FL (ref 9.2–12.9)
POTASSIUM SERPL-SCNC: 4.3 MMOL/L (ref 3.5–5.1)
PROT SERPL-MCNC: 6 G/DL (ref 6–8.4)
RBC # BLD AUTO: 3.83 M/UL (ref 4–5.4)
SODIUM SERPL-SCNC: 137 MMOL/L (ref 136–145)
WBC # BLD AUTO: 6.54 K/UL (ref 3.9–12.7)

## 2020-03-01 PROCEDURE — 96376 TX/PRO/DX INJ SAME DRUG ADON: CPT

## 2020-03-01 PROCEDURE — 83735 ASSAY OF MAGNESIUM: CPT

## 2020-03-01 PROCEDURE — 25000003 PHARM REV CODE 250: Performed by: HOSPITALIST

## 2020-03-01 PROCEDURE — 63600175 PHARM REV CODE 636 W HCPCS: Performed by: INTERNAL MEDICINE

## 2020-03-01 PROCEDURE — 25000003 PHARM REV CODE 250: Performed by: INTERNAL MEDICINE

## 2020-03-01 PROCEDURE — 36415 COLL VENOUS BLD VENIPUNCTURE: CPT

## 2020-03-01 PROCEDURE — 85025 COMPLETE CBC W/AUTO DIFF WBC: CPT

## 2020-03-01 PROCEDURE — 80053 COMPREHEN METABOLIC PANEL: CPT

## 2020-03-01 PROCEDURE — 96375 TX/PRO/DX INJ NEW DRUG ADDON: CPT

## 2020-03-01 PROCEDURE — 63600175 PHARM REV CODE 636 W HCPCS: Performed by: HOSPITALIST

## 2020-03-01 PROCEDURE — G0378 HOSPITAL OBSERVATION PER HR: HCPCS

## 2020-03-01 RX ORDER — POLYETHYLENE GLYCOL 3350 17 G/17G
17 POWDER, FOR SOLUTION ORAL DAILY
Status: DISCONTINUED | OUTPATIENT
Start: 2020-03-01 | End: 2020-03-02 | Stop reason: HOSPADM

## 2020-03-01 RX ORDER — DEXAMETHASONE SODIUM PHOSPHATE 4 MG/ML
4 INJECTION, SOLUTION INTRA-ARTICULAR; INTRALESIONAL; INTRAMUSCULAR; INTRAVENOUS; SOFT TISSUE ONCE
Status: COMPLETED | OUTPATIENT
Start: 2020-03-01 | End: 2020-03-01

## 2020-03-01 RX ORDER — IBUPROFEN 200 MG
24 TABLET ORAL
Status: DISCONTINUED | OUTPATIENT
Start: 2020-03-01 | End: 2020-03-02 | Stop reason: HOSPADM

## 2020-03-01 RX ORDER — IBUPROFEN 200 MG
16 TABLET ORAL
Status: DISCONTINUED | OUTPATIENT
Start: 2020-03-01 | End: 2020-03-02 | Stop reason: HOSPADM

## 2020-03-01 RX ORDER — SENNOSIDES 8.6 MG/1
8.6 TABLET ORAL DAILY PRN
Status: DISCONTINUED | OUTPATIENT
Start: 2020-03-01 | End: 2020-03-02 | Stop reason: HOSPADM

## 2020-03-01 RX ORDER — GLUCAGON 1 MG
1 KIT INJECTION
Status: DISCONTINUED | OUTPATIENT
Start: 2020-03-01 | End: 2020-03-02 | Stop reason: HOSPADM

## 2020-03-01 RX ORDER — INSULIN ASPART 100 [IU]/ML
0-5 INJECTION, SOLUTION INTRAVENOUS; SUBCUTANEOUS
Status: DISCONTINUED | OUTPATIENT
Start: 2020-03-01 | End: 2020-03-02 | Stop reason: HOSPADM

## 2020-03-01 RX ADMIN — LAMOTRIGINE 150 MG: 100 TABLET ORAL at 10:03

## 2020-03-01 RX ADMIN — PANTOPRAZOLE SODIUM 40 MG: 40 TABLET, DELAYED RELEASE ORAL at 10:03

## 2020-03-01 RX ADMIN — LAMOTRIGINE 25 MG: 25 TABLET ORAL at 09:03

## 2020-03-01 RX ADMIN — STANDARDIZED SENNA CONCENTRATE 8.6 MG: 8.6 TABLET ORAL at 05:03

## 2020-03-01 RX ADMIN — HYDROCODONE BITARTRATE AND ACETAMINOPHEN 1 TABLET: 5; 325 TABLET ORAL at 03:03

## 2020-03-01 RX ADMIN — HYDROCODONE BITARTRATE AND ACETAMINOPHEN 1 TABLET: 5; 325 TABLET ORAL at 10:03

## 2020-03-01 RX ADMIN — POLYETHYLENE GLYCOL 3350 17 G: 17 POWDER, FOR SOLUTION ORAL at 10:03

## 2020-03-01 RX ADMIN — GABAPENTIN 600 MG: 300 CAPSULE ORAL at 10:03

## 2020-03-01 RX ADMIN — DEXAMETHASONE SODIUM PHOSPHATE 4 MG: 4 INJECTION, SOLUTION INTRA-ARTICULAR; INTRALESIONAL; INTRAMUSCULAR; INTRAVENOUS; SOFT TISSUE at 12:03

## 2020-03-01 RX ADMIN — GABAPENTIN 600 MG: 300 CAPSULE ORAL at 04:03

## 2020-03-01 RX ADMIN — KETOROLAC TROMETHAMINE 30 MG: 30 INJECTION, SOLUTION INTRAMUSCULAR at 12:03

## 2020-03-01 RX ADMIN — GABAPENTIN 600 MG: 300 CAPSULE ORAL at 09:03

## 2020-03-01 RX ADMIN — HYDROCODONE BITARTRATE AND ACETAMINOPHEN 1 TABLET: 5; 325 TABLET ORAL at 09:03

## 2020-03-01 RX ADMIN — HYDROCODONE BITARTRATE AND ACETAMINOPHEN 1 TABLET: 5; 325 TABLET ORAL at 04:03

## 2020-03-01 RX ADMIN — FLUOXETINE 20 MG: 20 CAPSULE ORAL at 09:03

## 2020-03-01 NOTE — ASSESSMENT & PLAN NOTE
Patient is admitted for a lumbar puncture  Recent MRI of the brain was normal  Neurology is consulted-Lumbar puncture is planned on 03/02.  No focal neurological deficits including eye examination.   Neurology recommended 1 dose of Decadron  P.r.n. analgesia with NSAIDs and Norco

## 2020-03-01 NOTE — PLAN OF CARE
POC reviewed with patient, pain decreased with PO and IV pain medications, encouraged fluids and sleep, will continue to monitor

## 2020-03-01 NOTE — ASSESSMENT & PLAN NOTE
Per patient recently she was diagnosed with optic neuritis and papilledema  She is awaiting to see a neuro Ophthalmology MD in the month of April  Waiting for lumbar puncture.  As per my discussion with Neurology patient does not have multiple sclerosis.

## 2020-03-01 NOTE — PROGRESS NOTES
[facility]  Neurology  Progress Note    Patient Name: Shruthi Richard  MRN: [id number]  Admission Date: 2/28/2020  Hospital Length of Stay: 0 days  Code Status: Full Code   Attending Provider: Mati Quach MD   Consulting Provider: Dr. Mickey Nugent  Consulting Practitioner: Natasha Pink [facility]  Primary Care Physician: Valente Erazo Jr, MD  Principal Problem:Intractable headache      Subjective:     HPI: 39-year-old lady getting admitted with intractable headache, blurry vision and double vision  The symptom has been going on for about 3 weeks and she saw a neurologist as an outpatient  Every week ago she had an MRI of the brain which was normal and following she was referred to an ophthalmologist  Per patient of the mall just diagnosed her with optic neuritis and some papilledema  She is awaiting to see a neuro ophthalmologist in the month of April  Today her symptoms when worse and she came to the ER following her neurologist advice  Per patient she will be having a lumbar puncture while she is here  She denies any other issues.    Neurology Consult Note:  Patient seen, examined, and plan of care discussed with Dr. Mickey Nugent. Patient who is known to the clinic presented to ER after being advised to come to ER by the clinic.  She was recently diagnosed with optic neuritis. She reports having problems with her vision such as diplopia and blurry vision over the last month and has worsened recently.  She also reports tingling in her hands with R>L over the last two weeks.  Additionally, patient c/o HA with nausea over the last few days.  She was seen in clinic recently and MRI was done 1 week ago according to the patient. Dr. Mickey Nugent spoke with patient in ER about being admitted and having LP done on Monday or being discharged and returning to clinic on Monday. Patient decided to stay in hospital over weekend for LP on Monday. MRI of Brain w/o Contrast was performed on admit and was negative for any  acute changes. Patient had MRV, MRA, and carotid ultrasound in 6/2019 all of which did not show any significant findings. Patient is resting in bed this morning. She continues to c/o headache with blurry vision. We will give her 4mg Decadron IV x1 dose and provide supplemental oxygen at 100% for 15 minutes. Plan on LP on Monday. Orders for LP have been entered. Will continue to monitor.       Past Medical History:   Diagnosis Date    Anxiety     Bipolar affective disorder, rapid cycling     bipolar 1    Depression     GERD (gastroesophageal reflux disease)     Migraine headache     Vaginal delivery     x1       Past Surgical History:   Procedure Laterality Date    ABDOMINAL SURGERY      exploratory laparotomy    HYSTERECTOMY  12/15/2017       Review of patient's allergies indicates:   Allergen Reactions    Gluten protein Other (See Comments)     Sever stomach ache, very tired feeling    Prochlorperazine edisylate      Anxiety, itching  Other reaction(s): Unknown       Current Neurological Medications:   Gabapentin 600mg TID  Lamictal 25mg BID    No current facility-administered medications on file prior to encounter.      Current Outpatient Medications on File Prior to Encounter   Medication Sig    butalbital-aspirin-caffeine -40 mg (FIORINAL) -40 mg Cap Take 1 capsule by mouth every 4 (four) hours as needed (migrain).    clonazePAM (KLONOPIN) 2 MG Tab Take 2 mg by mouth 3 (three) times daily.    FLUoxetine 20 MG capsule Take 20 mg by mouth once daily.    gabapentin (NEURONTIN) 600 MG tablet Take 600 mg by mouth 3 (three) times daily.    lamoTRIgine (LAMICTAL) 150 MG Tab Take 25 mg by mouth 2 (two) times daily.    onabotulinumtoxina (BOTOX) 200 unit SolR Inject 200 Units into the muscle once.    pantoprazole (PROTONIX) 40 MG tablet Take 40 mg by mouth 2 (two) times daily.    prochlorperazine (COMPAZINE) 10 MG tablet       Family History     None        Tobacco Use    Smoking status:  Never Smoker    Smokeless tobacco: Never Used   Substance and Sexual Activity    Alcohol use: No    Drug use: No    Sexual activity: Yes     Partners: Male     Birth control/protection: OCP     Review of Systems   Constitutional: Negative.    HENT: Positive for congestion.    Eyes: Positive for visual disturbance.   Respiratory: Negative.    Cardiovascular: Negative.    Gastrointestinal: Negative.    Endocrine: Negative.    Genitourinary: Negative.    Musculoskeletal: Negative.    Allergic/Immunologic: Negative.    Neurological: Positive for numbness and headaches.   Hematological: Negative.    Psychiatric/Behavioral: Negative.      Objective:     Vital Signs (Most Recent):  Temp: 97.7 °F (36.5 °C) (03/01/20 1219)  Pulse: 97 (03/01/20 1219)  Resp: 18 (03/01/20 1219)  BP: 98/62 (03/01/20 1219)  SpO2: 97 % (03/01/20 1219) Vital Signs (24h Range):  Temp:  [97.6 °F (36.4 °C)-98.5 °F (36.9 °C)] 97.7 °F (36.5 °C)  Pulse:  [] 97  Resp:  [18] 18  SpO2:  [96 %-99 %] 97 %  BP: ()/(62-68) 98/62     Weight: 58 kg (127 lb 13.9 oz)  Body mass index is 22.65 kg/m².    Physical Exam   Constitutional: She is oriented to person, place, and time. She appears well-developed and well-nourished.   HENT:   Head: Normocephalic and atraumatic.   Eyes: Pupils are equal, round, and reactive to light. EOM are normal.   Neck: Normal range of motion. Neck supple.   Cardiovascular: Normal rate, regular rhythm, normal heart sounds and intact distal pulses.   Pulmonary/Chest: Effort normal and breath sounds normal.   Abdominal: Soft. Bowel sounds are normal.   Musculoskeletal: Normal range of motion.   Neurological: She is alert and oriented to person, place, and time. A cranial nerve deficit and sensory deficit is present.   Skin: Skin is warm and dry. Capillary refill takes less than 2 seconds.   Psychiatric: She has a normal mood and affect. Her behavior is normal. Judgment and thought content normal.       NEUROLOGICAL  EXAMINATION:     MENTAL STATUS   Oriented to person, place, and time.     CRANIAL NERVES     CN III, IV, VI   Pupils are equal, round, and reactive to light.  Extraocular motions are normal.       Significant Labs:  CMP  Sodium   Date Value Ref Range Status   03/01/2020 137 136 - 145 mmol/L Final     Potassium   Date Value Ref Range Status   03/01/2020 4.3 3.5 - 5.1 mmol/L Final     Chloride   Date Value Ref Range Status   03/01/2020 100 95 - 110 mmol/L Final     CO2   Date Value Ref Range Status   03/01/2020 30 (H) 23 - 29 mmol/L Final     Glucose   Date Value Ref Range Status   03/01/2020 91 70 - 110 mg/dL Final     BUN, Bld   Date Value Ref Range Status   03/01/2020 17 6 - 20 mg/dL Final     Creatinine   Date Value Ref Range Status   03/01/2020 0.7 0.5 - 1.4 mg/dL Final     Calcium   Date Value Ref Range Status   03/01/2020 8.7 8.7 - 10.5 mg/dL Final     Total Protein   Date Value Ref Range Status   03/01/2020 6.0 6.0 - 8.4 g/dL Final     Albumin   Date Value Ref Range Status   03/01/2020 3.6 3.5 - 5.2 g/dL Final     Total Bilirubin   Date Value Ref Range Status   03/01/2020 0.7 0.1 - 1.0 mg/dL Final     Comment:     For infants and newborns, interpretation of results should be based  on gestational age, weight and in agreement with clinical  observations.  Premature Infant recommended reference ranges:  Up to 24 hours.............<8.0 mg/dL  Up to 48 hours............<12.0 mg/dL  3-5 days..................<15.0 mg/dL  6-29 days.................<15.0 mg/dL       Alkaline Phosphatase   Date Value Ref Range Status   03/01/2020 68 55 - 135 U/L Final     AST   Date Value Ref Range Status   03/01/2020 70 (H) 10 - 40 U/L Final     ALT   Date Value Ref Range Status   03/01/2020 113 (H) 10 - 44 U/L Final     Anion Gap   Date Value Ref Range Status   03/01/2020 7 (L) 8 - 16 mmol/L Final     eGFR if    Date Value Ref Range Status   03/01/2020 >60.0 >60 mL/min/1.73 m^2 Final     eGFR if non African American    Date Value Ref Range Status   03/01/2020 >60.0 >60 mL/min/1.73 m^2 Final     Comment:     Calculation used to obtain the estimated glomerular filtration  rate (eGFR) is the CKD-EPI equation.        Lab Results   Component Value Date    WBC 6.57 02/29/2020    HGB 11.1 (L) 02/29/2020    HCT 34.8 (L) 02/29/2020    MCV 87 02/29/2020     02/29/2020       Significant Imaging:   MRI Brain w/o Contrast 2/28/2020  There are no signal abnormalities on diffusion weighted imaging to suggest acute infarct.There is no extra-axial fluid. The ventricular  system and cortical sulci are normal in size for the patient's age. Parenchymal signal is normal. The midline structures and  craniocervical junction are normal. The major intracranial physiologic flow voids are present. There are no signal abnormalities of the  orbits, the paranasal sinuses or the skull base.    Assessment and Plan:    1. Intractable Headache  -MRI Brain w/o Contrast-negative for any acute findings  -Toradol 30mg every 8hrs as needed for headache with pain 7/10 or greater  -Frequent Neuro Checks  -Dr. Mickey Nugent discussed with patient possibility of LP on Monday  -Five 4mg Decadron IV x1 dose  -Supplemental oxygen at 100% for 15 minutes    2. Diplopia  -MRI w/o Contrast-negative for any acute findings  -Fall Precautions  -Being followed by ophthalmology    Continue to monitor patient with plan of LP on Monday.    Patient to follow up with [facility] at [phone] within 2 weeks from discharge.     Stroke education was provided including stroke risk factors modification and any acute neurological changes including weakness, confusion, visual changes to come straight to the ER.     Plan of care discussed with patient. All questions answered.                                                 Active Diagnoses:    Diagnosis Date Noted POA    PRINCIPAL PROBLEM:  Intractable headache [R51] 02/28/2020 Unknown    Optic neuritis [H46.9] 02/28/2020  Unknown    Bipolar depression [F31.9] 02/28/2020 Unknown    Anxiety [F41.9] 02/28/2020 Unknown      Problems Resolved During this Admission:       VTE Risk Mitigation (From admission, onward)         Ordered     IP VTE LOW RISK PATIENT  Once      02/28/20 1643     Place KOLTON hose  Until discontinued      02/28/20 1643     Place sequential compression device  Until discontinued      02/28/20 1643                Thank you for your consult. I will follow-up with patient. Please contact us if you have any additional questions.    Natasha Pink, ALETA  Neurology  [facility]  I, Dr. Mickey Nugent, have personally seen and examined the patient with my advanced provider and agree with above. I personally did a focused exam, and reviewed all necessary clinical information. I discussed my management plan with my NP and agree with above.

## 2020-03-01 NOTE — SUBJECTIVE & OBJECTIVE
Interval History: Patient was seen and examined at bedside.  Still complains of headache and retro-orbital pain. No acute events overnight as per nursing staff.  She is waiting for lumbar puncture tomorrow morning    Review of Systems   Constitutional: Negative for activity change and appetite change.   HENT: Negative for congestion and dental problem.    Eyes: Positive for visual disturbance. Negative for discharge and itching.   Respiratory: Negative for shortness of breath.    Cardiovascular: Negative for chest pain.   Gastrointestinal: Negative for abdominal distention and abdominal pain.   Endocrine: Negative for cold intolerance.   Genitourinary: Negative for difficulty urinating and dysuria.   Musculoskeletal: Negative for arthralgias and back pain.   Skin: Negative for color change.   Neurological: Positive for headaches. Negative for dizziness and facial asymmetry.   Hematological: Negative for adenopathy.   Psychiatric/Behavioral: Negative for agitation and behavioral problems.   All other systems reviewed and are negative.    Objective:     Vital Signs (Most Recent):  Temp: 97.7 °F (36.5 °C) (03/01/20 1219)  Pulse: 97 (03/01/20 1219)  Resp: 18 (03/01/20 1219)  BP: 98/62 (03/01/20 1219)  SpO2: 97 % (03/01/20 1219) Vital Signs (24h Range):  Temp:  [97.6 °F (36.4 °C)-98.5 °F (36.9 °C)] 97.7 °F (36.5 °C)  Pulse:  [] 97  Resp:  [18] 18  SpO2:  [96 %-99 %] 97 %  BP: ()/(62-78) 98/62     Weight: 58 kg (127 lb 13.9 oz)  Body mass index is 22.65 kg/m².  No intake or output data in the 24 hours ending 03/01/20 1405   Physical Exam   Constitutional: She is oriented to person, place, and time. No distress.   Eyes: Pupils are equal, round, and reactive to light. EOM are normal.   Neck: Neck supple.   Cardiovascular: Normal rate.   Pulmonary/Chest: Effort normal and breath sounds normal.   Abdominal: Soft. Bowel sounds are normal.   Musculoskeletal: Normal range of motion. She exhibits no edema.    Neurological: She is alert and oriented to person, place, and time.   Skin: Skin is warm.   Psychiatric: She has a normal mood and affect.   Nursing note and vitals reviewed.      Significant Labs: All pertinent labs within the past 24 hours have been reviewed.    Significant Imaging: I have reviewed all pertinent imaging results/findings within the past 24 hours.

## 2020-03-01 NOTE — PROGRESS NOTES
[facility] Medicine  Progress Note    DOS: 03/01/2020    Patient Name: Shruthi Rcihard  MRN: [id number]  Patient Class: OP- Observation   Admission Date: 2/28/2020  Length of Stay: 0 days  Attending Physician: Mati Quach MD  Primary Care Provider: Valente Erazo Jr, MD        Subjective:     Principal Problem:Intractable headache        HPI:  39-year-old lady getting admitted with intractable headache, blurry vision and double vision  The symptom has been going on for about 3 weeks and she saw a neurologist as an outpatient  Every week ago she had an MRI of the brain which was normal and following she was referred to an ophthalmologist  Per patient of the mall just diagnosed her with optic neuritis and some papilledema  She is awaiting to see a neuro ophthalmologist in the month of April  Today her symptoms when worse and she came to the ER following her neurologist advice  Per patient she will be having a lumbar puncture while she is here  She denies any other issues.    Overview/Hospital Course:  02/29:  Patient was seen and examined bedside.  She denies any new symptoms however still complains of blurry vision more pronounced on the right side.  Headache is better.  No acute events overnight as per nursing staff.  She is waiting for lumbar puncture.     03/01:  Patient was seen and examined at bedside.  Still complains of headache and retro-orbital pain. No acute events overnight as per nursing staff.  She is waiting for lumbar puncture tomorrow morning.     Interval History: Patient was seen and examined at bedside.  Still complains of headache and retro-orbital pain. No acute events overnight as per nursing staff.  She is waiting for lumbar puncture tomorrow morning    Review of Systems   Constitutional: Negative for activity change and appetite change.   HENT: Negative for congestion and dental problem.    Eyes: Positive for visual disturbance. Negative for discharge and itching.    Respiratory: Negative for shortness of breath.    Cardiovascular: Negative for chest pain.   Gastrointestinal: Negative for abdominal distention and abdominal pain.   Endocrine: Negative for cold intolerance.   Genitourinary: Negative for difficulty urinating and dysuria.   Musculoskeletal: Negative for arthralgias and back pain.   Skin: Negative for color change.   Neurological: Positive for headaches. Negative for dizziness and facial asymmetry.   Hematological: Negative for adenopathy.   Psychiatric/Behavioral: Negative for agitation and behavioral problems.   All other systems reviewed and are negative.    Objective:     Vital Signs (Most Recent):  Temp: 97.7 °F (36.5 °C) (03/01/20 1219)  Pulse: 97 (03/01/20 1219)  Resp: 18 (03/01/20 1219)  BP: 98/62 (03/01/20 1219)  SpO2: 97 % (03/01/20 1219) Vital Signs (24h Range):  Temp:  [97.6 °F (36.4 °C)-98.5 °F (36.9 °C)] 97.7 °F (36.5 °C)  Pulse:  [] 97  Resp:  [18] 18  SpO2:  [96 %-99 %] 97 %  BP: ()/(62-78) 98/62     Weight: 58 kg (127 lb 13.9 oz)  Body mass index is 22.65 kg/m².  No intake or output data in the 24 hours ending 03/01/20 1405   Physical Exam   Constitutional: She is oriented to person, place, and time. No distress.   Eyes: Pupils are equal, round, and reactive to light. EOM are normal.   Neck: Neck supple.   Cardiovascular: Normal rate.   Pulmonary/Chest: Effort normal and breath sounds normal.   Abdominal: Soft. Bowel sounds are normal.   Musculoskeletal: Normal range of motion. She exhibits no edema.   Neurological: She is alert and oriented to person, place, and time.   Skin: Skin is warm.   Psychiatric: She has a normal mood and affect.   Nursing note and vitals reviewed.      Significant Labs: All pertinent labs within the past 24 hours have been reviewed.    Significant Imaging: I have reviewed all pertinent imaging results/findings within the past 24 hours.      Assessment/Plan:      * Intractable headache  Patient is admitted for  a lumbar puncture  Recent MRI of the brain was normal  Neurology is consulted-Lumbar puncture is planned on 03/02.  No focal neurological deficits including eye examination.   Neurology recommended 1 dose of Decadron  P.r.n. analgesia with NSAIDs and Norco      Anxiety  No evidence of anxiety or panic attack at the moment  Her medication list showing that she take 6 mg of Klonopin a day.    Bipolar depression  Suffers from bipolar depression with manic episodes  Stable issue.      Optic neuritis  Per patient recently she was diagnosed with optic neuritis and papilledema  She is awaiting to see a neuro Ophthalmology MD in the month of April  Waiting for lumbar puncture.  As per my discussion with Neurology patient does not have multiple sclerosis.        VTE Risk Mitigation (From admission, onward)         Ordered     IP VTE LOW RISK PATIENT  Once      02/28/20 1643     Place KOLTON hose  Until discontinued      02/28/20 1643     Place sequential compression device  Until discontinued      02/28/20 1643                      Mati Quach MD  Department of Hospital Medicine   [facility]

## 2020-03-01 NOTE — ASSESSMENT & PLAN NOTE
No evidence of anxiety or panic attack at the moment  Her medication list showing that she take 6 mg of Klonopin a day.

## 2020-03-02 VITALS
RESPIRATION RATE: 16 BRPM | WEIGHT: 127.88 LBS | HEIGHT: 63 IN | TEMPERATURE: 98 F | SYSTOLIC BLOOD PRESSURE: 94 MMHG | BODY MASS INDEX: 22.66 KG/M2 | DIASTOLIC BLOOD PRESSURE: 60 MMHG | HEART RATE: 69 BPM | OXYGEN SATURATION: 100 %

## 2020-03-02 PROBLEM — R51.9 INTRACTABLE HEADACHE: Status: RESOLVED | Noted: 2020-02-28 | Resolved: 2020-03-02

## 2020-03-02 LAB
CLARITY CSF: CLEAR
CLARITY CSF: CLEAR
COLOR CSF: COLORLESS
COLOR CSF: COLORLESS
GLUCOSE CSF-MCNC: 69 MG/DL (ref 40–70)
INR PPP: 1.2
LYMPHOCYTES NFR CSF MANUAL: 100 % (ref 40–80)
LYMPHOCYTES NFR CSF MANUAL: 100 % (ref 40–80)
PROT CSF-MCNC: 27 MG/DL (ref 15–40)
PROTHROMBIN TIME: 14.5 SEC (ref 10.6–14.8)
RBC # CSF: 0 /CU MM
RBC # CSF: 1 /CU MM
SPECIMEN VOL CSF: 2 ML
SPECIMEN VOL CSF: 2 ML
WBC # CSF: 1 /CU MM (ref 0–5)
WBC # CSF: 1 /CU MM (ref 0–5)

## 2020-03-02 PROCEDURE — 63600175 PHARM REV CODE 636 W HCPCS: Performed by: HOSPITALIST

## 2020-03-02 PROCEDURE — 82784 ASSAY IGA/IGD/IGG/IGM EACH: CPT

## 2020-03-02 PROCEDURE — 82945 GLUCOSE OTHER FLUID: CPT

## 2020-03-02 PROCEDURE — 25000003 PHARM REV CODE 250: Performed by: HOSPITALIST

## 2020-03-02 PROCEDURE — 82040 ASSAY OF SERUM ALBUMIN: CPT

## 2020-03-02 PROCEDURE — 89051 BODY FLUID CELL COUNT: CPT | Mod: 91

## 2020-03-02 PROCEDURE — 85610 PROTHROMBIN TIME: CPT

## 2020-03-02 PROCEDURE — 62328 DX LMBR SPI PNXR W/FLUOR/CT: CPT

## 2020-03-02 PROCEDURE — 30000890 HC MISC. SEND OUT TEST

## 2020-03-02 PROCEDURE — 84157 ASSAY OF PROTEIN OTHER: CPT

## 2020-03-02 PROCEDURE — 82042 OTHER SOURCE ALBUMIN QUAN EA: CPT

## 2020-03-02 PROCEDURE — 36415 COLL VENOUS BLD VENIPUNCTURE: CPT

## 2020-03-02 PROCEDURE — 25000003 PHARM REV CODE 250: Performed by: INTERNAL MEDICINE

## 2020-03-02 PROCEDURE — G0378 HOSPITAL OBSERVATION PER HR: HCPCS

## 2020-03-02 PROCEDURE — 30000890 LABCORP MISCELLANEOUS TEST

## 2020-03-02 PROCEDURE — 96376 TX/PRO/DX INJ SAME DRUG ADON: CPT

## 2020-03-02 PROCEDURE — 63600175 PHARM REV CODE 636 W HCPCS: Performed by: INTERNAL MEDICINE

## 2020-03-02 PROCEDURE — 83916 OLIGOCLONAL BANDS: CPT

## 2020-03-02 RX ORDER — HYDROCODONE BITARTRATE AND ACETAMINOPHEN 5; 325 MG/1; MG/1
1 TABLET ORAL EVERY 6 HOURS PRN
Qty: 20 TABLET | Refills: 0 | Status: SHIPPED | OUTPATIENT
Start: 2020-03-02 | End: 2020-03-07

## 2020-03-02 RX ORDER — DEXAMETHASONE SODIUM PHOSPHATE 4 MG/ML
4 INJECTION, SOLUTION INTRA-ARTICULAR; INTRALESIONAL; INTRAMUSCULAR; INTRAVENOUS; SOFT TISSUE ONCE
Status: COMPLETED | OUTPATIENT
Start: 2020-03-02 | End: 2020-03-02

## 2020-03-02 RX ADMIN — CLONAZEPAM 1 MG: 1 TABLET ORAL at 03:03

## 2020-03-02 RX ADMIN — FLUOXETINE 20 MG: 20 CAPSULE ORAL at 08:03

## 2020-03-02 RX ADMIN — DEXAMETHASONE SODIUM PHOSPHATE 4 MG: 4 INJECTION, SOLUTION INTRA-ARTICULAR; INTRALESIONAL; INTRAMUSCULAR; INTRAVENOUS; SOFT TISSUE at 01:03

## 2020-03-02 RX ADMIN — LAMOTRIGINE 150 MG: 100 TABLET ORAL at 08:03

## 2020-03-02 RX ADMIN — POLYETHYLENE GLYCOL 3350 17 G: 17 POWDER, FOR SOLUTION ORAL at 08:03

## 2020-03-02 RX ADMIN — GABAPENTIN 600 MG: 300 CAPSULE ORAL at 08:03

## 2020-03-02 RX ADMIN — HYDROCODONE BITARTRATE AND ACETAMINOPHEN 1 TABLET: 5; 325 TABLET ORAL at 10:03

## 2020-03-02 RX ADMIN — KETOROLAC TROMETHAMINE 30 MG: 30 INJECTION, SOLUTION INTRAMUSCULAR at 03:03

## 2020-03-02 RX ADMIN — HYDROCODONE BITARTRATE AND ACETAMINOPHEN 1 TABLET: 5; 325 TABLET ORAL at 04:03

## 2020-03-02 RX ADMIN — KETOROLAC TROMETHAMINE 30 MG: 30 INJECTION, SOLUTION INTRAMUSCULAR at 12:03

## 2020-03-02 RX ADMIN — PANTOPRAZOLE SODIUM 40 MG: 40 TABLET, DELAYED RELEASE ORAL at 08:03

## 2020-03-02 NOTE — PLAN OF CARE
Pt continues to have aching/throbbing head pain and eye pain. Medications do not completely relieve pain, and do not relieve pain long. VSS. Denies needs at this time.

## 2020-03-02 NOTE — NURSING
Discharge instructions given and reviewed with pt. Pt verbalized understanding. Pt waiting on ride for dc.

## 2020-03-03 NOTE — DISCHARGE SUMMARY
Formerly [facility] Medicine  Discharge Summary    DOS: 03/02/2020      Patient Name: Shruthi Richard  MRN: [id number]  Admission Date: 2/28/2020  Hospital Length of Stay: 0 days  Discharge Date and Time:  03/02/2020 6:16 PM  Attending Physician: No att. providers found   Discharging Provider: Mati Quach MD  Primary Care Provider: Valente Erazo Jr, MD      HPI:   39-year-old lady getting admitted with intractable headache, blurry vision and double vision  The symptom has been going on for about 3 weeks and she saw a neurologist as an outpatient  Every week ago she had an MRI of the brain which was normal and following she was referred to an ophthalmologist  Per patient of the mall just diagnosed her with optic neuritis and some papilledema  She is awaiting to see a neuro ophthalmologist in the month of April  Today her symptoms when worse and she came to the ER following her neurologist advice  Per patient she will be having a lumbar puncture while she is here  She denies any other issues.    * No surgery found *      Hospital Course:   02/29:  Patient was seen and examined bedside.  She denies any new symptoms however still complains of blurry vision more pronounced on the right side.  Headache is better.  No acute events overnight as per nursing staff.  She is waiting for lumbar puncture.     03/01:  Patient was seen and examined at bedside.  Still complains of headache and retro-orbital pain. No acute events overnight as per nursing staff.  She is waiting for lumbar puncture tomorrow morning.     03/02:  Patient was seen and examined bedside.  She reports some improvement in her her headache and retro-orbital pain with 1 dose of Decadron yesterday.  She underwent lumbar puncture today without any postprocedure complications.  I discussed case with Neurology again recommended 1 more dose of IV Decadron.  Patient was cleared for discharge by Neurology.  She was discharged home in  hemodynamically stable condition.  She was advised to follow up with Neurology and follow up with all LP studies.     Review of systems:  Mild headache otherwise comprehensive review of system negative    Physical Exam   Constitutional: She is oriented to person, place, and time. No distress.   Eyes: Pupils are equal, round, and reactive to light. EOM are normal.   Neck: Neck supple.   Cardiovascular: Normal rate.   Pulmonary/Chest: Effort normal and breath sounds normal.   Abdominal: Soft. Bowel sounds are normal.   Musculoskeletal: Normal range of motion. She exhibits no edema.   Neurological: She is alert and oriented to person, place, and time.   Skin: Skin is warm.   Psychiatric: She has a normal mood and affect.     Nursing note and vitals reviewed.      Consults:     No new Assessment & Plan notes have been filed under this hospital service since the last note was generated.  Service: Hospital Medicine    Final Active Diagnoses:    Diagnosis Date Noted POA    Optic neuritis [H46.9] 02/28/2020 Unknown    Bipolar depression [F31.9] 02/28/2020 Unknown    Anxiety [F41.9] 02/28/2020 Unknown      Problems Resolved During this Admission:    Diagnosis Date Noted Date Resolved POA    PRINCIPAL PROBLEM:  Intractable headache [R51] 02/28/2020 03/02/2020 Unknown       Discharged Condition: good    Disposition: Home or Self Care    Follow Up:  Follow-up Information     Flex Nugent MD In 2 weeks.    Specialties:  Vascular Neurology, Neurology  Contact information:  [address] [address] [phone]                 Patient Instructions:      Diet Adult Regular     Notify your health care provider if you experience any of the following:  temperature >100.4     Notify your health care provider if you experience any of the following:  severe persistent headache     Activity as tolerated       Significant Diagnostic Studies: Labs:   BMP:   Recent Labs   Lab 03/01/20  0518   GLU 91      K  4.3      CO2 30*   BUN 17   CREATININE 0.7   CALCIUM 8.7   MG 2.1       Pending Diagnostic Studies:     Procedure Component Value Units Date/Time    Ms Profile [[id number]] Collected:  03/02/20 0840    Order Status:  Sent Lab Status:  No result     Specimen:  CSF (Spinal Fluid) from Cerebrospinal Fluid          Medications:  Reconciled Home Medications:      Medication List      START taking these medications    HYDROcodone-acetaminophen 5-325 mg per tablet  Commonly known as:  NORCO  Take 1 tablet by mouth every 6 (six) hours as needed for Pain.        CONTINUE taking these medications    Botox 200 unit Solr  Generic drug:  onabotulinumtoxina  Inject 200 Units into the muscle once.     butalbital-aspirin-caffeine -40 mg -40 mg Cap  Commonly known as:  FIORINAL  Take 1 capsule by mouth every 4 (four) hours as needed (migrain).     clonazePAM 2 MG Tab  Commonly known as:  KLONOPIN  Take 2 mg by mouth 3 (three) times daily.     FLUoxetine 20 MG capsule  Take 20 mg by mouth once daily.     gabapentin 600 MG tablet  Commonly known as:  NEURONTIN  Take 600 mg by mouth 3 (three) times daily.     lamoTRIgine 150 MG Tab  Commonly known as:  LAMICTAL  Take 25 mg by mouth 2 (two) times daily.     pantoprazole 40 MG tablet  Commonly known as:  PROTONIX  Take 40 mg by mouth 2 (two) times daily.     prochlorperazine 10 MG tablet  Commonly known as:  COMPAZINE            Indwelling Lines/Drains at time of discharge:   Lines/Drains/Airways     None                 Time spent on the discharge of patient: 24 minutes  Patient was seen and examined on the date of discharge and determined to be suitable for discharge.         Mati Quach MD  Department of Hospital Medicine  [facility]

## 2020-03-03 NOTE — PROGRESS NOTES
Patient Name: Shruthi Richard    MRN: [id number]    Admission Date: 2/28/2020    Hospital Length of Stay: 0 days    Code Status: Full Code     Attending Provider: Mati Quach MD     Consulting Provider: Dr. Mickey Nugent    Consulting Practitioner: Natasha Pink [facility]    Primary Care Physician: Valente Erazo Jr, MD    Principal Problem:Intractable headache       This is the visit from 3/2.  Patient was seen by me and my NP, Patti Huff.         Subjective:            HPI: 39-year-old lady getting admitted with intractable headache, blurry vision and double vision    The symptom has been going on for about 3 weeks and she saw a neurologist as an outpatient    Every week ago she had an MRI of the brain which was normal and following she was referred to an ophthalmologist    Per patient of the mall just diagnosed her with optic neuritis and some papilledema    She is awaiting to see a neuro ophthalmologist in the month of April    Today her symptoms when worse and she came to the ER following her neurologist advice    Per patient she will be having a lumbar puncture while she is here    She denies any other issues.         Neurology Consult Note:  Patient seen, examined, and plan of care discussed with Dr. Mickey Nugent. Patient who is known to the clinic presented to ER after being advised to come to ER by the clinic.  She was recently diagnosed with optic neuritis. She reports having problems with her vision such as diplopia and blurry vision over the last month and has worsened recently.  She also reports tingling in her hands with R>L over the last two weeks.  Additionally, patient c/o HA with nausea over the last few days.  She was seen in clinic recently and MRI was done 1 week ago according to the patient. Dr. Mickey Nugent spoke with patient in ER about being admitted and having LP done on Monday or being discharged and returning to clinic on Monday. Patient decided to stay in hospital over weekend for LP on Monday.  MRI of Brain w/o Contrast was performed on admit and was negative for any acute changes. Patient had MRV, MRA, and carotid ultrasound in 6/2019 all of which did not show any significant findings. Patient is resting in bed this morning. She continues to c/o headache with blurry vision. We will give her 4mg Decadron IV x1 dose and provide supplemental oxygen at 100% for 15 minutes. Plan on LP on Monday. Orders for LP have been entered. Will continue to monitor.        Mild improvement of HA.              Past Medical History:       Diagnosis     Date            Anxiety                  Bipolar affective disorder, rapid cycling                   bipolar 1            Depression                  GERD (gastroesophageal reflux disease)                  Migraine headache                  Vaginal delivery                   x1                         Past Surgical History:       Procedure     Laterality     Date            ABDOMINAL SURGERY                         exploratory laparotomy            HYSTERECTOMY           12/15/2017                         Review of patient's allergies indicates:       Allergen     Reactions            Gluten protein     Other (See Comments)                   Sever stomach ache, very tired feeling            Prochlorperazine edisylate                         Anxiety, itching    Other reaction(s): Unknown                 Current Neurological Medications:     Gabapentin 600mg TID    Lamictal 25mg BID           No current facility-administered medications on file prior to encounter.                    Current Outpatient Medications on File Prior to Encounter       Medication     Sig            butalbital-aspirin-caffeine -40 mg (FIORINAL) -40 mg Cap     Take 1 capsule by mouth every 4 (four) hours as needed (migrain).            clonazePAM (KLONOPIN) 2 MG Tab     Take 2 mg by mouth 3 (three) times daily.            FLUoxetine 20 MG capsule     Take 20 mg by mouth once  daily.            gabapentin (NEURONTIN) 600 MG tablet     Take 600 mg by mouth 3 (three) times daily.            lamoTRIgine (LAMICTAL) 150 MG Tab     Take 25 mg by mouth 2 (two) times daily.            onabotulinumtoxina (BOTOX) 200 unit SolR     Inject 200 Units into the muscle once.            pantoprazole (PROTONIX) 40 MG tablet     Take 40 mg by mouth 2 (two) times daily.            prochlorperazine (COMPAZINE) 10 MG tablet                        Family History                None                              Tobacco Use            Smoking status:     Never Smoker            Smokeless tobacco:     Never Used       Substance and Sexual Activity            Alcohol use:     No            Drug use:     No            Sexual activity:     Yes                   Partners:     Male                   Birth control/protection:     OCP            Review of Systems     Constitutional: Negative.      HENT: Positive for congestion.      Eyes: Positive for visual disturbance.     Respiratory: Negative.      Cardiovascular: Negative.      Gastrointestinal: Negative.      Endocrine: Negative.      Genitourinary: Negative.      Musculoskeletal: Negative.      Allergic/Immunologic: Negative.      Neurological: Positive for numbness and headaches.     Hematological: Negative.      Psychiatric/Behavioral: Negative.             Objective:              Vital Signs (Most Recent):    Reviewed.           Weight: 58 kg (127 lb 13.9 oz)    Body mass index is 22.65 kg/m².         Physical Exam     Constitutional: She is oriented to person, place, and time. She appears well-developed and well-nourished.     HENT:     Head: Normocephalic and atraumatic.     Eyes: Pupils are equal, round, and reactive to light. EOM are normal.     Neck: Normal range of motion. Neck supple.     Cardiovascular: Normal rate, regular rhythm, normal heart sounds and intact distal pulses.     Pulmonary/Chest: Effort normal and breath sounds normal.      Abdominal: Soft. Bowel sounds are normal.   Musculoskeletal: Normal range of motion.   Neurological: She is alert and oriented to person, place, and time. A cranial nerve deficit and sensory deficit is present.     Skin: Skin is warm and dry. Capillary refill takes less than 2 seconds.   Psychiatric: She has a normal mood and affect. Her behavior is normal. Judgment and thought content normal.               NEUROLOGICAL EXAMINATION:          MENTAL STATUS     Oriented to person, place, and time.          CRANIAL NERVES          CN III, IV, VI   Pupils are equal, round, and reactive to light.  Extraocular motions are normal.               Significant Labs:    CMP            Sodium       Date     Value     Ref Range     Status       03/01/2020     137     136 - 145 mmol/L     Final                    Potassium       Date     Value     Ref Range     Status       03/01/2020     4.3     3.5 - 5.1 mmol/L     Final                    Chloride       Date     Value     Ref Range     Status       03/01/2020     100     95 - 110 mmol/L     Final                    CO2       Date     Value     Ref Range     Status       03/01/2020     30 (H)     23 - 29 mmol/L     Final                    Glucose       Date     Value     Ref Range     Status       03/01/2020     91     70 - 110 mg/dL     Final                    BUN, Bld       Date     Value     Ref Range     Status       03/01/2020     17     6 - 20 mg/dL     Final                    Creatinine       Date     Value     Ref Range     Status       03/01/2020     0.7     0.5 - 1.4 mg/dL     Final                    Calcium       Date     Value     Ref Range     Status       03/01/2020     8.7     8.7 - 10.5 mg/dL     Final                    Total Protein       Date     Value     Ref Range     Status       03/01/2020     6.0     6.0 - 8.4 g/dL     Final                    Albumin       Date     Value     Ref Range     Status       03/01/2020     3.6     3.5 - 5.2 g/dL      Final                      Total Bilirubin       Date     Value     Ref Range     Status       03/01/2020     0.7     0.1 - 1.0 mg/dL     Final                   Comment:                   For infants and newborns, interpretation of results should be based    on gestational age, weight and in agreement with clinical    observations.    Premature Infant recommended reference ranges:    Up to 24 hours.............<8.0 mg/dL    Up to 48 hours............<12.0 mg/dL    3-5 days..................<15.0 mg/dL    6-29 days.................<15.0 mg/dL                         Alkaline Phosphatase       Date     Value     Ref Range     Status       03/01/2020     68     55 - 135 U/L     Final                    AST       Date     Value     Ref Range     Status       03/01/2020     70 (H)     10 - 40 U/L     Final                    ALT       Date     Value     Ref Range     Status       03/01/2020     113 (H)     10 - 44 U/L     Final                    Anion Gap       Date     Value     Ref Range     Status       03/01/2020     7 (L)     8 - 16 mmol/L     Final                    eGFR if        Date     Value     Ref Range     Status       03/01/2020     >60.0     >60 mL/min/1.73 m^2     Final                      eGFR if non        Date     Value     Ref Range     Status       03/01/2020     >60.0     >60 mL/min/1.73 m^2     Final                   Comment:                   Calculation used to obtain the estimated glomerular filtration    rate (eGFR) is the CKD-EPI equation.                          Lab Results       Component     Value     Date             WBC     6.57     02/29/2020             HGB     11.1 (L)     02/29/2020             HCT     34.8 (L)     02/29/2020             MCV     87     02/29/2020             PLT     309     02/29/2020                 Significant Imaging:     MRI Brain w/o Contrast 2/28/2020    There are no signal abnormalities on diffusion weighted imaging to  suggest acute infarct.There is no extra-axial fluid. The ventricular  system and cortical sulci are normal in size for the patient's age. Parenchymal signal is normal. The midline structures and  craniocervical junction are normal. The major intracranial physiologic flow voids are present. There are no signal abnormalities of the  orbits, the paranasal sinuses or the skull base.           Assessment and Plan:         1. Intractable Headache    -MRI Brain w/o Contrast-negative for any acute findings    -Toradol 30mg every 8hrs as needed for headache with pain 7/10 or greater    -Frequent Neuro Checks    - LP results reviewed. Opening pressure normal. Patient to follow up with neurology within a week. Care discussed with the patient and Dr. Quach.    -Five 4mg Decadron IV x1 dose    -Supplemental oxygen at 100% for 15 minutes         2. Diplopia    -MRI w/o Contrast-negative for any acute findings    -Fall Precautions    -Being followed by ophthalmology         Continue to monitor patient with plan of LP on Monday.           Patient to follow up with [facility] at [phone] within 2 weeks from discharge.         Stroke education was provided including stroke risk factors modification and any acute neurological changes including weakness, confusion, visual changes to come straight to the ER.         Plan of care discussed with patient. All questions answered.                                                                                 Thank you for your consult. I will follow-up with patient. Please contact us if you have any additional questions.

## 2020-03-06 LAB
LABCORP MISC TEST CODE: NORMAL
LABCORP MISC TEST NAME: NORMAL
LABCORP MISCELLANEOUS TEST: NORMAL

## 2020-03-13 ENCOUNTER — OFFICE VISIT (OUTPATIENT)
Dept: URGENT CARE | Facility: CLINIC | Age: 40
End: 2020-03-13
Payer: MEDICAID

## 2020-03-13 VITALS
OXYGEN SATURATION: 99 % | TEMPERATURE: 98 F | BODY MASS INDEX: 22.5 KG/M2 | HEART RATE: 87 BPM | DIASTOLIC BLOOD PRESSURE: 52 MMHG | HEIGHT: 63 IN | RESPIRATION RATE: 17 BRPM | SYSTOLIC BLOOD PRESSURE: 120 MMHG | WEIGHT: 127 LBS

## 2020-03-13 DIAGNOSIS — J06.9 VIRAL URI WITH COUGH: Primary | ICD-10-CM

## 2020-03-13 DIAGNOSIS — R05.9 COUGH: ICD-10-CM

## 2020-03-13 LAB
CTP QC/QA: YES
FLUAV AG NPH QL: NEGATIVE
FLUBV AG NPH QL: NEGATIVE

## 2020-03-13 PROCEDURE — 99203 PR OFFICE/OUTPT VISIT, NEW, LEVL III, 30-44 MIN: ICD-10-PCS | Mod: 25,S$GLB,, | Performed by: PHYSICIAN ASSISTANT

## 2020-03-13 PROCEDURE — U0002 COVID-19 LAB TEST NON-CDC: HCPCS

## 2020-03-13 PROCEDURE — 99203 OFFICE O/P NEW LOW 30 MIN: CPT | Mod: 25,S$GLB,, | Performed by: PHYSICIAN ASSISTANT

## 2020-03-13 PROCEDURE — 87804 INFLUENZA ASSAY W/OPTIC: CPT | Mod: QW,S$GLB,, | Performed by: PHYSICIAN ASSISTANT

## 2020-03-13 PROCEDURE — 87804 POCT INFLUENZA A/B: ICD-10-PCS | Mod: 59,QW,S$GLB, | Performed by: PHYSICIAN ASSISTANT

## 2020-03-13 RX ORDER — BENZONATATE 100 MG/1
100 CAPSULE ORAL EVERY 6 HOURS PRN
Qty: 60 CAPSULE | Refills: 1 | Status: SHIPPED | OUTPATIENT
Start: 2020-03-13 | End: 2020-05-21 | Stop reason: ALTCHOICE

## 2020-03-13 NOTE — PROGRESS NOTES
"Subjective:       Patient ID: Shruthi Richard is a 39 y.o. female.    Vitals:  height is 5' 3" (1.6 m) and weight is 57.6 kg (127 lb). Her temperature is 98.2 °F (36.8 °C). Her blood pressure is 120/52 (abnormal) and her pulse is 87. Her respiration is 17 and oxygen saturation is 99%.     Chief Complaint: URI    Pt c/o flu-like symptoms, started yesterday. Symptoms: chest pain, cough, sore throat, headaches, dizziness, light-headedness, body aches, chills, diarrhea, fever(101 yesterday around 4pm)  Not treatment tried for symptoms.    URI    This is a new problem. The current episode started yesterday. The problem has been unchanged. The maximum temperature recorded prior to her arrival was 100.4 - 100.9 F. The fever has been present for less than 1 day. Associated symptoms include chest pain (with coughing), coughing, diarrhea, headaches, joint pain and a sore throat. Pertinent negatives include no congestion, ear pain, nausea, rash, sinus pain, vomiting or wheezing. She has tried nothing for the symptoms.       Constitution: Positive for appetite change, chills and fatigue. Negative for sweating and fever.   HENT: Positive for sore throat and trouble swallowing. Negative for ear pain, congestion, sinus pain, sinus pressure and voice change.    Neck: Positive for neck stiffness. Negative for painful lymph nodes.   Cardiovascular: Positive for chest pain (with coughing).   Eyes: Negative for eye redness.   Respiratory: Positive for chest tightness, cough and shortness of breath. Negative for sputum production, bloody sputum, COPD, stridor, wheezing and asthma.    Gastrointestinal: Positive for diarrhea. Negative for nausea and vomiting.   Musculoskeletal: Positive for muscle ache.   Skin: Negative for rash.   Allergic/Immunologic: Negative for seasonal allergies and asthma.   Neurological: Positive for dizziness, light-headedness and headaches.   Hematologic/Lymphatic: Negative for swollen lymph nodes.     "   Objective:      Physical Exam   Constitutional: She is oriented to person, place, and time. She appears well-developed and well-nourished. She is cooperative.  Non-toxic appearance. She does not have a sickly appearance. She does not appear ill. No distress.   HENT:   Head: Normocephalic and atraumatic.   Right Ear: Hearing, external ear and ear canal normal. A middle ear effusion (Clear) is present.   Left Ear: Hearing, external ear and ear canal normal. A middle ear effusion (Clear) is present.   Nose: Rhinorrhea present. No mucosal edema or nasal deformity. No epistaxis. Right sinus exhibits no maxillary sinus tenderness and no frontal sinus tenderness. Left sinus exhibits no maxillary sinus tenderness and no frontal sinus tenderness.   Mouth/Throat: Uvula is midline, oropharynx is clear and moist and mucous membranes are normal. No trismus in the jaw. Normal dentition. No uvula swelling. No oropharyngeal exudate, posterior oropharyngeal edema or posterior oropharyngeal erythema.   Eyes: Conjunctivae and lids are normal. No scleral icterus.   Neck: Trachea normal, full passive range of motion without pain and phonation normal. Neck supple. No neck rigidity. No edema and no erythema present.   Cardiovascular: Normal rate, regular rhythm, normal heart sounds, intact distal pulses and normal pulses.   Pulmonary/Chest: Effort normal and breath sounds normal. No respiratory distress. She has no decreased breath sounds. She has no rhonchi.   Abdominal: Normal appearance.   Musculoskeletal: Normal range of motion. She exhibits no edema or deformity.   Neurological: She is alert and oriented to person, place, and time. She exhibits normal muscle tone. Coordination normal.   Skin: Skin is warm, dry, intact, not diaphoretic and not pale.   Psychiatric: She has a normal mood and affect. Her speech is normal and behavior is normal. Judgment and thought content normal. Cognition and memory are normal.   Nursing note and  vitals reviewed.        Assessment:       1. Viral URI with cough    2. Cough        Plan:         Viral URI with cough  -     SARS- CoV-2 (COVID-19) QUALITATIVE PCR    Cough  -     POCT Influenza A/B  -     SARS- CoV-2 (COVID-19) QUALITATIVE PCR    Other orders  -     benzonatate (TESSALON PERLES) 100 MG capsule; Take 1 capsule (100 mg total) by mouth every 6 (six) hours as needed.  Dispense: 60 capsule; Refill: 1      Patient Instructions     Viral Upper Respiratory Illness (Adult)  You have a viral upper respiratory illness (URI), which is another term for the common cold. This illness is contagious during the first few days. It is spread through the air by coughing and sneezing. It may also be spread by direct contact (touching the sick person and then touching your own eyes, nose, or mouth). Frequent handwashing will decrease risk of spread. Most viral illnesses go away within 7 to 10 days with rest and simple home remedies. Sometimes the illness may last for several weeks. Antibiotics will not kill a virus, and they are generally not prescribed for this condition.    Home care  · If symptoms are severe, rest at home for the first 2 to 3 days. When you resume activity, don't let yourself get too tired.  · Avoid being exposed to cigarette smoke (yours or others).  · You may use acetaminophen or ibuprofen to control pain and fever, unless another medicine was prescribed. (Note: If you have chronic liver or kidney disease, have ever had a stomach ulcer or gastrointestinal bleeding, or are taking blood-thinning medicines, talk with your healthcare provider before using these medicines.) Aspirin should never be given to anyone under 18 years of age who is ill with a viral infection or fever. It may cause severe liver or brain damage.  · Your appetite may be poor, so a light diet is fine. Avoid dehydration by drinking 6 to 8 glasses of fluids per day (water, soft drinks, juices, tea, or soup). Extra fluids will help  loosen secretions in the nose and lungs.  · Over-the-counter cold medicines will not shorten the length of time youre sick, but they may be helpful for the following symptoms: cough, sore throat, and nasal and sinus congestion. (Note: Do not use decongestants if you have high blood pressure.)  Follow-up care  Follow up with your healthcare provider, or as advised.  When to seek medical advice  Call your healthcare provider right away if any of these occur:  · Cough with lots of colored sputum (mucus)  · Severe headache; face, neck, or ear pain  · Difficulty swallowing due to throat pain  · Fever of 100.4°F (38°C)  Call 911, or get immediate medical care  Call emergency services right away if any of these occur:  · Chest pain, shortness of breath, wheezing, or difficulty breathing  · Coughing up blood  · Inability to swallow due to throat pain  Date Last Reviewed: 9/13/2015  © 7272-2177 [employer]. [address] [place] [address], [place] [address]. All rights reserved. This information is not intended as a substitute for professional medical care. Always follow your healthcare professional's instructions.

## 2020-03-13 NOTE — PATIENT INSTRUCTIONS
Viral Upper Respiratory Illness (Adult)  You have a viral upper respiratory illness (URI), which is another term for the common cold. This illness is contagious during the first few days. It is spread through the air by coughing and sneezing. It may also be spread by direct contact (touching the sick person and then touching your own eyes, nose, or mouth). Frequent handwashing will decrease risk of spread. Most viral illnesses go away within 7 to 10 days with rest and simple home remedies. Sometimes the illness may last for several weeks. Antibiotics will not kill a virus, and they are generally not prescribed for this condition.    Home care  · If symptoms are severe, rest at home for the first 2 to 3 days. When you resume activity, don't let yourself get too tired.  · Avoid being exposed to cigarette smoke (yours or others).  · You may use acetaminophen or ibuprofen to control pain and fever, unless another medicine was prescribed. (Note: If you have chronic liver or kidney disease, have ever had a stomach ulcer or gastrointestinal bleeding, or are taking blood-thinning medicines, talk with your healthcare provider before using these medicines.) Aspirin should never be given to anyone under 18 years of age who is ill with a viral infection or fever. It may cause severe liver or brain damage.  · Your appetite may be poor, so a light diet is fine. Avoid dehydration by drinking 6 to 8 glasses of fluids per day (water, soft drinks, juices, tea, or soup). Extra fluids will help loosen secretions in the nose and lungs.  · Over-the-counter cold medicines will not shorten the length of time youre sick, but they may be helpful for the following symptoms: cough, sore throat, and nasal and sinus congestion. (Note: Do not use decongestants if you have high blood pressure.)  Follow-up care  Follow up with your healthcare provider, or as advised.  When to seek medical advice  Call your healthcare provider right away if any  of these occur:  · Cough with lots of colored sputum (mucus)  · Severe headache; face, neck, or ear pain  · Difficulty swallowing due to throat pain  · Fever of 100.4°F (38°C)  Call 911, or get immediate medical care  Call emergency services right away if any of these occur:  · Chest pain, shortness of breath, wheezing, or difficulty breathing  · Coughing up blood  · Inability to swallow due to throat pain  Date Last Reviewed: 9/13/2015  © 4099-0850 [employer]. [address] [place] [address]. All rights reserved. This information is not intended as a substitute for professional medical care. Always follow your healthcare professional's instructions.

## 2020-03-18 ENCOUNTER — TELEPHONE (OUTPATIENT)
Dept: URGENT CARE | Facility: CLINIC | Age: 40
End: 2020-03-18

## 2020-03-19 NOTE — TELEPHONE ENCOUNTER
Following up from office visit on 03/13/2020. Patient still has symptoms, and returned back to work. Patient is not self-quarantine has directed by provider. Advised patient to report to ED if symptoms worsen.

## 2020-03-26 ENCOUNTER — TELEPHONE (OUTPATIENT)
Dept: URGENT CARE | Facility: CLINIC | Age: 40
End: 2020-03-26

## 2020-03-26 LAB — SARS-COV-2 RNA RESP QL NAA+PROBE: NOT DETECTED

## 2020-03-26 NOTE — TELEPHONE ENCOUNTER
Patient reports she had a low grade fever and HA but all symptoms are gone.      Your test was NEGATIVE for COVID-19 (coronavirus).  If you still have symptoms, treat with rest, fluids, and over-the-counter medications.  Continue to stay home, avoid large crowds, and practice proper handwashing.     If your symptoms worsen or if you have any other concerns, please contact [facility] at [phone].     Sincerely,    Rashawn Dickerson NP

## 2020-05-21 ENCOUNTER — HOSPITAL ENCOUNTER (EMERGENCY)
Facility: HOSPITAL | Age: 40
Discharge: HOME | End: 2020-05-21
Attending: EMERGENCY MEDICINE
Payer: MEDICAID

## 2020-05-21 VITALS
BODY MASS INDEX: 22.5 KG/M2 | TEMPERATURE: 98 F | OXYGEN SATURATION: 100 % | HEIGHT: 63 IN | WEIGHT: 127 LBS | HEART RATE: 61 BPM | SYSTOLIC BLOOD PRESSURE: 95 MMHG | DIASTOLIC BLOOD PRESSURE: 52 MMHG | RESPIRATION RATE: 16 BRPM

## 2020-05-21 DIAGNOSIS — G43.909 MIGRAINE WITHOUT STATUS MIGRAINOSUS, NOT INTRACTABLE, UNSPECIFIED MIGRAINE TYPE: Primary | ICD-10-CM

## 2020-05-21 PROCEDURE — 96365 THER/PROPH/DIAG IV INF INIT: CPT

## 2020-05-21 PROCEDURE — 99284 EMERGENCY DEPT VISIT MOD MDM: CPT | Mod: 25

## 2020-05-21 PROCEDURE — 25000003 PHARM REV CODE 250: Performed by: EMERGENCY MEDICINE

## 2020-05-21 PROCEDURE — 96361 HYDRATE IV INFUSION ADD-ON: CPT

## 2020-05-21 PROCEDURE — 63600175 PHARM REV CODE 636 W HCPCS: Performed by: EMERGENCY MEDICINE

## 2020-05-21 PROCEDURE — 96375 TX/PRO/DX INJ NEW DRUG ADDON: CPT

## 2020-05-21 RX ORDER — PROMETHAZINE HYDROCHLORIDE 25 MG/1
25 TABLET ORAL EVERY 6 HOURS PRN
Qty: 15 TABLET | Refills: 0 | Status: SHIPPED | OUTPATIENT
Start: 2020-05-21 | End: 2020-05-21

## 2020-05-21 RX ORDER — KETOROLAC TROMETHAMINE 30 MG/ML
10 INJECTION, SOLUTION INTRAMUSCULAR; INTRAVENOUS
Status: DISCONTINUED | OUTPATIENT
Start: 2020-05-21 | End: 2020-05-21

## 2020-05-21 RX ORDER — ONDANSETRON 4 MG/1
4 TABLET, FILM COATED ORAL EVERY 6 HOURS
Qty: 12 TABLET | Refills: 0 | Status: ON HOLD | OUTPATIENT
Start: 2020-05-21 | End: 2020-09-24

## 2020-05-21 RX ORDER — PROMETHAZINE HYDROCHLORIDE 25 MG/ML
25 INJECTION, SOLUTION INTRAMUSCULAR; INTRAVENOUS
Status: DISCONTINUED | OUTPATIENT
Start: 2020-05-21 | End: 2020-05-21

## 2020-05-21 RX ORDER — SODIUM CHLORIDE 9 MG/ML
500 INJECTION, SOLUTION INTRAVENOUS
Status: COMPLETED | OUTPATIENT
Start: 2020-05-21 | End: 2020-05-21

## 2020-05-21 RX ORDER — KETOROLAC TROMETHAMINE 30 MG/ML
30 INJECTION, SOLUTION INTRAMUSCULAR; INTRAVENOUS
Status: COMPLETED | OUTPATIENT
Start: 2020-05-21 | End: 2020-05-21

## 2020-05-21 RX ORDER — DIPHENHYDRAMINE HCL 25 MG
25 CAPSULE ORAL
Status: COMPLETED | OUTPATIENT
Start: 2020-05-21 | End: 2020-05-21

## 2020-05-21 RX ORDER — HYDROCODONE BITARTRATE AND ACETAMINOPHEN 5; 325 MG/1; MG/1
1 TABLET ORAL
Status: COMPLETED | OUTPATIENT
Start: 2020-05-21 | End: 2020-05-21

## 2020-05-21 RX ORDER — HYDROCODONE BITARTRATE AND ACETAMINOPHEN 5; 325 MG/1; MG/1
1 TABLET ORAL EVERY 8 HOURS PRN
Qty: 6 TABLET | Refills: 0 | Status: SHIPPED | OUTPATIENT
Start: 2020-05-21 | End: 2020-05-23

## 2020-05-21 RX ADMIN — HYDROCODONE BITARTRATE AND ACETAMINOPHEN 1 TABLET: 5; 325 TABLET ORAL at 09:05

## 2020-05-21 RX ADMIN — DIPHENHYDRAMINE HYDROCHLORIDE 25 MG: 25 CAPSULE ORAL at 08:05

## 2020-05-21 RX ADMIN — PROMETHAZINE HYDROCHLORIDE 12.5 MG: 25 INJECTION INTRAMUSCULAR; INTRAVENOUS at 07:05

## 2020-05-21 RX ADMIN — SODIUM CHLORIDE 500 ML: 0.9 INJECTION, SOLUTION INTRAVENOUS at 09:05

## 2020-05-21 RX ADMIN — KETOROLAC TROMETHAMINE 30 MG: 30 INJECTION, SOLUTION INTRAMUSCULAR; INTRAVENOUS at 07:05

## 2020-05-21 RX ADMIN — SODIUM CHLORIDE 500 ML: 0.9 INJECTION, SOLUTION INTRAVENOUS at 07:05

## 2020-05-21 NOTE — ED PROVIDER NOTES
Encounter Date: 5/21/2020       History     Chief Complaint   Patient presents with    Migraine     states this is achronic problem but has become intolerable today and had to leave work early.      39-year-old female with history of chronic migraine presents for evaluation of headache similar to previous migraine the past.  She has taken a dose of Fioricet which was prescribed yesterday for her symptoms with no relief.  Symptoms have been progressively worsening over the course of 2 weeks.  She denies any fever or neck stiffness.  She has had some nausea and photophobia.  She also reports occasional blurred vision and difficulty concentrating, typical when she has a migraine.  No trauma reported.  No other acute issues        Review of patient's allergies indicates:   Allergen Reactions    Gluten protein Other (See Comments)     Sever stomach ache, very tired feeling    Prochlorperazine Other (See Comments)    Prochlorperazine edisylate      Anxiety, itching  Other reaction(s): Unknown     Past Medical History:   Diagnosis Date    Anxiety     Bipolar affective disorder, rapid cycling     bipolar 1    Depression     GERD (gastroesophageal reflux disease)     Migraine headache     Vaginal delivery     x1     Past Surgical History:   Procedure Laterality Date    ABDOMINAL SURGERY      exploratory laparotomy    HYSTERECTOMY  12/15/2017     No family history on file.  Social History     Tobacco Use    Smoking status: Never Smoker    Smokeless tobacco: Never Used   Substance Use Topics    Alcohol use: No    Drug use: No     Review of Systems   Constitutional: Negative for fever.   HENT: Negative for sinus pressure and sinus pain.    Respiratory: Negative for shortness of breath.    Cardiovascular: Negative for chest pain.   Gastrointestinal: Positive for nausea. Negative for vomiting.   Genitourinary: Negative for dysuria.   Musculoskeletal: Negative for back pain.   Skin: Negative for rash.    Neurological: Positive for headaches. Negative for weakness and numbness.       Physical Exam     Initial Vitals [05/21/20 1606]   BP Pulse Resp Temp SpO2   (!) 118/55 88 17 99 °F (37.2 °C) 98 %      MAP       --         Physical Exam    Nursing note and vitals reviewed.  Constitutional: She appears well-developed and well-nourished. She is not diaphoretic. No distress.   HENT:   Head: Normocephalic and atraumatic.   Mouth/Throat: Oropharynx is clear and moist.   No tenderness to palpation noted over the sinuses   Eyes: Conjunctivae and EOM are normal. Pupils are equal, round, and reactive to light.   Neck: Normal range of motion. Neck supple.   No meningismus   Cardiovascular: Normal rate, regular rhythm and intact distal pulses.   Pulmonary/Chest: No respiratory distress.   Abdominal: Soft.   Musculoskeletal: Normal range of motion. She exhibits no edema or tenderness.   Neurological: She is alert and oriented to person, place, and time. She has normal strength. No cranial nerve deficit or sensory deficit.   Skin: Skin is warm and dry. Capillary refill takes less than 2 seconds.   Psychiatric: She has a normal mood and affect.         ED Course   Procedures  Labs Reviewed - No data to display       Imaging Results          CT Head Without Contrast (Final result)  Result time 05/21/20 18:04:36    Final result by Juan Sparrow MD (05/21/20 18:04:36)                 Narrative:    CMS MANDATED QUALITY DATA - CT RADIATION - 436    All CT scans at this facility utilize dose modulation, iterative  reconstruction, and/or weight based dosing when appropriate to reduce  radiation dose to as low as reasonably achievable.          Reason: Headache, acute, norm neuro exam    TECHNIQUE: Head CT without IV contrast.    COMPARISON: 2/28/2020 MRI, CT 6/23/2018    FINDINGS:  Gray-white differentiation is maintained without hemorrhage, midline  shift, or mass effect.    The ventricles and cisterns are maintained.    Calvarium is  intact. Visualized sinuses are clear.    IMPRESSION:  Normal noncontrast head CT.    Electronically Signed by Juan Sparrow M.D. on 5/21/2020 6:27 PM                            X-Rays:   Independently Interpreted Readings:   Head CT: No hemorrhage.     Medical Decision Making:   Initial Assessment:   39-year-old female with history of migraine presents for evaluation of headache similar to previous migraine the past.  She has had no relief with home medications.  She has been seeing a neurologist for some time, she has tried multiple treatments.  She is still in the process of finding a medication regimen works for her.  She states she had relief last time she was in the emergency department with medications given then.  Upon review of the medical chart, she received Toradol and Benadryl.  Currently she is afebrile, neurologically intact.  Blood pressure is borderline low but she states this is normal for her.  I will give her a dose of Toradol and Phenergan along with a small dose of IV fluids.  CT head obtained at triage to expedite care.  I have reviewed and is negative for acute findings.  I anticipate pain control and discharged  Differential Diagnosis:   Migraine, tension headache, dehydration, and others  Clinical Tests:   Radiological Study: Ordered and Reviewed  ED Management:  Patient received IV fluids with pain medication and still reports moderate headache.  Blood pressure is 80/50.  She is otherwise asymptomatic.  She remains afebrile and neurologically intact.  She states her blood pressure is typically low, around 90-100mmHg (SBP). I will repeat her saline bolus and give her a dose of Norco.  Neurology follow-up has been discussed along with the need to return to the emergency department if headache worsens, for fever, neurologic changes or any other acute issues.  She is amenable to the plan of pain control at discharge.  If blood pressure improved she will be discharged.      Blood pressure did  improve.  Patient is asking for discharge to home.  She ambulated out of the emergency department without issue.                                 Clinical Impression:       ICD-10-CM ICD-9-CM   1. Migraine without status migrainosus, not intractable, unspecified migraine type G43.909 346.90         Disposition:   Disposition: Discharged  Condition: Stable     ED Disposition Condition    Discharge Stable        ED Prescriptions     Medication Sig Dispense Start Date End Date Auth. Provider    promethazine (PHENERGAN) 25 MG tablet  (Status: Discontinued) Take 1 tablet (25 mg total) by mouth every 6 (six) hours as needed for Nausea. 15 tablet 5/21/2020 5/21/2020 Renetta Cerda MD    HYDROcodone-acetaminophen (NORCO) 5-325 mg per tablet Take 1 tablet by mouth every 8 (eight) hours as needed for Pain. 6 tablet 5/21/2020 5/23/2020 Renetta Cerda MD    ondansetron (ZOFRAN) 4 MG tablet Take 1 tablet (4 mg total) by mouth every 6 (six) hours. 12 tablet 5/21/2020  Renetta Cerda MD        Follow-up Information     Follow up With Specialties Details Why Contact Info Additional Information    Valente Erazo Jr., MD Internal Medicine Schedule an appointment as soon as possible for a visit in 2 days  [address]  [address]  [facility] [address]  [phone]       [facility] Emergency Medicine  As needed, If symptoms worsen [address]  [facility] [id number] [phone] 1st floor                                     Renetta Cerda MD  05/21/20 7243

## 2020-05-22 NOTE — ED NOTES
Pt requested IV for medication administration.  Provider notified. Ok to give IV, promethazine 12.5 mg, ketorolac 30 mg, and ns 500 ml bolus.

## 2020-07-14 ENCOUNTER — HOSPITAL ENCOUNTER (EMERGENCY)
Facility: HOSPITAL | Age: 40
Discharge: HOME | End: 2020-07-14
Attending: EMERGENCY MEDICINE
Payer: MEDICAID

## 2020-07-14 VITALS
DIASTOLIC BLOOD PRESSURE: 56 MMHG | HEART RATE: 62 BPM | TEMPERATURE: 98 F | BODY MASS INDEX: 22.5 KG/M2 | RESPIRATION RATE: 86 BRPM | HEIGHT: 63 IN | WEIGHT: 127 LBS | OXYGEN SATURATION: 100 % | SYSTOLIC BLOOD PRESSURE: 106 MMHG

## 2020-07-14 DIAGNOSIS — R11.0 NAUSEA: ICD-10-CM

## 2020-07-14 DIAGNOSIS — R53.83 FATIGUE, UNSPECIFIED TYPE: ICD-10-CM

## 2020-07-14 DIAGNOSIS — R05.9 COUGH: Primary | ICD-10-CM

## 2020-07-14 DIAGNOSIS — R07.9 CHEST PAIN, UNSPECIFIED TYPE: ICD-10-CM

## 2020-07-14 DIAGNOSIS — Z20.822 COVID-19 VIRUS NOT DETECTED: ICD-10-CM

## 2020-07-14 DIAGNOSIS — R07.9 CHEST PAIN: ICD-10-CM

## 2020-07-14 DIAGNOSIS — R06.02 SOB (SHORTNESS OF BREATH): ICD-10-CM

## 2020-07-14 DIAGNOSIS — R19.7 DIARRHEA, UNSPECIFIED TYPE: ICD-10-CM

## 2020-07-14 LAB
ALBUMIN SERPL BCP-MCNC: 4.1 G/DL (ref 3.5–5.2)
ALP SERPL-CCNC: 74 U/L (ref 55–135)
ALT SERPL W/O P-5'-P-CCNC: 41 U/L (ref 10–44)
ANION GAP SERPL CALC-SCNC: 8 MMOL/L (ref 8–16)
AST SERPL-CCNC: 27 U/L (ref 10–40)
BASOPHILS # BLD AUTO: 0.03 K/UL (ref 0–0.2)
BASOPHILS NFR BLD: 0.5 % (ref 0–1.9)
BILIRUB SERPL-MCNC: 0.5 MG/DL (ref 0.1–1)
BNP SERPL-MCNC: 15 PG/ML (ref 0–99)
BUN SERPL-MCNC: 9 MG/DL (ref 6–20)
CALCIUM SERPL-MCNC: 8.9 MG/DL (ref 8.7–10.5)
CHLORIDE SERPL-SCNC: 103 MMOL/L (ref 95–110)
CO2 SERPL-SCNC: 27 MMOL/L (ref 23–29)
CREAT SERPL-MCNC: 0.7 MG/DL (ref 0.5–1.4)
DIFFERENTIAL METHOD: ABNORMAL
EOSINOPHIL # BLD AUTO: 0.2 K/UL (ref 0–0.5)
EOSINOPHIL NFR BLD: 2.4 % (ref 0–8)
ERYTHROCYTE [DISTWIDTH] IN BLOOD BY AUTOMATED COUNT: 14.6 % (ref 11.5–14.5)
EST. GFR  (AFRICAN AMERICAN): >60 ML/MIN/1.73 M^2
EST. GFR  (NON AFRICAN AMERICAN): >60 ML/MIN/1.73 M^2
GLUCOSE SERPL-MCNC: 90 MG/DL (ref 70–110)
HCT VFR BLD AUTO: 37.9 % (ref 37–48.5)
HGB BLD-MCNC: 12.3 G/DL (ref 12–16)
IMM GRANULOCYTES # BLD AUTO: 0.01 K/UL (ref 0–0.04)
IMM GRANULOCYTES NFR BLD AUTO: 0.2 % (ref 0–0.5)
LYMPHOCYTES # BLD AUTO: 1.8 K/UL (ref 1–4.8)
LYMPHOCYTES NFR BLD: 29 % (ref 18–48)
MCH RBC QN AUTO: 28 PG (ref 27–31)
MCHC RBC AUTO-ENTMCNC: 32.5 G/DL (ref 32–36)
MCV RBC AUTO: 86 FL (ref 82–98)
MONOCYTES # BLD AUTO: 0.4 K/UL (ref 0.3–1)
MONOCYTES NFR BLD: 6.3 % (ref 4–15)
NEUTROPHILS # BLD AUTO: 3.8 K/UL (ref 1.8–7.7)
NEUTROPHILS NFR BLD: 61.6 % (ref 38–73)
NRBC BLD-RTO: 0 /100 WBC
PLATELET # BLD AUTO: 339 K/UL (ref 150–350)
PMV BLD AUTO: 10.2 FL (ref 9.2–12.9)
POTASSIUM SERPL-SCNC: 3.9 MMOL/L (ref 3.5–5.1)
PROT SERPL-MCNC: 6.7 G/DL (ref 6–8.4)
RBC # BLD AUTO: 4.39 M/UL (ref 4–5.4)
SARS-COV-2 RDRP RESP QL NAA+PROBE: NEGATIVE
SODIUM SERPL-SCNC: 138 MMOL/L (ref 136–145)
TROPONIN I SERPL DL<=0.01 NG/ML-MCNC: <0.03 NG/ML
WBC # BLD AUTO: 6.2 K/UL (ref 3.9–12.7)

## 2020-07-14 PROCEDURE — 96374 THER/PROPH/DIAG INJ IV PUSH: CPT

## 2020-07-14 PROCEDURE — 25000003 PHARM REV CODE 250: Performed by: NURSE PRACTITIONER

## 2020-07-14 PROCEDURE — 85025 COMPLETE CBC W/AUTO DIFF WBC: CPT

## 2020-07-14 PROCEDURE — 63600175 PHARM REV CODE 636 W HCPCS: Performed by: NURSE PRACTITIONER

## 2020-07-14 PROCEDURE — 96361 HYDRATE IV INFUSION ADD-ON: CPT

## 2020-07-14 PROCEDURE — 99285 EMERGENCY DEPT VISIT HI MDM: CPT | Mod: 25

## 2020-07-14 PROCEDURE — 83880 ASSAY OF NATRIURETIC PEPTIDE: CPT

## 2020-07-14 PROCEDURE — 84484 ASSAY OF TROPONIN QUANT: CPT

## 2020-07-14 PROCEDURE — 80053 COMPREHEN METABOLIC PANEL: CPT

## 2020-07-14 PROCEDURE — U0002 COVID-19 LAB TEST NON-CDC: HCPCS

## 2020-07-14 PROCEDURE — 93005 ELECTROCARDIOGRAM TRACING: CPT | Performed by: INTERNAL MEDICINE

## 2020-07-14 RX ORDER — GABAPENTIN 600 MG/1
600 TABLET ORAL 3 TIMES DAILY
Status: ON HOLD | COMMUNITY
End: 2020-09-24

## 2020-07-14 RX ORDER — ONDANSETRON 4 MG/1
4 TABLET, ORALLY DISINTEGRATING ORAL EVERY 8 HOURS PRN
Qty: 12 TABLET | Refills: 0 | Status: SHIPPED | OUTPATIENT
Start: 2020-07-14 | End: 2021-03-17 | Stop reason: CLARIF

## 2020-07-14 RX ORDER — ONDANSETRON 2 MG/ML
4 INJECTION INTRAMUSCULAR; INTRAVENOUS
Status: COMPLETED | OUTPATIENT
Start: 2020-07-14 | End: 2020-07-14

## 2020-07-14 RX ADMIN — ONDANSETRON 4 MG: 2 INJECTION INTRAMUSCULAR; INTRAVENOUS at 03:07

## 2020-07-14 RX ADMIN — SODIUM CHLORIDE 1000 ML: 0.9 INJECTION, SOLUTION INTRAVENOUS at 03:07

## 2020-07-14 NOTE — ED NOTES
DC instructions reviewed with pt. All questions answered and pt verbalizes understanding. RX given to pt. Pt ambulatory to front lobby.

## 2020-07-14 NOTE — ED PROVIDER NOTES
Encounter Date: 7/14/2020       History     Chief Complaint   Patient presents with    Cough    Fever    Chest Pain     X 2 DAYS     Presents with multiple complaints sore throat Onset 3 days ago, cough, fever 101.0 today  Pt reports she has not taken anything for here fever  She is afebrile here in the ED  She also reports chest pain SOB  Weakness and fatigue Onset last PM Head ache with history of migraines  She reports ND without vomiting.  Pt is very anxious  States she feels as if she is going to die.  She has history of bipolar and anxiety         Review of patient's allergies indicates:   Allergen Reactions    Gluten protein Other (See Comments)     Sever stomach ache, very tired feeling    Prochlorperazine Other (See Comments)    Prochlorperazine edisylate      Anxiety, itching  Other reaction(s): Unknown     Past Medical History:   Diagnosis Date    Anxiety     Bipolar affective disorder, rapid cycling     bipolar 1    Depression     GERD (gastroesophageal reflux disease)     Migraine headache     Vaginal delivery     x1     Past Surgical History:   Procedure Laterality Date    ABDOMINAL SURGERY      exploratory laparotomy    HYSTERECTOMY  12/15/2017     No family history on file.  Social History     Tobacco Use    Smoking status: Never Smoker    Smokeless tobacco: Never Used   Substance Use Topics    Alcohol use: No    Drug use: No     Review of Systems   Constitutional: Positive for fatigue and fever.   HENT: Positive for sore throat. Negative for trouble swallowing.    Respiratory: Positive for shortness of breath. Negative for cough and wheezing.    Cardiovascular: Positive for chest pain. Negative for palpitations and leg swelling.   Gastrointestinal: Positive for diarrhea and nausea. Negative for abdominal pain and vomiting.   Genitourinary: Negative for dysuria.   Musculoskeletal: Negative for back pain.   Skin: Negative for rash.   Neurological: Positive for weakness and headaches.        Physical Exam     Initial Vitals [07/14/20 1355]   BP Pulse Resp Temp SpO2   (!) 114/56 75 16 98.5 °F (36.9 °C) 100 %      MAP       --         Physical Exam    Constitutional: She appears well-developed and well-nourished.   HENT:   Head: Normocephalic and atraumatic.   Mouth/Throat: Oropharynx is clear and moist.   Eyes: Conjunctivae are normal.   Neck: Normal range of motion. Neck supple.   Cardiovascular: Normal rate, regular rhythm and normal heart sounds.   Pulmonary/Chest: Breath sounds normal. No respiratory distress. She has no wheezes. She has no rhonchi.   Abdominal: Soft. Bowel sounds are normal. She exhibits no distension. There is no abdominal tenderness. There is no rebound.   Musculoskeletal: Normal range of motion.   Neurological: She is alert and oriented to person, place, and time. No sensory deficit. GCS score is 15. GCS eye subscore is 4. GCS verbal subscore is 5. GCS motor subscore is 6.   Skin: Skin is warm and dry. Capillary refill takes less than 2 seconds.   Psychiatric: She has a normal mood and affect. Thought content normal.         ED Course   Procedures  Labs Reviewed   CBC W/ AUTO DIFFERENTIAL   COMPREHENSIVE METABOLIC PANEL   TROPONIN I   B-TYPE NATRIURETIC PEPTIDE   SARS-COV-2 RNA AMPLIFICATION, QUAL        ECG Results          EKG 12-lead (In process)  Result time 07/14/20 14:04:20    In process by Interface, Lab In [facility] (07/14/20 14:04:20)                 Narrative:    Test Reason : R07.9,    Vent. Rate : 078 BPM     Atrial Rate : 078 BPM     P-R Int : 170 ms          QRS Dur : 072 ms      QT Int : 366 ms       P-R-T Axes : 068 068 056 degrees     QTc Int : 417 ms    Normal sinus rhythm  Cannot rule out Anterior infarct ,age undetermined  Abnormal ECG  No previous ECGs available    Referred By:             Confirmed By:                             Imaging Results          X-Ray Chest AP Portable (In process)  Result time 07/14/20 14:33:02   Procedure changed from X-Ray  Chest PA And Lateral                  Medical Decision Making:   Initial Assessment:   Presents with sore throat, fever, cough chest pain, SOB, nausea, weakness , fatigue, & diarrhea Denies vomiting   ED Management:  Pt reports to RN she is feeling better and wants to go home  Pt appears in NAD  Have discussed this pt with Dr Spicer                    ED Course as of Jul 14 1434   Tue Jul 14, 2020   1354 Cough and fever 101.0  chest pain and weakness     [KN]      ED Course User Index  [KN] Ashanti Almaraz NP                Clinical Impression:       ICD-10-CM ICD-9-CM   1. Cough  R05 786.2   2. Chest pain  R07.9 786.50   3. Chest pain, unspecified type  R07.9 786.50   4. SOB (shortness of breath)  R06.02 786.05   5. Nausea  R11.0 787.02   6. Diarrhea, unspecified type  R19.7 787.91   7. Fatigue, unspecified type  R53.83 780.79   8. Covid-19 Virus not Detected  [id number]                                 Ashanti Almaraz NP  07/14/20 6727

## 2020-08-15 ENCOUNTER — HOSPITAL ENCOUNTER (EMERGENCY)
Facility: HOSPITAL | Age: 40
Discharge: HOME | End: 2020-08-15
Attending: EMERGENCY MEDICINE
Payer: MEDICAID

## 2020-08-15 VITALS
DIASTOLIC BLOOD PRESSURE: 62 MMHG | WEIGHT: 130 LBS | BODY MASS INDEX: 23.04 KG/M2 | TEMPERATURE: 98 F | RESPIRATION RATE: 18 BRPM | SYSTOLIC BLOOD PRESSURE: 110 MMHG | HEART RATE: 69 BPM | HEIGHT: 63 IN | OXYGEN SATURATION: 100 %

## 2020-08-15 DIAGNOSIS — G43.809 OTHER MIGRAINE WITHOUT STATUS MIGRAINOSUS, NOT INTRACTABLE: Primary | ICD-10-CM

## 2020-08-15 LAB
ALBUMIN SERPL BCP-MCNC: 4.1 G/DL (ref 3.5–5.2)
ALP SERPL-CCNC: 46 U/L (ref 55–135)
ALT SERPL W/O P-5'-P-CCNC: 14 U/L (ref 10–44)
ANION GAP SERPL CALC-SCNC: 6 MMOL/L (ref 8–16)
AST SERPL-CCNC: 16 U/L (ref 10–40)
B-HCG UR QL: NEGATIVE
BASOPHILS # BLD AUTO: 0.02 K/UL (ref 0–0.2)
BASOPHILS NFR BLD: 0.3 % (ref 0–1.9)
BILIRUB SERPL-MCNC: 0.5 MG/DL (ref 0.1–1)
BILIRUB UR QL STRIP: NEGATIVE
BUN SERPL-MCNC: 12 MG/DL (ref 6–20)
CALCIUM SERPL-MCNC: 8.6 MG/DL (ref 8.7–10.5)
CHLORIDE SERPL-SCNC: 104 MMOL/L (ref 95–110)
CLARITY UR: CLEAR
CO2 SERPL-SCNC: 25 MMOL/L (ref 23–29)
COLOR UR: YELLOW
CREAT SERPL-MCNC: 0.6 MG/DL (ref 0.5–1.4)
CTP QC/QA: YES
DIFFERENTIAL METHOD: ABNORMAL
EOSINOPHIL # BLD AUTO: 0.1 K/UL (ref 0–0.5)
EOSINOPHIL NFR BLD: 2.2 % (ref 0–8)
ERYTHROCYTE [DISTWIDTH] IN BLOOD BY AUTOMATED COUNT: 13.5 % (ref 11.5–14.5)
EST. GFR  (AFRICAN AMERICAN): >60 ML/MIN/1.73 M^2
EST. GFR  (NON AFRICAN AMERICAN): >60 ML/MIN/1.73 M^2
GLUCOSE SERPL-MCNC: 97 MG/DL (ref 70–110)
GLUCOSE UR QL STRIP: NEGATIVE
HCT VFR BLD AUTO: 36 % (ref 37–48.5)
HGB BLD-MCNC: 12.2 G/DL (ref 12–16)
HGB UR QL STRIP: NEGATIVE
IMM GRANULOCYTES # BLD AUTO: 0.01 K/UL (ref 0–0.04)
IMM GRANULOCYTES NFR BLD AUTO: 0.2 % (ref 0–0.5)
KETONES UR QL STRIP: NEGATIVE
LEUKOCYTE ESTERASE UR QL STRIP: NEGATIVE
LYMPHOCYTES # BLD AUTO: 1.9 K/UL (ref 1–4.8)
LYMPHOCYTES NFR BLD: 32.9 % (ref 18–48)
MCH RBC QN AUTO: 28.8 PG (ref 27–31)
MCHC RBC AUTO-ENTMCNC: 33.9 G/DL (ref 32–36)
MCV RBC AUTO: 85 FL (ref 82–98)
MONOCYTES # BLD AUTO: 0.4 K/UL (ref 0.3–1)
MONOCYTES NFR BLD: 6.9 % (ref 4–15)
NEUTROPHILS # BLD AUTO: 3.4 K/UL (ref 1.8–7.7)
NEUTROPHILS NFR BLD: 57.5 % (ref 38–73)
NITRITE UR QL STRIP: NEGATIVE
NRBC BLD-RTO: 0 /100 WBC
PH UR STRIP: 7 [PH] (ref 5–8)
PLATELET # BLD AUTO: 298 K/UL (ref 150–350)
PMV BLD AUTO: 10.2 FL (ref 9.2–12.9)
POTASSIUM SERPL-SCNC: 3.8 MMOL/L (ref 3.5–5.1)
PROT SERPL-MCNC: 6.6 G/DL (ref 6–8.4)
PROT UR QL STRIP: NEGATIVE
RBC # BLD AUTO: 4.23 M/UL (ref 4–5.4)
SODIUM SERPL-SCNC: 135 MMOL/L (ref 136–145)
SP GR UR STRIP: 1.01 (ref 1–1.03)
URN SPEC COLLECT METH UR: NORMAL
UROBILINOGEN UR STRIP-ACNC: NEGATIVE EU/DL
WBC # BLD AUTO: 5.9 K/UL (ref 3.9–12.7)

## 2020-08-15 PROCEDURE — 81025 URINE PREGNANCY TEST: CPT | Performed by: EMERGENCY MEDICINE

## 2020-08-15 PROCEDURE — 99284 EMERGENCY DEPT VISIT MOD MDM: CPT | Mod: 25

## 2020-08-15 PROCEDURE — 63600175 PHARM REV CODE 636 W HCPCS: Performed by: EMERGENCY MEDICINE

## 2020-08-15 PROCEDURE — 96361 HYDRATE IV INFUSION ADD-ON: CPT

## 2020-08-15 PROCEDURE — 81003 URINALYSIS AUTO W/O SCOPE: CPT

## 2020-08-15 PROCEDURE — 85025 COMPLETE CBC W/AUTO DIFF WBC: CPT

## 2020-08-15 PROCEDURE — 96374 THER/PROPH/DIAG INJ IV PUSH: CPT

## 2020-08-15 PROCEDURE — 96375 TX/PRO/DX INJ NEW DRUG ADDON: CPT

## 2020-08-15 PROCEDURE — 80053 COMPREHEN METABOLIC PANEL: CPT

## 2020-08-15 RX ORDER — DIPHENHYDRAMINE HYDROCHLORIDE 50 MG/ML
25 INJECTION INTRAMUSCULAR; INTRAVENOUS
Status: COMPLETED | OUTPATIENT
Start: 2020-08-15 | End: 2020-08-15

## 2020-08-15 RX ORDER — KETOROLAC TROMETHAMINE 30 MG/ML
15 INJECTION, SOLUTION INTRAMUSCULAR; INTRAVENOUS
Status: COMPLETED | OUTPATIENT
Start: 2020-08-15 | End: 2020-08-15

## 2020-08-15 RX ORDER — METOCLOPRAMIDE HYDROCHLORIDE 5 MG/ML
10 INJECTION INTRAMUSCULAR; INTRAVENOUS
Status: COMPLETED | OUTPATIENT
Start: 2020-08-15 | End: 2020-08-15

## 2020-08-15 RX ADMIN — METOCLOPRAMIDE 10 MG: 5 INJECTION, SOLUTION INTRAMUSCULAR; INTRAVENOUS at 01:08

## 2020-08-15 RX ADMIN — DIPHENHYDRAMINE HYDROCHLORIDE 25 MG: 50 INJECTION INTRAMUSCULAR; INTRAVENOUS at 01:08

## 2020-08-15 RX ADMIN — SODIUM CHLORIDE, SODIUM LACTATE, POTASSIUM CHLORIDE, AND CALCIUM CHLORIDE 1000 ML: .6; .31; .03; .02 INJECTION, SOLUTION INTRAVENOUS at 01:08

## 2020-08-15 RX ADMIN — KETOROLAC TROMETHAMINE 15 MG: 30 INJECTION, SOLUTION INTRAMUSCULAR at 01:08

## 2020-08-15 NOTE — ED PROVIDER NOTES
Encounter Date: 8/15/2020       History     Chief Complaint   Patient presents with    Migraine     since yesterday hx of migraines     39-year-old female with history of gastroesophageal reflux, anxiety, bipolar disorder, migraine headache.  Patient presents to the emergency department with complaint of bitemporal frontal headache consistent with previous migraines over the last week and half.  Patient states that she is followed by neurology Dr. Mickey Nugent and receives intermittent Botox injections for headache.  Current at this time patient states headache is 8/10 and is exacerbated by light.  She denies fever, no neck stiffness, no other neurologic sequela including weakness or numbness.  Patient states she came to the hospital in emergency department to help break her migraine.        Review of patient's allergies indicates:   Allergen Reactions    Gluten protein Other (See Comments)     Sever stomach ache, very tired feeling    Prochlorperazine Other (See Comments)    Prochlorperazine edisylate      Anxiety, itching  Other reaction(s): Unknown     Past Medical History:   Diagnosis Date    Anxiety     Bipolar affective disorder, rapid cycling     bipolar 1    Depression     GERD (gastroesophageal reflux disease)     Migraine headache     Vaginal delivery     x1     Past Surgical History:   Procedure Laterality Date    ABDOMINAL SURGERY      exploratory laparotomy    HYSTERECTOMY  12/15/2017     History reviewed. No pertinent family history.  Social History     Tobacco Use    Smoking status: Never Smoker    Smokeless tobacco: Never Used   Substance Use Topics    Alcohol use: No    Drug use: No     Review of Systems   Constitutional: Negative for fever.   HENT: Negative for sore throat.    Respiratory: Negative for shortness of breath.    Cardiovascular: Negative for chest pain.   Gastrointestinal: Negative for nausea.   Genitourinary: Negative for dysuria.   Musculoskeletal: Negative for back  pain.   Skin: Negative for rash.   Neurological: Positive for headaches. Negative for dizziness, seizures, syncope, speech difficulty, weakness and light-headedness.   Hematological: Does not bruise/bleed easily.       Physical Exam     Initial Vitals [08/15/20 1228]   BP Pulse Resp Temp SpO2   115/62 82 16 98.4 °F (36.9 °C) 99 %      MAP       --         Physical Exam    Nursing note and vitals reviewed.  Constitutional: She appears well-developed and well-nourished.   HENT:   Head: Normocephalic and atraumatic.   Nose: Nose normal.   Mouth/Throat: Oropharynx is clear and moist.   Eyes: Conjunctivae and EOM are normal. Pupils are equal, round, and reactive to light.   Neck: Normal range of motion. Neck supple. No thyromegaly present. No tracheal deviation present.   Cardiovascular: Normal rate, regular rhythm, normal heart sounds and intact distal pulses. Exam reveals no gallop and no friction rub.    No murmur heard.  Pulmonary/Chest: Breath sounds normal. No stridor. No respiratory distress.   Abdominal: Soft. Bowel sounds are normal. She exhibits no mass. There is no rebound and no guarding.   Musculoskeletal: Normal range of motion. No edema.   Lymphadenopathy:     She has no cervical adenopathy.   Neurological: She is alert and oriented to person, place, and time. She has normal strength and normal reflexes. She displays normal reflexes. No cranial nerve deficit or sensory deficit. GCS score is 15. GCS eye subscore is 4. GCS verbal subscore is 5. GCS motor subscore is 6.   Skin: Skin is warm and dry. Capillary refill takes less than 2 seconds.   Psychiatric: She has a normal mood and affect.         ED Course   Procedures  Labs Reviewed   CBC W/ AUTO DIFFERENTIAL - Abnormal; Notable for the following components:       Result Value    Hematocrit 36.0 (*)     All other components within normal limits   COMPREHENSIVE METABOLIC PANEL - Abnormal; Notable for the following components:    Sodium 135 (*)     Calcium  8.6 (*)     Alkaline Phosphatase 46 (*)     Anion Gap 6 (*)     All other components within normal limits   URINALYSIS, REFLEX TO URINE CULTURE    Narrative:     Specimen Source->Urine   POCT URINE PREGNANCY          Imaging Results    None          Medical Decision Making:   Initial Assessment:   39-year-old female with known history of migraine headache, bipolar disorder, here with acute migraine exacerbation.  Differential Diagnosis:   Migraine headache, status migrainosus, tension headache, cluster headache  Clinical Tests:   Lab Tests: Ordered and Reviewed  ED Management:  Patient evaluated emergency department found with acute migraine exacerbation.  Patient given Toradol, Benadryl, Reglan as well as IV hydration.  Patient at this time struck to follow with Dr. Mickey Nugent next week.  She is to return if problems persist or worsen.  Patient refused any discharge medications states she has plenty at home.                                 Clinical Impression:       ICD-10-CM ICD-9-CM   1. Other migraine without status migrainosus, not intractable  G43.809 346.80             ED Disposition Condition    Discharge Stable        ED Prescriptions     None        Follow-up Information    None                                    Yuniel Parker MD  08/15/20 1417       Yuniel Parker MD  08/15/20 1413

## 2020-09-23 ENCOUNTER — HOSPITAL ENCOUNTER (OUTPATIENT)
Facility: HOSPITAL | Age: 40
Discharge: HOME | End: 2020-09-24
Attending: EMERGENCY MEDICINE | Admitting: INTERNAL MEDICINE
Payer: MEDICAID

## 2020-09-23 DIAGNOSIS — G43.119 INTRACTABLE MIGRAINE WITH AURA WITHOUT STATUS MIGRAINOSUS: Primary | ICD-10-CM

## 2020-09-23 DIAGNOSIS — I63.9 STROKE: ICD-10-CM

## 2020-09-23 DIAGNOSIS — R53.1 LEFT-SIDED WEAKNESS: ICD-10-CM

## 2020-09-23 DIAGNOSIS — R51.9 INTRACTABLE HEADACHE, UNSPECIFIED CHRONICITY PATTERN, UNSPECIFIED HEADACHE TYPE: ICD-10-CM

## 2020-09-23 LAB
INR PPP: 1.1
PROTHROMBIN TIME: 14 SEC (ref 10.6–14.8)

## 2020-09-23 PROCEDURE — 85025 COMPLETE CBC W/AUTO DIFF WBC: CPT

## 2020-09-23 PROCEDURE — 85610 PROTHROMBIN TIME: CPT

## 2020-09-23 PROCEDURE — 99285 EMERGENCY DEPT VISIT HI MDM: CPT | Mod: 25

## 2020-09-23 PROCEDURE — 80053 COMPREHEN METABOLIC PANEL: CPT

## 2020-09-23 PROCEDURE — 81025 URINE PREGNANCY TEST: CPT | Performed by: EMERGENCY MEDICINE

## 2020-09-23 PROCEDURE — 84443 ASSAY THYROID STIM HORMONE: CPT

## 2020-09-23 PROCEDURE — 93010 EKG 12-LEAD: ICD-10-PCS | Mod: ,,, | Performed by: INTERNAL MEDICINE

## 2020-09-23 PROCEDURE — 93005 ELECTROCARDIOGRAM TRACING: CPT | Performed by: INTERNAL MEDICINE

## 2020-09-23 PROCEDURE — 80061 LIPID PANEL: CPT

## 2020-09-23 PROCEDURE — 93010 ELECTROCARDIOGRAM REPORT: CPT | Mod: ,,, | Performed by: INTERNAL MEDICINE

## 2020-09-23 RX ORDER — ONDANSETRON 2 MG/ML
4 INJECTION INTRAMUSCULAR; INTRAVENOUS
Status: COMPLETED | OUTPATIENT
Start: 2020-09-24 | End: 2020-09-24

## 2020-09-23 RX ORDER — MORPHINE SULFATE 4 MG/ML
4 INJECTION, SOLUTION INTRAMUSCULAR; INTRAVENOUS
Status: COMPLETED | OUTPATIENT
Start: 2020-09-24 | End: 2020-09-24

## 2020-09-24 ENCOUNTER — CLINICAL SUPPORT (OUTPATIENT)
Dept: CARDIOLOGY | Facility: HOSPITAL | Age: 40
End: 2020-09-24
Attending: EMERGENCY MEDICINE
Payer: MEDICAID

## 2020-09-24 VITALS
WEIGHT: 136.25 LBS | OXYGEN SATURATION: 100 % | HEIGHT: 63 IN | HEART RATE: 55 BPM | RESPIRATION RATE: 17 BRPM | SYSTOLIC BLOOD PRESSURE: 92 MMHG | TEMPERATURE: 98 F | DIASTOLIC BLOOD PRESSURE: 48 MMHG | BODY MASS INDEX: 24.14 KG/M2

## 2020-09-24 PROBLEM — R53.1 LEFT-SIDED WEAKNESS: Status: ACTIVE | Noted: 2020-09-24

## 2020-09-24 PROBLEM — R53.1 LEFT-SIDED WEAKNESS: Status: RESOLVED | Noted: 2020-09-24 | Resolved: 2020-09-24

## 2020-09-24 PROBLEM — G43.919 INTRACTABLE MIGRAINE: Status: ACTIVE | Noted: 2020-09-24

## 2020-09-24 LAB
ALBUMIN SERPL BCP-MCNC: 4.6 G/DL (ref 3.5–5.2)
ALP SERPL-CCNC: 72 U/L (ref 55–135)
ALT SERPL W/O P-5'-P-CCNC: 31 U/L (ref 10–44)
ANION GAP SERPL CALC-SCNC: 10 MMOL/L (ref 8–16)
AORTIC ROOT ANNULUS: 2.74 CM
AORTIC VALVE CUSP SEPERATION: 1.62 CM
AST SERPL-CCNC: 16 U/L (ref 10–40)
AV INDEX (PROSTH): 0.98
AV MEAN GRADIENT: 3 MMHG
AV PEAK GRADIENT: 5 MMHG
AV VALVE AREA: 3.11 CM2
AV VELOCITY RATIO: 96.89
B-HCG UR QL: NEGATIVE
BASOPHILS # BLD AUTO: 0.04 K/UL (ref 0–0.2)
BASOPHILS NFR BLD: 0.5 % (ref 0–1.9)
BILIRUB SERPL-MCNC: 0.6 MG/DL (ref 0.1–1)
BSA FOR ECHO PROCEDURE: 1.61 M2
BUN SERPL-MCNC: 6 MG/DL (ref 6–20)
CALCIUM SERPL-MCNC: 9 MG/DL (ref 8.7–10.5)
CHLORIDE SERPL-SCNC: 107 MMOL/L (ref 95–110)
CHOLEST SERPL-MCNC: 262 MG/DL (ref 120–199)
CHOLEST/HDLC SERPL: 4.1 {RATIO} (ref 2–5)
CO2 SERPL-SCNC: 21 MMOL/L (ref 23–29)
CREAT SERPL-MCNC: 0.7 MG/DL (ref 0.5–1.4)
CTP QC/QA: YES
CV ECHO LV RWT: 0.38 CM
DIFFERENTIAL METHOD: ABNORMAL
DOP CALC AO PEAK VEL: 1.1 M/S
DOP CALC AO VTI: 25.91 CM
DOP CALC LVOT AREA: 3.2 CM2
DOP CALC LVOT DIAMETER: 2.01 CM
DOP CALC LVOT PEAK VEL: 106.58 M/S
DOP CALC LVOT STROKE VOLUME: 80.52 CM3
DOP CALCLVOT PEAK VEL VTI: 25.39 CM
E WAVE DECELERATION TIME: 416.84 MSEC
E/A RATIO: 1.71
E/E' RATIO: 6.36 M/S
ECHO LV POSTERIOR WALL: 0.87 CM (ref 0.6–1.1)
EOSINOPHIL # BLD AUTO: 0.2 K/UL (ref 0–0.5)
EOSINOPHIL NFR BLD: 1.7 % (ref 0–8)
ERYTHROCYTE [DISTWIDTH] IN BLOOD BY AUTOMATED COUNT: 13.5 % (ref 11.5–14.5)
EST. GFR  (AFRICAN AMERICAN): >60 ML/MIN/1.73 M^2
EST. GFR  (NON AFRICAN AMERICAN): >60 ML/MIN/1.73 M^2
FRACTIONAL SHORTENING: 32 % (ref 28–44)
GLUCOSE SERPL-MCNC: 89 MG/DL (ref 70–110)
HCT VFR BLD AUTO: 39.9 % (ref 37–48.5)
HDLC SERPL-MCNC: 64 MG/DL (ref 40–75)
HDLC SERPL: 24.4 % (ref 20–50)
HGB BLD-MCNC: 13.3 G/DL (ref 12–16)
IMM GRANULOCYTES # BLD AUTO: 0.02 K/UL (ref 0–0.04)
IMM GRANULOCYTES NFR BLD AUTO: 0.2 % (ref 0–0.5)
INTERVENTRICULAR SEPTUM: 0.87 CM (ref 0.6–1.1)
LDLC SERPL CALC-MCNC: 156 MG/DL (ref 63–159)
LEFT ATRIUM SIZE: 2.92 CM
LEFT INTERNAL DIMENSION IN SYSTOLE: 3.09 CM (ref 2.1–4)
LEFT VENTRICLE MASS INDEX: 78 G/M2
LEFT VENTRICULAR INTERNAL DIMENSION IN DIASTOLE: 4.54 CM (ref 3.5–6)
LEFT VENTRICULAR MASS: 128.81 G
LV LATERAL E/E' RATIO: 5.93 M/S
LV SEPTAL E/E' RATIO: 6.85 M/S
LYMPHOCYTES # BLD AUTO: 3.5 K/UL (ref 1–4.8)
LYMPHOCYTES NFR BLD: 40.7 % (ref 18–48)
MCH RBC QN AUTO: 29 PG (ref 27–31)
MCHC RBC AUTO-ENTMCNC: 33.3 G/DL (ref 32–36)
MCV RBC AUTO: 87 FL (ref 82–98)
MONOCYTES # BLD AUTO: 0.5 K/UL (ref 0.3–1)
MONOCYTES NFR BLD: 5.9 % (ref 4–15)
MV PEAK A VEL: 0.52 M/S
MV PEAK E VEL: 0.89 M/S
NEUTROPHILS # BLD AUTO: 4.4 K/UL (ref 1.8–7.7)
NEUTROPHILS NFR BLD: 51 % (ref 38–73)
NONHDLC SERPL-MCNC: 198 MG/DL
NRBC BLD-RTO: 0 /100 WBC
PISA TR MAX VEL: 2.09 M/S
PLATELET # BLD AUTO: 409 K/UL (ref 150–350)
PMV BLD AUTO: 10.1 FL (ref 9.2–12.9)
POTASSIUM SERPL-SCNC: 3.6 MMOL/L (ref 3.5–5.1)
PROT SERPL-MCNC: 7.6 G/DL (ref 6–8.4)
PV PEAK VELOCITY: 84.09 CM/S
RA PRESSURE: 3 MMHG
RBC # BLD AUTO: 4.59 M/UL (ref 4–5.4)
SARS-COV-2 RDRP RESP QL NAA+PROBE: NEGATIVE
SODIUM SERPL-SCNC: 138 MMOL/L (ref 136–145)
TDI LATERAL: 0.15 M/S
TDI SEPTAL: 0.13 M/S
TDI: 0.14 M/S
TR MAX PG: 17 MMHG
TRIGL SERPL-MCNC: 210 MG/DL (ref 30–150)
TSH SERPL DL<=0.005 MIU/L-ACNC: 1.74 UIU/ML (ref 0.34–5.6)
TV REST PULMONARY ARTERY PRESSURE: 20 MMHG
WBC # BLD AUTO: 8.61 K/UL (ref 3.9–12.7)

## 2020-09-24 PROCEDURE — 80175 DRUG SCREEN QUAN LAMOTRIGINE: CPT

## 2020-09-24 PROCEDURE — 96376 TX/PRO/DX INJ SAME DRUG ADON: CPT | Mod: 59

## 2020-09-24 PROCEDURE — G0378 HOSPITAL OBSERVATION PER HR: HCPCS

## 2020-09-24 PROCEDURE — 25500020 PHARM REV CODE 255: Performed by: EMERGENCY MEDICINE

## 2020-09-24 PROCEDURE — 25000003 PHARM REV CODE 250: Performed by: NURSE PRACTITIONER

## 2020-09-24 PROCEDURE — 93306 TTE W/DOPPLER COMPLETE: CPT

## 2020-09-24 PROCEDURE — 63600175 PHARM REV CODE 636 W HCPCS: Performed by: NURSE PRACTITIONER

## 2020-09-24 PROCEDURE — 36415 COLL VENOUS BLD VENIPUNCTURE: CPT

## 2020-09-24 PROCEDURE — 92610 EVALUATE SWALLOWING FUNCTION: CPT

## 2020-09-24 PROCEDURE — 96361 HYDRATE IV INFUSION ADD-ON: CPT

## 2020-09-24 PROCEDURE — 63600175 PHARM REV CODE 636 W HCPCS: Performed by: EMERGENCY MEDICINE

## 2020-09-24 PROCEDURE — 93306 ECHO (CUPID ONLY): ICD-10-PCS | Mod: 26,,, | Performed by: INTERNAL MEDICINE

## 2020-09-24 PROCEDURE — 25000003 PHARM REV CODE 250: Performed by: INTERNAL MEDICINE

## 2020-09-24 PROCEDURE — 96374 THER/PROPH/DIAG INJ IV PUSH: CPT

## 2020-09-24 PROCEDURE — 96375 TX/PRO/DX INJ NEW DRUG ADDON: CPT | Mod: 59

## 2020-09-24 PROCEDURE — 96375 TX/PRO/DX INJ NEW DRUG ADDON: CPT

## 2020-09-24 PROCEDURE — S0028 INJECTION, FAMOTIDINE, 20 MG: HCPCS | Performed by: EMERGENCY MEDICINE

## 2020-09-24 PROCEDURE — 25000003 PHARM REV CODE 250: Performed by: EMERGENCY MEDICINE

## 2020-09-24 PROCEDURE — 93306 TTE W/DOPPLER COMPLETE: CPT | Mod: 26,,, | Performed by: INTERNAL MEDICINE

## 2020-09-24 PROCEDURE — U0002 COVID-19 LAB TEST NON-CDC: HCPCS

## 2020-09-24 RX ORDER — MAGNESIUM SULFATE 1 G/100ML
1 INJECTION INTRAVENOUS
Status: DISCONTINUED | OUTPATIENT
Start: 2020-09-24 | End: 2020-09-24 | Stop reason: HOSPADM

## 2020-09-24 RX ORDER — KETOROLAC TROMETHAMINE 30 MG/ML
30 INJECTION, SOLUTION INTRAMUSCULAR; INTRAVENOUS EVERY 8 HOURS
Status: DISCONTINUED | OUTPATIENT
Start: 2020-09-24 | End: 2020-09-24 | Stop reason: HOSPADM

## 2020-09-24 RX ORDER — BUTALBITAL, ASPIRIN, AND CAFFEINE 325; 50; 40 MG/1; MG/1; MG/1
1 CAPSULE ORAL EVERY 4 HOURS PRN
Status: DISCONTINUED | OUTPATIENT
Start: 2020-09-24 | End: 2020-09-24 | Stop reason: HOSPADM

## 2020-09-24 RX ORDER — ATORVASTATIN CALCIUM 40 MG/1
40 TABLET, FILM COATED ORAL DAILY
Status: DISCONTINUED | OUTPATIENT
Start: 2020-09-24 | End: 2020-09-24 | Stop reason: HOSPADM

## 2020-09-24 RX ORDER — GABAPENTIN 300 MG/1
600 CAPSULE ORAL 3 TIMES DAILY
Status: DISCONTINUED | OUTPATIENT
Start: 2020-09-24 | End: 2020-09-24 | Stop reason: HOSPADM

## 2020-09-24 RX ORDER — POTASSIUM CHLORIDE 20 MEQ/1
40 TABLET, EXTENDED RELEASE ORAL
Status: DISCONTINUED | OUTPATIENT
Start: 2020-09-24 | End: 2020-09-24 | Stop reason: HOSPADM

## 2020-09-24 RX ORDER — PANTOPRAZOLE SODIUM 40 MG/1
40 TABLET, DELAYED RELEASE ORAL 2 TIMES DAILY
Status: DISCONTINUED | OUTPATIENT
Start: 2020-09-24 | End: 2020-09-24 | Stop reason: HOSPADM

## 2020-09-24 RX ORDER — ONDANSETRON 2 MG/ML
4 INJECTION INTRAMUSCULAR; INTRAVENOUS EVERY 8 HOURS PRN
Status: DISCONTINUED | OUTPATIENT
Start: 2020-09-24 | End: 2020-09-24 | Stop reason: HOSPADM

## 2020-09-24 RX ORDER — POTASSIUM CHLORIDE 20 MEQ/1
20 TABLET, EXTENDED RELEASE ORAL
Status: DISCONTINUED | OUTPATIENT
Start: 2020-09-24 | End: 2020-09-24 | Stop reason: HOSPADM

## 2020-09-24 RX ORDER — POTASSIUM CHLORIDE 7.45 MG/ML
40 INJECTION INTRAVENOUS
Status: DISCONTINUED | OUTPATIENT
Start: 2020-09-24 | End: 2020-09-24 | Stop reason: HOSPADM

## 2020-09-24 RX ORDER — POTASSIUM CHLORIDE 7.45 MG/ML
20 INJECTION INTRAVENOUS
Status: DISCONTINUED | OUTPATIENT
Start: 2020-09-24 | End: 2020-09-24 | Stop reason: HOSPADM

## 2020-09-24 RX ORDER — SODIUM CHLORIDE 9 MG/ML
INJECTION, SOLUTION INTRAVENOUS CONTINUOUS
Status: DISCONTINUED | OUTPATIENT
Start: 2020-09-24 | End: 2020-09-24 | Stop reason: HOSPADM

## 2020-09-24 RX ORDER — LABETALOL HYDROCHLORIDE 5 MG/ML
10 INJECTION, SOLUTION INTRAVENOUS EVERY 4 HOURS PRN
Status: DISCONTINUED | OUTPATIENT
Start: 2020-09-24 | End: 2020-09-24 | Stop reason: HOSPADM

## 2020-09-24 RX ORDER — CLONAZEPAM 1 MG/1
2 TABLET ORAL 3 TIMES DAILY
Status: DISCONTINUED | OUTPATIENT
Start: 2020-09-24 | End: 2020-09-24 | Stop reason: HOSPADM

## 2020-09-24 RX ORDER — FLUOXETINE HYDROCHLORIDE 20 MG/1
20 CAPSULE ORAL DAILY
Status: DISCONTINUED | OUTPATIENT
Start: 2020-09-24 | End: 2020-09-24 | Stop reason: HOSPADM

## 2020-09-24 RX ORDER — MAGNESIUM SULFATE HEPTAHYDRATE 40 MG/ML
4 INJECTION, SOLUTION INTRAVENOUS
Status: DISCONTINUED | OUTPATIENT
Start: 2020-09-24 | End: 2020-09-24 | Stop reason: HOSPADM

## 2020-09-24 RX ORDER — BUTALBITAL, ASPIRIN, AND CAFFEINE 325; 50; 40 MG/1; MG/1; MG/1
1 CAPSULE ORAL EVERY 4 HOURS PRN
Qty: 20 CAPSULE | Refills: 0 | Status: SHIPPED | OUTPATIENT
Start: 2020-09-24 | End: 2020-09-29

## 2020-09-24 RX ORDER — MAGNESIUM SULFATE HEPTAHYDRATE 40 MG/ML
2 INJECTION, SOLUTION INTRAVENOUS
Status: DISCONTINUED | OUTPATIENT
Start: 2020-09-24 | End: 2020-09-24 | Stop reason: HOSPADM

## 2020-09-24 RX ORDER — DIPHENHYDRAMINE HYDROCHLORIDE 50 MG/ML
25 INJECTION INTRAMUSCULAR; INTRAVENOUS EVERY 8 HOURS
Status: DISCONTINUED | OUTPATIENT
Start: 2020-09-24 | End: 2020-09-24 | Stop reason: HOSPADM

## 2020-09-24 RX ORDER — SODIUM CHLORIDE 0.9 % (FLUSH) 0.9 %
10 SYRINGE (ML) INJECTION
Status: DISCONTINUED | OUTPATIENT
Start: 2020-09-24 | End: 2020-09-24 | Stop reason: HOSPADM

## 2020-09-24 RX ORDER — ASPIRIN 81 MG/1
81 TABLET ORAL DAILY
Status: DISCONTINUED | OUTPATIENT
Start: 2020-09-24 | End: 2020-09-24 | Stop reason: HOSPADM

## 2020-09-24 RX ORDER — MORPHINE SULFATE 4 MG/ML
4 INJECTION, SOLUTION INTRAMUSCULAR; INTRAVENOUS
Status: COMPLETED | OUTPATIENT
Start: 2020-09-24 | End: 2020-09-24

## 2020-09-24 RX ORDER — BUTALBITAL, ACETAMINOPHEN AND CAFFEINE 50; 325; 40 MG/1; MG/1; MG/1
2 TABLET ORAL ONCE
Status: COMPLETED | OUTPATIENT
Start: 2020-09-24 | End: 2020-09-24

## 2020-09-24 RX ORDER — BISACODYL 10 MG
10 SUPPOSITORY, RECTAL RECTAL DAILY PRN
Status: DISCONTINUED | OUTPATIENT
Start: 2020-09-24 | End: 2020-09-24 | Stop reason: HOSPADM

## 2020-09-24 RX ORDER — FAMOTIDINE 10 MG/ML
20 INJECTION INTRAVENOUS
Status: COMPLETED | OUTPATIENT
Start: 2020-09-24 | End: 2020-09-24

## 2020-09-24 RX ORDER — LANOLIN ALCOHOL/MO/W.PET/CERES
800 CREAM (GRAM) TOPICAL
Status: DISCONTINUED | OUTPATIENT
Start: 2020-09-24 | End: 2020-09-24 | Stop reason: HOSPADM

## 2020-09-24 RX ADMIN — MORPHINE SULFATE 4 MG: 4 INJECTION INTRAVENOUS at 12:09

## 2020-09-24 RX ADMIN — ASPIRIN 81 MG: 81 TABLET, DELAYED RELEASE ORAL at 08:09

## 2020-09-24 RX ADMIN — CLONAZEPAM 2 MG: 1 TABLET ORAL at 03:09

## 2020-09-24 RX ADMIN — CLONAZEPAM 2 MG: 1 TABLET ORAL at 08:09

## 2020-09-24 RX ADMIN — LAMOTRIGINE 150 MG: 100 TABLET ORAL at 08:09

## 2020-09-24 RX ADMIN — KETOROLAC TROMETHAMINE 30 MG: 30 INJECTION, SOLUTION INTRAMUSCULAR at 10:09

## 2020-09-24 RX ADMIN — MORPHINE SULFATE 4 MG: 4 INJECTION INTRAVENOUS at 01:09

## 2020-09-24 RX ADMIN — ONDANSETRON 4 MG: 2 INJECTION INTRAMUSCULAR; INTRAVENOUS at 10:09

## 2020-09-24 RX ADMIN — SODIUM CHLORIDE: 0.9 INJECTION, SOLUTION INTRAVENOUS at 05:09

## 2020-09-24 RX ADMIN — BUTALBITAL, ACETAMINOPHEN AND CAFFEINE 2 TABLET: 50; 325; 40 TABLET ORAL at 05:09

## 2020-09-24 RX ADMIN — FAMOTIDINE 20 MG: 10 INJECTION, SOLUTION INTRAVENOUS at 01:09

## 2020-09-24 RX ADMIN — BUTALBITAL, ASPIRIN, AND CAFFEINE 1 CAPSULE: 50; 325; 40 CAPSULE ORAL at 03:09

## 2020-09-24 RX ADMIN — PANTOPRAZOLE SODIUM 40 MG: 40 TABLET, DELAYED RELEASE ORAL at 08:09

## 2020-09-24 RX ADMIN — FLUOXETINE 20 MG: 20 CAPSULE ORAL at 08:09

## 2020-09-24 RX ADMIN — ONDANSETRON 4 MG: 2 INJECTION INTRAMUSCULAR; INTRAVENOUS at 12:09

## 2020-09-24 RX ADMIN — DIPHENHYDRAMINE HYDROCHLORIDE 25 MG: 50 INJECTION INTRAMUSCULAR; INTRAVENOUS at 10:09

## 2020-09-24 RX ADMIN — IOHEXOL 100 ML: 350 INJECTION, SOLUTION INTRAVENOUS at 01:09

## 2020-09-24 RX ADMIN — SODIUM CHLORIDE, SODIUM LACTATE, POTASSIUM CHLORIDE, AND CALCIUM CHLORIDE 1000 ML: .6; .31; .03; .02 INJECTION, SOLUTION INTRAVENOUS at 12:09

## 2020-09-24 NOTE — PT/OT/SLP PROGRESS
Physical Therapy      Patient Name:  Shruthi Richard   MRN:  [id number]    Patient not seen today secondary to Pain, Other (Comment)(Pt reports headache pain 10/10 and unable to participate. RN notified.). Will follow-up 09/25/20.    Maribel Crespo, PT

## 2020-09-24 NOTE — SUBJECTIVE & OBJECTIVE
Past Medical History:   Diagnosis Date    Anxiety     Bipolar affective disorder, rapid cycling     bipolar 1    Depression     GERD (gastroesophageal reflux disease)     Migraine headache     Vaginal delivery     x1       Past Surgical History:   Procedure Laterality Date    ABDOMINAL SURGERY      exploratory laparotomy    HYSTERECTOMY  12/15/2017       Review of patient's allergies indicates:   Allergen Reactions    Gluten protein Other (See Comments)     Sever stomach ache, very tired feeling    Prochlorperazine Other (See Comments)    Prochlorperazine edisylate      Anxiety, itching  Other reaction(s): Unknown       No current facility-administered medications on file prior to encounter.      Current Outpatient Medications on File Prior to Encounter   Medication Sig    butalbital-aspirin-caffeine -40 mg (FIORINAL) -40 mg Cap Take 1 capsule by mouth every 4 (four) hours as needed (migrain).    clonazePAM (KLONOPIN) 2 MG Tab Take 2 mg by mouth 3 (three) times daily.    FLUoxetine 20 MG capsule Take 20 mg by mouth once daily.    gabapentin (NEURONTIN) 600 MG tablet Take 600 mg by mouth 3 (three) times daily.    lamoTRIgine (LAMICTAL) 150 MG Tab Take 150 mg by mouth 2 (two) times daily.     onabotulinumtoxina (BOTOX) 200 unit SolR Inject 200 Units into the muscle once.    ondansetron (ZOFRAN) 4 MG tablet Take 1 tablet (4 mg total) by mouth every 6 (six) hours.    ondansetron (ZOFRAN-ODT) 4 MG TbDL Take 1 tablet (4 mg total) by mouth every 8 (eight) hours as needed.    pantoprazole (PROTONIX) 40 MG tablet Take 40 mg by mouth 2 (two) times daily.     Family History     None        Tobacco Use    Smoking status: Never Smoker    Smokeless tobacco: Never Used   Substance and Sexual Activity    Alcohol use: No    Drug use: No    Sexual activity: Yes     Partners: Male     Birth control/protection: OCP     Review of Systems   Constitutional: Negative for chills, fever and unexpected  weight change.   Gastrointestinal: Positive for nausea. Negative for vomiting.   Neurological: Positive for weakness, numbness and headaches.   All other systems reviewed and are negative.    Objective:     Vital Signs (Most Recent):  Temp: 98.4 °F (36.9 °C) (09/23/20 2311)  Pulse: 73 (09/24/20 0300)  Resp: (!) 22 (09/24/20 0300)  BP: (!) 95/45 (09/24/20 0300)  SpO2: 98 % (09/24/20 0300) Vital Signs (24h Range):  Temp:  [98.4 °F (36.9 °C)] 98.4 °F (36.9 °C)  Pulse:  [66-75] 73  Resp:  [16-22] 22  SpO2:  [98 %-100 %] 98 %  BP: ()/(45-68) 95/45     Weight: 58.1 kg (128 lb)  Body mass index is 22.67 kg/m².    Physical Exam  Vitals signs reviewed.   Constitutional:       General: She is not in acute distress.     Appearance: Normal appearance. She is not toxic-appearing.   HENT:      Head: Normocephalic and atraumatic.      Right Ear: External ear normal.      Left Ear: External ear normal.      Mouth/Throat:      Mouth: Mucous membranes are moist.   Eyes:      General: No scleral icterus.        Right eye: No discharge.         Left eye: No discharge.      Pupils: Pupils are equal, round, and reactive to light.   Neck:      Musculoskeletal: Normal range of motion and neck supple.   Cardiovascular:      Rate and Rhythm: Normal rate and regular rhythm.      Pulses: Normal pulses.      Heart sounds: Normal heart sounds.   Pulmonary:      Effort: Pulmonary effort is normal.      Breath sounds: Normal breath sounds.   Abdominal:      General: Bowel sounds are normal.      Palpations: Abdomen is soft.   Genitourinary:     Comments: No santillan  Musculoskeletal:         General: No swelling or tenderness.      Right lower leg: No edema.      Left lower leg: No edema.   Skin:     General: Skin is warm and dry.      Capillary Refill: Capillary refill takes less than 2 seconds.      Findings: No lesion.   Neurological:      Mental Status: She is alert and oriented to person, place, and time.      Comments: Weakness on left?    Psychiatric:         Mood and Affect: Mood normal.         Behavior: Behavior normal.           CRANIAL NERVES     CN III, IV, VI   Pupils are equal, round, and reactive to light.       Significant Labs:   CBC:   Recent Labs   Lab 09/23/20  2335   WBC 8.61   HGB 13.3   HCT 39.9   *     CMP:   Recent Labs   Lab 09/23/20  2335      K 3.6      CO2 21*   GLU 89   BUN 6   CREATININE 0.7   CALCIUM 9.0   PROT 7.6   ALBUMIN 4.6   BILITOT 0.6   ALKPHOS 72   AST 16   ALT 31   ANIONGAP 10   EGFRNONAA >60.0     Cardiac Markers: No results for input(s): CKMB, MYOGLOBIN, BNP, TROPISTAT in the last 48 hours.  Lipid Panel:   Recent Labs   Lab 09/23/20  2335   CHOL 262*   HDL 64   LDLCALC 156.0   TRIG 210*   CHOLHDL 24.4         Significant Imaging: I have reviewed all pertinent imaging results/findings within the past 24 hours.   Ct Head Without Contrast    Result Date: 9/23/2020  EXAM: CT Head Without Intravenous Contrast CLINICAL HISTORY: The patient is 39 years old and is Female; Neuro deficit, acute, stroke suspected TECHNIQUE: Axial computed tomography images of the head/brain without intravenous contrast.  Sagittal and coronal reformatted images were created and reviewed.  This CT exam was performed using one or more of the following dose reduction techniques:  automated exposure control, adjustment of the mA and/or kV according to patient size, and/or use of iterative reconstruction technique. COMPARISON: CT brain from 05/21/2020 FINDINGS: BRAIN:  Unremarkable.  No obvious signs of acute infarct.  No evidence of intracranial mass. No acute hemorrhage. VENTRICLES:  Unremarkable.  No ventriculomegaly. BONES/JOINTS:  Unremarkable.  No acute fracture. SOFT TISSUES:  Unremarkable. SINUSES:  Unremarkable as visualized.  No acute sinusitis. MASTOID AIR CELLS:  Unremarkable as visualized.  No mastoid effusion. IMPRESSION: No acute intracranial findings. Electronically signed by:  Yamilex Hall MD  9/24/2020 2:14 AM  CDT Workstation: [id number]    [facility] Head And Neck (xpd)    Result Date: 9/23/2020  EXAM: CT Angiography Head and Neck With Intravenous Contrast CLINICAL HISTORY: The patient is 39 years old and is Female; Neuro deficit, acute, stroke suspected TECHNIQUE: Axial computed tomographic angiography images of the head and neck with intravenous contrast.  Measurements of carotid stenosis were determined utilizing NASCET criteria.  Sagittal and coronal reformatted images were created and reviewed.  This CT exam was performed using one or more of the following dose reduction techniques:  automated exposure control, adjustment of the mA and/or kV according to patient size, and/or use of iterative reconstruction technique. MIP reconstructed images were created and reviewed. COMPARISON: No relevant prior studies available. FINDINGS: HEAD: RIGHT ANTERIOR CEREBRAL ARTERY:  No occlusion or significant stenosis.  No aneurysm. RIGHT MIDDLE CEREBRAL ARTERY:  No occlusion or significant stenosis.  No aneurysm. RIGHT POSTERIOR CEREBRAL ARTERY:  No occlusion or significant stenosis.  No aneurysm. LEFT ANTERIOR CEREBRAL ARTERY:  No occlusion or significant stenosis.  No aneurysm. LEFT MIDDLE CEREBRAL ARTERY:  No occlusion or significant stenosis.  No aneurysm. LEFT POSTERIOR CEREBRAL ARTERY:  No occlusion or significant stenosis.  No aneurysm. BASILAR ARTERY:  No occlusion or significant stenosis.  No aneurysm. NECK: RIGHT COMMON CAROTID ARTERY:  No significant stenosis.  No dissection or occlusion. RIGHT INTERNAL CAROTID ARTERY:  No significant stenosis.  No dissection or occlusion. RIGHT EXTERNAL CAROTID ARTERY:  Unremarkable.  No occlusion. RIGHT VERTEBRAL ARTERY:  No significant stenosis.  No dissection or occlusion. LEFT COMMON CAROTID ARTERY:  No significant stenosis.  No dissection or occlusion. LEFT INTERNAL CAROTID ARTERY:  No significant stenosis.  No dissection or occlusion. LEFT EXTERNAL CAROTID ARTERY:  Unremarkable.  No  occlusion. LEFT VERTEBRAL ARTERY:  No significant stenosis.  No dissection or occlusion. HEAD and NECK: BONES/JOINTS:  No acute fracture.  No dislocation. SOFT TISSUES:  Unremarkable as visualized.  No mass. CAROTID STENOSIS REFERENCE USING NASCET CRITERIA: % ICA stenosis = (1 - narrowest ICA diameter/diameter of distal cervical ICA) x 100. Mild - <50% stenosis. Moderate - 50-69% stenosis. Severe - 70-94% stenosis. Near occlusion - 95-99% stenosis. Occluded - 100% stenosis. IMPRESSION: No significant stenosis or occlusion within the arterial vasculature of the head and neck. Electronically signed by:  Yamilex Hall MD  9/24/2020 2:11 AM CDT Workstation: [id number]

## 2020-09-24 NOTE — CONSULTS
"[facility]  Adult Nutrition   Consult Note (Initial Assessment)     SUMMARY     Recommendations/Interventions:    Recommendation/Intervention: 1. Continue current diet as tolerated, encourage intake. 2.  to assist in meal choices daily.  Goals: 1. Patient to meet at least 75% of estimated needs via PO intake of meals.    Dietitian Rounds Brief:  · Seen 2' consult RE: assess dietary needs-stroke trigger. Patient presented with headache and left sided weakness. Admitted for stroke work-up. Neurology and tests results pending. Will f/u once tests are complete and neurology has had a chance to assess patient. Lipid panel elevated. Will continue to monitor intake, labs, and plan of care.  Reason for Assessment  Reason For Assessment: consult  Diagnosis: (Stroke work-up)  Relevant Medical History: GERD, bipolar, depression  Interdisciplinary Rounds: attended    Nutrition Risk Screen  Nutrition Risk Screen: no indicators present     MST Score: 0  Have you recently lost weight without trying?: No  Weight loss score: 0  Have you been eating poorly because of a decreased appetite?: No  Appetite score: 0     Nutrition/Diet History  Food Allergies: NKFA  Factors Affecting Nutritional Intake: None identified at this time    Anthropometrics  Temp: 97.8 °F (36.6 °C)  Height Method: Stated  Height: 5' 3" (160 cm)  Height (inches): 63 in  Weight Method: Bed Scale  Weight: 61.8 kg (136 lb 3.9 oz)  Weight (lb): 136.25 lb  Ideal Body Weight (IBW), Female: 115 lb  % Ideal Body Weight, Female (lb): 111.3 %  BMI (Calculated): 24.1  BMI Grade: 18.5-24.9 - normal     Weight History:  Wt Readings from Last 10 Encounters:   09/24/20 61.8 kg (136 lb 3.9 oz)   08/15/20 59 kg (130 lb)   07/14/20 57.6 kg (127 lb)   05/21/20 57.6 kg (127 lb)   03/13/20 57.6 kg (127 lb)   02/28/20 58 kg (127 lb 13.9 oz)   02/13/20 57.2 kg (126 lb)   11/21/19 54.4 kg (120 lb)   02/04/19 56.7 kg (125 lb)   12/18/18 56.6 kg (124 lb 12.5 oz) "     Lab/Procedures/Meds: Pertinent Labs Reviewed  Clinical Chemistry:  Recent Labs   Lab 09/23/20  2335      K 3.6      CO2 21*   GLU 89   BUN 6   CREATININE 0.7   CALCIUM 9.0   PROT 7.6   ALBUMIN 4.6   BILITOT 0.6   ALKPHOS 72   AST 16   ALT 31   ANIONGAP 10   ESTGFRAFRICA >60.0   EGFRNONAA >60.0     CBC:   Recent Labs   Lab 09/23/20  2335   WBC 8.61   RBC 4.59   HGB 13.3   HCT 39.9   *   MCV 87   MCH 29.0   MCHC 33.3     Lipid Panel:  Recent Labs   Lab 09/23/20  2335   CHOL 262*   HDL 64   LDLCALC 156.0   TRIG 210*   CHOLHDL 24.4     Thyroid & Parathyroid:  Recent Labs   Lab 09/23/20  2335   TSH 1.740     Medications: Pertinent Medications reviewed  Scheduled Meds:   aspirin  81 mg Oral Daily    atorvastatin  40 mg Oral Daily    clonazePAM  2 mg Oral TID    FLUoxetine  20 mg Oral Daily    gabapentin  600 mg Oral TID    lamoTRIgine  150 mg Oral BID    pantoprazole  40 mg Oral BID     Continuous Infusions:   sodium chloride 0.9% 75 mL/hr at 09/24/20 0519     PRN Meds:.bisacodyL, butalbital-aspirin-caffeine -40 mg, calcium chloride IVPB, calcium chloride IVPB, calcium chloride IVPB, labetalol, magnesium oxide, magnesium sulfate IVPB, magnesium sulfate IVPB, magnesium sulfate IVPB, magnesium sulfate IVPB, ondansetron, potassium chloride in water, potassium chloride in water, potassium chloride in water, potassium chloride in water, potassium chloride, potassium chloride, potassium chloride, potassium chloride, promethazine (PHENERGAN) IVPB, sodium chloride 0.9%, sodium phosphate IVPB, sodium phosphate IVPB, sodium phosphate IVPB, sodium phosphate IVPB, sodium phosphate IVPB    Estimated/Assessed Needs    Weight Used For Calorie Calculations: 61.8 kg (136 lb 3.9 oz)  Energy Calorie Requirements (kcal): 9209-3757 kcals/day (25-30 kcals/kg)  Energy Need Method: Kcal/kg  Protein Requirements: 74-93 g/day (1.2-1.5 g/kg)  Weight Used For Protein Calculations: 61.8 kg (136 lb 3.9 oz)      Estimated Fluid Requirement Method: RDA Method    Nutrition Prescription Ordered    Current Diet Order: Regular Diet    Evaluation of Received Nutrient/Fluid Intake    Energy Calories Required: not meeting needs  Protein Required: not meeting needs  Fluid Required: meeting needs  Tolerance: tolerating      Intake/Output Summary (Last 24 hours) at 9/24/2020 0827  Last data filed at 9/24/2020 0700  Gross per 24 hour   Intake 1126.25 ml   Output --   Net 1126.25 ml      Nutrition Risk    Level of Risk/Frequency of Follow-up: moderate   Monitor and Evaluation    Food and Nutrient Intake: energy intake, food and beverage intake  Food and Nutrient Adminstration: diet order  Physical Activity and Function: nutrition-related ADLs and IADLs, factors affecting access to physical activity  Anthropometric Measurements: weight, weight change, body mass index  Biochemical Data, Medical Tests and Procedures: electrolyte and renal panel, lipid profile, gastrointestinal profile, glucose/endocrine profile, inflammatory profile  Nutrition-Focused Physical Findings: overall appearance     Nutrition Follow-Up    RD Follow-up?: Yes  Dian Heredia RD 09/24/2020 8:29 AM

## 2020-09-24 NOTE — H&P
[facility] Medicine  History & Physical    Patient Name: Shruthi Richard  MRN: [id number]  Admission Date: 9/23/2020  Attending Physician: Chance Serrato MD   Primary Care Provider: Valente Erazo Jr, MD         Patient information was obtained from patient, past medical records and ER records.     Subjective:     Principal Problem:<principal problem not specified>    Chief Complaint:   Chief Complaint   Patient presents with    Headache    Weakness     left sided        HPI: The patient is a 39 year old nonsmoker female with history of bipolar disorder and migraines.  She presented with headache and left-sided numbness.  She states that she have constant severe (12/10) headache, over entire head, as if someone hammer nails to different parts of her head, 24/7, 365 days/year.  She states that since 2:00 p.m. yesterday she has been having intermittent tingling/numbness/clumsiness on her left side, particularly on her left hand and left foot.  She has intermittent difficulty texting.  She denies recent illness.  She denies fever, chills, cough, shortness of breath, chest pain, abdominal pain, urinary symptoms.    Workup in the ED was unremarkable.  CT head and CTA head and neck were unremarkable.    Review of records showed that she had extensive workup including MRI brain and MRV brain in 2018.  He had CT head, MRI brain, and LP in February/March this year. She states that she has been follow up with Dr. Mickey Nugent and was recently referred to pain clinic..      Past Medical History:   Diagnosis Date    Anxiety     Bipolar affective disorder, rapid cycling     bipolar 1    Depression     GERD (gastroesophageal reflux disease)     Migraine headache     Vaginal delivery     x1       Past Surgical History:   Procedure Laterality Date    ABDOMINAL SURGERY      exploratory laparotomy    HYSTERECTOMY  12/15/2017       Review of patient's allergies indicates:   Allergen Reactions    Gluten  protein Other (See Comments)     Sever stomach ache, very tired feeling    Prochlorperazine Other (See Comments)    Prochlorperazine edisylate      Anxiety, itching  Other reaction(s): Unknown       No current facility-administered medications on file prior to encounter.      Current Outpatient Medications on File Prior to Encounter   Medication Sig    butalbital-aspirin-caffeine -40 mg (FIORINAL) -40 mg Cap Take 1 capsule by mouth every 4 (four) hours as needed (migrain).    clonazePAM (KLONOPIN) 2 MG Tab Take 2 mg by mouth 3 (three) times daily.    FLUoxetine 20 MG capsule Take 20 mg by mouth once daily.    gabapentin (NEURONTIN) 600 MG tablet Take 600 mg by mouth 3 (three) times daily.    lamoTRIgine (LAMICTAL) 150 MG Tab Take 150 mg by mouth 2 (two) times daily.     onabotulinumtoxina (BOTOX) 200 unit SolR Inject 200 Units into the muscle once.    ondansetron (ZOFRAN) 4 MG tablet Take 1 tablet (4 mg total) by mouth every 6 (six) hours.    ondansetron (ZOFRAN-ODT) 4 MG TbDL Take 1 tablet (4 mg total) by mouth every 8 (eight) hours as needed.    pantoprazole (PROTONIX) 40 MG tablet Take 40 mg by mouth 2 (two) times daily.     Family History     None        Tobacco Use    Smoking status: Never Smoker    Smokeless tobacco: Never Used   Substance and Sexual Activity    Alcohol use: No    Drug use: No    Sexual activity: Yes     Partners: Male     Birth control/protection: OCP     Review of Systems   Constitutional: Negative for chills, fever and unexpected weight change.   Gastrointestinal: Positive for nausea. Negative for vomiting.   Neurological: Positive for weakness, numbness and headaches.   All other systems reviewed and are negative.    Objective:     Vital Signs (Most Recent):  Temp: 98.4 °F (36.9 °C) (09/23/20 2311)  Pulse: 73 (09/24/20 0300)  Resp: (!) 22 (09/24/20 0300)  BP: (!) 95/45 (09/24/20 0300)  SpO2: 98 % (09/24/20 0300) Vital Signs (24h Range):  Temp:  [98.4 °F (36.9  °C)] 98.4 °F (36.9 °C)  Pulse:  [66-75] 73  Resp:  [16-22] 22  SpO2:  [98 %-100 %] 98 %  BP: ()/(45-68) 95/45     Weight: 58.1 kg (128 lb)  Body mass index is 22.67 kg/m².    Physical Exam  Vitals signs reviewed.   Constitutional:       General: She is not in acute distress.     Appearance: Normal appearance. She is not toxic-appearing.   HENT:      Head: Normocephalic and atraumatic.      Right Ear: External ear normal.      Left Ear: External ear normal.      Mouth/Throat:      Mouth: Mucous membranes are moist.   Eyes:      General: No scleral icterus.        Right eye: No discharge.         Left eye: No discharge.      Pupils: Pupils are equal, round, and reactive to light.   Neck:      Musculoskeletal: Normal range of motion and neck supple.   Cardiovascular:      Rate and Rhythm: Normal rate and regular rhythm.      Pulses: Normal pulses.      Heart sounds: Normal heart sounds.   Pulmonary:      Effort: Pulmonary effort is normal.      Breath sounds: Normal breath sounds.   Abdominal:      General: Bowel sounds are normal.      Palpations: Abdomen is soft.   Genitourinary:     Comments: No santillan  Musculoskeletal:         General: No swelling or tenderness.      Right lower leg: No edema.      Left lower leg: No edema.   Skin:     General: Skin is warm and dry.      Capillary Refill: Capillary refill takes less than 2 seconds.      Findings: No lesion.   Neurological:      Mental Status: She is alert and oriented to person, place, and time.      Comments: Weakness on left?   Psychiatric:         Mood and Affect: Mood normal.         Behavior: Behavior normal.           CRANIAL NERVES     CN III, IV, VI   Pupils are equal, round, and reactive to light.       Significant Labs:   CBC:   Recent Labs   Lab 09/23/20  2335   WBC 8.61   HGB 13.3   HCT 39.9   *     CMP:   Recent Labs   Lab 09/23/20  2335      K 3.6      CO2 21*   GLU 89   BUN 6   CREATININE 0.7   CALCIUM 9.0   PROT 7.6   ALBUMIN  4.6   BILITOT 0.6   ALKPHOS 72   AST 16   ALT 31   ANIONGAP 10   EGFRNONAA >60.0     Cardiac Markers: No results for input(s): CKMB, MYOGLOBIN, BNP, TROPISTAT in the last 48 hours.  Lipid Panel:   Recent Labs   Lab 09/23/20  2335   CHOL 262*   HDL 64   LDLCALC 156.0   TRIG 210*   CHOLHDL 24.4         Significant Imaging: I have reviewed all pertinent imaging results/findings within the past 24 hours.   Ct Head Without Contrast    Result Date: 9/23/2020  EXAM: CT Head Without Intravenous Contrast CLINICAL HISTORY: The patient is 39 years old and is Female; Neuro deficit, acute, stroke suspected TECHNIQUE: Axial computed tomography images of the head/brain without intravenous contrast.  Sagittal and coronal reformatted images were created and reviewed.  This CT exam was performed using one or more of the following dose reduction techniques:  automated exposure control, adjustment of the mA and/or kV according to patient size, and/or use of iterative reconstruction technique. COMPARISON: CT brain from 05/21/2020 FINDINGS: BRAIN:  Unremarkable.  No obvious signs of acute infarct.  No evidence of intracranial mass. No acute hemorrhage. VENTRICLES:  Unremarkable.  No ventriculomegaly. BONES/JOINTS:  Unremarkable.  No acute fracture. SOFT TISSUES:  Unremarkable. SINUSES:  Unremarkable as visualized.  No acute sinusitis. MASTOID AIR CELLS:  Unremarkable as visualized.  No mastoid effusion. IMPRESSION: No acute intracranial findings. Electronically signed by:  Yamilex Hall MD  9/24/2020 2:14 AM CDT Workstation: [id number]    Cta Head And Neck (xpd)    Result Date: 9/23/2020  EXAM: CT Angiography Head and Neck With Intravenous Contrast CLINICAL HISTORY: The patient is 39 years old and is Female; Neuro deficit, acute, stroke suspected TECHNIQUE: Axial computed tomographic angiography images of the head and neck with intravenous contrast.  Measurements of carotid stenosis were determined utilizing NASCET criteria.  Sagittal and  coronal reformatted images were created and reviewed.  This CT exam was performed using one or more of the following dose reduction techniques:  automated exposure control, adjustment of the mA and/or kV according to patient size, and/or use of iterative reconstruction technique. MIP reconstructed images were created and reviewed. COMPARISON: No relevant prior studies available. FINDINGS: HEAD: RIGHT ANTERIOR CEREBRAL ARTERY:  No occlusion or significant stenosis.  No aneurysm. RIGHT MIDDLE CEREBRAL ARTERY:  No occlusion or significant stenosis.  No aneurysm. RIGHT POSTERIOR CEREBRAL ARTERY:  No occlusion or significant stenosis.  No aneurysm. LEFT ANTERIOR CEREBRAL ARTERY:  No occlusion or significant stenosis.  No aneurysm. LEFT MIDDLE CEREBRAL ARTERY:  No occlusion or significant stenosis.  No aneurysm. LEFT POSTERIOR CEREBRAL ARTERY:  No occlusion or significant stenosis.  No aneurysm. BASILAR ARTERY:  No occlusion or significant stenosis.  No aneurysm. NECK: RIGHT COMMON CAROTID ARTERY:  No significant stenosis.  No dissection or occlusion. RIGHT INTERNAL CAROTID ARTERY:  No significant stenosis.  No dissection or occlusion. RIGHT EXTERNAL CAROTID ARTERY:  Unremarkable.  No occlusion. RIGHT VERTEBRAL ARTERY:  No significant stenosis.  No dissection or occlusion. LEFT COMMON CAROTID ARTERY:  No significant stenosis.  No dissection or occlusion. LEFT INTERNAL CAROTID ARTERY:  No significant stenosis.  No dissection or occlusion. LEFT EXTERNAL CAROTID ARTERY:  Unremarkable.  No occlusion. LEFT VERTEBRAL ARTERY:  No significant stenosis.  No dissection or occlusion. HEAD and NECK: BONES/JOINTS:  No acute fracture.  No dislocation. SOFT TISSUES:  Unremarkable as visualized.  No mass. CAROTID STENOSIS REFERENCE USING NASCET CRITERIA: % ICA stenosis = (1 - narrowest ICA diameter/diameter of distal cervical ICA) x 100. Mild - <50% stenosis. Moderate - 50-69% stenosis. Severe - 70-94% stenosis. Near occlusion - 95-99%  stenosis. Occluded - 100% stenosis. IMPRESSION: No significant stenosis or occlusion within the arterial vasculature of the head and neck. Electronically signed by:  Yamilex Hall MD  9/24/2020 2:11 AM CDT Workstation: [id number]      Assessment/Plan:     Left-sided weakness  Reports severe constant migraine headache  Reports intermittentleft sided weakness and numbness since 2 pm yesterday  Consult Neurology.  The ED physician discussed case with Dr. Lundberg.    Admit for observation, per Neurology recommendation to rule out stroke  Stroke protocol  Neuro checks q 4 hours  Pulse oximetry q.4 hours  Cardiac monitor for arrhythmia  Aspiration precautions  Fall precautions  NPO til passing nursing swallow assessment  IV hydration  Permissive hypertension  Labetalol  IV p.r.n. for systolic blood pressure above 220 or diastolic blood pressure above 100  Anti-lipidemics with atorvastatin 40 mg daily  Anti-thrombotic with aspirin 81 mg daily  Labs:  BMP, magnesium, CBC daily  Consult and treat:   ST, dietitian, case management  Limited echocardiogram with bubble study  Further work up per Neurology            Bipolar depression  Chronic medical condition  Continue home medication        VTE Risk Mitigation (From admission, onward)         Ordered     IP VTE LOW RISK PATIENT  Once      09/24/20 0307     Place sequential compression device  Until discontinued      09/24/20 0307                   TEETEE Oden  Department of Hospital Medicine   [facility]

## 2020-09-24 NOTE — PLAN OF CARE
Eval initiated.  Swallow WFL (borderline mild tongue discoordination).  Recall deficits, decreased word fluency.  Will follow.  If she is discharged home soon, I would recommend outpt cognitive-linguistic eval in greater detail.

## 2020-09-24 NOTE — ED NOTES
Shruthi Richard, a 39 y.o. female presents to the ED w/ complaint of left sided weakness (LKN: 1400) and migraine (x 3 weeks). Hx of migraines. AAOx4.   Triage note:  Chief Complaint   Patient presents with    Headache    Weakness     left sided     Review of patient's allergies indicates:   Allergen Reactions    Gluten protein Other (See Comments)     Sever stomach ache, very tired feeling    Prochlorperazine Other (See Comments)    Prochlorperazine edisylate      Anxiety, itching  Other reaction(s): Unknown     Past Medical History:   Diagnosis Date    Anxiety     Bipolar affective disorder, rapid cycling     bipolar 1    Depression     GERD (gastroesophageal reflux disease)     Migraine headache     Vaginal delivery     x1

## 2020-09-24 NOTE — DISCHARGE SUMMARY
[facility] Medicine  Discharge Summary      Patient Name: Shruthi Richard  MRN: [id number]  Admission Date: 9/23/2020  Hospital Length of Stay: 0 days  Discharge Date and Time:  09/24/2020 4:51 PM  Attending Physician: Talha Ott MD   Discharging Provider: Talha Ott MD  Primary Care Provider: Valente Erazo Jr, MD      HPI:   The patient is a 39 year old nonsmoker female with history of bipolar disorder and migraines.  She presented with headache and left-sided numbness.  She states that she have constant severe (12/10) headache, over entire head, as if someone hammer nails to different parts of her head, 24/7, 365 days/year.  She states that since 2:00 p.m. yesterday she has been having intermittent tingling/numbness/clumsiness on her left side, particularly on her left hand and left foot.  She has intermittent difficulty texting.  She denies recent illness.  She denies fever, chills, cough, shortness of breath, chest pain, abdominal pain, urinary symptoms.    Workup in the ED was unremarkable.  CT head and CTA head and neck were unremarkable.    Review of records showed that she had extensive workup including MRI brain and MRV brain in 2018.  He had CT head, MRI brain, and LP in February/March this year. She states that she has been follow up with Dr. Mickey Nugent and was recently referred to pain clinic..      * No surgery found *      Hospital Course:   39 year old female with bipolar disorder and migraines admitted after presented with headache and migraine. Seen by neurology. Stroke work-up negative. Left sided paresthesias persist. On multiple analgesics only with minimal improvement in her symptoms. She requested to be discharged home. Advised to follow-up with neurology outpatient.     Instructions provided to follow up with primary care physician as outpatient. Patient verbalized understanding and is aware to contact primary care physician or return to ED if new or  worsening symptoms.    Physical exam on the day of discharge:  General: Patient resting comfortably in no acute distress.  Lungs: CTA. Good air entry.  Cor: Regular rate and rhythm. No murmurs. No pedal edema.  Abd: Soft. Nontender. Non-distended.  Neuro: Alert and oriented x3.  Speech is clear and coherent.  Cranial nerves 2-12 are grossly intact. Motor strength is 5/5 in all extremities.  Sensory exam unremarkable.  No dysmetria or dysdiadochokinesia.  Ext: No clubbing. No cyanosis.        Consults:   Consults (From admission, onward)        Status Ordering Provider     Inpatient consult to Hospitalist  Once     Provider:  Lis Hewitt MD    Acknowledged KURT FREED     Inpatient consult to Neurology  Once     Provider:  Dung Lundberg MD    Acknowledged KURT FREED     Inpatient consult to Neurology  Once     Provider:  Flex Nugent MD    Acknowledged GUILLERMO COLBERT     Inpatient consult to Registered Dietitian/Nutritionist  Once     Provider:  (Not yet assigned)    Completed GUILLERMO COLBERT     IP consult to case management/social work  Once     Provider:  (Not yet assigned)    Completed GUILLERMO COLBERT          No new Assessment & Plan notes have been filed under this hospital service since the last note was generated.  Service: Hospital Medicine    Final Active Diagnoses:    Diagnosis Date Noted POA    PRINCIPAL PROBLEM:  Intractable migraine [G43.919] 09/24/2020 Yes    Bipolar depression [F31.9] 02/28/2020 Yes      Problems Resolved During this Admission:    Diagnosis Date Noted Date Resolved POA    Left-sided weakness [R53.1] 09/24/2020 09/24/2020 Yes       Discharged Condition: fair    Disposition: Home or Self Care    Follow Up:  Follow-up Information     Migraine specialists as discussed  In 2 weeks.    Why: As needed  Contact information:  [place]               Patient Instructions:      Diet Cardiac     Notify your health care provider if you experience any of the  following:  temperature >100.4     Notify your health care provider if you experience any of the following:  persistent nausea and vomiting or diarrhea     Notify your health care provider if you experience any of the following:  persistent dizziness, light-headedness, or visual disturbances     Notify your health care provider if you experience any of the following:  severe uncontrolled pain     Notify your health care provider if you experience any of the following:  increased confusion or weakness     Activity as tolerated       Significant Diagnostic Studies: Labs:   CMP   Recent Labs   Lab 09/23/20  2335      K 3.6      CO2 21*   GLU 89   BUN 6   CREATININE 0.7   CALCIUM 9.0   PROT 7.6   ALBUMIN 4.6   BILITOT 0.6   ALKPHOS 72   AST 16   ALT 31   ANIONGAP 10   ESTGFRAFRICA >60.0   EGFRNONAA >60.0    and CBC   Recent Labs   Lab 09/23/20  2335   WBC 8.61   HGB 13.3   HCT 39.9   *     Imaging Results          CT Head Without Contrast (Final result)  Result time 09/23/20 23:20:00    Final result by Yamilex Hall MD (09/23/20 23:20:00)                 Narrative:    EXAM:  CT Head Without Intravenous Contrast    CLINICAL HISTORY:  The patient is 39 years old and is Female; Neuro deficit, acute, stroke suspected    TECHNIQUE:  Axial computed tomography images of the head/brain without intravenous contrast.  Sagittal and coronal reformatted images were created and reviewed.  This CT exam was performed using one or more of the following dose reduction techniques:  automated exposure control, adjustment of the mA and/or kV according to patient size, and/or use of iterative reconstruction technique.    COMPARISON:  CT brain from 05/21/2020    FINDINGS:  BRAIN:  Unremarkable.  No obvious signs of acute infarct.  No evidence of intracranial mass. No acute hemorrhage.  VENTRICLES:  Unremarkable.  No ventriculomegaly.  BONES/JOINTS:  Unremarkable.  No acute fracture.  SOFT TISSUES:  Unremarkable.  SINUSES:   Unremarkable as visualized.  No acute sinusitis.  MASTOID AIR CELLS:  Unremarkable as visualized.  No mastoid effusion.    IMPRESSION:  No acute intracranial findings.    Electronically signed by:  Yamilex Hall MD  9/24/2020 2:14 AM CDT Workstation: [id number]                             CTA Head and Neck (xpd) (Final result)  Result time 09/23/20 23:26:00    Final result by Yamilex Hall MD (09/23/20 23:26:00)                 Narrative:    EXAM:  CT Angiography Head and Neck With Intravenous Contrast    CLINICAL HISTORY:  The patient is 39 years old and is Female; Neuro deficit, acute, stroke suspected    TECHNIQUE:  Axial computed tomographic angiography images of the head and neck with intravenous contrast.  Measurements of carotid stenosis were determined utilizing NASCET criteria.  Sagittal and coronal reformatted images were created and reviewed.  This CT exam was performed using one or more of the following dose reduction techniques:  automated exposure control, adjustment of the mA and/or kV according to patient size, and/or use of iterative reconstruction technique.  MIP reconstructed images were created and reviewed.    COMPARISON:  No relevant prior studies available.    FINDINGS:  HEAD:  RIGHT ANTERIOR CEREBRAL ARTERY:  No occlusion or significant stenosis.  No aneurysm.  RIGHT MIDDLE CEREBRAL ARTERY:  No occlusion or significant stenosis.  No aneurysm.  RIGHT POSTERIOR CEREBRAL ARTERY:  No occlusion or significant stenosis.  No aneurysm.    LEFT ANTERIOR CEREBRAL ARTERY:  No occlusion or significant stenosis.  No aneurysm.  LEFT MIDDLE CEREBRAL ARTERY:  No occlusion or significant stenosis.  No aneurysm.  LEFT POSTERIOR CEREBRAL ARTERY:  No occlusion or significant stenosis.  No aneurysm.    BASILAR ARTERY:  No occlusion or significant stenosis.  No aneurysm.    NECK:  RIGHT COMMON CAROTID ARTERY:  No significant stenosis.  No dissection or occlusion.  RIGHT INTERNAL CAROTID ARTERY:  No significant stenosis.   No dissection or occlusion.  RIGHT EXTERNAL CAROTID ARTERY:  Unremarkable.  No occlusion.  RIGHT VERTEBRAL ARTERY:  No significant stenosis.  No dissection or occlusion.    LEFT COMMON CAROTID ARTERY:  No significant stenosis.  No dissection or occlusion.  LEFT INTERNAL CAROTID ARTERY:  No significant stenosis.  No dissection or occlusion.  LEFT EXTERNAL CAROTID ARTERY:  Unremarkable.  No occlusion.  LEFT VERTEBRAL ARTERY:  No significant stenosis.  No dissection or occlusion.    HEAD and NECK:  BONES/JOINTS:  No acute fracture.  No dislocation.  SOFT TISSUES:  Unremarkable as visualized.  No mass.    CAROTID STENOSIS REFERENCE USING NASCET CRITERIA:  % ICA stenosis = (1 - narrowest ICA diameter/diameter of distal cervical ICA) x 100.  Mild - <50% stenosis.  Moderate - 50-69% stenosis.  Severe - 70-94% stenosis.  Near occlusion - 95-99% stenosis.  Occluded - 100% stenosis.    IMPRESSION:  No significant stenosis or occlusion within the arterial vasculature of the head and neck.    Electronically signed by:  Yamilex Hall MD  9/24/2020 2:11 AM CDT Workstation: [id number]                             X-Ray Chest AP Portable (Final result)  Result time 09/23/20 23:39:15    Final result by Kandis Mejia MD (09/23/20 23:39:15)                 Narrative:    PROCEDURE:   XR CHEST AP PORTABLE  dated  9/23/2020 11:25 PM    CLINICAL HISTORY:   Female 39 years of age.   Stroke    TECHNIQUE: AP view of the chest obtained portably at 11:26 PM.    PREVIOUS STUDIES:  None Available    FINDINGS:    Cardiac and mediastinal contours are normal. Lungs are clear. There is  no pleural effusion or pneumothorax. Bones are unremarkable.      IMPRESSION:    No acute findings. Clear lungs. Normal size heart.    Electronically Signed by Kandis Mejia on 9/24/2020 6:59 AM                              Pending Diagnostic Studies:     Procedure Component Value Units Date/Time    Folate [[id number]]     Order Status: Sent Lab Status: No result      Specimen: Blood     Lamotrigine level [[id number]] Collected: 09/24/20 1052    Order Status: Sent Lab Status: In process Updated: 09/24/20 1125    Specimen: Blood     Vitamin B12 [[id number]]     Order Status: Sent Lab Status: No result     Specimen: Blood          Medications:  Reconciled Home Medications:      Medication List      CHANGE how you take these medications    butalbital-aspirin-caffeine -40 mg -40 mg Cap  Commonly known as: FIORINAL  Take 1 capsule by mouth every 4 (four) hours as needed (migraine).  What changed: reasons to take this        CONTINUE taking these medications    BOTOX 200 unit Solr  Generic drug: onabotulinumtoxina  Inject 200 Units into the muscle once.     clonazePAM 2 MG Tab  Commonly known as: KLONOPIN  Take 2 mg by mouth 3 (three) times daily.     FLUoxetine 20 MG capsule  Take 20 mg by mouth once daily.     lamoTRIgine 150 MG Tab  Commonly known as: LAMICTAL  Take 150 mg by mouth 2 (two) times daily.     ondansetron 4 MG Tbdl  Commonly known as: ZOFRAN-ODT  Take 1 tablet (4 mg total) by mouth every 8 (eight) hours as needed.     pantoprazole 40 MG tablet  Commonly known as: PROTONIX  Take 40 mg by mouth 2 (two) times daily.                Time spent on the discharge of patient: 28 minutes  Patient was seen and examined on the date of discharge and determined to be suitable for discharge.     Greater than 8 hours between admission and discharge face to face encounters.    Talha Ott MD  Department of Hospital Medicine  [facility]

## 2020-09-24 NOTE — PLAN OF CARE
Orientation to room, medication regime, and plan of care discussed with pt. Pt verbalized understanding.

## 2020-09-24 NOTE — ED NOTES
Patient assisted to the bedpan. Noted patient able to lift hips and bilateral lower extremity during assistance on the bed.

## 2020-09-24 NOTE — CONSULTS
Formerly [facility], [facility]  Neurology  Consult Note    Patient Name: Shruthi Richard  MRN: [id number]  Admission Date: 9/23/2020  Hospital Length of Stay: 0 days  Code Status: Full Code   Attending Provider: Dr Ott  Consulting Provider: Dr Lundberg  Primary Care Physician: Valente Erazo Jr, MD  Principal Problem:Left-sided weakness    Consults  Subjective:     Chief Complaint:    Headache    Weakness       left sided         HPI: The patient is a 39 year old nonsmoker female with history of bipolar disorder and migraines.  She presented with headache and left-sided numbness.  She states that she have constant severe (12/10) headache, over entire head, as if someone hammer nails to different parts of her head, 24/7, 365 days/year.  She states that since 2:00 p.m. yesterday she has been having intermittent tingling/numbness/clumsiness on her left side, particularly on her left hand and left foot.  She has intermittent difficulty texting.  She denies recent illness.  She denies fever, chills, cough, shortness of breath, chest pain, abdominal pain, urinary symptoms.     Workup in the ED was unremarkable.  CT head and CTA head and neck were unremarkable.     Review of records showed that she had extensive workup including MRI brain and MRV brain in 2018.  He had CT head, MRI brain, and LP in February/March this year. She states that she has been follow up with Dr. Mickey Nugent and was recently referred to pain clinic..     Neurological Consult:  Patient seen and examined with Dr. Lundberg.  She is awake alert oriented and ambulatory.  She does have left-sided weakness to her lower extremity.  She reports she has had some clumsiness, trouble concentrating is in along with left-sided tingling, and a headache.    Past Medical History:   Diagnosis Date    Anxiety     Bipolar affective disorder, rapid cycling     bipolar 1    Depression     GERD (gastroesophageal reflux disease)     Migraine headache     Vaginal delivery      x1       Past Surgical History:   Procedure Laterality Date    ABDOMINAL SURGERY      exploratory laparotomy    HYSTERECTOMY  12/15/2017       Review of patient's allergies indicates:   Allergen Reactions    Gluten protein Other (See Comments)     Sever stomach ache, very tired feeling    Prochlorperazine Other (See Comments)    Prochlorperazine edisylate      Anxiety, itching  Other reaction(s): Unknown       Current Neurological Medications:     No current facility-administered medications on file prior to encounter.      Current Outpatient Medications on File Prior to Encounter   Medication Sig    butalbital-aspirin-caffeine -40 mg (FIORINAL) -40 mg Cap Take 1 capsule by mouth every 4 (four) hours as needed (migrain).    clonazePAM (KLONOPIN) 2 MG Tab Take 2 mg by mouth 3 (three) times daily.    FLUoxetine 20 MG capsule Take 20 mg by mouth once daily.    lamoTRIgine (LAMICTAL) 150 MG Tab Take 150 mg by mouth 2 (two) times daily.     onabotulinumtoxina (BOTOX) 200 unit SolR Inject 200 Units into the muscle once.    ondansetron (ZOFRAN-ODT) 4 MG TbDL Take 1 tablet (4 mg total) by mouth every 8 (eight) hours as needed.    pantoprazole (PROTONIX) 40 MG tablet Take 40 mg by mouth 2 (two) times daily.    [DISCONTINUED] gabapentin (NEURONTIN) 600 MG tablet Take 600 mg by mouth 3 (three) times daily.    [DISCONTINUED] ondansetron (ZOFRAN) 4 MG tablet Take 1 tablet (4 mg total) by mouth every 6 (six) hours.      Family History     None        Tobacco Use    Smoking status: Never Smoker    Smokeless tobacco: Never Used   Substance and Sexual Activity    Alcohol use: No    Drug use: No    Sexual activity: Yes     Partners: Male     Birth control/protection: OCP     Review of Systems   Constitutional: Negative.    HENT: Negative.    Respiratory: Negative.    Cardiovascular: Negative.    Gastrointestinal: Negative.    Endocrine: Negative.    Genitourinary: Negative.    Musculoskeletal:  Negative.    Skin: Negative.    Neurological: Positive for weakness, numbness and headaches.   Hematological: Negative.    Psychiatric/Behavioral: Negative.      Objective:     Vital Signs (Most Recent):  Temp: 97.8 °F (36.6 °C) (09/24/20 0739)  Pulse: 70 (09/24/20 0739)  Resp: 17 (09/24/20 0739)  BP: (!) 94/48 (09/24/20 0739)  SpO2: 100 % (09/24/20 0739) Vital Signs (24h Range):  Temp:  [97.6 °F (36.4 °C)-98.4 °F (36.9 °C)] 97.8 °F (36.6 °C)  Pulse:  [66-75] 70  Resp:  [16-22] 17  SpO2:  [98 %-100 %] 100 %  BP: ()/(45-68) 94/48     Weight: 61.8 kg (136 lb 3.9 oz)  Body mass index is 24.13 kg/m².    Physical Exam  HENT:      Head: Normocephalic.      Nose: Nose normal.      Mouth/Throat:      Mouth: Mucous membranes are moist.   Eyes:      Extraocular Movements: Extraocular movements intact.      Pupils: Pupils are equal, round, and reactive to light.   Neck:      Musculoskeletal: Normal range of motion and neck supple.   Cardiovascular:      Rate and Rhythm: Normal rate.   Pulmonary:      Breath sounds: Normal breath sounds.   Abdominal:      Palpations: Abdomen is soft.   Musculoskeletal: Normal range of motion.      Comments: Left-sided weakness-lower extremity   Skin:     General: Skin is warm and dry.      Capillary Refill: Capillary refill takes less than 2 seconds.   Neurological:      Mental Status: She is alert and oriented to person, place, and time.      Motor: Weakness present.      Coordination: Finger-Nose-Finger Test normal.      Gait: Gait is intact.   Psychiatric:         Mood and Affect: Mood normal.         Speech: Speech normal.         NEUROLOGICAL EXAMINATION:     MENTAL STATUS   Oriented to person, place, and time.   Follows 1 step commands.   Attention: normal. Concentration: normal.   Speech: speech is normal   Level of consciousness: alert  Able to name object. Able to repeat.     CRANIAL NERVES   Cranial nerves II through XII intact.     CN III, IV, VI   Pupils are equal, round, and  reactive to light.    MOTOR EXAM        LLE 3/5 MS     SENSORY EXAM   Light touch normal.     GAIT AND COORDINATION     Gait  Gait: normal     Coordination   Finger to nose coordination: normal    Tremor   Resting tremor: absent       hillary       Significant Labs:   Lab Results   Component Value Date    WBC 8.61 09/23/2020    HGB 13.3 09/23/2020    HCT 39.9 09/23/2020    MCV 87 09/23/2020     (H) 09/23/2020       CMP  Sodium   Date Value Ref Range Status   09/23/2020 138 136 - 145 mmol/L Final     Potassium   Date Value Ref Range Status   09/23/2020 3.6 3.5 - 5.1 mmol/L Final     Chloride   Date Value Ref Range Status   09/23/2020 107 95 - 110 mmol/L Final     CO2   Date Value Ref Range Status   09/23/2020 21 (L) 23 - 29 mmol/L Final     Glucose   Date Value Ref Range Status   09/23/2020 89 70 - 110 mg/dL Final     BUN, Bld   Date Value Ref Range Status   09/23/2020 6 6 - 20 mg/dL Final     Creatinine   Date Value Ref Range Status   09/23/2020 0.7 0.5 - 1.4 mg/dL Final     Calcium   Date Value Ref Range Status   09/23/2020 9.0 8.7 - 10.5 mg/dL Final     Total Protein   Date Value Ref Range Status   09/23/2020 7.6 6.0 - 8.4 g/dL Final     Albumin   Date Value Ref Range Status   09/23/2020 4.6 3.5 - 5.2 g/dL Final     Total Bilirubin   Date Value Ref Range Status   09/23/2020 0.6 0.1 - 1.0 mg/dL Final     Comment:     For infants and newborns, interpretation of results should be based  on gestational age, weight and in agreement with clinical  observations.  Premature Infant recommended reference ranges:  Up to 24 hours.............<8.0 mg/dL  Up to 48 hours............<12.0 mg/dL  3-5 days..................<15.0 mg/dL  6-29 days.................<15.0 mg/dL       Alkaline Phosphatase   Date Value Ref Range Status   09/23/2020 72 55 - 135 U/L Final     AST   Date Value Ref Range Status   09/23/2020 16 10 - 40 U/L Final     ALT   Date Value Ref Range Status   09/23/2020 31 10 - 44 U/L Final     Anion Gap   Date  Value Ref Range Status   09/23/2020 10 8 - 16 mmol/L Final     eGFR if    Date Value Ref Range Status   09/23/2020 >60.0 >60 mL/min/1.73 m^2 Final     eGFR if non    Date Value Ref Range Status   09/23/2020 >60.0 >60 mL/min/1.73 m^2 Final     Comment:     Calculation used to obtain the estimated glomerular filtration  rate (eGFR) is the CKD-EPI equation.            Significant Imaging: MRI Brain Without Contrast  MRI of the brain    Clinical history is CVA    COMPARISON: Head CT at 1:27 AM    The ventricles and sulci are normal in size and configuration for age.  There is no intraparenchymal hemorrhage, mass or midline shift. There  are no extra-axial fluid collections.    There is normal signal within the white matter in all sequences. There  is no abnormality on the diffusion-weighted images to suggest acute  infarct. Cerebellum and brainstem are normal.    There are flow voids within the cavernous portions of the internal  carotid arteries and basilar artery indicating patency.    The corpus callosum, cerebellar tonsils and midline structures are  normal.    IMPRESSION: Negative MRI of the brain    Electronically Signed by Rebeca Wilde M.D. on 9/24/2020 12:19 PM  X-Ray Chest AP Portable  PROCEDURE:   XR CHEST AP PORTABLE  dated  9/23/2020 11:25 PM    CLINICAL HISTORY:   Female 39 years of age.   Stroke    TECHNIQUE: AP view of the chest obtained portably at 11:26 PM.    PREVIOUS STUDIES:  None Available    FINDINGS:    Cardiac and mediastinal contours are normal. Lungs are clear. There is  no pleural effusion or pneumothorax. Bones are unremarkable.    IMPRESSION:    No acute findings. Clear lungs. Normal size heart.    Electronically Signed by Kandis Mejia on 9/24/2020 6:59 AM  CT Head Without Contrast  EXAM:  CT Head Without Intravenous Contrast    CLINICAL HISTORY:  The patient is 39 years old and is Female; Neuro deficit, acute, stroke suspected    TECHNIQUE:  Axial  computed tomography images of the head/brain without intravenous contrast.  Sagittal and coronal reformatted images were created and reviewed.  This CT exam was performed using one or more of the following dose reduction techniques:  automated exposure control, adjustment of the mA and/or kV according to patient size, and/or use of iterative reconstruction technique.    COMPARISON:  CT brain from 05/21/2020    FINDINGS:  BRAIN:  Unremarkable.  No obvious signs of acute infarct.  No evidence of intracranial mass. No acute hemorrhage.  VENTRICLES:  Unremarkable.  No ventriculomegaly.  BONES/JOINTS:  Unremarkable.  No acute fracture.  SOFT TISSUES:  Unremarkable.  SINUSES:  Unremarkable as visualized.  No acute sinusitis.  MASTOID AIR CELLS:  Unremarkable as visualized.  No mastoid effusion.    IMPRESSION:  No acute intracranial findings.    Electronically signed by:  Yamilex Hall MD  9/24/2020 2:14 AM CDT Workstation: [id number]  CTA Head and Neck (xpd)  EXAM:  CT Angiography Head and Neck With Intravenous Contrast    CLINICAL HISTORY:  The patient is 39 years old and is Female; Neuro deficit, acute, stroke suspected    TECHNIQUE:  Axial computed tomographic angiography images of the head and neck with intravenous contrast.  Measurements of carotid stenosis were determined utilizing NASCET criteria.  Sagittal and coronal reformatted images were created and reviewed.  This CT exam was performed using one or more of the following dose reduction techniques:  automated exposure control, adjustment of the mA and/or kV according to patient size, and/or use of iterative reconstruction technique.  MIP reconstructed images were created and reviewed.    COMPARISON:  No relevant prior studies available.    FINDINGS:  HEAD:  RIGHT ANTERIOR CEREBRAL ARTERY:  No occlusion or significant stenosis.  No aneurysm.  RIGHT MIDDLE CEREBRAL ARTERY:  No occlusion or significant stenosis.  No aneurysm.  RIGHT POSTERIOR CEREBRAL ARTERY:  No  occlusion or significant stenosis.  No aneurysm.    LEFT ANTERIOR CEREBRAL ARTERY:  No occlusion or significant stenosis.  No aneurysm.  LEFT MIDDLE CEREBRAL ARTERY:  No occlusion or significant stenosis.  No aneurysm.  LEFT POSTERIOR CEREBRAL ARTERY:  No occlusion or significant stenosis.  No aneurysm.    BASILAR ARTERY:  No occlusion or significant stenosis.  No aneurysm.    NECK:  RIGHT COMMON CAROTID ARTERY:  No significant stenosis.  No dissection or occlusion.  RIGHT INTERNAL CAROTID ARTERY:  No significant stenosis.  No dissection or occlusion.  RIGHT EXTERNAL CAROTID ARTERY:  Unremarkable.  No occlusion.  RIGHT VERTEBRAL ARTERY:  No significant stenosis.  No dissection or occlusion.    LEFT COMMON CAROTID ARTERY:  No significant stenosis.  No dissection or occlusion.  LEFT INTERNAL CAROTID ARTERY:  No significant stenosis.  No dissection or occlusion.  LEFT EXTERNAL CAROTID ARTERY:  Unremarkable.  No occlusion.  LEFT VERTEBRAL ARTERY:  No significant stenosis.  No dissection or occlusion.    HEAD and NECK:  BONES/JOINTS:  No acute fracture.  No dislocation.  SOFT TISSUES:  Unremarkable as visualized.  No mass.    CAROTID STENOSIS REFERENCE USING NASCET CRITERIA:  % ICA stenosis = (1 - narrowest ICA diameter/diameter of distal cervical ICA) x 100.  Mild - <50% stenosis.  Moderate - 50-69% stenosis.  Severe - 70-94% stenosis.  Near occlusion - 95-99% stenosis.  Occluded - 100% stenosis.    IMPRESSION:  No significant stenosis or occlusion within the arterial vasculature of the head and neck.    Electronically signed by:  Yamilex Hall MD  9/24/2020 2:11 AM CDT Workstation: [id number]        Assessment and Plan:    LS weakness  -MRI brain: negative  -CT head negative  -CTA head and neck: negative  -ECHO ordered  -PT/OT    Headache  -Toradol/Benedryl/Allery to phenergan q8    PLAN: Neuro imaging negative for CVA. Will order some neuropathy labs.       Patient to follow up with [facility] at [phone]  within 3 days from discharge.     Stroke education was provided including stroke risk factors modification and any acute neurological changes including weakness, confusion, visual changes to come straight to the ER.     All questions were answered.                                  Active Diagnoses:    Diagnosis Date Noted POA    PRINCIPAL PROBLEM:  Left-sided weakness [R53.1] 09/24/2020 Yes    Bipolar depression [F31.9] 02/28/2020 Yes      Problems Resolved During this Admission:       VTE Risk Mitigation (From admission, onward)         Ordered     IP VTE LOW RISK PATIENT  Once      09/24/20 0307     Place sequential compression device  Until discontinued      09/24/20 0307                Thank you for your consult. I will follow-up with patient. Please contact us if you have any additional questions.    Patti Huff NP  Neurology  [facility]

## 2020-09-24 NOTE — ASSESSMENT & PLAN NOTE
Reports severe constant migraine headache  Reports intermittentleft sided weakness and numbness since 2 pm yesterday  Consult Neurology.  The ED physician discussed case with Dr. Lundberg.    Admit for observation, per Neurology recommendation to rule out stroke  Stroke protocol  Neuro checks q 4 hours  Pulse oximetry q.4 hours  Cardiac monitor for arrhythmia  Aspiration precautions  Fall precautions  NPO til passing nursing swallow assessment  IV hydration  Permissive hypertension  Labetalol  IV p.r.n. for systolic blood pressure above 220 or diastolic blood pressure above 100  Anti-lipidemics with atorvastatin 40 mg daily  Anti-thrombotic with aspirin 81 mg daily  Labs:  BMP, magnesium, CBC daily  Consult and treat:   ST, dietitian, case management  Limited echocardiogram with bubble study  Further work up per Neurology

## 2020-09-24 NOTE — HPI
The patient is a 39 year old nonsmoker female with history of bipolar disorder and migraines.  She presented with headache and left-sided numbness.  She states that she have constant severe (12/10) headache, over entire head, as if someone hammer nails to different parts of her head, 24/7, 365 days/year.  She states that since 2:00 p.m. yesterday she has been having intermittent tingling/numbness/clumsiness on her left side, particularly on her left hand and left foot.  She has intermittent difficulty texting.  She denies recent illness.  She denies fever, chills, cough, shortness of breath, chest pain, abdominal pain, urinary symptoms.    Workup in the ED was unremarkable.  CT head and CTA head and neck were unremarkable.    Review of records showed that she had extensive workup including MRI brain and MRV brain in 2018.  He had CT head, MRI brain, and LP in February/March this year. She states that she has been follow up with Dr. Mickey Nugent and was recently referred to pain clinic..

## 2020-09-24 NOTE — PLAN OF CARE
SW started assessment with pt at bedside this morning, unable to complete d/t provider coming to see her. Assessment finished using medical record. Per today's note from NP Patti Huff, pt is pending ECHO and PT/OT eval.  Her note also states pt has been seen by Dr. Mickey Nugent and was recently referred to pain clinic. HARJEET/CM to continue to follow.     09/24/20 0913   Discharge Assessment   Assessment Type Discharge Planning Assessment   Confirmed/corrected address and phone number on facesheet? Yes   Assessment information obtained from? Patient;Medical Record   Communicated expected length of stay with patient/caregiver no   Prior to hospitilization cognitive status: Alert/Oriented   Prior to hospitalization functional status: Independent   Current cognitive status: Alert/Oriented   Current Functional Status: Independent   Lives With spouse   Able to Return to Prior Arrangements yes   Who are your caregiver(s) and their phone number(s)? Spouse Yonathan Richard is emergency contact [phone]   Readmission Within the Last 30 Days unable to assess   Patient currently being followed by outpatient case management? Unable to determine (comments)   Equipment Currently Used at Home none   Part D Coverage n/a   Discharge Plan A Other  (pending PT/OT eval)   Discharge Plan B Home   Patient/Family in Agreement with Plan unable to assess

## 2020-09-24 NOTE — HOSPITAL COURSE
39 year old female with bipolar disorder and migraines admitted after presented with headache and migraine. Seen by neurology. Stroke work-up negative. Left sided paresthesias persist. On multiple analgesics only with minimal improvement in her symptoms. She requested to be discharged home. Advised to follow-up with neurology outpatient.     Instructions provided to follow up with primary care physician as outpatient. Patient verbalized understanding and is aware to contact primary care physician or return to ED if new or worsening symptoms.    Physical exam on the day of discharge:  General: Patient resting comfortably in no acute distress.  Lungs: CTA. Good air entry.  Cor: Regular rate and rhythm. No murmurs. No pedal edema.  Abd: Soft. Nontender. Non-distended.  Neuro: Alert and oriented x3.  Speech is clear and coherent.  Cranial nerves 2-12 are grossly intact. Motor strength is 5/5 in all extremities.  Sensory exam unremarkable.  No dysmetria or dysdiadochokinesia.  Ext: No clubbing. No cyanosis.

## 2020-09-24 NOTE — ED PROVIDER NOTES
Encounter Date: 9/23/2020       History     Chief Complaint   Patient presents with    Headache    Weakness     left sided     39-year-old female with a history of migraines, bipolar disorder, GERD presents to the ER with headache and left-sided weakness.  Patient states that for the past week, she has had a migraine consistent with prior headaches.  Notes that it was slow onset.  Generalized and feels like nails in her head.  Today around 2:00 p.m., she states that she developed numbness to her left arm and leg.  States that she has felt clumsy and that she has had changes in her vision that she cannot describe.  Notes that she has never had these symptoms with prior migraines.  Denies fever, vomiting, chest pain, shortness of breath, abdominal pain, or changes in bowel or bladder function.  Denies history of MI or stroke.        Review of patient's allergies indicates:   Allergen Reactions    Gluten protein Other (See Comments)     Sever stomach ache, very tired feeling    Prochlorperazine Other (See Comments)    Prochlorperazine edisylate      Anxiety, itching  Other reaction(s): Unknown     Past Medical History:   Diagnosis Date    Anxiety     Bipolar affective disorder, rapid cycling     bipolar 1    Depression     GERD (gastroesophageal reflux disease)     Migraine headache     Vaginal delivery     x1     Past Surgical History:   Procedure Laterality Date    ABDOMINAL SURGERY      exploratory laparotomy    HYSTERECTOMY  12/15/2017     No family history on file.  Social History     Tobacco Use    Smoking status: Never Smoker    Smokeless tobacco: Never Used   Substance Use Topics    Alcohol use: No    Drug use: No     Review of Systems   Constitutional: Negative for fever.   HENT: Negative for sore throat.    Eyes: Positive for visual disturbance.   Respiratory: Negative for shortness of breath.    Cardiovascular: Negative for chest pain.   Gastrointestinal: Positive for nausea. Negative  for abdominal pain and vomiting.   Genitourinary: Negative for dysuria.   Musculoskeletal: Negative for back pain.   Skin: Negative for rash.   Neurological: Positive for weakness, numbness and headaches.   Hematological: Does not bruise/bleed easily.   All other systems reviewed and are negative.      Physical Exam     Initial Vitals [09/23/20 2311]   BP Pulse Resp Temp SpO2   116/68 75 16 98.4 °F (36.9 °C) 99 %      MAP       --         Physical Exam    Constitutional: She appears well-developed and well-nourished. No distress.   HENT:   Head: Normocephalic and atraumatic.   Mouth/Throat: Oropharynx is clear and moist.   Eyes: Conjunctivae and EOM are normal. Pupils are equal, round, and reactive to light.   Neck: Normal range of motion.   Cardiovascular: Normal rate, regular rhythm, normal heart sounds and intact distal pulses.   No murmur heard.  Pulmonary/Chest: Breath sounds normal. No respiratory distress. She has no wheezes. She has no rhonchi. She has no rales.   Abdominal: Soft. Bowel sounds are normal. She exhibits no distension. There is no abdominal tenderness. There is no rebound and no guarding.   Musculoskeletal: Normal range of motion. No tenderness or edema.   Neurological: She is alert. A cranial nerve deficit and sensory deficit is present. She exhibits normal muscle tone. Coordination normal. GCS eye subscore is 4. GCS verbal subscore is 5. GCS motor subscore is 6.   AAO. EOMI, PERRL.  Normal visual fields.  Decreased sensation to the left face, otherwise CN II-XII intact. BUE and BLE 5/5 strength to squeeze, flexion, extension. LUE and LLE drift. Normal sensation to extremities. Normal finger-to-nose, heel-to-shin, rapid alternating hand movements.   Skin: Skin is warm and dry.   Psychiatric: She has a normal mood and affect. Thought content normal.       NIHSS: 3 (Decreased sensation, LUE drift, LLE drift)      ED Course   Procedures  Labs Reviewed   CBC W/ AUTO DIFFERENTIAL - Abnormal;  Notable for the following components:       Result Value    Platelets 409 (*)     All other components within normal limits   COMPREHENSIVE METABOLIC PANEL - Abnormal; Notable for the following components:    CO2 21 (*)     All other components within normal limits   LIPID PANEL - Abnormal; Notable for the following components:    Cholesterol 262 (*)     Triglycerides 210 (*)     All other components within normal limits   PROTIME-INR   TSH   SARS-COV-2 RNA AMPLIFICATION, QUAL   POCT URINE PREGNANCY   POCT GLUCOSE MONITORING CONTINUOUS          Imaging Results          CT Head Without Contrast (Final result)  Result time 09/23/20 23:20:00    Final result by Yamilex Hall MD (09/23/20 23:20:00)                 Narrative:    EXAM:  CT Head Without Intravenous Contrast    CLINICAL HISTORY:  The patient is 39 years old and is Female; Neuro deficit, acute, stroke suspected    TECHNIQUE:  Axial computed tomography images of the head/brain without intravenous contrast.  Sagittal and coronal reformatted images were created and reviewed.  This CT exam was performed using one or more of the following dose reduction techniques:  automated exposure control, adjustment of the mA and/or kV according to patient size, and/or use of iterative reconstruction technique.    COMPARISON:  CT brain from 05/21/2020    FINDINGS:  BRAIN:  Unremarkable.  No obvious signs of acute infarct.  No evidence of intracranial mass. No acute hemorrhage.  VENTRICLES:  Unremarkable.  No ventriculomegaly.  BONES/JOINTS:  Unremarkable.  No acute fracture.  SOFT TISSUES:  Unremarkable.  SINUSES:  Unremarkable as visualized.  No acute sinusitis.  MASTOID AIR CELLS:  Unremarkable as visualized.  No mastoid effusion.    IMPRESSION:  No acute intracranial findings.    Electronically signed by:  Yamilex Hall MD  9/24/2020 2:14 AM CDT Workstation: [id number]                             CTA Head and Neck (xpd) (Final result)  Result time 09/23/20 23:26:00    Final  result by Yamilex Hall MD (09/23/20 23:26:00)                 Narrative:    EXAM:  CT Angiography Head and Neck With Intravenous Contrast    CLINICAL HISTORY:  The patient is 39 years old and is Female; Neuro deficit, acute, stroke suspected    TECHNIQUE:  Axial computed tomographic angiography images of the head and neck with intravenous contrast.  Measurements of carotid stenosis were determined utilizing NASCET criteria.  Sagittal and coronal reformatted images were created and reviewed.  This CT exam was performed using one or more of the following dose reduction techniques:  automated exposure control, adjustment of the mA and/or kV according to patient size, and/or use of iterative reconstruction technique.  MIP reconstructed images were created and reviewed.    COMPARISON:  No relevant prior studies available.    FINDINGS:  HEAD:  RIGHT ANTERIOR CEREBRAL ARTERY:  No occlusion or significant stenosis.  No aneurysm.  RIGHT MIDDLE CEREBRAL ARTERY:  No occlusion or significant stenosis.  No aneurysm.  RIGHT POSTERIOR CEREBRAL ARTERY:  No occlusion or significant stenosis.  No aneurysm.    LEFT ANTERIOR CEREBRAL ARTERY:  No occlusion or significant stenosis.  No aneurysm.  LEFT MIDDLE CEREBRAL ARTERY:  No occlusion or significant stenosis.  No aneurysm.  LEFT POSTERIOR CEREBRAL ARTERY:  No occlusion or significant stenosis.  No aneurysm.    BASILAR ARTERY:  No occlusion or significant stenosis.  No aneurysm.    NECK:  RIGHT COMMON CAROTID ARTERY:  No significant stenosis.  No dissection or occlusion.  RIGHT INTERNAL CAROTID ARTERY:  No significant stenosis.  No dissection or occlusion.  RIGHT EXTERNAL CAROTID ARTERY:  Unremarkable.  No occlusion.  RIGHT VERTEBRAL ARTERY:  No significant stenosis.  No dissection or occlusion.    LEFT COMMON CAROTID ARTERY:  No significant stenosis.  No dissection or occlusion.  LEFT INTERNAL CAROTID ARTERY:  No significant stenosis.  No dissection or occlusion.  LEFT EXTERNAL  CAROTID ARTERY:  Unremarkable.  No occlusion.  LEFT VERTEBRAL ARTERY:  No significant stenosis.  No dissection or occlusion.    HEAD and NECK:  BONES/JOINTS:  No acute fracture.  No dislocation.  SOFT TISSUES:  Unremarkable as visualized.  No mass.    CAROTID STENOSIS REFERENCE USING NASCET CRITERIA:  % ICA stenosis = (1 - narrowest ICA diameter/diameter of distal cervical ICA) x 100.  Mild - <50% stenosis.  Moderate - 50-69% stenosis.  Severe - 70-94% stenosis.  Near occlusion - 95-99% stenosis.  Occluded - 100% stenosis.    IMPRESSION:  No significant stenosis or occlusion within the arterial vasculature of the head and neck.    Electronically signed by:  Yamilex Hall MD  9/24/2020 2:11 AM CDT Workstation: [id number]                             X-Ray Chest AP Portable (In process)                  Medical Decision Making:   Initial Assessment:   39-year-old female with a history of migraines, bipolar disorder, GERD presents to the ER with headache x1 week and left-sided weakness since 2pm.  ED Management:  Plan:  Afebrile, vital signs stable.  Suspect complex migraine although subacute stroke cannot be ruled out.  Discussed case with Dr. Lundberg.  Recommends CTA head and neck and admission for stroke workup.  TPA not recommended at this time.    Reassessed.  Patient lying in bed in no acute distress.  States that she still has headache.  No new symptoms.  Lab showed WBC 8.6.  BUN 6, creatinine 0.7.  TSH within normal limits at 1.74.  CT head shows no acute abnormality.  CTA of the head and neck shows no significant stenosis or occlusion.  Chest x-ray shows no acute abnormality.  Because this patient has weakness on exam and is new for her, will admit for further evaluation.              Attending Attestation:         Attending Critical Care:   Critical Care Times:   ==============================================================  · Total Critical Care Time - exclusive of procedural time: 35  minutes.  ==============================================================  Critical care was necessary to treat or prevent imminent or life-threatening deterioration of the following conditions: stroke.   Critical care was time spent personally by me on the following activities: obtaining history from patient or relative, examination of patient, review of old charts, ordering lab, x-rays, and/or EKG, ordering and performing treatments and interventions, evaluation of patient's response to treatment, discussion with consultants, interpretation of cardiac measurements and re-evaluation of patient's conition.   Critical Care Condition: potentially life-threatening                         Clinical Impression:       ICD-10-CM ICD-9-CM   1. Left-sided weakness  R53.1 728.87   2. Stroke  I63.9 434.91   3. Intractable headache, unspecified chronicity pattern, unspecified headache type  R51 784.0                          ED Disposition Condition    Observation                             Chance Serrato MD  09/24/20 0256       Chance Serrato MD  10/05/20 0921

## 2020-09-24 NOTE — PT/OT/SLP EVAL
Speech Language Pathology Evaluation  Bedside Swallow    Patient Name:  Shruthi Richard   MRN:  [id number]  Admitting Diagnosis: Left-sided weakness    Recommendations:                 General Recommendations:  complete Cognitive-linguistic evaluation; more detailed cognitive-linguistic eval as outpt  Diet recommendations:  Regular, Thin (NO GLUTEN)  Aspiration Precautions: Standard aspiration precautions   General Precautions: Standard, fall, aspiration  Communication strategies:  provide increased time to answer and go to room if call light pushed    History:     Past Medical History:   Diagnosis Date    Anxiety     Bipolar affective disorder, rapid cycling     bipolar 1    Depression     GERD (gastroesophageal reflux disease)     Migraine headache     Vaginal delivery     x1       Past Surgical History:   Procedure Laterality Date    ABDOMINAL SURGERY      exploratory laparotomy    HYSTERECTOMY  12/15/2017       Social History: deferred    Prior Intubation HX:  Not associated with this admission    Modified Barium Swallow: n/a at this time    Chest X-Rays:   Imaging Results          CT Head Without Contrast (Final result)  Result time 09/23/20 23:20:00    Final result by Yamilex Hall MD (09/23/20 23:20:00)                 Narrative:    EXAM:  CT Head Without Intravenous Contrast    CLINICAL HISTORY:  The patient is 39 years old and is Female; Neuro deficit, acute, stroke suspected    TECHNIQUE:  Axial computed tomography images of the head/brain without intravenous contrast.  Sagittal and coronal reformatted images were created and reviewed.  This CT exam was performed using one or more of the following dose reduction techniques:  automated exposure control, adjustment of the mA and/or kV according to patient size, and/or use of iterative reconstruction technique.    COMPARISON:  CT brain from 05/21/2020    FINDINGS:  BRAIN:  Unremarkable.  No obvious signs of acute infarct.  No evidence of intracranial  mass. No acute hemorrhage.  VENTRICLES:  Unremarkable.  No ventriculomegaly.  BONES/JOINTS:  Unremarkable.  No acute fracture.  SOFT TISSUES:  Unremarkable.  SINUSES:  Unremarkable as visualized.  No acute sinusitis.  MASTOID AIR CELLS:  Unremarkable as visualized.  No mastoid effusion.    IMPRESSION:  No acute intracranial findings.    Electronically signed by:  Yamilex Hall MD  9/24/2020 2:14 AM CDT Workstation: [id number]                             CTA Head and Neck (xpd) (Final result)  Result time 09/23/20 23:26:00    Final result by Yamilex Hall MD (09/23/20 23:26:00)                 Narrative:    EXAM:  CT Angiography Head and Neck With Intravenous Contrast    CLINICAL HISTORY:  The patient is 39 years old and is Female; Neuro deficit, acute, stroke suspected    TECHNIQUE:  Axial computed tomographic angiography images of the head and neck with intravenous contrast.  Measurements of carotid stenosis were determined utilizing NASCET criteria.  Sagittal and coronal reformatted images were created and reviewed.  This CT exam was performed using one or more of the following dose reduction techniques:  automated exposure control, adjustment of the mA and/or kV according to patient size, and/or use of iterative reconstruction technique.  MIP reconstructed images were created and reviewed.    COMPARISON:  No relevant prior studies available.    FINDINGS:  HEAD:  RIGHT ANTERIOR CEREBRAL ARTERY:  No occlusion or significant stenosis.  No aneurysm.  RIGHT MIDDLE CEREBRAL ARTERY:  No occlusion or significant stenosis.  No aneurysm.  RIGHT POSTERIOR CEREBRAL ARTERY:  No occlusion or significant stenosis.  No aneurysm.    LEFT ANTERIOR CEREBRAL ARTERY:  No occlusion or significant stenosis.  No aneurysm.  LEFT MIDDLE CEREBRAL ARTERY:  No occlusion or significant stenosis.  No aneurysm.  LEFT POSTERIOR CEREBRAL ARTERY:  No occlusion or significant stenosis.  No aneurysm.    BASILAR ARTERY:  No occlusion or significant  stenosis.  No aneurysm.    NECK:  RIGHT COMMON CAROTID ARTERY:  No significant stenosis.  No dissection or occlusion.  RIGHT INTERNAL CAROTID ARTERY:  No significant stenosis.  No dissection or occlusion.  RIGHT EXTERNAL CAROTID ARTERY:  Unremarkable.  No occlusion.  RIGHT VERTEBRAL ARTERY:  No significant stenosis.  No dissection or occlusion.    LEFT COMMON CAROTID ARTERY:  No significant stenosis.  No dissection or occlusion.  LEFT INTERNAL CAROTID ARTERY:  No significant stenosis.  No dissection or occlusion.  LEFT EXTERNAL CAROTID ARTERY:  Unremarkable.  No occlusion.  LEFT VERTEBRAL ARTERY:  No significant stenosis.  No dissection or occlusion.    HEAD and NECK:  BONES/JOINTS:  No acute fracture.  No dislocation.  SOFT TISSUES:  Unremarkable as visualized.  No mass.    CAROTID STENOSIS REFERENCE USING NASCET CRITERIA:  % ICA stenosis = (1 - narrowest ICA diameter/diameter of distal cervical ICA) x 100.  Mild - <50% stenosis.  Moderate - 50-69% stenosis.  Severe - 70-94% stenosis.  Near occlusion - 95-99% stenosis.  Occluded - 100% stenosis.    IMPRESSION:  No significant stenosis or occlusion within the arterial vasculature of the head and neck.    Electronically signed by:  Yamilex Hall MD  9/24/2020 2:11 AM CDT Workstation: [id number]                             X-Ray Chest AP Portable (Final result)  Result time 09/23/20 23:39:15    Final result by Kandis Mejia MD (09/23/20 23:39:15)                 Narrative:    PROCEDURE:   XR CHEST AP PORTABLE  dated  9/23/2020 11:25 PM    CLINICAL HISTORY:   Female 39 years of age.   Stroke    TECHNIQUE: AP view of the chest obtained portably at 11:26 PM.    PREVIOUS STUDIES:  None Available    FINDINGS:    Cardiac and mediastinal contours are normal. Lungs are clear. There is  no pleural effusion or pneumothorax. Bones are unremarkable.      IMPRESSION:    No acute findings. Clear lungs. Normal size heart.    Electronically Signed by Kandis Mejia on 9/24/2020 6:59 AM  "                               Prior diet: gluten allergy; regular consistency.    Occupation/hobbies/homemaking: deferred.    Subjective     "I can do that." (chew/swallow fruit for eval purposes -- braden crackers not used due to gluten allergy)  Patient goals: none stated     Pain/Comfort:  · Pain Rating 1: 0/10    Objective:     Oral Musculature Evaluation  · Oral Musculature: WFL  · Dentition: present and adequate  · Secretion Management: adequate  · Mucosal Quality: adequate  · Mandibular Strength and Mobility: WFL  · Oral Labial Strength and Mobility: WFL  · Lingual Strength and Mobility: WFL, other (see comments)(faint lingual discoordination)  · Velar Elevation: other (see comments)(deferred)  · Buccal Strength and Mobility: WFL  · Volitional Cough: present  · Volitional Swallow: present  · Voice Prior to PO Intake: WFL, no issues noted or reported    Bedside Swallow Eval:    Pt denies change in swallow; denies swallow difficulty. Reports her left side weakness has resolved.    Consistencies Assessed:  · Thin liquids (water by straw sips, multiple)  · Puree (pudding x multiple spoons)  · Soft solids (fruit/canteloupe)     Oral Phase:   · WFL    Pharyngeal Phase:   · no overt clinical signs/symptoms of aspiration  · no overt clinical signs/symptoms of pharyngeal dysphagia    Compensatory Strategies  · n/a at this time    COGNITIVE COMMUNICATION -- Impaired recall, attention to task.  Needs further eval.  Brief assessment :    Verbal at sentence to conversation level but not spontaneously asking questions or commenting.  Follows basic spoken instructions; functional y/n for simple questions.  Names common objects but had difficulty naming in categories (7 animals in one minute).  Good speech intelligibility; repeats up to three digits in reverse.  Recalled two of 5 words after 4 minute delay.    Assessment:     Shruthi Richard is a 39 y.o. female with an SLP diagnosis functional swallow mechanism (faint " lingual discoordination when following oral motor commands), demonstrating behaviors/responses consistent with at least mild cognitive-linguistic deficits (impaired recall and attention to task).   Further cognitive assessment recommended.    Goals:   Multidisciplinary Problems     SLP Goals        Problem: SLP Goal    Goal Priority Disciplines Outcome   SLP Goal     SLP    Description: 1. Pt will participate in ongoing cognitive-linguistic assessment (reading, functional problem solving).  2. Use functional recall strategies to recall 75% target info from paragraph read to her, min cues                   Plan:     · Patient to be seen:  2 x/week, 3 x/week, 4 x/week(2-4/wk)   · Plan of Care expires:  10/08/20  · Plan of Care reviewed with:  patient   · SLP Follow-Up:  Yes       Discharge recommendations:  outpatient speech therapy   Barriers to Discharge:  to be determined    Time Tracking:     SLP Treatment Date:   09/24/20  Speech Start Time:  1325  Speech Stop Time:  1339     Speech Total Time (min):  14 min    Billable Minutes: Eval Swallow and Oral Function 8, cognitive-lingusit assessment 7 min    Monik Chadwick CCC-SLP  09/24/2020

## 2020-09-28 LAB — LAMOTRIGINE SERPL-MCNC: 4.3 UG/ML (ref 2–20)

## 2020-10-06 ENCOUNTER — HOSPITAL ENCOUNTER (EMERGENCY)
Facility: HOSPITAL | Age: 40
Discharge: HOME | End: 2020-10-06
Attending: EMERGENCY MEDICINE
Payer: MEDICAID

## 2020-10-06 VITALS
DIASTOLIC BLOOD PRESSURE: 60 MMHG | BODY MASS INDEX: 21.66 KG/M2 | HEIGHT: 65 IN | WEIGHT: 130 LBS | RESPIRATION RATE: 15 BRPM | HEART RATE: 49 BPM | SYSTOLIC BLOOD PRESSURE: 110 MMHG | OXYGEN SATURATION: 100 % | TEMPERATURE: 98 F

## 2020-10-06 DIAGNOSIS — Z53.29 LEFT AGAINST MEDICAL ADVICE: Primary | ICD-10-CM

## 2020-10-06 DIAGNOSIS — G43.909 MIGRAINE: ICD-10-CM

## 2020-10-06 LAB
ALBUMIN SERPL BCP-MCNC: 4 G/DL (ref 3.5–5.2)
ALP SERPL-CCNC: 54 U/L (ref 55–135)
ALT SERPL W/O P-5'-P-CCNC: 14 U/L (ref 10–44)
AMPHET+METHAMPHET UR QL: NEGATIVE
ANION GAP SERPL CALC-SCNC: 7 MMOL/L (ref 8–16)
APTT PPP: 26.9 SEC (ref 23.6–33.3)
AST SERPL-CCNC: 13 U/L (ref 10–40)
BARBITURATES UR QL SCN>200 NG/ML: NORMAL
BASOPHILS # BLD AUTO: 0.04 K/UL (ref 0–0.2)
BASOPHILS NFR BLD: 0.6 % (ref 0–1.9)
BENZODIAZ UR QL SCN>200 NG/ML: NORMAL
BILIRUB SERPL-MCNC: 0.3 MG/DL (ref 0.1–1)
BILIRUB UR QL STRIP: NEGATIVE
BNP SERPL-MCNC: 48 PG/ML (ref 0–99)
BUN SERPL-MCNC: 8 MG/DL (ref 6–20)
BZE UR QL SCN: NEGATIVE
CALCIUM SERPL-MCNC: 8.8 MG/DL (ref 8.7–10.5)
CANNABINOIDS UR QL SCN: NEGATIVE
CHLORIDE SERPL-SCNC: 105 MMOL/L (ref 95–110)
CK SERPL-CCNC: 62 U/L (ref 20–180)
CLARITY UR: CLEAR
CO2 SERPL-SCNC: 26 MMOL/L (ref 23–29)
COLOR UR: COLORLESS
CREAT SERPL-MCNC: 0.9 MG/DL (ref 0.5–1.4)
CREAT UR-MCNC: 36 MG/DL (ref 15–325)
DIFFERENTIAL METHOD: ABNORMAL
EOSINOPHIL # BLD AUTO: 0.1 K/UL (ref 0–0.5)
EOSINOPHIL NFR BLD: 1.9 % (ref 0–8)
ERYTHROCYTE [DISTWIDTH] IN BLOOD BY AUTOMATED COUNT: 13.3 % (ref 11.5–14.5)
EST. GFR  (AFRICAN AMERICAN): >60 ML/MIN/1.73 M^2
EST. GFR  (NON AFRICAN AMERICAN): >60 ML/MIN/1.73 M^2
GLUCOSE SERPL-MCNC: 96 MG/DL (ref 70–110)
GLUCOSE UR QL STRIP: NEGATIVE
HCT VFR BLD AUTO: 37.3 % (ref 37–48.5)
HGB BLD-MCNC: 11.9 G/DL (ref 12–16)
HGB UR QL STRIP: NEGATIVE
IMM GRANULOCYTES # BLD AUTO: 0.01 K/UL (ref 0–0.04)
IMM GRANULOCYTES NFR BLD AUTO: 0.1 % (ref 0–0.5)
INR PPP: 1.1
KETONES UR QL STRIP: NEGATIVE
LEUKOCYTE ESTERASE UR QL STRIP: NEGATIVE
LIPASE SERPL-CCNC: 40 U/L (ref 4–60)
LYMPHOCYTES # BLD AUTO: 2.5 K/UL (ref 1–4.8)
LYMPHOCYTES NFR BLD: 36.3 % (ref 18–48)
MAGNESIUM SERPL-MCNC: 2.2 MG/DL (ref 1.6–2.6)
MCH RBC QN AUTO: 28.6 PG (ref 27–31)
MCHC RBC AUTO-ENTMCNC: 31.9 G/DL (ref 32–36)
MCV RBC AUTO: 90 FL (ref 82–98)
MONOCYTES # BLD AUTO: 0.5 K/UL (ref 0.3–1)
MONOCYTES NFR BLD: 6.7 % (ref 4–15)
NEUTROPHILS # BLD AUTO: 3.7 K/UL (ref 1.8–7.7)
NEUTROPHILS NFR BLD: 54.4 % (ref 38–73)
NITRITE UR QL STRIP: NEGATIVE
NRBC BLD-RTO: 0 /100 WBC
OPIATES UR QL SCN: NEGATIVE
PCP UR QL SCN>25 NG/ML: NEGATIVE
PH UR STRIP: 6 [PH] (ref 5–8)
PLATELET # BLD AUTO: 307 K/UL (ref 150–350)
PMV BLD AUTO: 10.3 FL (ref 9.2–12.9)
POTASSIUM SERPL-SCNC: 4.3 MMOL/L (ref 3.5–5.1)
PROT SERPL-MCNC: 6.5 G/DL (ref 6–8.4)
PROT UR QL STRIP: NEGATIVE
PROTHROMBIN TIME: 14 SEC (ref 10.6–14.8)
RBC # BLD AUTO: 4.16 M/UL (ref 4–5.4)
SARS-COV-2 RDRP RESP QL NAA+PROBE: NEGATIVE
SODIUM SERPL-SCNC: 138 MMOL/L (ref 136–145)
SP GR UR STRIP: 1 (ref 1–1.03)
TOXICOLOGY INFORMATION: NORMAL
TROPONIN I SERPL DL<=0.01 NG/ML-MCNC: <0.03 NG/ML
TSH SERPL DL<=0.005 MIU/L-ACNC: 1.59 UIU/ML (ref 0.34–5.6)
URN SPEC COLLECT METH UR: ABNORMAL
UROBILINOGEN UR STRIP-ACNC: NEGATIVE EU/DL
WBC # BLD AUTO: 6.88 K/UL (ref 3.9–12.7)

## 2020-10-06 PROCEDURE — 83880 ASSAY OF NATRIURETIC PEPTIDE: CPT

## 2020-10-06 PROCEDURE — 93010 ELECTROCARDIOGRAM REPORT: CPT | Mod: ,,, | Performed by: INTERNAL MEDICINE

## 2020-10-06 PROCEDURE — 36415 COLL VENOUS BLD VENIPUNCTURE: CPT

## 2020-10-06 PROCEDURE — 93010 EKG 12-LEAD: ICD-10-PCS | Mod: ,,, | Performed by: INTERNAL MEDICINE

## 2020-10-06 PROCEDURE — 84484 ASSAY OF TROPONIN QUANT: CPT

## 2020-10-06 PROCEDURE — 96374 THER/PROPH/DIAG INJ IV PUSH: CPT | Mod: 59

## 2020-10-06 PROCEDURE — 96375 TX/PRO/DX INJ NEW DRUG ADDON: CPT | Mod: 59

## 2020-10-06 PROCEDURE — 85025 COMPLETE CBC W/AUTO DIFF WBC: CPT

## 2020-10-06 PROCEDURE — 83735 ASSAY OF MAGNESIUM: CPT

## 2020-10-06 PROCEDURE — 82550 ASSAY OF CK (CPK): CPT

## 2020-10-06 PROCEDURE — 63600175 PHARM REV CODE 636 W HCPCS: Performed by: EMERGENCY MEDICINE

## 2020-10-06 PROCEDURE — 51701 INSERT BLADDER CATHETER: CPT

## 2020-10-06 PROCEDURE — 80053 COMPREHEN METABOLIC PANEL: CPT

## 2020-10-06 PROCEDURE — U0002 COVID-19 LAB TEST NON-CDC: HCPCS

## 2020-10-06 PROCEDURE — 83690 ASSAY OF LIPASE: CPT

## 2020-10-06 PROCEDURE — 80307 DRUG TEST PRSMV CHEM ANLYZR: CPT

## 2020-10-06 PROCEDURE — 81003 URINALYSIS AUTO W/O SCOPE: CPT | Mod: 59

## 2020-10-06 PROCEDURE — 85730 THROMBOPLASTIN TIME PARTIAL: CPT

## 2020-10-06 PROCEDURE — 85610 PROTHROMBIN TIME: CPT

## 2020-10-06 PROCEDURE — 84443 ASSAY THYROID STIM HORMONE: CPT

## 2020-10-06 PROCEDURE — 99285 EMERGENCY DEPT VISIT HI MDM: CPT | Mod: 25

## 2020-10-06 PROCEDURE — 93005 ELECTROCARDIOGRAM TRACING: CPT | Mod: 59 | Performed by: INTERNAL MEDICINE

## 2020-10-06 RX ORDER — KETOROLAC TROMETHAMINE 30 MG/ML
15 INJECTION, SOLUTION INTRAMUSCULAR; INTRAVENOUS
Status: COMPLETED | OUTPATIENT
Start: 2020-10-06 | End: 2020-10-06

## 2020-10-06 RX ORDER — DIPHENHYDRAMINE HYDROCHLORIDE 50 MG/ML
25 INJECTION INTRAMUSCULAR; INTRAVENOUS
Status: COMPLETED | OUTPATIENT
Start: 2020-10-06 | End: 2020-10-06

## 2020-10-06 RX ADMIN — DIPHENHYDRAMINE HYDROCHLORIDE 25 MG: 50 INJECTION INTRAMUSCULAR; INTRAVENOUS at 04:10

## 2020-10-06 RX ADMIN — KETOROLAC TROMETHAMINE 15 MG: 30 INJECTION, SOLUTION INTRAMUSCULAR at 04:10

## 2020-10-06 NOTE — DISCHARGE INSTRUCTIONS
You are leaving against medical advice.  Please return at any time if you change her mind regarding our care.  Please see your primary care physician and neurologist as soon as possible.

## 2020-10-06 NOTE — ED NOTES
Per EMS, pt told  that didn't feel well and thought she could have a seizure. At approx 1 PM, became non-verbal and unable to move.

## 2020-10-06 NOTE — ED NOTES
Per  on phone, pt has been having constant migraine BOWSER x 3 weeks, saw Dr Lundberg/partner with Dr Negron yesterday for HA. States has an HA earring in ear to help with HA.  Husbands name is Yonathan Richard at [phone].

## 2020-10-06 NOTE — ED PROVIDER NOTES
Encounter Date: 10/6/2020       History     Chief Complaint   Patient presents with    Altered Mental Status     since 1 PM, possible seizure     This is a 39-year-old female who presents with EMS with a report of an altered mental status and headache.  EMS reports that the patient has been dealing with headaches and having problems with headaches.  She saw her neurologist yesterday.  EMS was called because the patient was not speaking talking or moving although she was awake and alert.  There is no report of seizure activity.  Upon ED arrival the patient was not speaking although she was making eye contact and following instructions.  These symptoms later resolved and she reported that she needed to urinate and that she was having a headache.  The patient was able to ambulate to the bathroom.          Review of patient's allergies indicates:   Allergen Reactions    Gluten protein Other (See Comments)     Sever stomach ache, very tired feeling    Prochlorperazine Other (See Comments)    Prochlorperazine edisylate      Anxiety, itching  Other reaction(s): Unknown     Past Medical History:   Diagnosis Date    Anxiety     Bipolar affective disorder, rapid cycling     bipolar 1    Depression     GERD (gastroesophageal reflux disease)     Migraine headache     Vaginal delivery     x1     Past Surgical History:   Procedure Laterality Date    ABDOMINAL SURGERY      exploratory laparotomy    HYSTERECTOMY  12/15/2017     No family history on file.  Social History     Tobacco Use    Smoking status: Never Smoker    Smokeless tobacco: Never Used   Substance Use Topics    Alcohol use: No    Drug use: No     Review of Systems   Unable to perform ROS: Other (Patient was not communicating when she presented to the emergency room initially to perform review of systems)       Physical Exam     Initial Vitals   BP Pulse Resp Temp SpO2   10/06/20 1415 10/06/20 1415 10/06/20 1415 10/06/20 1417 10/06/20 1415   130/65 (!)  55 11 97.8 °F (36.6 °C) 99 %      MAP       --                Physical Exam    Nursing note and vitals reviewed.  Constitutional: Vital signs are normal. She appears well-developed and well-nourished. She is not diaphoretic. She is active and cooperative.  Non-toxic appearance. She does not have a sickly appearance. She does not appear ill. No distress.   Patient will follow commands.  She makes good eye contact.  She is not speaking.   HENT:   Head: Normocephalic and atraumatic.   Right Ear: Tympanic membrane normal.   Left Ear: Tympanic membrane normal.   Nose: Nose normal. Right sinus exhibits no maxillary sinus tenderness and no frontal sinus tenderness. Left sinus exhibits no maxillary sinus tenderness and no frontal sinus tenderness.   Mouth/Throat: Uvula is midline, oropharynx is clear and moist and mucous membranes are normal. No oral lesions. No uvula swelling. No oropharyngeal exudate, posterior oropharyngeal edema or posterior oropharyngeal erythema.   Eyes: Conjunctivae, EOM and lids are normal. Pupils are equal, round, and reactive to light. No scleral icterus.   Neck: Trachea normal, normal range of motion, full passive range of motion without pain and phonation normal. Neck supple. No thyroid mass present. No stridor present. No spinous process tenderness and no muscular tenderness present. No edema, no erythema and normal range of motion present. No neck rigidity. No JVD present.   Cardiovascular: Normal rate, regular rhythm, normal heart sounds, intact distal pulses and normal pulses. Exam reveals no gallop, no distant heart sounds, no friction rub and no decreased pulses.    No murmur heard.  Pulmonary/Chest: Effort normal and breath sounds normal. No accessory muscle usage. No tachypnea. No respiratory distress. She has no decreased breath sounds. She has no wheezes. She has no rhonchi. She has no rales.   Abdominal: Soft. Normal appearance and bowel sounds are normal. She exhibits no distension,  no pulsatile midline mass and no mass. There is no abdominal tenderness. There is no rigidity, no rebound, no guarding, no CVA tenderness, no tenderness at McBurney's point and negative Gordon's sign.   Musculoskeletal: Normal range of motion. No tenderness or edema.      Cervical back: Normal. She exhibits normal range of motion, no tenderness, no bony tenderness, no swelling and no edema.      Thoracic back: Normal. She exhibits normal range of motion, no tenderness, no bony tenderness and no swelling.      Lumbar back: Normal. She exhibits normal range of motion, no tenderness, no bony tenderness and no swelling.      Comments: Pulses are 2+ throughout, cap refill is less than 2 sec throughout, extremities are nontender throughout with full range of motion. There is no spinal tenderness to palpation.   Neurological: She is alert. She has normal strength. She displays no atrophy, no tremor and normal reflexes. No cranial nerve deficit or sensory deficit. She exhibits normal muscle tone. She displays a negative Romberg sign. She displays no seizure activity.   No focal deficits.  Patient initially not speaking but would follow commands.  Patient then speaking and was able to ambulate to the emergency room.   Skin: Skin is warm, dry and intact. Capillary refill takes less than 2 seconds. No ecchymosis, no petechiae and no rash noted. No erythema. No pallor.   Psychiatric: She has a normal mood and affect. Her speech is normal and behavior is normal. Judgment and thought content normal. Cognition and memory are normal.         ED Course   Procedures  Labs Reviewed   CBC W/ AUTO DIFFERENTIAL - Abnormal; Notable for the following components:       Result Value    Hemoglobin 11.9 (*)     Mean Corpuscular Hemoglobin Conc 31.9 (*)     All other components within normal limits   COMPREHENSIVE METABOLIC PANEL - Abnormal; Notable for the following components:    Alkaline Phosphatase 54 (*)     Anion Gap 7 (*)     All other  components within normal limits   URINALYSIS - Abnormal; Notable for the following components:    Color, UA Colorless (*)     All other components within normal limits   APTT   B-TYPE NATRIURETIC PEPTIDE   DRUG SCREEN PANEL, URINE EMERGENCY    Narrative:     Specimen Source->Urine   LIPASE   MAGNESIUM   PROTIME-INR   TROPONIN I   TSH   CK   SARS-COV-2 RNA AMPLIFICATION, QUAL        ECG Results          EKG 12-lead (In process)  Result time 10/06/20 15:27:37    In process by Interface, Lab In [facility] (10/06/20 15:27:37)                 Narrative:    Test Reason : G43.909,    Vent. Rate : 063 BPM     Atrial Rate : 063 BPM     P-R Int : 172 ms          QRS Dur : 068 ms      QT Int : 402 ms       P-R-T Axes : 063 065 059 degrees     QTc Int : 411 ms    Normal sinus rhythm  Low voltage QRS  Borderline Abnormal ECG  When compared with ECG of 23-SEP-2020 23:19,  No significant change was found    Referred By: [email or web address]   SELF           Confirmed By:                             Imaging Results          CT Head Without Contrast (Final result)  Result time 10/06/20 14:55:52    Final result by Isaiah Rosado MD (10/06/20 14:55:52)                 Narrative:    CT HEAD WITHOUT CONTRAST    CMS MANDATED QUALITY DATA - CT RADIATION  436    All CT scans at this facility utilize dose modulation, iterative  reconstruction, and/or weight based dosing when appropriate to reduce  radiation dose to as low as reasonably achievable    Clinical data: Altered mental status, comparison to September 24, 2020.    FINDINGS: Noninfusion images were obtained from the skull base to the  vertex. There is no intracranial mass, hemorrhage, or midline shift.  Ventricles and sulci are normal. There are no pathologic extra-axial  fluid collections. There is no evidence of ischemic change or edema.  Cerebellum and brainstem are normal.    The calvarium is intact.    IMPRESSION:    1. Normal CT head without contrast.      Electronically Signed by Tony  Ashlee HOBBS on 10/6/2020 3:01 PM                             X-Ray Chest 1 View (Final result)  Result time 10/06/20 14:48:51    Final result by Rebeca Wilde MD (10/06/20 14:48:51)                 Narrative:    CLINICAL HISTORY:  Headaches    TECHNIQUE:  Portable AP radiograph the chest.    COMPARISON:  9/23/2020    FINDINGS:  The lungs appear clear. There is no pneumothorax. Costophrenic angles  are seen without effusion. The heart is normal in size .  The  mediastinum is within normal limits. Osseous structures and visualized  upper abdomen appear within normal limits.    IMPRESSION:  No acute cardiac or pulmonary process.            Electronically Signed by Rebeca Wilde M.D. on 10/6/2020 2:51 PM                               Medical Decision Making:   Clinical Tests:   Lab Tests: Reviewed  Radiological Study: Reviewed  Medical Tests: Reviewed  ED Management:  Consult placed to patient's neurologist.  Old chart review demonstrates multiple previous admissions with various neurological symptoms with with an extensive workup with normal findings including normal MRI of the brain, normal CTA of the brain, normal echo.  MRI was ordered today.  The patient then decided to leave against medical advice.  She stated that she wanted to go home.  She was conversing.  No focal deficits noted.  She was awake alert and oriented.  She was demonstrating adequate decision making capabilities.  Risk of leaving against medical advice have been explained in detail including permanent disability and death.  Patient voiced understanding.  Have encouraged patient to return at any time if she changes her mind regarding our care and I have urged the patient to follow up with her primary care physician as well as Neurology.                             Clinical Impression:       ICD-10-CM ICD-9-CM   1. Left against medical advice  Z53.29 V64.2   2. Migraine  G43.909 346.90                          ED Disposition Condition     KAREN Perea MD  10/06/20 0169

## 2020-10-06 NOTE — ED NOTES
Pt more verbal, able to understand few words, agreed that has a HA,rates a 10, crying, informed Dr Perea.

## 2020-10-22 PROCEDURE — 99285 EMERGENCY DEPT VISIT HI MDM: CPT

## 2020-10-23 ENCOUNTER — HOSPITAL ENCOUNTER (OUTPATIENT)
Facility: HOSPITAL | Age: 40
Discharge: HOME | End: 2020-10-23
Attending: EMERGENCY MEDICINE | Admitting: HOSPITALIST
Payer: MEDICAID

## 2020-10-23 VITALS
DIASTOLIC BLOOD PRESSURE: 54 MMHG | OXYGEN SATURATION: 99 % | HEIGHT: 63 IN | BODY MASS INDEX: 23.83 KG/M2 | RESPIRATION RATE: 15 BRPM | HEART RATE: 80 BPM | TEMPERATURE: 98 F | WEIGHT: 134.5 LBS | SYSTOLIC BLOOD PRESSURE: 101 MMHG

## 2020-10-23 DIAGNOSIS — R40.4 STARING EPISODES: ICD-10-CM

## 2020-10-23 DIAGNOSIS — G44.59 OTHER COMPLICATED HEADACHE SYNDROME: Primary | ICD-10-CM

## 2020-10-23 DIAGNOSIS — R07.9 CHEST PAIN: ICD-10-CM

## 2020-10-23 DIAGNOSIS — R53.81 COMPLAINT OF DEBILITY AND MALAISE: ICD-10-CM

## 2020-10-23 PROBLEM — G43.919 INTRACTABLE MIGRAINE: Status: RESOLVED | Noted: 2020-09-24 | Resolved: 2020-10-23

## 2020-10-23 PROBLEM — F44.7 FUNCTIONAL NEUROLOGICAL SYMPTOM DISORDER WITH MIXED SYMPTOMS: Status: ACTIVE | Noted: 2020-10-23

## 2020-10-23 PROBLEM — F44.7 FUNCTIONAL NEUROLOGICAL SYMPTOM DISORDER WITH MIXED SYMPTOMS: Status: RESOLVED | Noted: 2020-10-23 | Resolved: 2020-10-23

## 2020-10-23 LAB
ALBUMIN SERPL BCP-MCNC: 4.3 G/DL (ref 3.5–5.2)
ALP SERPL-CCNC: 77 U/L (ref 55–135)
ALT SERPL W/O P-5'-P-CCNC: 58 U/L (ref 10–44)
AMPHET+METHAMPHET UR QL: NEGATIVE
ANION GAP SERPL CALC-SCNC: 12 MMOL/L (ref 8–16)
APTT PPP: 26.8 SEC (ref 23.6–33.3)
AST SERPL-CCNC: 33 U/L (ref 10–40)
BARBITURATES UR QL SCN>200 NG/ML: NORMAL
BASOPHILS # BLD AUTO: 0.03 K/UL (ref 0–0.2)
BASOPHILS NFR BLD: 0.4 % (ref 0–1.9)
BENZODIAZ UR QL SCN>200 NG/ML: NORMAL
BILIRUB SERPL-MCNC: 0.4 MG/DL (ref 0.1–1)
BILIRUB UR QL STRIP: NEGATIVE
BNP SERPL-MCNC: 10 PG/ML (ref 0–99)
BUN SERPL-MCNC: 9 MG/DL (ref 6–20)
BZE UR QL SCN: NEGATIVE
CALCIUM SERPL-MCNC: 9.6 MG/DL (ref 8.7–10.5)
CANNABINOIDS UR QL SCN: NEGATIVE
CHLORIDE SERPL-SCNC: 101 MMOL/L (ref 95–110)
CK SERPL-CCNC: 77 U/L (ref 20–180)
CLARITY UR: CLEAR
CO2 SERPL-SCNC: 27 MMOL/L (ref 23–29)
COLOR UR: COLORLESS
CREAT SERPL-MCNC: 0.7 MG/DL (ref 0.5–1.4)
CREAT UR-MCNC: 33 MG/DL (ref 15–325)
CRP SERPL-MCNC: 0.18 MG/DL
DIFFERENTIAL METHOD: ABNORMAL
EOSINOPHIL # BLD AUTO: 0.2 K/UL (ref 0–0.5)
EOSINOPHIL NFR BLD: 2.1 % (ref 0–8)
ERYTHROCYTE [DISTWIDTH] IN BLOOD BY AUTOMATED COUNT: 13.2 % (ref 11.5–14.5)
ERYTHROCYTE [SEDIMENTATION RATE] IN BLOOD BY WESTERGREN METHOD: 9 MM/HR (ref 0–20)
EST. GFR  (AFRICAN AMERICAN): >60 ML/MIN/1.73 M^2
EST. GFR  (NON AFRICAN AMERICAN): >60 ML/MIN/1.73 M^2
FOLATE SERPL-MCNC: 10 NG/ML (ref 4–24)
GLUCOSE SERPL-MCNC: 88 MG/DL (ref 70–110)
GLUCOSE UR QL STRIP: NEGATIVE
HCT VFR BLD AUTO: 38.6 % (ref 37–48.5)
HGB BLD-MCNC: 12.3 G/DL (ref 12–16)
HGB UR QL STRIP: NEGATIVE
HIV1+2 IGG SERPL QL IA.RAPID: NEGATIVE
IMM GRANULOCYTES # BLD AUTO: 0.02 K/UL (ref 0–0.04)
IMM GRANULOCYTES NFR BLD AUTO: 0.3 % (ref 0–0.5)
INR PPP: 1.1
KETONES UR QL STRIP: NEGATIVE
LEUKOCYTE ESTERASE UR QL STRIP: NEGATIVE
LIPASE SERPL-CCNC: 36 U/L (ref 4–60)
LYMPHOCYTES # BLD AUTO: 2.7 K/UL (ref 1–4.8)
LYMPHOCYTES NFR BLD: 35.6 % (ref 18–48)
MAGNESIUM SERPL-MCNC: 2 MG/DL (ref 1.6–2.6)
MCH RBC QN AUTO: 28 PG (ref 27–31)
MCHC RBC AUTO-ENTMCNC: 31.9 G/DL (ref 32–36)
MCV RBC AUTO: 88 FL (ref 82–98)
MONOCYTES # BLD AUTO: 0.5 K/UL (ref 0.3–1)
MONOCYTES NFR BLD: 6.4 % (ref 4–15)
NEUTROPHILS # BLD AUTO: 4.2 K/UL (ref 1.8–7.7)
NEUTROPHILS NFR BLD: 55.2 % (ref 38–73)
NITRITE UR QL STRIP: NEGATIVE
NRBC BLD-RTO: 0 /100 WBC
OPIATES UR QL SCN: NEGATIVE
PCP UR QL SCN>25 NG/ML: NEGATIVE
PH UR STRIP: 8 [PH] (ref 5–8)
PLATELET # BLD AUTO: 298 K/UL (ref 150–350)
PMV BLD AUTO: 10.3 FL (ref 9.2–12.9)
POTASSIUM SERPL-SCNC: 3.5 MMOL/L (ref 3.5–5.1)
PROT SERPL-MCNC: 6.7 G/DL (ref 6–8.4)
PROT UR QL STRIP: NEGATIVE
PROTHROMBIN TIME: 13.2 SEC (ref 10.6–14.8)
RBC # BLD AUTO: 4.39 M/UL (ref 4–5.4)
RPR SER QL: NORMAL
SARS-COV-2 RDRP RESP QL NAA+PROBE: NEGATIVE
SODIUM SERPL-SCNC: 140 MMOL/L (ref 136–145)
SP GR UR STRIP: 1.01 (ref 1–1.03)
TOXICOLOGY INFORMATION: NORMAL
TROPONIN I SERPL DL<=0.01 NG/ML-MCNC: <0.03 NG/ML
TSH SERPL DL<=0.005 MIU/L-ACNC: 3.73 UIU/ML (ref 0.34–5.6)
URN SPEC COLLECT METH UR: ABNORMAL
UROBILINOGEN UR STRIP-ACNC: NEGATIVE EU/DL
VIT B12 SERPL-MCNC: 485 PG/ML (ref 210–950)
WBC # BLD AUTO: 7.67 K/UL (ref 3.9–12.7)

## 2020-10-23 PROCEDURE — 96376 TX/PRO/DX INJ SAME DRUG ADON: CPT

## 2020-10-23 PROCEDURE — 82607 VITAMIN B-12: CPT

## 2020-10-23 PROCEDURE — 84443 ASSAY THYROID STIM HORMONE: CPT

## 2020-10-23 PROCEDURE — 85651 RBC SED RATE NONAUTOMATED: CPT

## 2020-10-23 PROCEDURE — 25000003 PHARM REV CODE 250: Performed by: INTERNAL MEDICINE

## 2020-10-23 PROCEDURE — 83735 ASSAY OF MAGNESIUM: CPT

## 2020-10-23 PROCEDURE — 63600175 PHARM REV CODE 636 W HCPCS: Performed by: EMERGENCY MEDICINE

## 2020-10-23 PROCEDURE — 63600175 PHARM REV CODE 636 W HCPCS: Performed by: HOSPITALIST

## 2020-10-23 PROCEDURE — G0378 HOSPITAL OBSERVATION PER HR: HCPCS

## 2020-10-23 PROCEDURE — 36415 COLL VENOUS BLD VENIPUNCTURE: CPT

## 2020-10-23 PROCEDURE — 86703 HIV-1/HIV-2 1 RESULT ANTBDY: CPT

## 2020-10-23 PROCEDURE — 85610 PROTHROMBIN TIME: CPT

## 2020-10-23 PROCEDURE — 82746 ASSAY OF FOLIC ACID SERUM: CPT

## 2020-10-23 PROCEDURE — 25000003 PHARM REV CODE 250: Performed by: HOSPITALIST

## 2020-10-23 PROCEDURE — U0002 COVID-19 LAB TEST NON-CDC: HCPCS

## 2020-10-23 PROCEDURE — 93005 ELECTROCARDIOGRAM TRACING: CPT | Performed by: INTERNAL MEDICINE

## 2020-10-23 PROCEDURE — 93010 ELECTROCARDIOGRAM REPORT: CPT | Mod: ,,, | Performed by: INTERNAL MEDICINE

## 2020-10-23 PROCEDURE — 83690 ASSAY OF LIPASE: CPT

## 2020-10-23 PROCEDURE — 97162 PT EVAL MOD COMPLEX 30 MIN: CPT

## 2020-10-23 PROCEDURE — 81003 URINALYSIS AUTO W/O SCOPE: CPT | Mod: 59

## 2020-10-23 PROCEDURE — 63600175 PHARM REV CODE 636 W HCPCS: Performed by: INTERNAL MEDICINE

## 2020-10-23 PROCEDURE — 80307 DRUG TEST PRSMV CHEM ANLYZR: CPT

## 2020-10-23 PROCEDURE — 85730 THROMBOPLASTIN TIME PARTIAL: CPT

## 2020-10-23 PROCEDURE — 96375 TX/PRO/DX INJ NEW DRUG ADDON: CPT

## 2020-10-23 PROCEDURE — 80053 COMPREHEN METABOLIC PANEL: CPT

## 2020-10-23 PROCEDURE — 86592 SYPHILIS TEST NON-TREP QUAL: CPT

## 2020-10-23 PROCEDURE — 83880 ASSAY OF NATRIURETIC PEPTIDE: CPT

## 2020-10-23 PROCEDURE — 82550 ASSAY OF CK (CPK): CPT

## 2020-10-23 PROCEDURE — 85025 COMPLETE CBC W/AUTO DIFF WBC: CPT

## 2020-10-23 PROCEDURE — 84484 ASSAY OF TROPONIN QUANT: CPT

## 2020-10-23 PROCEDURE — 93010 EKG 12-LEAD: ICD-10-PCS | Mod: ,,, | Performed by: INTERNAL MEDICINE

## 2020-10-23 PROCEDURE — 97530 THERAPEUTIC ACTIVITIES: CPT

## 2020-10-23 PROCEDURE — 86140 C-REACTIVE PROTEIN: CPT

## 2020-10-23 PROCEDURE — 96374 THER/PROPH/DIAG INJ IV PUSH: CPT

## 2020-10-23 RX ORDER — ACETAMINOPHEN 325 MG/1
650 TABLET ORAL EVERY 4 HOURS PRN
Status: DISCONTINUED | OUTPATIENT
Start: 2020-10-23 | End: 2020-10-23 | Stop reason: HOSPADM

## 2020-10-23 RX ORDER — METHYLPREDNISOLONE SOD SUCC 125 MG
250 VIAL (EA) INJECTION ONCE
Status: DISCONTINUED | OUTPATIENT
Start: 2020-10-23 | End: 2020-10-23

## 2020-10-23 RX ORDER — POTASSIUM CHLORIDE 20 MEQ/1
20 TABLET, EXTENDED RELEASE ORAL
Status: DISCONTINUED | OUTPATIENT
Start: 2020-10-23 | End: 2020-10-23 | Stop reason: HOSPADM

## 2020-10-23 RX ORDER — KETOROLAC TROMETHAMINE 30 MG/ML
30 INJECTION, SOLUTION INTRAMUSCULAR; INTRAVENOUS EVERY 6 HOURS PRN
Status: DISCONTINUED | OUTPATIENT
Start: 2020-10-23 | End: 2020-10-23

## 2020-10-23 RX ORDER — POTASSIUM CHLORIDE 7.45 MG/ML
40 INJECTION INTRAVENOUS
Status: DISCONTINUED | OUTPATIENT
Start: 2020-10-23 | End: 2020-10-23 | Stop reason: HOSPADM

## 2020-10-23 RX ORDER — POTASSIUM CHLORIDE 20 MEQ/1
40 TABLET, EXTENDED RELEASE ORAL
Status: DISCONTINUED | OUTPATIENT
Start: 2020-10-23 | End: 2020-10-23 | Stop reason: HOSPADM

## 2020-10-23 RX ORDER — KETOROLAC TROMETHAMINE 30 MG/ML
15 INJECTION, SOLUTION INTRAMUSCULAR; INTRAVENOUS EVERY 6 HOURS PRN
Status: DISCONTINUED | OUTPATIENT
Start: 2020-10-23 | End: 2020-10-23 | Stop reason: HOSPADM

## 2020-10-23 RX ORDER — DIPHENHYDRAMINE HYDROCHLORIDE 50 MG/ML
25 INJECTION INTRAMUSCULAR; INTRAVENOUS EVERY 8 HOURS
Status: DISCONTINUED | OUTPATIENT
Start: 2020-10-23 | End: 2020-10-23 | Stop reason: HOSPADM

## 2020-10-23 RX ORDER — PANTOPRAZOLE SODIUM 40 MG/1
40 TABLET, DELAYED RELEASE ORAL 2 TIMES DAILY
Status: DISCONTINUED | OUTPATIENT
Start: 2020-10-23 | End: 2020-10-23 | Stop reason: HOSPADM

## 2020-10-23 RX ORDER — LANOLIN ALCOHOL/MO/W.PET/CERES
800 CREAM (GRAM) TOPICAL
Status: DISCONTINUED | OUTPATIENT
Start: 2020-10-23 | End: 2020-10-23 | Stop reason: HOSPADM

## 2020-10-23 RX ORDER — KETOROLAC TROMETHAMINE 30 MG/ML
15 INJECTION, SOLUTION INTRAMUSCULAR; INTRAVENOUS
Status: COMPLETED | OUTPATIENT
Start: 2020-10-23 | End: 2020-10-23

## 2020-10-23 RX ORDER — MAGNESIUM SULFATE 1 G/100ML
1 INJECTION INTRAVENOUS
Status: DISCONTINUED | OUTPATIENT
Start: 2020-10-23 | End: 2020-10-23 | Stop reason: HOSPADM

## 2020-10-23 RX ORDER — DIPHENHYDRAMINE HYDROCHLORIDE 50 MG/ML
25 INJECTION INTRAMUSCULAR; INTRAVENOUS
Status: COMPLETED | OUTPATIENT
Start: 2020-10-23 | End: 2020-10-23

## 2020-10-23 RX ORDER — SODIUM CHLORIDE 0.9 % (FLUSH) 0.9 %
10 SYRINGE (ML) INJECTION
Status: DISCONTINUED | OUTPATIENT
Start: 2020-10-23 | End: 2020-10-23 | Stop reason: HOSPADM

## 2020-10-23 RX ORDER — POTASSIUM CHLORIDE 7.45 MG/ML
20 INJECTION INTRAVENOUS
Status: DISCONTINUED | OUTPATIENT
Start: 2020-10-23 | End: 2020-10-23 | Stop reason: HOSPADM

## 2020-10-23 RX ORDER — DULOXETIN HYDROCHLORIDE 20 MG/1
20 CAPSULE, DELAYED RELEASE ORAL 2 TIMES DAILY
Status: DISCONTINUED | OUTPATIENT
Start: 2020-10-23 | End: 2020-10-23 | Stop reason: HOSPADM

## 2020-10-23 RX ORDER — MAGNESIUM SULFATE HEPTAHYDRATE 40 MG/ML
4 INJECTION, SOLUTION INTRAVENOUS
Status: DISCONTINUED | OUTPATIENT
Start: 2020-10-23 | End: 2020-10-23 | Stop reason: HOSPADM

## 2020-10-23 RX ORDER — POLYETHYLENE GLYCOL 3350 17 G/17G
17 POWDER, FOR SOLUTION ORAL DAILY
Status: DISCONTINUED | OUTPATIENT
Start: 2020-10-23 | End: 2020-10-23 | Stop reason: HOSPADM

## 2020-10-23 RX ORDER — ONDANSETRON 2 MG/ML
4 INJECTION INTRAMUSCULAR; INTRAVENOUS EVERY 8 HOURS PRN
Status: DISCONTINUED | OUTPATIENT
Start: 2020-10-23 | End: 2020-10-23 | Stop reason: HOSPADM

## 2020-10-23 RX ORDER — KETOROLAC TROMETHAMINE 30 MG/ML
30 INJECTION, SOLUTION INTRAMUSCULAR; INTRAVENOUS EVERY 8 HOURS
Status: DISCONTINUED | OUTPATIENT
Start: 2020-10-23 | End: 2020-10-23 | Stop reason: HOSPADM

## 2020-10-23 RX ORDER — METHYLPREDNISOLONE SOD SUCC 125 MG
250 VIAL (EA) INJECTION ONCE
Status: COMPLETED | OUTPATIENT
Start: 2020-10-23 | End: 2020-10-23

## 2020-10-23 RX ORDER — MAGNESIUM SULFATE HEPTAHYDRATE 40 MG/ML
2 INJECTION, SOLUTION INTRAVENOUS
Status: DISCONTINUED | OUTPATIENT
Start: 2020-10-23 | End: 2020-10-23 | Stop reason: HOSPADM

## 2020-10-23 RX ORDER — AMOXICILLIN 250 MG
1 CAPSULE ORAL 2 TIMES DAILY
Status: DISCONTINUED | OUTPATIENT
Start: 2020-10-23 | End: 2020-10-23 | Stop reason: HOSPADM

## 2020-10-23 RX ORDER — BUTALBITAL, ACETAMINOPHEN AND CAFFEINE 50; 325; 40 MG/1; MG/1; MG/1
1 TABLET ORAL ONCE
Status: DISCONTINUED | OUTPATIENT
Start: 2020-10-23 | End: 2020-10-23

## 2020-10-23 RX ORDER — TALC
6 POWDER (GRAM) TOPICAL NIGHTLY PRN
Status: DISCONTINUED | OUTPATIENT
Start: 2020-10-23 | End: 2020-10-23 | Stop reason: HOSPADM

## 2020-10-23 RX ADMIN — METHYLPREDNISOLONE SODIUM SUCCINATE 250 MG: 125 INJECTION, POWDER, FOR SOLUTION INTRAMUSCULAR; INTRAVENOUS at 04:10

## 2020-10-23 RX ADMIN — KETOROLAC TROMETHAMINE 15 MG: 30 INJECTION, SOLUTION INTRAMUSCULAR; INTRAVENOUS at 03:10

## 2020-10-23 RX ADMIN — DIPHENHYDRAMINE HYDROCHLORIDE 25 MG: 50 INJECTION INTRAMUSCULAR; INTRAVENOUS at 03:10

## 2020-10-23 RX ADMIN — POTASSIUM CHLORIDE 20 MEQ: 20 TABLET, EXTENDED RELEASE ORAL at 08:10

## 2020-10-23 RX ADMIN — KETOROLAC TROMETHAMINE 30 MG: 30 INJECTION, SOLUTION INTRAMUSCULAR at 10:10

## 2020-10-23 RX ADMIN — LAMOTRIGINE 150 MG: 100 TABLET ORAL at 10:10

## 2020-10-23 RX ADMIN — PANTOPRAZOLE SODIUM 40 MG: 40 TABLET, DELAYED RELEASE ORAL at 10:10

## 2020-10-23 NOTE — HOSPITAL COURSE
Patient admitted with symptoms per HPI.  Head CT with no acute process. Prior MRI of head and CTA head and neck done 9/24 and were normal.  MRI cervical spine done and revealed degenerative disease with mild canal and foraminal narrowing with normal cord signal. Neurology consulted and evaluated patient.  IV steroids given for headache as fioricet did not help. Prior extensive inpatient and outpatient workup has been done with unfortunate no etiology to her symptoms thus far.  She is scheduled for a repeat EEG and nerve conduction study with Dr. Mickey Nugent as outpatient next week.  She was seen by therapy and outpatient therapy recommended and ordered. Neuro deficits have been intermittent in nature as documented by history given, therapy notes, nursing notes.  Patient does not feel that IV steroids has helped her headache but would prefer to go home. She is medically stable for discharge with no current neuro deficits.  I discussed studies with patient and her  as they are understandably frustrated given lack of answers to her symptoms . B12, Folic acid, HIV, RPR, TSH checked and normal. Examined on day of discharge and patient is in NAD, well groomed, face symmetric, speech non slurred, affect appropriate. Return precautions given.

## 2020-10-23 NOTE — CONSULTS
[facility]  Neurology  Consult Note    Patient Name: Shruthi Richard  MRN: [id number]  Admission Date: 10/23/2020  Hospital Length of Stay: 0 days  Code Status: Full Code   Attending Provider: Mayra aNir MD   Consulting Provider: Dr MICKEY Nugent  Primary Care Physician: Valente Erazo Jr, MD  Principal Problem:Functional neurological symptom disorder with mixed symptoms    Consults  Subjective:     Chief Complaint:    Headache         HPI per EMR: 39-year-old lady with past medical history of bipolar, depression, intractable migraine presented with chief complaint of shock-like sensation all over the body.  According to  was present at bedside tells me that since last couple weeks patient has many staring episodes and after those episodes she becomes very slow, sluggish and overall for last couple weeks she is declining and prefers to stay in the bed.  He also reported that she cannot walk long distance due to leg weakness.  Patient tells me that she has this sensation where somebody is tasing her with stun gun.  Patient has numerous complains including but not limited to speech disturbances, sensory complaints, visual symptoms, cognitive symptoms and nonepileptic seizure-like symptoms.  This patient has very extensive neuro workup done both inpatient and outpatient including but not limited to CT head, MRIs, lumbar puncture without revealing any organic disease process that would explain her symptoms.  Otherwise she denies any fever, chills, chest pain, palpitations, bladder or bowel symptoms.  She tells me that due to her symptoms she cannot work.    Neurological Consult:  Patient seen and examined with Dr. Mickey Nugent.  Patient alert oriented in bed.  She moves all the extremities at this time.  She says that last night when she was in the bathtub she felt a shock goes through her body and she fell weak.  She also has a headache that she has not been able to get relief from.  Patient has  had full workup with Neurology and multiple MRIs along with multiple treatment modalities.  MRI C-spine ordered to rule out any acute abnormalities.      Past Medical History:   Diagnosis Date    Anxiety     Bipolar affective disorder, rapid cycling     bipolar 1    Depression     GERD (gastroesophageal reflux disease)     Migraine headache     Vaginal delivery     x1       Past Surgical History:   Procedure Laterality Date    ABDOMINAL SURGERY      exploratory laparotomy    HYSTERECTOMY  12/15/2017       Review of patient's allergies indicates:   Allergen Reactions    Gluten protein Other (See Comments)     Sever stomach ache, very tired feeling    Prochlorperazine Other (See Comments)    Prochlorperazine edisylate      Anxiety, itching  Other reaction(s): Unknown       Current Neurological Medications:  Lamictal    No current facility-administered medications on file prior to encounter.      Current Outpatient Medications on File Prior to Encounter   Medication Sig    clonazePAM (KLONOPIN) 2 MG Tab Take 2 mg by mouth 3 (three) times daily.    lamoTRIgine (LAMICTAL) 150 MG Tab Take 150 mg by mouth 2 (two) times daily.     ondansetron (ZOFRAN-ODT) 4 MG TbDL Take 1 tablet (4 mg total) by mouth every 8 (eight) hours as needed.    pantoprazole (PROTONIX) 40 MG tablet Take 40 mg by mouth 2 (two) times daily.    FLUoxetine 20 MG capsule Take 20 mg by mouth once daily.    onabotulinumtoxina (BOTOX) 200 unit SolR Inject 200 Units into the muscle once.      Family History     None        Tobacco Use    Smoking status: Never Smoker    Smokeless tobacco: Never Used   Substance and Sexual Activity    Alcohol use: No    Drug use: No    Sexual activity: Yes     Partners: Male     Birth control/protection: OCP     Review of Systems   Musculoskeletal: Positive for myalgias.   Neurological: Positive for speech difficulty, weakness, numbness and headaches.   All other systems reviewed and are  negative.    Objective:     Vital Signs (Most Recent):  Temp: 98.2 °F (36.8 °C) (10/23/20 1609)  Pulse: 80 (10/23/20 1609)  Resp: 15 (10/23/20 1609)  BP: (!) 101/54 (10/23/20 1632)  SpO2: 99 % (10/23/20 1609) Vital Signs (24h Range):  Temp:  [98.2 °F (36.8 °C)-99 °F (37.2 °C)] 98.2 °F (36.8 °C)  Pulse:  [61-89] 80  Resp:  [11-21] 15  SpO2:  [96 %-100 %] 99 %  BP: ()/(48-58) 101/54     Weight: 61 kg (134 lb 7.7 oz)  Body mass index is 23.82 kg/m².    Physical Exam  HENT:      Head: Normocephalic.      Nose: Nose normal.      Mouth/Throat:      Mouth: Mucous membranes are moist.   Eyes:      Pupils: Pupils are equal, round, and reactive to light.   Neck:      Musculoskeletal: Normal range of motion.   Cardiovascular:      Rate and Rhythm: Normal rate.   Pulmonary:      Effort: Pulmonary effort is normal.   Abdominal:      Palpations: Abdomen is soft.   Musculoskeletal: Normal range of motion.   Skin:     General: Skin is warm and dry.      Capillary Refill: Capillary refill takes less than 2 seconds.   Neurological:      Mental Status: She is alert and oriented to person, place, and time.      Coordination: Finger-Nose-Finger Test normal.   Psychiatric:         Mood and Affect: Mood normal.         Speech: Speech normal.         NEUROLOGICAL EXAMINATION:     MENTAL STATUS   Oriented to person, place, and time.   Follows 1 step commands.   Attention: normal. Concentration: normal.   Speech: speech is normal   Level of consciousness: alert  Able to name object. Able to repeat.     CRANIAL NERVES   Cranial nerves II through XII intact.     CN III, IV, VI   Pupils are equal, round, and reactive to light.    MOTOR EXAM   Muscle bulk: normal    GAIT AND COORDINATION      Coordination   Finger to nose coordination: normal    Tremor   Resting tremor: absent      Significant Labs:   Lab Results   Component Value Date    WBC 7.67 10/23/2020    HGB 12.3 10/23/2020    HCT 38.6 10/23/2020    MCV 88 10/23/2020      10/23/2020       CMP  Sodium   Date Value Ref Range Status   10/23/2020 140 136 - 145 mmol/L Final     Potassium   Date Value Ref Range Status   10/23/2020 3.5 3.5 - 5.1 mmol/L Final     Chloride   Date Value Ref Range Status   10/23/2020 101 95 - 110 mmol/L Final     CO2   Date Value Ref Range Status   10/23/2020 27 23 - 29 mmol/L Final     Glucose   Date Value Ref Range Status   10/23/2020 88 70 - 110 mg/dL Final     BUN, Bld   Date Value Ref Range Status   10/23/2020 9 6 - 20 mg/dL Final     Creatinine   Date Value Ref Range Status   10/23/2020 0.7 0.5 - 1.4 mg/dL Final     Calcium   Date Value Ref Range Status   10/23/2020 9.6 8.7 - 10.5 mg/dL Final     Total Protein   Date Value Ref Range Status   10/23/2020 6.7 6.0 - 8.4 g/dL Final     Albumin   Date Value Ref Range Status   10/23/2020 4.3 3.5 - 5.2 g/dL Final     Total Bilirubin   Date Value Ref Range Status   10/23/2020 0.4 0.1 - 1.0 mg/dL Final     Comment:     For infants and newborns, interpretation of results should be based  on gestational age, weight and in agreement with clinical  observations.  Premature Infant recommended reference ranges:  Up to 24 hours.............<8.0 mg/dL  Up to 48 hours............<12.0 mg/dL  3-5 days..................<15.0 mg/dL  6-29 days.................<15.0 mg/dL       Alkaline Phosphatase   Date Value Ref Range Status   10/23/2020 77 55 - 135 U/L Final     AST   Date Value Ref Range Status   10/23/2020 33 10 - 40 U/L Final     ALT   Date Value Ref Range Status   10/23/2020 58 (H) 10 - 44 U/L Final     Anion Gap   Date Value Ref Range Status   10/23/2020 12 8 - 16 mmol/L Final     eGFR if    Date Value Ref Range Status   10/23/2020 >60.0 >60 mL/min/1.73 m^2 Final     eGFR if non    Date Value Ref Range Status   10/23/2020 >60.0 >60 mL/min/1.73 m^2 Final     Comment:     Calculation used to obtain the estimated glomerular filtration  rate (eGFR) is the CKD-EPI equation.           Significant Imaging: MRI Cervical Spine Without Contrast  Reason: Neck pain, abnormal neuro exam Neck pain x a couple wks, no hx  of trauma    TECHNIQUE: Cervical spine MRI without IV contrast    COMPARISON: None    FINDINGS:  At C2-C3, C3-C4, normal.    At C4-C5, minor right uncovertebral joint osteophyte causes no  narrowing.    At C5-C6, broad disc osteophyte complex results in minor central canal  narrowing while contacting the anterior cord diffusely. Mild left  neural foramen narrowing is also present.    At C6-C7, C7-T1, normal.    Cervical vertebral bodies maintain normal bone marrow signal.  Straightening of cervical lordosis occurs. Cervical cord contains  normal signal and is of normal caliber. Visualized paraspinal soft  tissues are unremarkable.    IMPRESSION:    1. Minor degenerative change at C4-C5.  2. Mild C5-C6 degenerative disc disease resulting in minor central  canal narrowing with anterior cord contact and mild left neural  foramen narrowing    Electronically Signed by Juan Sparrow M.D. on 10/23/2020 3:36 PM  X-Ray Chest AP Portable  CLINICAL HISTORY:  39 years (1980) Female chest pain Chest pain    TECHNIQUE:  Portable AP radiograph the chest.    COMPARISON:  10/6/2020    FINDINGS:  The lungs appear clear. There is no pneumothorax. Costophrenic angles  are seen without effusion. The heart is normal in size .  The  mediastinum is within normal limits. Osseous structures and visualized  upper abdomen appear within normal limits.    IMPRESSION:  No acute cardiac or pulmonary process.    Electronically Signed by Rebeca Wilde M.D. on 10/23/2020 7:53 AM  CT Head Without Contrast  EXAM DESCRIPTION:  CT HEAD WITHOUT IV CONTRAST    COMPLETED DATE/TME:    CLINICAL HISTORY:  39 years Female, Numbness or tingling, paresthesia (Ped 0-18y)    COMPARISON:  9/24/20, CT and MR    TECHNIQUE:  No contrast.  Coronal and sagittal reformat.  This exam was performed according to our departmental  dose-optimization program, which includes automated exposure control, adjustment of the mA and/or kV according to patient size and/or use of iterative reconstruction technique.    FINDINGS:  No hemorrhage or infarct. No mass, mass effect, or midline shift. Brain and extra-axial structures appear intact.    IMPRESSION: Normal CT of the head.    Electronically signed by:  Eduardo Forbes MD  10/23/2020 1:42 AM CDT Workstation: [id number]          Assessment and Plan:    Paresthesias/Weakness  -MRI C-Spine: 1. Minor degenerative change at C4-C5.  2. Mild C5-C6 degenerative disc disease resulting in minor central  canal narrowing with anterior cord contact and mild left neural  foramen narrowing  -CT head: normal  -Recent MRI brain: 9/24/2020: negative acute  -Recent CTA head and neck: 9/23/20: no significant stenosis or occlusion  -Recent LP 2020: unremarkable  -PT/OT    Headache  -250 mg methylprednisolone IV once  -25 mg benedryl/30 mg toradol (pt alleries to phenergan) Q8 hours scheduled  -Rec Nurtec    PLAN: Rec psyc consult. Neurological workup unrevealing.  Rec Nurtec for headache abortive therapy.        [phone] 3 days from discharge.     Stroke education was provided including stroke risk factors modification and any acute neurological changes including weakness, confusion, visual changes to come straight to the ER.     All questions were answered.                                    Active Diagnoses:    Diagnosis Date Noted POA    PRINCIPAL PROBLEM:  Suspected Functional neurological symptom disorder with mixed symptoms [F44.7] 10/23/2020 Yes    Staring episodes [R40.4] 10/23/2020 Yes    Intractable migraine [G43.919] 09/24/2020 Yes    Bipolar depression [F31.9] 02/28/2020 Yes    Anxiety [F41.9] 02/28/2020 Yes      Problems Resolved During this Admission:       VTE Risk Mitigation (From admission, onward)         Ordered     IP VTE LOW RISK  PATIENT  Once      10/23/20 0331     Place sequential compression device  Until discontinued      10/23/20 0331                Thank you for your consult. I will follow-up with patient. Please contact us if you have any additional questions.    Patti Huff NP  Neurology  [facility]

## 2020-10-23 NOTE — H&P
[facility]  History & Physical    Patient Name: Shruthi Richard  MRN: [id number]  Admission Date: 10/23/2020  Attending Physician: Mati Quach MD   Primary Care Provider: Valente Erazo Jr, MD         Patient information was obtained from patient, spouse/SO, past medical records and ER records.     Subjective:     Principal Problem:Functional neurological symptom disorder with mixed symptoms    Chief Complaint:   Chief Complaint   Patient presents with    Headache        HPI: 39-year-old lady with past medical history of bipolar, depression, intractable migraine presented with chief complaint of shock-like sensation all over the body.  According to  was present at bedside tells me that since last couple weeks patient has many staring episodes and after those episodes she becomes very slow, sluggish and overall for last couple weeks she is declining and prefers to stay in the bed.  He also reported that she cannot walk long distance due to leg weakness.  Patient tells me that she has this sensation where somebody is tasing her with stun gun.  Patient has numerous complains including but not limited to speech disturbances, sensory complaints, visual symptoms, cognitive symptoms and nonepileptic seizure-like symptoms.  This patient has very extensive neuro workup done both inpatient and outpatient including but not limited to CT head, MRIs, lumbar puncture without revealing any organic disease process that would explain her symptoms.  Otherwise she denies any fever, chills, chest pain, palpitations, bladder or bowel symptoms.  She tells me that due to her symptoms she cannot work.    Past Medical History:   Diagnosis Date    Anxiety     Bipolar affective disorder, rapid cycling     bipolar 1    Depression     GERD (gastroesophageal reflux disease)     Migraine headache     Vaginal delivery     x1       Past Surgical History:   Procedure Laterality Date    ABDOMINAL  SURGERY      exploratory laparotomy    HYSTERECTOMY  12/15/2017       Review of patient's allergies indicates:   Allergen Reactions    Gluten protein Other (See Comments)     Sever stomach ache, very tired feeling    Prochlorperazine Other (See Comments)    Prochlorperazine edisylate      Anxiety, itching  Other reaction(s): Unknown       No current facility-administered medications on file prior to encounter.      Current Outpatient Medications on File Prior to Encounter   Medication Sig    clonazePAM (KLONOPIN) 2 MG Tab Take 2 mg by mouth 3 (three) times daily.    FLUoxetine 20 MG capsule Take 20 mg by mouth once daily.    lamoTRIgine (LAMICTAL) 150 MG Tab Take 150 mg by mouth 2 (two) times daily.     onabotulinumtoxina (BOTOX) 200 unit SolR Inject 200 Units into the muscle once.    ondansetron (ZOFRAN-ODT) 4 MG TbDL Take 1 tablet (4 mg total) by mouth every 8 (eight) hours as needed.    pantoprazole (PROTONIX) 40 MG tablet Take 40 mg by mouth 2 (two) times daily.     Family History     None        Tobacco Use    Smoking status: Never Smoker    Smokeless tobacco: Never Used   Substance and Sexual Activity    Alcohol use: No    Drug use: No    Sexual activity: Yes     Partners: Male     Birth control/protection: OCP     Review of Systems   Constitutional: Positive for fatigue. Negative for chills and fever.   HENT: Positive for dental problem and trouble swallowing. Negative for sore throat.    Eyes: Positive for visual disturbance. Negative for redness and itching.   Respiratory: Negative for chest tightness and shortness of breath.    Cardiovascular: Negative for chest pain and palpitations.   Gastrointestinal: Negative for blood in stool and nausea.   Genitourinary: Negative for dysuria.   Musculoskeletal: Positive for gait problem. Negative for joint swelling.   Neurological: Positive for dizziness, speech difficulty, weakness and headaches. Negative for tremors.   Psychiatric/Behavioral:  Negative for behavioral problems and sleep disturbance.   All other systems reviewed and are negative.    Objective:     Vital Signs (Most Recent):  Temp: 98.4 °F (36.9 °C) (10/23/20 0002)  Pulse: 76 (10/23/20 0330)  Resp: 15 (10/23/20 0330)  BP: (!) 96/55 (10/23/20 0200)  SpO2: 100 % (10/23/20 0330) Vital Signs (24h Range):  Temp:  [98.4 °F (36.9 °C)] 98.4 °F (36.9 °C)  Pulse:  [67-84] 76  Resp:  [11-16] 15  SpO2:  [99 %-100 %] 100 %  BP: ()/(54-58) 96/55     Weight: 61.7 kg (136 lb)  Body mass index is 24.09 kg/m².    Physical Exam  Vitals signs and nursing note reviewed.   Constitutional:       Appearance: She is well-developed.   HENT:      Head: Atraumatic.   Eyes:      Pupils: Pupils are equal, round, and reactive to light.   Neck:      Musculoskeletal: Neck supple.      Vascular: No JVD.   Cardiovascular:      Rate and Rhythm: Normal rate and regular rhythm.      Heart sounds: Normal heart sounds. No murmur. No gallop.    Pulmonary:      Effort: No respiratory distress.      Breath sounds: Normal breath sounds. No wheezing.   Abdominal:      General: Bowel sounds are normal. There is no distension.      Palpations: Abdomen is soft.      Tenderness: There is no guarding or rebound.   Lymphadenopathy:      Cervical: No cervical adenopathy.   Skin:     General: Skin is warm.      Capillary Refill: Capillary refill takes less than 2 seconds.      Findings: No rash.   Neurological:      General: No focal deficit present.      Mental Status: She is alert and oriented to person, place, and time.      Cranial Nerves: No cranial nerve deficit.   Psychiatric:         Behavior: Behavior normal.           CRANIAL NERVES     CN III, IV, VI   Pupils are equal, round, and reactive to light.       Significant Labs: All pertinent labs within the past 24 hours have been reviewed.    Significant Imaging: I have reviewed all pertinent imaging results/findings within the past 24 hours.    Assessment/Plan:     39-year-old  lady with numerous admissions due to nonspecific neurological symptoms who had extensive workup in the past without revealing any disease process came with multiple neurological symptoms including but not limited to headache, staring episodes, shock-like sensation all over the body.  ED physician discussed case with her neurologist who recommended admission for further workup.    Assessment:  Active Hospital Problems    Diagnosis  POA    *Suspected Functional neurological symptom disorder with mixed symptoms [F44.7]  Yes    Staring episodes [R40.4]  Yes     Priority: 2     Intractable migraine [G43.919]  Yes     Priority: 3     Bipolar depression [F31.9]  Yes    Anxiety [F41.9]  Yes      Resolved Hospital Problems   No resolved problems to display.       Plan:  Admit to telemetry-observation    This patient already had very extensive neuro workup done both inpatient and outpatient including but not limited to multiple EEGs, CT head, MRIs, lumbar puncture without revealing any organic disease process that would explain her symptoms.  At this point I am inclined to believe that she is suffering functional neurological symptom disorder(AKA conversion disorder) and she might benefit from psych evaluation    ED provider discussed case with neurologist who recommended admission for further workup-defer further workup to Neurology    Resume home medications    I would strongly avoid any opiates    Telemetry monitoring, aspiration precaution, fall precautions, seizure precautions    PT, OT    VTE Risk Mitigation (From admission, onward)         Ordered     IP VTE LOW RISK PATIENT  Once      10/23/20 0331     Place sequential compression device  Until discontinued      10/23/20 0331                   Mati Quach MD  Department of Hospital Medicine   [facility]

## 2020-10-23 NOTE — ED PROVIDER NOTES
Encounter Date: 10/22/2020       History     Chief Complaint   Patient presents with    Headache     This is a 39-year-old female who presents with several complaints.  Patient has a history of chronic recurring headaches.  She is complaining of a headache.  She has had a recent extensive workup to rule out CVA.  She has been diagnosed with complex migraines.  She has also recently underwent testing to investigate possible seizure activity.  She has complained that over the past several weeks she has been feeling worse and worse in general.  She is complaining of fatigue and malaise and lack of energy.  She states that she has headaches continually and they never improve despite any treatment.  She is also complaining of intermittent weakness and numbness tingling paresthesias to various parts of her body at different times.  There seems to be no exacerbating or alleviating factors.  She denies fever chills or weight loss.  She denies trauma.  She denies any photophobia neck pain or stiffness but the headaches have started to involve the more occipital part of the head and have somewhat extended into the neck area however she has had no associated neck stiffness.  She denies chest pain or shortness of breath but has had several episodes of syncope last of which occurred several weeks ago.  She was evaluated in the emergency room at that time but had left against medical advice.  She did follow-up with her neurologist after that visit.  She is not currently under the care of a primary care physician.  She denies any loss of sense of taste or smell.  She denies any urinary problems changes or any bladder or bowel incontinence or urinary retention type symptoms.  Symptoms have been moderate to severe in intensity with no exacerbating or alleviating factors.  She admits to associated anxiety and depression.  She is under the care of a mental health provider as well.  She denies any other problems or  complaints.      Headache   This is a recurrent problem. The current episode started today. The problem occurs constantly. The problem has been unchanged. The pain is located in the bilateral, occipital, temporal and parietal region. The pain does not radiate. The pain quality is similar to prior headaches. Associated symptoms include dizziness, nausea, numbness and weakness. Pertinent negatives include no abdominal pain, back pain or vomiting.     Review of patient's allergies indicates:   Allergen Reactions    Gluten protein Other (See Comments)     Sever stomach ache, very tired feeling    Prochlorperazine Other (See Comments)    Prochlorperazine edisylate      Anxiety, itching  Other reaction(s): Unknown     Past Medical History:   Diagnosis Date    Anxiety     Bipolar affective disorder, rapid cycling     bipolar 1    Depression     GERD (gastroesophageal reflux disease)     Migraine headache     Vaginal delivery     x1     Past Surgical History:   Procedure Laterality Date    ABDOMINAL SURGERY      exploratory laparotomy    HYSTERECTOMY  12/15/2017     No family history on file.  Social History     Tobacco Use    Smoking status: Never Smoker    Smokeless tobacco: Never Used   Substance Use Topics    Alcohol use: No    Drug use: No     Review of Systems   Constitutional: Negative for activity change and fatigue.   HENT: Negative.    Eyes: Positive for visual disturbance.        Does intermittently have blurry vision with the headaches.  This has been an ongoing intermittent recurrent issue.   Respiratory: Negative.    Cardiovascular: Negative.    Gastrointestinal: Positive for nausea. Negative for abdominal pain, anal bleeding, blood in stool, constipation and vomiting.   Endocrine: Positive for cold intolerance.   Genitourinary: Negative.    Musculoskeletal: Positive for arthralgias and myalgias. Negative for back pain, gait problem and neck stiffness.   Skin: Negative.    Neurological: Positive  for dizziness, tremors, syncope, speech difficulty, weakness, light-headedness, numbness and headaches.   Hematological: Negative.    Psychiatric/Behavioral: Positive for decreased concentration, dysphoric mood and sleep disturbance. Negative for agitation, hallucinations and suicidal ideas. The patient is nervous/anxious. The patient is not hyperactive.    All other systems reviewed and are negative.      Physical Exam     Initial Vitals [10/23/20 0002]   BP Pulse Resp Temp SpO2   (!) 121/56 84 16 98.4 °F (36.9 °C) 99 %      MAP       --         Physical Exam    Nursing note and vitals reviewed.  Constitutional: She is active and cooperative.  Non-toxic appearance. She does not have a sickly appearance. She does not appear ill. No distress.   HENT:   Head: Normocephalic and atraumatic.   Right Ear: Tympanic membrane normal.   Left Ear: Tympanic membrane normal.   Nose: Nose normal.   Mouth/Throat: Uvula is midline, oropharynx is clear and moist and mucous membranes are normal. No oral lesions. No uvula swelling. No oropharyngeal exudate, posterior oropharyngeal edema or posterior oropharyngeal erythema.   Eyes: Conjunctivae, EOM and lids are normal. Pupils are equal, round, and reactive to light. No scleral icterus.   Neck: Trachea normal, normal range of motion, full passive range of motion without pain and phonation normal. Neck supple. No thyroid mass and no thyromegaly present. No stridor present. No spinous process tenderness and no muscular tenderness present. No tracheal deviation, no edema, no erythema and normal range of motion present. No neck rigidity. No JVD present.   Cardiovascular: Normal rate, regular rhythm, normal heart sounds, intact distal pulses and normal pulses. Exam reveals no gallop and no friction rub.    No murmur heard.  Pulmonary/Chest: Effort normal and breath sounds normal. No accessory muscle usage or stridor. No tachypnea. No respiratory distress. She has no wheezes. She has no  rhonchi. She has no rales.   Abdominal: Soft. Normal appearance and bowel sounds are normal. She exhibits no distension, no pulsatile midline mass and no mass. There is no abdominal tenderness. There is no rigidity, no guarding and no CVA tenderness.   Musculoskeletal: Normal range of motion. No tenderness or edema.      Comments: Pulses are 2+ throughout, cap refill is less than 2 sec throughout, extremities are nontender throughout with full range of motion. There is no spinal tenderness to palpation.   Lymphadenopathy:     She has no cervical adenopathy.   Neurological: She is alert and oriented to person, place, and time. She has normal strength. She displays normal reflexes. No cranial nerve deficit or sensory deficit. Coordination normal.   No focal deficits.   Skin: Skin is warm, dry and intact. Capillary refill takes less than 2 seconds. No ecchymosis, no petechiae and no rash noted. No erythema. No pallor.   Psychiatric: Her speech is normal. Judgment and thought content normal. Her mood appears anxious. She is withdrawn. Cognition and memory are normal.         ED Course   Procedures  Labs Reviewed   CBC W/ AUTO DIFFERENTIAL - Abnormal; Notable for the following components:       Result Value    Mean Corpuscular Hemoglobin Conc 31.9 (*)     All other components within normal limits   COMPREHENSIVE METABOLIC PANEL - Abnormal; Notable for the following components:    ALT 58 (*)     All other components within normal limits   URINALYSIS - Abnormal; Notable for the following components:    Color, UA Colorless (*)     All other components within normal limits    Narrative:     Specimen Source->Urine   APTT   B-TYPE NATRIURETIC PEPTIDE   DRUG SCREEN PANEL, URINE EMERGENCY    Narrative:     Specimen Source->Urine   LIPASE   MAGNESIUM   PROTIME-INR   TROPONIN I   TSH   CK   SARS-COV-2 RNA AMPLIFICATION, QUAL   SEDIMENTATION RATE   C-REACTIVE PROTEIN   VITAMIN B12   FOLATE   RAPID HIV   VITAMIN B12   FOLATE    LIPASE   TSH   MAGNESIUM   TROPONIN I   CK   RPR          Imaging Results          CT Head Without Contrast (Final result)  Result time 10/23/20 00:19:00    Final result by Eduardo Forbes MD (10/23/20 00:19:00)                 Narrative:    EXAM DESCRIPTION:  CT HEAD WITHOUT IV CONTRAST    COMPLETED DATE/TME:    CLINICAL HISTORY:  39 years Female, Numbness or tingling, paresthesia (Ped 0-18y)    COMPARISON:  9/24/20, CT and MR    TECHNIQUE:  No contrast.  Coronal and sagittal reformat.  This exam was performed according to our departmental dose-optimization program, which includes automated exposure control, adjustment of the mA and/or kV according to patient size and/or use of iterative reconstruction technique.    FINDINGS:  No hemorrhage or infarct. No mass, mass effect, or midline shift. Brain and extra-axial structures appear intact.    IMPRESSION: Normal CT of the head.    Electronically signed by:  Eduardo Forbes MD  10/23/2020 1:42 AM CDT Workstation: [id number]                             X-Ray Chest AP Portable (In process)    Procedure changed from X-Ray Chest 1 View                  Medical Decision Making:   History:   Old Medical Records: I decided to obtain old medical records.  Old Records Summarized: records from clinic visits and records from previous admission(s).       <> Summary of Records: Patient had extensive neurological workup recently which was negative for evidence of CVA as the etiology of similar symptoms  Clinical Tests:   Lab Tests: Reviewed  Radiological Study: Reviewed  Medical Tests: Reviewed  ED Management:  I have discussed the case with Dr. Mickey Nugent.  He does recommend admission to the hospital and recommends MRI of the cervical spine as inpatient.  Have discussed the case with Dr. Quach of the hospitalist service who has evaluated the patient and assumed care                             Clinical Impression:       ICD-10-CM ICD-9-CM   1. Other complicated headache syndrome   G44.59 339.44   2. Complaint of debility and malaise  R53.81 799.3     780.79   3. Staring episodes  R40.4 780.02                          ED Disposition Condition    Observation                             Dayana Perea MD  10/23/20 0328

## 2020-10-23 NOTE — SUBJECTIVE & OBJECTIVE
Past Medical History:   Diagnosis Date    Anxiety     Bipolar affective disorder, rapid cycling     bipolar 1    Depression     GERD (gastroesophageal reflux disease)     Migraine headache     Vaginal delivery     x1       Past Surgical History:   Procedure Laterality Date    ABDOMINAL SURGERY      exploratory laparotomy    HYSTERECTOMY  12/15/2017       Review of patient's allergies indicates:   Allergen Reactions    Gluten protein Other (See Comments)     Sever stomach ache, very tired feeling    Prochlorperazine Other (See Comments)    Prochlorperazine edisylate      Anxiety, itching  Other reaction(s): Unknown       No current facility-administered medications on file prior to encounter.      Current Outpatient Medications on File Prior to Encounter   Medication Sig    clonazePAM (KLONOPIN) 2 MG Tab Take 2 mg by mouth 3 (three) times daily.    FLUoxetine 20 MG capsule Take 20 mg by mouth once daily.    lamoTRIgine (LAMICTAL) 150 MG Tab Take 150 mg by mouth 2 (two) times daily.     onabotulinumtoxina (BOTOX) 200 unit SolR Inject 200 Units into the muscle once.    ondansetron (ZOFRAN-ODT) 4 MG TbDL Take 1 tablet (4 mg total) by mouth every 8 (eight) hours as needed.    pantoprazole (PROTONIX) 40 MG tablet Take 40 mg by mouth 2 (two) times daily.     Family History     None        Tobacco Use    Smoking status: Never Smoker    Smokeless tobacco: Never Used   Substance and Sexual Activity    Alcohol use: No    Drug use: No    Sexual activity: Yes     Partners: Male     Birth control/protection: OCP     Review of Systems   Constitutional: Positive for fatigue. Negative for chills and fever.   HENT: Positive for dental problem and trouble swallowing. Negative for sore throat.    Eyes: Positive for visual disturbance. Negative for redness and itching.   Respiratory: Negative for chest tightness and shortness of breath.    Cardiovascular: Negative for chest pain and palpitations.    Gastrointestinal: Negative for blood in stool and nausea.   Genitourinary: Negative for dysuria.   Musculoskeletal: Positive for gait problem. Negative for joint swelling.   Neurological: Positive for dizziness, speech difficulty, weakness and headaches. Negative for tremors.   Psychiatric/Behavioral: Negative for behavioral problems and sleep disturbance.   All other systems reviewed and are negative.    Objective:     Vital Signs (Most Recent):  Temp: 98.4 °F (36.9 °C) (10/23/20 0002)  Pulse: 76 (10/23/20 0330)  Resp: 15 (10/23/20 0330)  BP: (!) 96/55 (10/23/20 0200)  SpO2: 100 % (10/23/20 0330) Vital Signs (24h Range):  Temp:  [98.4 °F (36.9 °C)] 98.4 °F (36.9 °C)  Pulse:  [67-84] 76  Resp:  [11-16] 15  SpO2:  [99 %-100 %] 100 %  BP: ()/(54-58) 96/55     Weight: 61.7 kg (136 lb)  Body mass index is 24.09 kg/m².    Physical Exam  Vitals signs and nursing note reviewed.   Constitutional:       Appearance: She is well-developed.   HENT:      Head: Atraumatic.   Eyes:      Pupils: Pupils are equal, round, and reactive to light.   Neck:      Musculoskeletal: Neck supple.      Vascular: No JVD.   Cardiovascular:      Rate and Rhythm: Normal rate and regular rhythm.      Heart sounds: Normal heart sounds. No murmur. No gallop.    Pulmonary:      Effort: No respiratory distress.      Breath sounds: Normal breath sounds. No wheezing.   Abdominal:      General: Bowel sounds are normal. There is no distension.      Palpations: Abdomen is soft.      Tenderness: There is no guarding or rebound.   Lymphadenopathy:      Cervical: No cervical adenopathy.   Skin:     General: Skin is warm.      Capillary Refill: Capillary refill takes less than 2 seconds.      Findings: No rash.   Neurological:      General: No focal deficit present.      Mental Status: She is alert and oriented to person, place, and time.      Cranial Nerves: No cranial nerve deficit.   Psychiatric:         Behavior: Behavior normal.           CRANIAL  NERVES     CN III, IV, VI   Pupils are equal, round, and reactive to light.       Significant Labs: All pertinent labs within the past 24 hours have been reviewed.    Significant Imaging: I have reviewed all pertinent imaging results/findings within the past 24 hours.

## 2020-10-23 NOTE — HPI
39-year-old lady with past medical history of bipolar, depression, intractable migraine presented with chief complaint of shock-like sensation all over the body.  According to  was present at bedside tells me that since last couple weeks patient has many staring episodes and after those episodes she becomes very slow, sluggish and overall for last couple weeks she is declining and prefers to stay in the bed.  He also reported that she cannot walk long distance due to leg weakness.  Patient tells me that she has this sensation where somebody is tasing her with stun gun.  Patient has numerous complains including but not limited to speech disturbances, sensory complaints, visual symptoms, cognitive symptoms and nonepileptic seizure-like symptoms.  This patient has very extensive neuro workup done both inpatient and outpatient including but not limited to CT head, MRIs, lumbar puncture without revealing any organic disease process that would explain her symptoms.  Otherwise she denies any fever, chills, chest pain, palpitations, bladder or bowel symptoms.  She tells me that due to her symptoms she cannot work.

## 2020-10-23 NOTE — PT/OT/SLP EVAL
"Physical Therapy Evaluation    Patient Name:  Shruthi Richard   MRN:  [id number]    Recommendations:     Discharge Recommendations:  outpatient PT   Discharge Equipment Recommendations: (pt may benefit from rolling walker. Will cont to assess)   Barriers to discharge: None    Assessment:     Shruthi Richard is a 39 y.o. female admitted with a medical diagnosis of Functional neurological symptom disorder with mixed symptoms.  She presents with the following impairments/functional limitations:  impaired self care skills, impaired functional mobilty, gait instability, impaired balance, pain .    Pt coming out of bathroom with extender. Pt about to get in bed and able to hike LLE up to about 80 degrees and stand only on RLE in order to get in bed but then PT had her sit EOB. Parents present for eval. Pt reports HA 7/10 L temporal but new pain posterior neck that is separate from head--both 7/10. Pt reports some blurred vision and not changing. Pt reports increased HA pain with eval but still rates it 7/10. Pt able to ambulate HHA to door with moderate pressure in PT's hand--pt did not c/o dizziness but when asked if dizzy, said "yes". PT then asked pt to ambulate without HHA and pt replied "I can't I will fall". When PT let go of hand, pt had posterior lean but did not fall. Pt then able to amb door to bed with PT holding gait belt only for caution and no LOB and no posterior lean. Pt then able to get in bed independently.  Coordination decreased finger to nose Left and difficulty performing heel to shin BLE's then pt starting crying. MMT appears equal each side with repeated verbal cues and repeated tests except poor B hamstrings with MMT but able to ambulate without give way.     Rehab Prognosis: unknown; patient would benefit from acute skilled PT services to address these deficits and reach maximum level of function.    Recent Surgery: * No surgery found *      Plan:     During this hospitalization, patient to be " seen 6 x/week to address the identified rehab impairments via gait training, therapeutic activities, therapeutic exercises and progress toward the following goals:    · Plan of Care Expires:  11/23/20    Subjective     Chief Complaint: headache  Patient/Family Comments/goals: to get better  Pain/Comfort:  · Pain Rating 1: 7/10  · Location 1: head  · Pain Addressed 1: Nurse notified  · Pain Rating Post-Intervention 1: 7/10    Patients cultural, spiritual, L[employer] conflicts given the current situation:      Living Environment:  Pt lives with  and child; not working. Pt has seen neurologist for 2-3 years. Pt reports holding onto wall with amb in house; assistance required getting dressed and bathing.  Prior to admission, patients level of function was modified independent for time, min A for IADL's.  Equipment used at home: none.  DME owned (not currently used): none.  Upon discharge, patient will have assistance from family.    Objective:     Communicated with rn prior to session.  Patient found coming out of bathroom with extender, standing at bedside.  with telemetry  upon PT entry to room.    General Precautions: Standard, fall   Orthopedic Precautions:N/A   Braces: N/A     Exams:  · Cognitive Exam:  Patient is oriented to Person, Place, Time and Situation  · Gross Motor Coordination:  see above  · RUE Strength: WFL and good ; performed multiple times each muscle and got stronger with each test.  · LUE Strength: wfl and good ; performed multiple times each muscle and got stronger with each test.  · RLE ROM: WFL  · RLE Strength: WFL except pt unable to hold MMT againsta hamstrings-- 1/5; performed multiple times each muscle and got stronger with each test except hamstrings  · LLE ROM: WFL  · LLE Strength: pt unable to hold MMT againsta hamstrings-- 1/5; performed multiple times each muscle and got stronger with each test except hamstrings    Functional Mobility:  · Bed Mobility:     · Scooting:  modified independence  · Sit to Supine: modified independence  · Transfers:     · Sit to Stand:  contact guard assistance with no AD  · Gait: See above for ambulation; noted short steps, cautious, no give way, no reported dizziness until PT asked if had dizziness then reported dizziness. Pt unable to perform heel raises, able to perform toe raises--only toes only, not whole foot. Pt able to perform minimal marching in place--only able to lift leg about 2 inches off ground.     Therapeutic Activities and Exercises:   education, fall prevention, poc, dc planning. Pt educated not to get up without staff; pt reports understanding.     AM-PAC 6 CLICK MOBILITY  Total Score:20     Patient left HOB elevated with call button in reach, rn notified and  mother and father present.    GOALS:   Multidisciplinary Problems     Physical Therapy Goals        Problem: Physical Therapy Goal    Goal Priority Disciplines Outcome Goal Variances Interventions   Physical Therapy Goal     PT, PT/OT      Description: Goals to be met by: discharge     Patient will increase functional independence with mobility by performin. Sit to stand transfer with Supervision  2. Bed to chair transfer with Supervision using Rolling Walker or no AD  3. Gait  x 100 feet with Supervision using Rolling Walker or no AD                     History:     Past Medical History:   Diagnosis Date    Anxiety     Bipolar affective disorder, rapid cycling     bipolar 1    Depression     GERD (gastroesophageal reflux disease)     Migraine headache     Vaginal delivery     x1       Past Surgical History:   Procedure Laterality Date    ABDOMINAL SURGERY      exploratory laparotomy    HYSTERECTOMY  12/15/2017       Time Tracking:     PT Received On: 10/23/20  PT Start Time: 902     PT Stop Time: 922  PT Total Time (min): 20 min     Billable Minutes: Evaluation 12 and Therapeutic Activity 8      Maribel Crespo, PT  10/23/2020

## 2020-10-23 NOTE — DISCHARGE SUMMARY
[facility]  Discharge Summary      Patient Name: Shruthi Richard  MRN: [id number]  Admission Date: 10/23/2020  Hospital Length of Stay: 0 days  Discharge Date and Time: No discharge date for patient encounter.  Attending Physician: Mayra Nair MD   Discharging Provider: Mayra Nair MD  Primary Care Provider: Valente Erazo Jr, MD      HPI:   39-year-old lady with past medical history of bipolar, depression, intractable migraine presented with chief complaint of shock-like sensation all over the body.  According to  was present at bedside tells me that since last couple weeks patient has many staring episodes and after those episodes she becomes very slow, sluggish and overall for last couple weeks she is declining and prefers to stay in the bed.  He also reported that she cannot walk long distance due to leg weakness.  Patient tells me that she has this sensation where somebody is tasing her with stun gun.  Patient has numerous complains including but not limited to speech disturbances, sensory complaints, visual symptoms, cognitive symptoms and nonepileptic seizure-like symptoms.  This patient has very extensive neuro workup done both inpatient and outpatient including but not limited to CT head, MRIs, lumbar puncture without revealing any organic disease process that would explain her symptoms.  Otherwise she denies any fever, chills, chest pain, palpitations, bladder or bowel symptoms.  She tells me that due to her symptoms she cannot work.    * No surgery found *      Hospital Course:   Patient admitted with symptoms per HPI.  Head CT with no acute process. Prior MRI of head and CTA head and neck done 9/24 and were normal.  MRI cervical spine done and revealed degenerative disease with mild canal and foraminal narrowing with normal cord signal. Neurology consulted and evaluated patient.  IV steroids given for headache as fioricet did not help. Prior extensive  inpatient and outpatient workup has been done with unfortunate no etiology to her symptoms thus far.  She is scheduled for a repeat EEG and nerve conduction study with Dr. Mickey Nugent as outpatient next week.  She was seen by therapy and outpatient therapy recommended and ordered. Neuro deficits have been intermittent in nature as documented by history given, therapy notes, nursing notes.  Patient does not feel that IV steroids has helped her headache but would prefer to go home. She is medically stable for discharge with no current neuro deficits.  I discussed studies with patient and her  as they are understandably frustrated given lack of answers to her symptoms . B12, Folic acid, HIV, RPR, TSH checked and normal. Examined on day of discharge and patient is in NAD, well groomed, face symmetric, speech non slurred, affect appropriate. Return precautions given.     Consults:   Consults (From admission, onward)        Status Ordering Provider     Inpatient consult to Hospitalist  Once     Provider:  Mati Quach MD    Acknowledged MATI QUACH     Inpatient consult to Neurology  Once     Provider:  Dung Lundberg MD    Acknowledged MATI QUACH     Inpatient consult to Psychiatry  Once     Provider:  Kalyn Erazo MD    Acknowledged MATI QUACH     Inpatient consult to Social Work/Case Management  Once     Provider:  (Not yet assigned)    DANIEL Tiwari          No new Assessment & Plan notes have been filed under this hospital service since the last note was generated.  Service: Hospital Medicine    Final Active Diagnoses:    Diagnosis Date Noted POA    Bipolar depression [F31.9] 02/28/2020 Yes    Anxiety [F41.9] 02/28/2020 Yes      Problems Resolved During this Admission:    Diagnosis Date Noted Date Resolved POA    PRINCIPAL PROBLEM:  Suspected Functional neurological symptom disorder with mixed symptoms [F44.7] 10/23/2020 10/23/2020 Yes    Staring episodes  [R40.4] 10/23/2020 10/23/2020 Yes    Intractable migraine [G43.919] 09/24/2020 10/23/2020 Yes       Discharged Condition: good    Disposition: Home or Self Care    Follow Up:  Follow-up Information     Flex Nugent MD.    Specialties: Vascular Neurology, Neurology  Why: keep scheduled appointment   Contact information:  [address] [facility] [id number]  [phone]                 Patient Instructions:      Ambulatory referral/consult to Physical/Occupational Therapy   Standing Status: Future   Referral Priority: Routine Referral Type: Physical Medicine   Referral Reason: Specialty Services Required   Requested Specialty: Physical Therapy   Number of Visits Requested: 1     Diet Adult Regular     Notify your health care provider if you experience any of the following:  temperature >100.4     Notify your health care provider if you experience any of the following:  persistent nausea and vomiting or diarrhea     Notify your health care provider if you experience any of the following:  severe uncontrolled pain     Notify your health care provider if you experience any of the following:  increased confusion or weakness     Activity as tolerated       Significant Diagnostic Studies: Labs:   CMP   Recent Labs   Lab 10/23/20  0036      K 3.5      CO2 27   GLU 88   BUN 9   CREATININE 0.7   CALCIUM 9.6   PROT 6.7   ALBUMIN 4.3   BILITOT 0.4   ALKPHOS 77   AST 33   ALT 58*   ANIONGAP 12   ESTGFRAFRICA >60.0   EGFRNONAA >60.0    and CBC   Recent Labs   Lab 10/23/20  0036   WBC 7.67   HGB 12.3   HCT 38.6          Pending Diagnostic Studies:     None         Medications:  Reconciled Home Medications:      Medication List      CONTINUE taking these medications    BOTOX 200 unit Solr  Generic drug: onabotulinumtoxina  Inject 200 Units into the muscle once.     clonazePAM 2 MG Tab  Commonly known as: KLONOPIN  Take 2 mg by mouth 3 (three) times daily.     FLUoxetine 20 MG  capsule  Take 20 mg by mouth once daily.     lamoTRIgine 150 MG Tab  Commonly known as: LAMICTAL  Take 150 mg by mouth 2 (two) times daily.     ondansetron 4 MG Tbdl  Commonly known as: ZOFRAN-ODT  Take 1 tablet (4 mg total) by mouth every 8 (eight) hours as needed.     pantoprazole 40 MG tablet  Commonly known as: PROTONIX  Take 40 mg by mouth 2 (two) times daily.            Indwelling Lines/Drains at time of discharge:   Lines/Drains/Airways     None                 Time spent on the discharge of patient: 29 minutes  Patient was seen and examined on the date of discharge and determined to be suitable for discharge.         Mayra Nair MD  Department of Hospital Medicine  [facility]

## 2020-10-26 NOTE — PLAN OF CARE
10/26/20 1054   Final Note   Assessment Type Final Discharge Note   Anticipated Discharge Disposition Home   Post-Acute Status   Discharge Delays None known at this time

## 2020-10-30 ENCOUNTER — CLINICAL SUPPORT (OUTPATIENT)
Dept: REHABILITATION | Facility: HOSPITAL | Age: 40
End: 2020-10-30
Payer: MEDICAID

## 2020-10-30 DIAGNOSIS — G44.59 OTHER COMPLICATED HEADACHE SYNDROME: ICD-10-CM

## 2020-10-30 DIAGNOSIS — Z74.09 IMPAIRED FUNCTIONAL MOBILITY, BALANCE, GAIT, AND ENDURANCE: ICD-10-CM

## 2020-10-30 DIAGNOSIS — R42 DIZZINESS: Primary | ICD-10-CM

## 2020-10-30 PROCEDURE — 97163 PT EVAL HIGH COMPLEX 45 MIN: CPT | Mod: PN

## 2020-10-30 NOTE — PLAN OF CARE
[facility]  Physical Therapy Neurological Rehabilitation Initial Evaluation    Name: Shruthi Richard  Clinic Number: [id number]    Therapy Diagnosis:   Encounter Diagnoses   Name Primary?    Dizziness Yes    Other complicated headache syndrome      Physician: Mayra Nair MD    Physician Orders: PT Eval and Treat   Medical Diagnosis from Referral: Other complicated headache syndrome  Evaluation Date: 10/30/2020  Authorization Period Expiration: 10/23/2021  Plan of Care Expiration: 12/31/2020  Visit # / Visits authorized: 1/ 1    Time In: 1455  Time Out: 1545  Total Billable Time: 46 minutes    Precautions: Fall    Subjective   Date of onset: 10/2020  History of current condition - Shruthi reports: She has had intractable migraine headaches for about 3 years.   She states that over the past year she has gone to the ER several times with migraine.  She had a seizure October 6, 2020 resulting in trip to ER.  Was treated for migraine.  2 of the trips to the ER were due to extremity weakness with severe headache resulting in admission.  She reports that with the episode on 10/23/2020 she also developed speech problem.   Hospitalist referred her to PT following latest admission.  She has an appointment with her neurologist on 11/4/2020 (Dr Arriaga) and with a different neurologist (Dr. Mclain) on 11/2/2020.  She is now referred to PT.       Medical History:   Past Medical History:   Diagnosis Date    Anxiety     Bipolar affective disorder, rapid cycling     bipolar 1    Depression     GERD (gastroesophageal reflux disease)     Migraine headache     Vaginal delivery     x1       Surgical History:   Shruthi Richard  has a past surgical history that includes Abdominal surgery and Hysterectomy (12/15/2017).    Medications:   Shruthi has a current medication list which includes the following prescription(s): clonazepam, fluoxetine, lamotrigine, onabotulinumtoxina, ondansetron, and  "pantoprazole.    Allergies:   Review of patient's allergies indicates:   Allergen Reactions    Gluten protein Other (See Comments)     Sever stomach ache, very tired feeling    Prochlorperazine Other (See Comments)    Prochlorperazine edisylate      Anxiety, itching  Other reaction(s): Unknown        Imaging, CT scan films: IMPRESSION: Normal CT of the head.  MRI IMPRESSION:  1. Minor degenerative change at C4-C5.  2. Mild C5-C6 degenerative disc disease resulting in minor central  canal narrowing with anterior cord contact and mild left neural  foramen narrowing    Prior Therapy: None  Social History:  lives with their family ( and 13 y/o daughter)  Falls: Yes about 2 weeks ago (lost balance and fell injuring L and twisting ankle).  This has happened a couple of times at home.  "have to hold onto walls when I walk at home"    DME: "some kind of cane thing but when I walk with it, it wobbles"*   Home Environment: Single level home with no steps.     Exercise Routine / History: No.     Family Present at time of Eval: No   Occupation: Does not work outside the home.    Prior Level of Function: able to perform light housekeeping and cooking activities, fair standing tolerance, no falls,   Current Level of Function: more off balance, have to hold onto something when I walk, really bad migraines, speech problem, unable to stand very long, unable to perform housekeeping or cooking activities    Pain:  Current 7/10, worst 10/10, best 6/10   Location: left temple spreading into head.    Description: Constant pressure, intermittent sharp pain, pressure in eyes.    Aggravating Factors: Stress, activity (walking), loud noise, light.    Easing Factors: quiet.      Reports dizziness with walking.  Described as being "off".  No spinning.      Fogginess 6/6  Dizziness 3/6  Imbalance 6/6  Nausea 2/6  Sensitivity to light 3/6  Sensitivity to noise 4/6    Patient's goals: To get answers.      Objective     Posture: " Standing: R shoulder lower but pelvis level.  Sitting: reversed lumbar lordosis, increased thoracic kyphosis, moderate rounded shoulder and forward head posture.     Palpation: Significant tenderness and excess muscle tension B suboccipital muscles.  Otherwise no significant tenderness noted.    Sensation: No deficits noted this date.    DTRs:  Not tested this date.   Range of Motion/Strength:   Cervical A/PROM: Pain/Dysfunction with Movement:   Flexion                        60*/70*    Extension                   40*/50*  pain in back of neck   Right side bending     50*/60*  pain in back of neck   Left side bending       45*/55*    Right rotation             80*/90*    Left rotation               70*/80*    UE grossly WFL except L shoulder elevation which is limited to 75% actively.    Strength R UE grossly 4-/5. L 3+/5-4-/5  B LE grossly WFL except ER which is somewhat restricted B,  L>R.  Strength R 3/5-3+/5.  L 3-/5-3/5  Flexibility: limitation in pectoralis muscles, upper traps and levator scap muscles.  .  Gait: Without AD However, pt requests UE support from PT during ambulation over level surfaces.    Analysis: minimal assistance, decreased aldo, decreased foot clearance.    Bed Mobility:Assistance - CGA for safety on treatment table.   Transfers: Assistance - Increased UE assistance.  Pt has difficulty stabilizing in standing.    Special Tests: Marsh Balance scale 20/56 with most difficulty demonstrated in narrow base of support, stepping (placing alternating feet on step), and single leg stance.      CMS Impairment/Limitation/Restriction for FOTO Vertigo Survey    Therapist reviewed FOTO scores for Shruthi Richard on 10/30/2020.   FOTO documents entered into [employer] - see Media section.    Limitation Score: 57%         TREATMENT   Treatment Time In: 1535  Treatment Time Out: 1545  Total Treatment time separate from Evaluation: 10 minutes    Shruthi received manual therapy to reduce pain and improve ROM  "to cervical region x 10 minutes including: subocc release, gentle PROM of cervical spine.  Patient reported "feeling like I want to pass out" when attempted grade 2 manual cervical traction thus this was discontinued.      Home Exercises and Patient Education Provided    Education provided:   - Discussed potential causes for dizziness including vestibular input, oculomotor input, cervicogenic, medication related issues, etc.   Instructed pt to utilize cold compress on neck should she develop a throbbing type headache following manual therapy    Written Home Exercises Provided: Not initiated this date. .  Exercises were reviewed and Shruthi was able to demonstrate them prior to the end of the session.  Shruthi demonstrated fair  understanding of the education provided.     See EMR under N/A for exercises provided N/A.    Assessment   Shruthi is a 39 y.o. female referred to outpatient Physical Therapy with a medical diagnosis of "other complicated headache syndrome". Patient presents with impaired posture, increased pain, intermittent dizziness, excess tenderness and muscle tension, impaired cervical ROM, impaired U/LE strength, impaired balance, impaired functional mobility and impaired gait.      Patient prognosis is Fair.   Patient will benefit from skilled outpatient Physical Therapy to address the deficits stated above and in the chart below, provide patient/family education, and to maximize patient's level of independence.     Plan of care discussed with patient: Yes  Patient's spiritual, cultural and educational needs considered and patient is agreeable to the plan of care and goals as stated below:     Anticipated Barriers for therapy: dizziness,     Medical Necessity is demonstrated by the following  History  Co-morbidities and personal factors that may impact the plan of care Co-morbidities:   anxiety, depression and level of undertstanding of current condition    Personal Factors:   no deficits     high "   Examination  Body Structures and Functions, activity limitations and participation restrictions that may impact the plan of care Body Regions:   head  neck  lower extremities  upper extremities  trunk    Body Systems:    gross symmetry  ROM  strength  balance  gait  transfers    Participation Restrictions:   dizziness    Activity limitations:   Learning and applying knowledge  solving problems    General Tasks and Commands  undertaking multiple tasks    Communication  no deficits    Mobility  lifting and carrying objects  walking  driving (bike, car, motorcycle)    Self care  washing oneself (bathing, drying, washing hands)  caring for body parts (brushing teeth, shaving, grooming)  toileting  dressing  looking after one's health    Domestic Life  shopping  cooking  doing house work (cleaning house, washing dishes, laundry)  assisting others    Interactions/Relationships  complex interpersonal interactions    Life Areas  employment    Community and Social Life  community life  recreation and leisure         high   Clinical Presentation unstable clinical presentation with unpredictable characteristics high   Decision Making/ Complexity Score: high     Goals:  Short Term Goals: 4 weeks    1) Decrease max pain to < 7/10    2) Decrease tenderness and excess muscle tension to moderate   3) Facilitate decreased dizziness to 2/6   4) Patient will transfer sit > stand and stabilize in standing with minimal  UE support 6 of 10 trials    Long Term Goals: 8 weeks    1) Patient will ambulate over level surfaces with appropriate assistive device without loss of balance at least 100' for  Safe household ambulation   2) Patient will safely perform sit > stand transfer and stabilize in standing without UE support 6 of 10 trials   3) Patient will improved Marsh Balance score to > 30/56 to assist with safe transitions and gait   4) Improve FOTO  Score to < 40% limitation   5) Pt will be independent in HEP.      Plan   Plan of care  Certification: 10/30/2020 to 12/31/2020.    Outpatient Physical Therapy 2 times weekly for 8 weeks to include the following interventions: Gait Training, Manual Therapy, Moist Heat/ Ice, Neuromuscular Re-ed, Patient Education, Therapeutic Activites and Therapeutic Exercise.     Vidhi Luu, PT

## 2020-10-31 PROBLEM — Z74.09 IMPAIRED FUNCTIONAL MOBILITY, BALANCE, GAIT, AND ENDURANCE: Status: ACTIVE | Noted: 2020-10-31

## 2020-11-02 ENCOUNTER — OFFICE VISIT (OUTPATIENT)
Dept: NEUROLOGY | Facility: CLINIC | Age: 40
End: 2020-11-02
Payer: MEDICAID

## 2020-11-02 VITALS
SYSTOLIC BLOOD PRESSURE: 102 MMHG | BODY MASS INDEX: 24.42 KG/M2 | DIASTOLIC BLOOD PRESSURE: 66 MMHG | HEIGHT: 63 IN | HEART RATE: 78 BPM | WEIGHT: 137.81 LBS

## 2020-11-02 DIAGNOSIS — G98.8 NEUROLOGIC DISORDER: ICD-10-CM

## 2020-11-02 DIAGNOSIS — G44.52 NPDH (NEW PERSISTENT DAILY HEADACHE): ICD-10-CM

## 2020-11-02 PROCEDURE — 99205 PR OFFICE/OUTPT VISIT, NEW, LEVL V, 60-74 MIN: ICD-10-PCS | Mod: S$PBB,,, | Performed by: PSYCHIATRY & NEUROLOGY

## 2020-11-02 PROCEDURE — 99999 PR PBB SHADOW E&M-EST. PATIENT-LVL IV: ICD-10-PCS | Mod: PBBFAC,,, | Performed by: PSYCHIATRY & NEUROLOGY

## 2020-11-02 PROCEDURE — 99205 OFFICE O/P NEW HI 60 MIN: CPT | Mod: S$PBB,,, | Performed by: PSYCHIATRY & NEUROLOGY

## 2020-11-02 PROCEDURE — 99214 OFFICE O/P EST MOD 30 MIN: CPT | Mod: PBBFAC | Performed by: PSYCHIATRY & NEUROLOGY

## 2020-11-02 PROCEDURE — 99999 PR PBB SHADOW E&M-EST. PATIENT-LVL IV: CPT | Mod: PBBFAC,,, | Performed by: PSYCHIATRY & NEUROLOGY

## 2020-11-02 RX ORDER — GABAPENTIN 800 MG/1
800 TABLET ORAL 3 TIMES DAILY
COMMUNITY
Start: 2020-10-26 | End: 2021-03-17 | Stop reason: CLARIF

## 2020-11-02 NOTE — PROGRESS NOTES
"Subjective:       Patient ID: Shruthi Richard is a 39 y.o. female.    Chief Complaint: Seizures    HPI     Multiple UC and ER visits     6 months of continuous symptoms:   Feet numb  Electric shock of whole body lasting few sec   Paresthesias of LUE   Episode of whole body paralyzed and awake   Stuttering   Trouble with word finding   Brain fog   Irritable   Fatigue   "something is off with my body"    Headache history:   Age of onset -  35 (6 months after uterus removed)   Location - L temple   Nature of pain -  Throbbing   Prodrome - no   Aura -  No   Duration of headache - cont   Time to peak intensity - n/a   Pain scale - range of intensity -  5-10/10  Frequency -  Daily   Status Migrainosus history - yes   Time of day of most headaches- anytime     Associated symptoms with the headache:   Meningeal symptoms - photophobia, phonophobia, exercise intolerance   Nausea/vomitting  Nasal drainage   Visual blurriness   Pallor/flushing  Dizziness   Vertigo  Confusion  Impaired concentration   Pain worsened with physical activity   Neck pain     Headache Triggers: unknown     Other comorbid conditions:  Anxiety - yes     Neuro note:   HPI per EMR: 39-year-old lady with past medical history of bipolar, depression, intractable migraine presented with chief complaint of shock-like sensation all over the body.  According to  was present at bedside tells me that since last couple weeks patient has many staring episodes and after those episodes she becomes very slow, sluggish and overall for last couple weeks she is declining and prefers to stay in the bed.  He also reported that she cannot walk long distance due to leg weakness.  Patient tells me that she has this sensation where somebody is tasing her with stun gun.  Patient has numerous complains including but not limited to speech disturbances, sensory complaints, visual symptoms, cognitive symptoms and nonepileptic seizure-like symptoms.  This patient has very " extensive neuro workup done both inpatient and outpatient including but not limited to CT head, MRIs, lumbar puncture without revealing any organic disease process that would explain her symptoms.  Otherwise she denies any fever, chills, chest pain, palpitations, bladder or bowel symptoms.  She tells me that due to her symptoms she cannot work.      Past Medical History:   Anxiety     Bipolar affective disorder, rapid cycling      bipolar 1   Depression     GERD (gastroesophageal reflux disease)     Migraine headache     Vaginal delivery       Past Surgical History:   ABDOMINAL SURGERY        exploratory laparotomy   HYSTERECTOMY   12/15/2017      Review of patient's allergies indicates:   Gluten protein Other (See Comments)      Sever stomach ache, very tired feeling   Prochlorperazine Other (See Comments)   Prochlorperazine edisylate        Anxiety, itching     Current Neurological Medications:  Lamictal     No current facility-administered medications on file prior to encounter.      Current Outpatient Medications on File Prior to Encounter   clonazePAM (KLONOPIN) 2 MG Tab Take 2 mg by mouth 3 (three) times daily.   lamoTRIgine (LAMICTAL) 150 MG Tab Take 150 mg by mouth 2 (two) times daily.    ondansetron (ZOFRAN-ODT) 4 MG TbDL Take 1 tablet (4 mg total) by mouth every 8 (eight) hours as needed.   pantoprazole (PROTONIX) 40 MG tablet Take 40 mg by mouth 2 (two) times daily.   FLUoxetine 20 MG capsule Take 20 mg by mouth once daily.   onabotulinumtoxina (BOTOX) 200 unit SolR Inject 200 Units into the muscle once. (5 rounds)      Family History None     Tobacco Use   Smoking status: Never Smoker   Smokeless tobacco: Never Used  Substance and Sexual Activity   Alcohol use: No   Drug use: No   Sexual activity: Yes      Partners: Male      Birth control/protection: OCP     Significant Imaging: MRI Cervical Spine Without Contrast  Reason: Neck pain, abnormal neuro exam Neck pain x a couple wks, no  hx  of trauma     TECHNIQUE: Cervical spine MRI without IV contrast     COMPARISON: None     FINDINGS:  At C2-C3, C3-C4, normal.     At C4-C5, minor right uncovertebral joint osteophyte causes no  narrowing.     At C5-C6, broad disc osteophyte complex results in minor central canal  narrowing while contacting the anterior cord diffusely. Mild left  neural foramen narrowing is also present.     At C6-C7, C7-T1, normal.     Cervical vertebral bodies maintain normal bone marrow signal.  Straightening of cervical lordosis occurs. Cervical cord contains  normal signal and is of normal caliber. Visualized paraspinal soft  tissues are unremarkable.     IMPRESSION:     1. Minor degenerative change at C4-C5.  2. Mild C5-C6 degenerative disc disease resulting in minor central  canal narrowing with anterior cord contact and mild left neural  foramen narrowing     Electronically Signed by Juan Sparrow M.D. on 10/23/2020 3:36 PM  X-Ray Chest AP Portable  CLINICAL HISTORY:  39 years (1980) Female chest pain Chest pain     TECHNIQUE:  Portable AP radiograph the chest.     COMPARISON:  10/6/2020     FINDINGS:  The lungs appear clear. There is no pneumothorax. Costophrenic angles  are seen without effusion. The heart is normal in size .  The  mediastinum is within normal limits. Osseous structures and visualized  upper abdomen appear within normal limits.     IMPRESSION:  No acute cardiac or pulmonary process.     Electronically Signed by Rebeca Wilde M.D. on 10/23/2020 7:53 AM  CT Head Without Contrast  EXAM DESCRIPTION:  CT HEAD WITHOUT IV CONTRAST     COMPARISON:  9/24/20, CT and MR   TECHNIQUE:  No contrast.  Coronal and sagittal reformat.  This exam was performed according to our departmental dose-optimization program, which includes automated exposure control, adjustment of the mA and/or kV according to patient size and/or use of iterative reconstruction technique.   FINDINGS:  No hemorrhage or infarct. No mass, mass  effect, or midline shift. Brain and extra-axial structures appear intact.   IMPRESSION: Normal CT of the head.      Paresthesias/Weakness  -MRI C-Spine: 1. Minor degenerative change at C4-C5.  2. Mild C5-C6 degenerative disc disease resulting in minor central  canal narrowing with anterior cord contact and mild left neural  foramen narrowing  -CT head: normal  -Recent MRI brain: 9/24/2020: negative acute  -Recent CTA head and neck: 9/23/20: no significant stenosis or occlusion  -Recent LP 2020: unremarkable  -PT/OT     Headache  -250 mg methylprednisolone IV once  -25 mg benedryl/30 mg toradol (pt alleries to phenergan) Q8 hours scheduled  -[facility]     Review of Systems   Constitutional: Positive for fatigue.   HENT: Negative.    Eyes: Negative.    Respiratory: Negative.    Cardiovascular: Negative.    Gastrointestinal: Negative.    Endocrine: Negative.    Genitourinary: Negative.    Integumentary:  Negative.   Allergic/Immunologic: Negative.    Neurological: Positive for numbness, disturbances in coordination and coordination difficulties.   Hematological: Negative.    Psychiatric/Behavioral: Positive for dysphoric mood.         Objective:      Physical Exam  Constitutional:       Appearance: Normal appearance.   HENT:      Head: Normocephalic and atraumatic.   Neurological:      Mental Status: She is alert.   Psychiatric:         Mood and Affect: Mood normal.         Behavior: Behavior normal.       speech - fluent     Assessment:       1. Neurologic disorder    2. NPDH (new persistent daily headache)          MRI  COMPARISON: Head CT at 1:27 AM     The ventricles and sulci are normal in size and configuration for age.  There is no intraparenchymal hemorrhage, mass or midline shift. There  are no extra-axial fluid collections.     There is normal signal within the white matter in all sequences. There  is no abnormality on the diffusion-weighted images to suggest acute  infarct. Cerebellum and brainstem are  normal.     There are flow voids within the cavernous portions of the internal  carotid arteries and basilar artery indicating patency.     The corpus callosum, cerebellar tonsils and midline structures are  normal.     IMPRESSION: Negative MRI of the brain    DD: SIBO, autoimmune, CNS vasculitis, conversion disorder, heavy metal poisoning, estrogen deff      Plan:   1, cont PT   2, MRA brain   3, On gabapentin, clonazepam, lamictal   4, EMG  5, EEG  6, Will check lamictal, vitamin B1, B2, B6, Vitamin D, ESR, CRP,  TSH, T4, TPO, paraneoplastic, SRUTHI, gluten enteropathy testing,     Will f/up with results

## 2020-11-02 NOTE — LETTER
November 2, 2020      Juanita Clay MD  [address]  [facility] [address]           [place] - Neurology 7th Fl  [address]  [facility] [address]  Phone: [phone]  Fax: [phone]          Patient: Shruthi Richard   MR Number: [id number]   YOB: 1980   Date of Visit: 11/2/2020       Dear Dr. Juanita Clay:    Thank you for referring Shruthi Richard to me for evaluation. Attached you will find relevant portions of my assessment and plan of care.    If you have questions, please do not hesitate to call me. I look forward to following Shruthi Richard along with you.    Sincerely,    Ramon Mclain MD    Enclosure  CC:  No Recipients    If you would like to receive this communication electronically, please contact [email or web address] or [phone] to request more information on [employer] Link access.    For providers and/or their staff who would like to refer a patient to [facility], please contact us through our one-stop-shop provider referral line, [facility], at [phone].    If you feel you have received this communication in error or would no longer like to receive these types of communications, please e-mail [email or web address]

## 2020-11-11 DIAGNOSIS — R20.0 NUMBNESS: Primary | ICD-10-CM

## 2020-11-11 DIAGNOSIS — R26.2 DIFFICULTY WALKING: ICD-10-CM

## 2020-11-11 DIAGNOSIS — R29.898 WEAKNESS OF BOTH ARMS: ICD-10-CM

## 2020-11-11 DIAGNOSIS — N39.46 MIXED STRESS AND URGE URINARY INCONTINENCE: ICD-10-CM

## 2020-11-13 ENCOUNTER — TELEPHONE (OUTPATIENT)
Dept: NEUROLOGY | Facility: CLINIC | Age: 40
End: 2020-11-13

## 2020-11-13 NOTE — TELEPHONE ENCOUNTER
Spoke with patient, she had multiply questions regarding her lab results and Dr Mclain's office notes. Advised her labs were normal with the exception of a slightly elevated B6 level. She wanted to know if Dr Mclain thought she had all the ailments that were listed at the bottom of his note and would like to discuss that with him. I advised that those things are likely what he is trying to rule out. She was added to the wait list for a January appointment

## 2020-11-13 NOTE — TELEPHONE ENCOUNTER
----- Message from Fidelina Han sent at 11/13/2020 12:24 PM CST -----  Regarding: appt  Contact: pt @ [phone]  Pt calling to speak with someone in Dr. Mclain's office regarding scheduling an appt. Please call.

## 2020-11-19 ENCOUNTER — TELEPHONE (OUTPATIENT)
Dept: NEUROLOGY | Facility: CLINIC | Age: 40
End: 2020-11-19

## 2020-11-19 NOTE — TELEPHONE ENCOUNTER
----- Message from Altaf Esquivel sent at 11/19/2020  2:44 PM CST -----  Regarding: Returning the office call  Contact: Shruthi MendozaGovind Richard was returning your call. She can be reached at [phone]

## 2020-11-20 ENCOUNTER — TELEPHONE (OUTPATIENT)
Dept: REHABILITATION | Facility: HOSPITAL | Age: 40
End: 2020-11-20

## 2020-11-20 NOTE — TELEPHONE ENCOUNTER
Contacted patient regarding missed appt this morning.  She stated she is undergoing further testing and has decided to defer therapy at this time and has canceled all appointments.  I informed her that she has 8 authorized visits. She can call to reschedule once testing is complete if she wishes to continue.  She verbalized understanding.

## 2020-11-23 ENCOUNTER — HOSPITAL ENCOUNTER (OUTPATIENT)
Dept: RADIOLOGY | Facility: HOSPITAL | Age: 40
Discharge: HOME | End: 2020-11-23
Attending: NURSE PRACTITIONER
Payer: MEDICAID

## 2020-11-23 DIAGNOSIS — R20.0 NUMBNESS: ICD-10-CM

## 2020-11-23 DIAGNOSIS — N39.46 MIXED STRESS AND URGE URINARY INCONTINENCE: ICD-10-CM

## 2020-11-23 DIAGNOSIS — R26.2 DIFFICULTY WALKING: ICD-10-CM

## 2020-11-23 DIAGNOSIS — R29.898 WEAKNESS OF BOTH ARMS: ICD-10-CM

## 2020-11-23 PROCEDURE — A9585 GADOBUTROL INJECTION: HCPCS | Mod: PO | Performed by: NURSE PRACTITIONER

## 2020-11-23 PROCEDURE — 72157 MRI CHEST SPINE W/O & W/DYE: CPT | Mod: TC,PO

## 2020-11-23 PROCEDURE — 25500020 PHARM REV CODE 255: Mod: PO | Performed by: NURSE PRACTITIONER

## 2020-11-23 RX ORDER — GADOBUTROL 604.72 MG/ML
6 INJECTION INTRAVENOUS
Status: COMPLETED | OUTPATIENT
Start: 2020-11-23 | End: 2020-11-23

## 2020-11-23 RX ADMIN — GADOBUTROL 6 ML: 604.72 INJECTION INTRAVENOUS at 01:11

## 2020-12-08 DIAGNOSIS — R29.90 STROKE-LIKE SYMPTOMS: Primary | ICD-10-CM

## 2020-12-18 ENCOUNTER — HOSPITAL ENCOUNTER (OUTPATIENT)
Dept: RADIOLOGY | Facility: HOSPITAL | Age: 40
Discharge: HOME | End: 2020-12-18
Attending: NURSE PRACTITIONER
Payer: MEDICAID

## 2020-12-18 DIAGNOSIS — R29.90 STROKE-LIKE SYMPTOMS: ICD-10-CM

## 2020-12-18 PROCEDURE — 70544 MR ANGIOGRAPHY HEAD W/O DYE: CPT | Mod: TC,PO

## 2020-12-29 ENCOUNTER — TELEPHONE (OUTPATIENT)
Dept: RHEUMATOLOGY | Facility: CLINIC | Age: 40
End: 2020-12-29

## 2020-12-29 NOTE — TELEPHONE ENCOUNTER
I called pt back.  Told her I do have her on my cancellation list.  As soon as I get  One I will call her.  But if she feels she needs to be seen sooner I will  Check to see if dome one else could see her

## 2021-01-19 ENCOUNTER — TELEPHONE (OUTPATIENT)
Dept: RHEUMATOLOGY | Facility: CLINIC | Age: 41
End: 2021-01-19

## 2021-01-20 ENCOUNTER — TELEPHONE (OUTPATIENT)
Dept: RHEUMATOLOGY | Facility: CLINIC | Age: 41
End: 2021-01-20

## 2021-02-23 ENCOUNTER — PATIENT MESSAGE (OUTPATIENT)
Dept: RHEUMATOLOGY | Facility: CLINIC | Age: 41
End: 2021-02-23

## 2021-03-02 ENCOUNTER — LAB VISIT (OUTPATIENT)
Dept: LAB | Facility: HOSPITAL | Age: 41
End: 2021-03-02
Attending: INTERNAL MEDICINE
Payer: MEDICAID

## 2021-03-02 ENCOUNTER — TELEPHONE (OUTPATIENT)
Dept: RHEUMATOLOGY | Facility: CLINIC | Age: 41
End: 2021-03-02

## 2021-03-02 ENCOUNTER — OFFICE VISIT (OUTPATIENT)
Dept: RHEUMATOLOGY | Facility: CLINIC | Age: 41
End: 2021-03-02
Payer: MEDICAID

## 2021-03-02 VITALS
HEART RATE: 58 BPM | DIASTOLIC BLOOD PRESSURE: 66 MMHG | HEIGHT: 64 IN | WEIGHT: 147.69 LBS | SYSTOLIC BLOOD PRESSURE: 98 MMHG | BODY MASS INDEX: 25.21 KG/M2

## 2021-03-02 DIAGNOSIS — R76.8 POSITIVE ANA (ANTINUCLEAR ANTIBODY): ICD-10-CM

## 2021-03-02 DIAGNOSIS — G98.8 NEUROLOGIC DISORDER: ICD-10-CM

## 2021-03-02 DIAGNOSIS — Z87.898 HISTORY OF FEVER: ICD-10-CM

## 2021-03-02 DIAGNOSIS — D64.9 ANEMIA, UNSPECIFIED TYPE: Primary | ICD-10-CM

## 2021-03-02 DIAGNOSIS — G98.8 NEUROLOGIC DISORDER: Primary | ICD-10-CM

## 2021-03-02 DIAGNOSIS — G43.811 OTHER MIGRAINE WITH STATUS MIGRAINOSUS, INTRACTABLE: ICD-10-CM

## 2021-03-02 DIAGNOSIS — R39.82 CHRONIC URINARY BLADDER PAIN: ICD-10-CM

## 2021-03-02 PROBLEM — R29.90 STROKE-LIKE SYMPTOMS: Status: ACTIVE | Noted: 2021-03-02

## 2021-03-02 PROBLEM — R20.9 SKIN SENSATION DISTURBANCE: Status: ACTIVE | Noted: 2021-03-02

## 2021-03-02 PROBLEM — R76.0 RAISED ANTIBODY TITER: Status: ACTIVE | Noted: 2021-03-02

## 2021-03-02 PROBLEM — F32.A DEPRESSIVE DISORDER: Status: ACTIVE | Noted: 2020-05-14

## 2021-03-02 PROBLEM — G43.019 REFRACTORY MIGRAINE WITHOUT AURA: Status: ACTIVE | Noted: 2020-05-14

## 2021-03-02 PROBLEM — F31.81 BIPOLAR II DISORDER: Status: ACTIVE | Noted: 2020-02-28

## 2021-03-02 PROBLEM — G62.9 NEUROPATHY: Status: ACTIVE | Noted: 2020-11-02

## 2021-03-02 PROBLEM — R26.2 DIFFICULTY WALKING: Status: ACTIVE | Noted: 2021-03-02

## 2021-03-02 LAB
ALBUMIN SERPL BCP-MCNC: 4.5 G/DL (ref 3.5–5.2)
ALP SERPL-CCNC: 78 U/L (ref 55–135)
ALT SERPL W/O P-5'-P-CCNC: 29 U/L (ref 10–44)
ANION GAP SERPL CALC-SCNC: 9 MMOL/L (ref 8–16)
AST SERPL-CCNC: 15 U/L (ref 10–40)
BASOPHILS # BLD AUTO: 0.05 K/UL (ref 0–0.2)
BASOPHILS NFR BLD: 0.6 % (ref 0–1.9)
BILIRUB SERPL-MCNC: 0.3 MG/DL (ref 0.1–1)
BUN SERPL-MCNC: 6 MG/DL (ref 6–20)
C3 SERPL-MCNC: 104 MG/DL (ref 50–180)
C4 SERPL-MCNC: 36 MG/DL (ref 11–44)
CALCIUM SERPL-MCNC: 9.4 MG/DL (ref 8.7–10.5)
CCP AB SER IA-ACNC: 0.5 U/ML
CHLORIDE SERPL-SCNC: 103 MMOL/L (ref 95–110)
CK SERPL-CCNC: 82 U/L (ref 20–180)
CO2 SERPL-SCNC: 28 MMOL/L (ref 23–29)
CREAT SERPL-MCNC: 0.8 MG/DL (ref 0.5–1.4)
CRP SERPL-MCNC: 1.7 MG/L (ref 0–8.2)
DAT IGG-SP REAG RBC-IMP: NORMAL
DIFFERENTIAL METHOD: ABNORMAL
EOSINOPHIL # BLD AUTO: 0.2 K/UL (ref 0–0.5)
EOSINOPHIL NFR BLD: 1.9 % (ref 0–8)
ERYTHROCYTE [DISTWIDTH] IN BLOOD BY AUTOMATED COUNT: 15.2 % (ref 11.5–14.5)
ERYTHROCYTE [SEDIMENTATION RATE] IN BLOOD BY WESTERGREN METHOD: 6 MM/HR (ref 0–36)
EST. GFR  (AFRICAN AMERICAN): >60 ML/MIN/1.73 M^2
EST. GFR  (NON AFRICAN AMERICAN): >60 ML/MIN/1.73 M^2
GLUCOSE SERPL-MCNC: 80 MG/DL (ref 70–110)
HCT VFR BLD AUTO: 36.6 % (ref 37–48.5)
HGB BLD-MCNC: 11.6 G/DL (ref 12–16)
IGA SERPL-MCNC: 102 MG/DL (ref 40–350)
IGG SERPL-MCNC: 921 MG/DL (ref 650–1600)
IGM SERPL-MCNC: 89 MG/DL (ref 50–300)
IMM GRANULOCYTES # BLD AUTO: 0.02 K/UL (ref 0–0.04)
IMM GRANULOCYTES NFR BLD AUTO: 0.3 % (ref 0–0.5)
LYMPHOCYTES # BLD AUTO: 2.6 K/UL (ref 1–4.8)
LYMPHOCYTES NFR BLD: 33.5 % (ref 18–48)
MCH RBC QN AUTO: 27.1 PG (ref 27–31)
MCHC RBC AUTO-ENTMCNC: 31.7 G/DL (ref 32–36)
MCV RBC AUTO: 86 FL (ref 82–98)
MONOCYTES # BLD AUTO: 0.5 K/UL (ref 0.3–1)
MONOCYTES NFR BLD: 6.1 % (ref 4–15)
NEUTROPHILS # BLD AUTO: 4.5 K/UL (ref 1.8–7.7)
NEUTROPHILS NFR BLD: 57.6 % (ref 38–73)
NRBC BLD-RTO: 0 /100 WBC
PLATELET # BLD AUTO: 431 K/UL (ref 150–350)
PMV BLD AUTO: 10.2 FL (ref 9.2–12.9)
POTASSIUM SERPL-SCNC: 4.1 MMOL/L (ref 3.5–5.1)
PROT SERPL-MCNC: 7.7 G/DL (ref 6–8.4)
RBC # BLD AUTO: 4.28 M/UL (ref 4–5.4)
RHEUMATOID FACT SERPL-ACNC: 11 IU/ML (ref 0–15)
SODIUM SERPL-SCNC: 140 MMOL/L (ref 136–145)
WBC # BLD AUTO: 7.88 K/UL (ref 3.9–12.7)

## 2021-03-02 PROCEDURE — 82085 ASSAY OF ALDOLASE: CPT

## 2021-03-02 PROCEDURE — 86140 C-REACTIVE PROTEIN: CPT

## 2021-03-02 PROCEDURE — 36415 COLL VENOUS BLD VENIPUNCTURE: CPT

## 2021-03-02 PROCEDURE — 83520 IMMUNOASSAY QUANT NOS NONAB: CPT

## 2021-03-02 PROCEDURE — 86803 HEPATITIS C AB TEST: CPT

## 2021-03-02 PROCEDURE — 86146 BETA-2 GLYCOPROTEIN ANTIBODY: CPT

## 2021-03-02 PROCEDURE — 86706 HEP B SURFACE ANTIBODY: CPT

## 2021-03-02 PROCEDURE — 86334 PATHOLOGIST INTERPRETATION IFE: ICD-10-PCS | Mod: 26,,, | Performed by: PATHOLOGY

## 2021-03-02 PROCEDURE — 86880 COOMBS TEST DIRECT: CPT

## 2021-03-02 PROCEDURE — 85025 COMPLETE CBC W/AUTO DIFF WBC: CPT

## 2021-03-02 PROCEDURE — 82787 IGG 1 2 3 OR 4 EACH: CPT | Mod: 59

## 2021-03-02 PROCEDURE — 86334 IMMUNOFIX E-PHORESIS SERUM: CPT

## 2021-03-02 PROCEDURE — 85652 RBC SED RATE AUTOMATED: CPT

## 2021-03-02 PROCEDURE — 84311 SPECTROPHOTOMETRY: CPT | Performed by: INTERNAL MEDICINE

## 2021-03-02 PROCEDURE — 86160 COMPLEMENT ANTIGEN: CPT | Mod: 59

## 2021-03-02 PROCEDURE — 84165 PROTEIN E-PHORESIS SERUM: CPT

## 2021-03-02 PROCEDURE — 86162 COMPLEMENT TOTAL (CH50): CPT

## 2021-03-02 PROCEDURE — 86255 FLUORESCENT ANTIBODY SCREEN: CPT | Mod: 91

## 2021-03-02 PROCEDURE — 86787 VARICELLA-ZOSTER ANTIBODY: CPT

## 2021-03-02 PROCEDURE — 86682 HELMINTH ANTIBODY: CPT

## 2021-03-02 PROCEDURE — 82595 ASSAY OF CRYOGLOBULIN: CPT

## 2021-03-02 PROCEDURE — 86200 CCP ANTIBODY: CPT

## 2021-03-02 PROCEDURE — 86160 COMPLEMENT ANTIGEN: CPT

## 2021-03-02 PROCEDURE — 86704 HEP B CORE ANTIBODY TOTAL: CPT

## 2021-03-02 PROCEDURE — 84165 PROTEIN E-PHORESIS SERUM: CPT | Mod: 26,,, | Performed by: PATHOLOGY

## 2021-03-02 PROCEDURE — 83516 IMMUNOASSAY NONANTIBODY: CPT | Mod: 59

## 2021-03-02 PROCEDURE — 86431 RHEUMATOID FACTOR QUANT: CPT

## 2021-03-02 PROCEDURE — 80053 COMPREHEN METABOLIC PANEL: CPT

## 2021-03-02 PROCEDURE — 87040 BLOOD CULTURE FOR BACTERIA: CPT

## 2021-03-02 PROCEDURE — 99215 OFFICE O/P EST HI 40 MIN: CPT | Mod: PBBFAC,25 | Performed by: INTERNAL MEDICINE

## 2021-03-02 PROCEDURE — 82550 ASSAY OF CK (CPK): CPT

## 2021-03-02 PROCEDURE — 86592 SYPHILIS TEST NON-TREP QUAL: CPT

## 2021-03-02 PROCEDURE — 99205 PR OFFICE/OUTPT VISIT, NEW, LEVL V, 60-74 MIN: ICD-10-PCS | Mod: S$PBB,,, | Performed by: INTERNAL MEDICINE

## 2021-03-02 PROCEDURE — 99999 PR PBB SHADOW E&M-EST. PATIENT-LVL V: CPT | Mod: PBBFAC,,, | Performed by: INTERNAL MEDICINE

## 2021-03-02 PROCEDURE — 84165 PATHOLOGIST INTERPRETATION SPE: ICD-10-PCS | Mod: 26,,, | Performed by: PATHOLOGY

## 2021-03-02 PROCEDURE — 86038 ANTINUCLEAR ANTIBODIES: CPT

## 2021-03-02 PROCEDURE — 86235 NUCLEAR ANTIGEN ANTIBODY: CPT | Mod: 59

## 2021-03-02 PROCEDURE — 99999 PR PBB SHADOW E&M-EST. PATIENT-LVL V: ICD-10-PCS | Mod: PBBFAC,,, | Performed by: INTERNAL MEDICINE

## 2021-03-02 PROCEDURE — 82784 ASSAY IGA/IGD/IGG/IGM EACH: CPT | Mod: 59

## 2021-03-02 PROCEDURE — 86790 VIRUS ANTIBODY NOS: CPT

## 2021-03-02 PROCEDURE — 86334 IMMUNOFIX E-PHORESIS SERUM: CPT | Mod: 26,,, | Performed by: PATHOLOGY

## 2021-03-02 PROCEDURE — 85613 RUSSELL VIPER VENOM DILUTED: CPT

## 2021-03-02 PROCEDURE — 86147 CARDIOLIPIN ANTIBODY EA IG: CPT

## 2021-03-02 PROCEDURE — 99205 OFFICE O/P NEW HI 60 MIN: CPT | Mod: S$PBB,,, | Performed by: INTERNAL MEDICINE

## 2021-03-02 PROCEDURE — 83516 IMMUNOASSAY NONANTIBODY: CPT

## 2021-03-02 PROCEDURE — 86235 NUCLEAR ANTIGEN ANTIBODY: CPT

## 2021-03-02 PROCEDURE — 87340 HEPATITIS B SURFACE AG IA: CPT

## 2021-03-02 PROCEDURE — 86703 HIV-1/HIV-2 1 RESULT ANTBDY: CPT

## 2021-03-02 PROCEDURE — 86225 DNA ANTIBODY NATIVE: CPT

## 2021-03-03 ENCOUNTER — TELEPHONE (OUTPATIENT)
Dept: RHEUMATOLOGY | Facility: CLINIC | Age: 41
End: 2021-03-03

## 2021-03-03 DIAGNOSIS — R29.90 STROKE-LIKE SYMPTOMS: Primary | ICD-10-CM

## 2021-03-03 LAB
ALBUMIN SERPL ELPH-MCNC: 4.87 G/DL (ref 3.35–5.55)
ALDOLASE SERPL-CCNC: 5.4 U/L (ref 1.2–7.6)
ALPHA1 GLOB SERPL ELPH-MCNC: 0.33 G/DL (ref 0.17–0.41)
ALPHA2 GLOB SERPL ELPH-MCNC: 0.7 G/DL (ref 0.43–0.99)
ANA SER QL IF: NORMAL
ANTI-SSA ANTIBODY: 0.08 RATIO (ref 0–0.99)
ANTI-SSA INTERPRETATION: NEGATIVE
ANTI-SSB ANTIBODY: 0.06 RATIO (ref 0–0.99)
ANTI-SSB INTERPRETATION: NEGATIVE
B-GLOBULIN SERPL ELPH-MCNC: 0.8 G/DL (ref 0.5–1.1)
DSDNA AB SER-ACNC: NORMAL [IU]/ML
GAMMA GLOB SERPL ELPH-MCNC: 0.9 G/DL (ref 0.67–1.58)
HBV CORE AB SERPL QL IA: NEGATIVE
HBV SURFACE AB SER-ACNC: POSITIVE M[IU]/ML
HBV SURFACE AG SERPL QL IA: NEGATIVE
HCV AB SERPL QL IA: NEGATIVE
HEPATITIS A ANTIBODY, IGG: NEGATIVE
INTERPRETATION SERPL IFE-IMP: NORMAL
PROT SERPL-MCNC: 7.6 G/DL (ref 6–8.4)
RPR SER QL: NORMAL
VARICELLA INTERPRETATION: POSITIVE
VARICELLA ZOSTER IGG: 3.18 ISR (ref 0–0.9)

## 2021-03-04 ENCOUNTER — PATIENT MESSAGE (OUTPATIENT)
Dept: RHEUMATOLOGY | Facility: CLINIC | Age: 41
End: 2021-03-04

## 2021-03-04 LAB
HIV 1+2 AB+HIV1 P24 AG SERPL QL IA: NEGATIVE
LA PPP-IMP: NEGATIVE
PROTEINASE3 IGG SER-ACNC: <0.2 U
RIBOSOMAL P IGG SER-ACNC: <0.2 U
STRONGYLOIDES ANTIBODY IGG: NEGATIVE

## 2021-03-05 LAB
ANCA AB TITR SER IF: NORMAL TITER
CARDIOLIPIN IGG SER IA-ACNC: <9.4 GPL (ref 0–14.99)
CARDIOLIPIN IGM SER IA-ACNC: <9.4 MPL (ref 0–12.49)
CH50 SERPL-ACNC: 56 U/ML (ref 42–95)
IGG1 SER-MCNC: 381 MG/DL (ref 382–929)
IGG2 SER-MCNC: 429 MG/DL (ref 242–700)
IGG3 SER-MCNC: 66 MG/DL (ref 22–176)
IGG4 SER-MCNC: 16 MG/DL (ref 4–86)
MYELOPEROXIDASE AB SER-ACNC: 4 UNITS
P-ANCA TITR SER IF: NORMAL TITER
PATHOLOGIST INTERPRETATION IFE: NORMAL
PATHOLOGIST INTERPRETATION SPE: NORMAL

## 2021-03-06 LAB
B2 GLYCOPROT1 IGA SER QL: <9 SAU
B2 GLYCOPROT1 IGG SER QL: <9 SGU
B2 GLYCOPROT1 IGM SER QL: <9 SMU

## 2021-03-07 ENCOUNTER — HOSPITAL ENCOUNTER (EMERGENCY)
Facility: HOSPITAL | Age: 41
Discharge: HOME | End: 2021-03-07
Attending: EMERGENCY MEDICINE
Payer: MEDICAID

## 2021-03-07 VITALS
BODY MASS INDEX: 24.8 KG/M2 | DIASTOLIC BLOOD PRESSURE: 57 MMHG | OXYGEN SATURATION: 99 % | HEART RATE: 56 BPM | TEMPERATURE: 98 F | SYSTOLIC BLOOD PRESSURE: 101 MMHG | RESPIRATION RATE: 18 BRPM | HEIGHT: 63 IN | WEIGHT: 140 LBS

## 2021-03-07 DIAGNOSIS — R07.89 CHEST WALL PAIN: ICD-10-CM
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
## 2021-03-07 DIAGNOSIS — S16.1XXA STRAIN OF NECK MUSCLE, INITIAL ENCOUNTER: ICD-10-CM

## 2021-03-07 LAB
ADENOSINE DEAMINASE RBC-CCNT: 443 MU/G HB (ref 400–900)
BACTERIA BLD CULT: NORMAL
BACTERIA BLD CULT: NORMAL

## 2021-03-07 PROCEDURE — 99284 EMERGENCY DEPT VISIT MOD MDM: CPT | Mod: 25

## 2021-03-07 PROCEDURE — 63600175 PHARM REV CODE 636 W HCPCS: Performed by: EMERGENCY MEDICINE

## 2021-03-07 PROCEDURE — 25000003 PHARM REV CODE 250: Performed by: EMERGENCY MEDICINE

## 2021-03-07 PROCEDURE — 96372 THER/PROPH/DIAG INJ SC/IM: CPT | Mod: 59

## 2021-03-07 RX ORDER — METHOCARBAMOL 500 MG/1
1000 TABLET, FILM COATED ORAL 3 TIMES DAILY
Qty: 30 TABLET | Refills: 0 | Status: SHIPPED | OUTPATIENT
Start: 2021-03-07 | End: 2021-03-12

## 2021-03-07 RX ORDER — KETOROLAC TROMETHAMINE 30 MG/ML
30 INJECTION, SOLUTION INTRAMUSCULAR; INTRAVENOUS
Status: COMPLETED | OUTPATIENT
Start: 2021-03-07 | End: 2021-03-07

## 2021-03-07 RX ORDER — KETOROLAC TROMETHAMINE 10 MG/1
10 TABLET, FILM COATED ORAL EVERY 6 HOURS
Qty: 20 TABLET | Refills: 0 | Status: SHIPPED | OUTPATIENT
Start: 2021-03-07 | End: 2021-03-12

## 2021-03-07 RX ORDER — METHOCARBAMOL 500 MG/1
1000 TABLET, FILM COATED ORAL
Status: COMPLETED | OUTPATIENT
Start: 2021-03-07 | End: 2021-03-07

## 2021-03-07 RX ORDER — KETOROLAC TROMETHAMINE 10 MG/1
10 TABLET, FILM COATED ORAL EVERY 6 HOURS
Qty: 20 TABLET | Refills: 0 | Status: SHIPPED | OUTPATIENT
Start: 2021-03-07 | End: 2021-03-07 | Stop reason: SDUPTHER

## 2021-03-07 RX ADMIN — METHOCARBAMOL 1000 MG: 500 TABLET ORAL at 05:03

## 2021-03-07 RX ADMIN — KETOROLAC TROMETHAMINE 30 MG: 30 INJECTION, SOLUTION INTRAMUSCULAR at 05:03

## 2021-03-12 ENCOUNTER — PATIENT MESSAGE (OUTPATIENT)
Dept: RHEUMATOLOGY | Facility: CLINIC | Age: 41
End: 2021-03-12

## 2021-03-12 LAB — CRYOGLOB SER QL: NORMAL

## 2021-03-17 ENCOUNTER — HOSPITAL ENCOUNTER (EMERGENCY)
Facility: HOSPITAL | Age: 41
Discharge: HOME | End: 2021-03-17
Attending: EMERGENCY MEDICINE
Payer: MEDICAID

## 2021-03-17 VITALS
OXYGEN SATURATION: 97 % | HEART RATE: 80 BPM | RESPIRATION RATE: 20 BRPM | DIASTOLIC BLOOD PRESSURE: 57 MMHG | SYSTOLIC BLOOD PRESSURE: 93 MMHG | TEMPERATURE: 99 F | WEIGHT: 140 LBS | BODY MASS INDEX: 24.8 KG/M2 | HEIGHT: 63 IN

## 2021-03-17 DIAGNOSIS — R07.89 CHEST WALL PAIN: Primary | ICD-10-CM

## 2021-03-17 DIAGNOSIS — S62.646A NONDISPLACED FRACTURE OF PROXIMAL PHALANX OF RIGHT LITTLE FINGER, INITIAL ENCOUNTER FOR CLOSED FRACTURE: ICD-10-CM

## 2021-03-17 DIAGNOSIS — S20.219A CONTUSION OF CHEST WALL, UNSPECIFIED LATERALITY, INITIAL ENCOUNTER: ICD-10-CM

## 2021-03-17 DIAGNOSIS — R07.9 CHEST PAIN: ICD-10-CM
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
## 2021-03-17 LAB
ALBUMIN SERPL BCP-MCNC: 4.2 G/DL (ref 3.5–5.2)
ALP SERPL-CCNC: 53 U/L (ref 55–135)
ALT SERPL W/O P-5'-P-CCNC: 13 U/L (ref 10–44)
ANION GAP SERPL CALC-SCNC: 10 MMOL/L (ref 8–16)
AST SERPL-CCNC: 14 U/L (ref 10–40)
BASOPHILS # BLD AUTO: 0.03 K/UL (ref 0–0.2)
BASOPHILS NFR BLD: 0.4 % (ref 0–1.9)
BILIRUB SERPL-MCNC: 0.2 MG/DL (ref 0.1–1)
BUN SERPL-MCNC: 12 MG/DL (ref 6–20)
CALCIUM SERPL-MCNC: 8.9 MG/DL (ref 8.7–10.5)
CHLORIDE SERPL-SCNC: 103 MMOL/L (ref 95–110)
CO2 SERPL-SCNC: 25 MMOL/L (ref 23–29)
CREAT SERPL-MCNC: 0.8 MG/DL (ref 0.5–1.4)
DIFFERENTIAL METHOD: ABNORMAL
EOSINOPHIL # BLD AUTO: 0.2 K/UL (ref 0–0.5)
EOSINOPHIL NFR BLD: 2.4 % (ref 0–8)
ERYTHROCYTE [DISTWIDTH] IN BLOOD BY AUTOMATED COUNT: 14.6 % (ref 11.5–14.5)
EST. GFR  (AFRICAN AMERICAN): >60 ML/MIN/1.73 M^2
EST. GFR  (NON AFRICAN AMERICAN): >60 ML/MIN/1.73 M^2
GLUCOSE SERPL-MCNC: 90 MG/DL (ref 70–110)
HCT VFR BLD AUTO: 34.5 % (ref 37–48.5)
HGB BLD-MCNC: 11 G/DL (ref 12–16)
IMM GRANULOCYTES # BLD AUTO: 0.02 K/UL (ref 0–0.04)
IMM GRANULOCYTES NFR BLD AUTO: 0.3 % (ref 0–0.5)
LIPASE SERPL-CCNC: 25 U/L (ref 4–60)
LYMPHOCYTES # BLD AUTO: 2.6 K/UL (ref 1–4.8)
LYMPHOCYTES NFR BLD: 32.6 % (ref 18–48)
MCH RBC QN AUTO: 27 PG (ref 27–31)
MCHC RBC AUTO-ENTMCNC: 31.9 G/DL (ref 32–36)
MCV RBC AUTO: 85 FL (ref 82–98)
MONOCYTES # BLD AUTO: 0.6 K/UL (ref 0.3–1)
MONOCYTES NFR BLD: 7 % (ref 4–15)
NEUTROPHILS # BLD AUTO: 4.6 K/UL (ref 1.8–7.7)
NEUTROPHILS NFR BLD: 57.3 % (ref 38–73)
NRBC BLD-RTO: 0 /100 WBC
PLATELET # BLD AUTO: 340 K/UL (ref 150–350)
PMV BLD AUTO: 9.7 FL (ref 9.2–12.9)
POTASSIUM SERPL-SCNC: 4.2 MMOL/L (ref 3.5–5.1)
PROT SERPL-MCNC: 7 G/DL (ref 6–8.4)
RBC # BLD AUTO: 4.08 M/UL (ref 4–5.4)
SODIUM SERPL-SCNC: 138 MMOL/L (ref 136–145)
WBC # BLD AUTO: 8 K/UL (ref 3.9–12.7)

## 2021-03-17 PROCEDURE — 99285 EMERGENCY DEPT VISIT HI MDM: CPT | Mod: 25

## 2021-03-17 PROCEDURE — 80053 COMPREHEN METABOLIC PANEL: CPT | Performed by: EMERGENCY MEDICINE

## 2021-03-17 PROCEDURE — 36415 COLL VENOUS BLD VENIPUNCTURE: CPT | Performed by: EMERGENCY MEDICINE

## 2021-03-17 PROCEDURE — 93005 ELECTROCARDIOGRAM TRACING: CPT | Mod: 59

## 2021-03-17 PROCEDURE — 93010 ELECTROCARDIOGRAM REPORT: CPT | Mod: ,,, | Performed by: INTERNAL MEDICINE

## 2021-03-17 PROCEDURE — 85025 COMPLETE CBC W/AUTO DIFF WBC: CPT | Performed by: EMERGENCY MEDICINE

## 2021-03-17 PROCEDURE — 25000003 PHARM REV CODE 250: Performed by: EMERGENCY MEDICINE

## 2021-03-17 PROCEDURE — 93010 EKG 12-LEAD: ICD-10-PCS | Mod: ,,, | Performed by: INTERNAL MEDICINE

## 2021-03-17 PROCEDURE — 29130 APPL FINGER SPLINT STATIC: CPT

## 2021-03-17 PROCEDURE — 83690 ASSAY OF LIPASE: CPT | Performed by: EMERGENCY MEDICINE

## 2021-03-17 RX ORDER — HYDROCODONE BITARTRATE AND ACETAMINOPHEN 5; 325 MG/1; MG/1
1 TABLET ORAL EVERY 6 HOURS PRN
Qty: 12 TABLET | Refills: 0 | Status: SHIPPED | OUTPATIENT
Start: 2021-03-17 | End: 2021-03-27

## 2021-03-17 RX ORDER — PROPRANOLOL HYDROCHLORIDE 40 MG/1
40 TABLET ORAL 2 TIMES DAILY
COMMUNITY
End: 2021-06-29

## 2021-03-17 RX ORDER — OXYCODONE HYDROCHLORIDE 10 MG/1
10 TABLET ORAL
Status: COMPLETED | OUTPATIENT
Start: 2021-03-17 | End: 2021-03-17

## 2021-03-17 RX ORDER — BUTALBITAL, ACETAMINOPHEN AND CAFFEINE 50; 325; 40 MG/1; MG/1; MG/1
1 TABLET ORAL EVERY 6 HOURS PRN
COMMUNITY

## 2021-03-17 RX ORDER — PROPRANOLOL HYDROCHLORIDE 10 MG/1
20 TABLET ORAL 2 TIMES DAILY
COMMUNITY
End: 2021-06-29

## 2021-03-17 RX ADMIN — OXYCODONE HYDROCHLORIDE 10 MG: 10 TABLET ORAL at 05:03

## 2021-03-19 ENCOUNTER — PATIENT OUTREACH (OUTPATIENT)
Dept: EMERGENCY MEDICINE | Facility: HOSPITAL | Age: 41
End: 2021-03-19

## 2021-03-31 ENCOUNTER — OFFICE VISIT (OUTPATIENT)
Dept: FAMILY MEDICINE | Facility: CLINIC | Age: 41
End: 2021-03-31
Payer: MEDICAID

## 2021-03-31 VITALS
SYSTOLIC BLOOD PRESSURE: 100 MMHG | BODY MASS INDEX: 26.58 KG/M2 | DIASTOLIC BLOOD PRESSURE: 66 MMHG | HEART RATE: 74 BPM | HEIGHT: 63 IN | TEMPERATURE: 99 F | OXYGEN SATURATION: 98 % | WEIGHT: 150 LBS

## 2021-03-31 DIAGNOSIS — F31.81 BIPOLAR II DISORDER: Primary | ICD-10-CM

## 2021-03-31 DIAGNOSIS — Z12.31 ENCOUNTER FOR SCREENING MAMMOGRAM FOR BREAST CANCER: ICD-10-CM

## 2021-03-31 DIAGNOSIS — E78.5 DYSLIPIDEMIA: ICD-10-CM

## 2021-03-31 DIAGNOSIS — R53.83 FATIGUE, UNSPECIFIED TYPE: ICD-10-CM

## 2021-03-31 DIAGNOSIS — F32.A DEPRESSIVE DISORDER: ICD-10-CM

## 2021-03-31 PROCEDURE — 99204 OFFICE O/P NEW MOD 45 MIN: CPT | Mod: S$PBB,,, | Performed by: NURSE PRACTITIONER

## 2021-03-31 PROCEDURE — 99215 OFFICE O/P EST HI 40 MIN: CPT | Performed by: NURSE PRACTITIONER

## 2021-03-31 PROCEDURE — 99204 PR OFFICE/OUTPT VISIT, NEW, LEVL IV, 45-59 MIN: ICD-10-PCS | Mod: S$PBB,,, | Performed by: NURSE PRACTITIONER

## 2021-03-31 RX ORDER — IBUPROFEN 800 MG/1
1 TABLET ORAL
COMMUNITY
Start: 2020-12-11 | End: 2021-06-29

## 2021-03-31 RX ORDER — ONDANSETRON 8 MG/1
1 TABLET, ORALLY DISINTEGRATING ORAL
COMMUNITY
Start: 2020-10-16 | End: 2021-06-29

## 2021-05-20 ENCOUNTER — TELEPHONE (OUTPATIENT)
Dept: RHEUMATOLOGY | Facility: CLINIC | Age: 41
End: 2021-05-20

## 2021-05-20 ENCOUNTER — OFFICE VISIT (OUTPATIENT)
Dept: RHEUMATOLOGY | Facility: CLINIC | Age: 41
End: 2021-05-20
Payer: MEDICAID

## 2021-05-20 ENCOUNTER — LAB VISIT (OUTPATIENT)
Dept: LAB | Facility: HOSPITAL | Age: 41
End: 2021-05-20
Attending: INTERNAL MEDICINE
Payer: MEDICAID

## 2021-05-20 VITALS
HEIGHT: 64 IN | WEIGHT: 149 LBS | BODY MASS INDEX: 25.44 KG/M2 | SYSTOLIC BLOOD PRESSURE: 99 MMHG | DIASTOLIC BLOOD PRESSURE: 69 MMHG | HEART RATE: 86 BPM

## 2021-05-20 DIAGNOSIS — G89.29 OTHER CHRONIC PAIN: ICD-10-CM

## 2021-05-20 DIAGNOSIS — D64.9 ANEMIA, UNSPECIFIED TYPE: ICD-10-CM

## 2021-05-20 DIAGNOSIS — D64.9 ANEMIA, UNSPECIFIED TYPE: Primary | ICD-10-CM

## 2021-05-20 DIAGNOSIS — M79.7 FIBROMYALGIA: ICD-10-CM

## 2021-05-20 LAB
C3 SERPL-MCNC: 108 MG/DL (ref 50–180)
C4 SERPL-MCNC: 38 MG/DL (ref 11–44)
FERRITIN SERPL-MCNC: 3 NG/ML (ref 20–300)
IRON SERPL-MCNC: 16 UG/DL (ref 30–160)
PHOSPHATE SERPL-MCNC: 3.8 MG/DL (ref 2.7–4.5)
SATURATED IRON: 3 % (ref 20–50)
TOTAL IRON BINDING CAPACITY: 472 UG/DL (ref 250–450)
TRANSFERRIN SERPL-MCNC: 319 MG/DL (ref 200–375)

## 2021-05-20 PROCEDURE — 82728 ASSAY OF FERRITIN: CPT | Performed by: INTERNAL MEDICINE

## 2021-05-20 PROCEDURE — 99214 PR OFFICE/OUTPT VISIT, EST, LEVL IV, 30-39 MIN: ICD-10-PCS | Mod: S$PBB,,, | Performed by: INTERNAL MEDICINE

## 2021-05-20 PROCEDURE — 81479 UNLISTED MOLECULAR PATHOLOGY: CPT | Performed by: INTERNAL MEDICINE

## 2021-05-20 PROCEDURE — 99213 OFFICE O/P EST LOW 20 MIN: CPT | Mod: PBBFAC | Performed by: INTERNAL MEDICINE

## 2021-05-20 PROCEDURE — 86160 COMPLEMENT ANTIGEN: CPT | Performed by: INTERNAL MEDICINE

## 2021-05-20 PROCEDURE — 99214 OFFICE O/P EST MOD 30 MIN: CPT | Mod: S$PBB,,, | Performed by: INTERNAL MEDICINE

## 2021-05-20 PROCEDURE — 83540 ASSAY OF IRON: CPT | Performed by: INTERNAL MEDICINE

## 2021-05-20 PROCEDURE — 30000890 MISCELLANEOUS SENDOUT TEST, BLOOD: Performed by: INTERNAL MEDICINE

## 2021-05-20 PROCEDURE — 86160 COMPLEMENT ANTIGEN: CPT | Mod: 59 | Performed by: INTERNAL MEDICINE

## 2021-05-20 PROCEDURE — 99999 PR PBB SHADOW E&M-EST. PATIENT-LVL III: CPT | Mod: PBBFAC,,, | Performed by: INTERNAL MEDICINE

## 2021-05-20 PROCEDURE — 36415 COLL VENOUS BLD VENIPUNCTURE: CPT | Performed by: INTERNAL MEDICINE

## 2021-05-20 PROCEDURE — 84100 ASSAY OF PHOSPHORUS: CPT | Performed by: INTERNAL MEDICINE

## 2021-05-20 PROCEDURE — 86162 COMPLEMENT TOTAL (CH50): CPT | Performed by: INTERNAL MEDICINE

## 2021-05-20 PROCEDURE — 84238 ASSAY NONENDOCRINE RECEPTOR: CPT | Performed by: INTERNAL MEDICINE

## 2021-05-20 PROCEDURE — 99999 PR PBB SHADOW E&M-EST. PATIENT-LVL III: ICD-10-PCS | Mod: PBBFAC,,, | Performed by: INTERNAL MEDICINE

## 2021-05-20 RX ORDER — OXYCODONE AND ACETAMINOPHEN 10; 325 MG/1; MG/1
1 TABLET ORAL EVERY 4 HOURS PRN
COMMUNITY
End: 2022-04-14

## 2021-05-20 RX ORDER — PREGABALIN 50 MG/1
50 CAPSULE ORAL NIGHTLY
Qty: 30 CAPSULE | Refills: 2 | OUTPATIENT
Start: 2021-05-20 | End: 2021-06-29

## 2021-05-21 LAB — STFR SERPL-MCNC: 5.9 MG/L (ref 1.8–4.6)

## 2021-05-22 ENCOUNTER — PATIENT MESSAGE (OUTPATIENT)
Dept: RHEUMATOLOGY | Facility: CLINIC | Age: 41
End: 2021-05-22

## 2021-05-24 LAB — CH50 SERPL-ACNC: 66 U/ML (ref 42–95)

## 2021-06-01 ENCOUNTER — HOSPITAL ENCOUNTER (EMERGENCY)
Facility: HOSPITAL | Age: 41
Discharge: HOME | End: 2021-06-01
Attending: EMERGENCY MEDICINE
Payer: MEDICAID

## 2021-06-01 VITALS
BODY MASS INDEX: 24.92 KG/M2 | DIASTOLIC BLOOD PRESSURE: 60 MMHG | OXYGEN SATURATION: 98 % | SYSTOLIC BLOOD PRESSURE: 118 MMHG | HEART RATE: 74 BPM | WEIGHT: 146 LBS | HEIGHT: 64 IN | RESPIRATION RATE: 17 BRPM | TEMPERATURE: 98 F

## 2021-06-01 DIAGNOSIS — R06.02 SHORTNESS OF BREATH: ICD-10-CM

## 2021-06-01 DIAGNOSIS — D64.9 ANEMIA, UNSPECIFIED TYPE: ICD-10-CM

## 2021-06-01 DIAGNOSIS — R53.1 WEAKNESS: Primary | ICD-10-CM

## 2021-06-01 LAB
ALBUMIN SERPL BCP-MCNC: 4.4 G/DL (ref 3.5–5.2)
ALP SERPL-CCNC: 81 U/L (ref 55–135)
ALT SERPL W/O P-5'-P-CCNC: 33 U/L (ref 10–44)
ANION GAP SERPL CALC-SCNC: 10 MMOL/L (ref 8–16)
AST SERPL-CCNC: 21 U/L (ref 10–40)
BASOPHILS # BLD AUTO: 0.03 K/UL (ref 0–0.2)
BASOPHILS NFR BLD: 0.4 % (ref 0–1.9)
BILIRUB SERPL-MCNC: 0.5 MG/DL (ref 0.1–1)
BUN SERPL-MCNC: 8 MG/DL (ref 6–20)
CALCIUM SERPL-MCNC: 9.4 MG/DL (ref 8.7–10.5)
CHLORIDE SERPL-SCNC: 101 MMOL/L (ref 95–110)
CO2 SERPL-SCNC: 29 MMOL/L (ref 23–29)
CREAT SERPL-MCNC: 0.6 MG/DL (ref 0.5–1.4)
D DIMER PPP IA.FEU-MCNC: <0.27 UG/ML FEU
DIFFERENTIAL METHOD: ABNORMAL
EOSINOPHIL # BLD AUTO: 0.1 K/UL (ref 0–0.5)
EOSINOPHIL NFR BLD: 1.5 % (ref 0–8)
ERYTHROCYTE [DISTWIDTH] IN BLOOD BY AUTOMATED COUNT: 13.8 % (ref 11.5–14.5)
EST. GFR  (AFRICAN AMERICAN): >60 ML/MIN/1.73 M^2
EST. GFR  (NON AFRICAN AMERICAN): >60 ML/MIN/1.73 M^2
GLUCOSE SERPL-MCNC: 85 MG/DL (ref 70–110)
HCT VFR BLD AUTO: 33.4 % (ref 37–48.5)
HGB BLD-MCNC: 10.4 G/DL (ref 12–16)
IMM GRANULOCYTES # BLD AUTO: 0.02 K/UL (ref 0–0.04)
IMM GRANULOCYTES NFR BLD AUTO: 0.3 % (ref 0–0.5)
LYMPHOCYTES # BLD AUTO: 2.2 K/UL (ref 1–4.8)
LYMPHOCYTES NFR BLD: 30.1 % (ref 18–48)
MCH RBC QN AUTO: 25.6 PG (ref 27–31)
MCHC RBC AUTO-ENTMCNC: 31.1 G/DL (ref 32–36)
MCV RBC AUTO: 82 FL (ref 82–98)
MONOCYTES # BLD AUTO: 0.6 K/UL (ref 0.3–1)
MONOCYTES NFR BLD: 7.4 % (ref 4–15)
NEUTROPHILS # BLD AUTO: 4.5 K/UL (ref 1.8–7.7)
NEUTROPHILS NFR BLD: 60.3 % (ref 38–73)
NRBC BLD-RTO: 0 /100 WBC
PLATELET # BLD AUTO: 464 K/UL (ref 150–450)
PMV BLD AUTO: 10.2 FL (ref 9.2–12.9)
POTASSIUM SERPL-SCNC: 4.2 MMOL/L (ref 3.5–5.1)
PROT SERPL-MCNC: 7 G/DL (ref 6–8.4)
RBC # BLD AUTO: 4.06 M/UL (ref 4–5.4)
SODIUM SERPL-SCNC: 140 MMOL/L (ref 136–145)
TROPONIN I SERPL DL<=0.01 NG/ML-MCNC: <0.03 NG/ML
WBC # BLD AUTO: 7.41 K/UL (ref 3.9–12.7)

## 2021-06-01 PROCEDURE — 36415 COLL VENOUS BLD VENIPUNCTURE: CPT | Performed by: EMERGENCY MEDICINE

## 2021-06-01 PROCEDURE — 99285 EMERGENCY DEPT VISIT HI MDM: CPT | Mod: 25

## 2021-06-01 PROCEDURE — 93010 EKG 12-LEAD: ICD-10-PCS | Mod: ,,, | Performed by: SPECIALIST

## 2021-06-01 PROCEDURE — 93005 ELECTROCARDIOGRAM TRACING: CPT | Performed by: SPECIALIST

## 2021-06-01 PROCEDURE — 93010 ELECTROCARDIOGRAM REPORT: CPT | Mod: ,,, | Performed by: SPECIALIST

## 2021-06-01 PROCEDURE — 80053 COMPREHEN METABOLIC PANEL: CPT | Performed by: EMERGENCY MEDICINE

## 2021-06-01 PROCEDURE — 25000003 PHARM REV CODE 250: Performed by: EMERGENCY MEDICINE

## 2021-06-01 PROCEDURE — 85379 FIBRIN DEGRADATION QUANT: CPT | Performed by: EMERGENCY MEDICINE

## 2021-06-01 PROCEDURE — 84484 ASSAY OF TROPONIN QUANT: CPT | Performed by: EMERGENCY MEDICINE

## 2021-06-01 PROCEDURE — 85025 COMPLETE CBC W/AUTO DIFF WBC: CPT | Performed by: EMERGENCY MEDICINE

## 2021-06-01 RX ORDER — SUCRALFATE 1 G/1
1 TABLET ORAL 4 TIMES DAILY
COMMUNITY
Start: 2021-04-17 | End: 2021-06-29

## 2021-06-01 RX ORDER — BUTALBITAL, ACETAMINOPHEN AND CAFFEINE 50; 325; 40 MG/1; MG/1; MG/1
1 TABLET ORAL EVERY 4 HOURS PRN
Status: DISCONTINUED | OUTPATIENT
Start: 2021-06-01 | End: 2021-06-02 | Stop reason: HOSPADM

## 2021-06-01 RX ORDER — ONDANSETRON 4 MG/1
8 TABLET, FILM COATED ORAL 3 TIMES DAILY PRN
COMMUNITY
Start: 2021-05-08 | End: 2022-04-14

## 2021-06-01 RX ORDER — TRAMADOL HYDROCHLORIDE 50 MG/1
TABLET ORAL
COMMUNITY
Start: 2021-04-23 | End: 2021-06-29

## 2021-06-01 RX ORDER — GABAPENTIN 600 MG/1
600 TABLET ORAL 3 TIMES DAILY
COMMUNITY

## 2021-06-01 RX ORDER — DICYCLOMINE HYDROCHLORIDE 10 MG/1
10 CAPSULE ORAL 3 TIMES DAILY
COMMUNITY
Start: 2021-05-23 | End: 2021-06-29

## 2021-06-01 RX ORDER — FAMOTIDINE 20 MG/1
20 TABLET, FILM COATED ORAL 2 TIMES DAILY
COMMUNITY
Start: 2021-05-14 | End: 2021-06-29

## 2021-06-01 RX ORDER — LANSOPRAZOLE 30 MG/1
30 CAPSULE, DELAYED RELEASE ORAL DAILY
COMMUNITY
Start: 2021-04-26 | End: 2021-06-29

## 2021-06-01 RX ORDER — ONDANSETRON 4 MG/1
4 TABLET, ORALLY DISINTEGRATING ORAL
Status: COMPLETED | OUTPATIENT
Start: 2021-06-01 | End: 2021-06-01

## 2021-06-01 RX ADMIN — ONDANSETRON 4 MG: 4 TABLET, ORALLY DISINTEGRATING ORAL at 06:06

## 2021-06-01 RX ADMIN — BUTALBITAL, ACETAMINOPHEN AND CAFFEINE 1 TABLET: 50; 325; 40 TABLET ORAL at 09:06

## 2021-06-25 ENCOUNTER — TELEPHONE (OUTPATIENT)
Dept: HEMATOLOGY/ONCOLOGY | Facility: CLINIC | Age: 41
End: 2021-06-25

## 2021-06-25 ENCOUNTER — OFFICE VISIT (OUTPATIENT)
Dept: HEMATOLOGY/ONCOLOGY | Facility: CLINIC | Age: 41
End: 2021-06-25
Payer: MEDICAID

## 2021-06-25 VITALS
WEIGHT: 154 LBS | DIASTOLIC BLOOD PRESSURE: 79 MMHG | HEIGHT: 64 IN | SYSTOLIC BLOOD PRESSURE: 111 MMHG | RESPIRATION RATE: 18 BRPM | HEART RATE: 102 BPM | BODY MASS INDEX: 26.29 KG/M2

## 2021-06-25 DIAGNOSIS — D50.8 IRON DEFICIENCY ANEMIA DUE TO DIETARY CAUSES: ICD-10-CM

## 2021-06-25 PROCEDURE — 99204 PR OFFICE/OUTPT VISIT, NEW, LEVL IV, 45-59 MIN: ICD-10-PCS | Mod: S$GLB,,, | Performed by: INTERNAL MEDICINE

## 2021-06-25 PROCEDURE — 99204 OFFICE O/P NEW MOD 45 MIN: CPT | Mod: S$GLB,,, | Performed by: INTERNAL MEDICINE

## 2021-06-28 ENCOUNTER — HOSPITAL ENCOUNTER (OUTPATIENT)
Facility: HOSPITAL | Age: 41
Discharge: HOME HEALTH | End: 2021-06-29
Attending: EMERGENCY MEDICINE | Admitting: INTERNAL MEDICINE
Payer: MEDICAID

## 2021-06-28 DIAGNOSIS — I63.9 STROKE: ICD-10-CM

## 2021-06-28 DIAGNOSIS — G45.9 TIA (TRANSIENT ISCHEMIC ATTACK): ICD-10-CM

## 2021-06-28 DIAGNOSIS — R51.9 HEADACHE: ICD-10-CM

## 2021-06-28 DIAGNOSIS — R07.9 CHEST PAIN: ICD-10-CM

## 2021-06-28 LAB
CREAT SERPL-MCNC: 0.7 MG/DL (ref 0.5–1.4)
GLUCOSE SERPL-MCNC: 90 MG/DL (ref 70–110)
SAMPLE: NORMAL

## 2021-06-28 PROCEDURE — 80061 LIPID PANEL: CPT | Performed by: EMERGENCY MEDICINE

## 2021-06-28 PROCEDURE — 99285 EMERGENCY DEPT VISIT HI MDM: CPT | Mod: 25

## 2021-06-28 PROCEDURE — 93010 ELECTROCARDIOGRAM REPORT: CPT | Mod: ,,, | Performed by: INTERNAL MEDICINE

## 2021-06-28 PROCEDURE — 93010 EKG 12-LEAD: ICD-10-PCS | Mod: ,,, | Performed by: INTERNAL MEDICINE

## 2021-06-28 PROCEDURE — 25500020 PHARM REV CODE 255: Performed by: EMERGENCY MEDICINE

## 2021-06-28 PROCEDURE — 93005 ELECTROCARDIOGRAM TRACING: CPT | Performed by: INTERNAL MEDICINE

## 2021-06-28 PROCEDURE — 84443 ASSAY THYROID STIM HORMONE: CPT | Performed by: EMERGENCY MEDICINE

## 2021-06-28 PROCEDURE — 85025 COMPLETE CBC W/AUTO DIFF WBC: CPT | Performed by: EMERGENCY MEDICINE

## 2021-06-28 PROCEDURE — 82962 GLUCOSE BLOOD TEST: CPT

## 2021-06-28 PROCEDURE — 83036 HEMOGLOBIN GLYCOSYLATED A1C: CPT | Performed by: EMERGENCY MEDICINE

## 2021-06-28 PROCEDURE — 80053 COMPREHEN METABOLIC PANEL: CPT | Performed by: EMERGENCY MEDICINE

## 2021-06-28 RX ORDER — HEPARIN 100 UNIT/ML
500 SYRINGE INTRAVENOUS
Status: CANCELLED | OUTPATIENT
Start: 2021-07-09

## 2021-06-28 RX ORDER — HEPARIN 100 UNIT/ML
500 SYRINGE INTRAVENOUS
OUTPATIENT
Start: 2021-07-16

## 2021-06-28 RX ORDER — SODIUM CHLORIDE 0.9 % (FLUSH) 0.9 %
10 SYRINGE (ML) INJECTION
Status: CANCELLED | OUTPATIENT
Start: 2021-07-09

## 2021-06-28 RX ORDER — SODIUM CHLORIDE 0.9 % (FLUSH) 0.9 %
10 SYRINGE (ML) INJECTION
OUTPATIENT
Start: 2021-07-16

## 2021-06-28 RX ADMIN — IOHEXOL 100 ML: 350 INJECTION, SOLUTION INTRAVENOUS at 11:06

## 2021-06-29 VITALS
RESPIRATION RATE: 20 BRPM | WEIGHT: 148 LBS | DIASTOLIC BLOOD PRESSURE: 79 MMHG | HEART RATE: 89 BPM | HEIGHT: 64 IN | OXYGEN SATURATION: 99 % | BODY MASS INDEX: 25.27 KG/M2 | SYSTOLIC BLOOD PRESSURE: 112 MMHG | TEMPERATURE: 98 F

## 2021-06-29 PROBLEM — R51.9 HEADACHE: Status: ACTIVE | Noted: 2021-06-29

## 2021-06-29 PROBLEM — R29.90 STROKE-LIKE SYMPTOMS: Status: RESOLVED | Noted: 2021-03-02 | Resolved: 2021-06-29

## 2021-06-29 LAB
ALBUMIN SERPL BCP-MCNC: 4.4 G/DL (ref 3.5–5.2)
ALP SERPL-CCNC: 91 U/L (ref 55–135)
ALT SERPL W/O P-5'-P-CCNC: 52 U/L (ref 10–44)
ANION GAP SERPL CALC-SCNC: 11 MMOL/L (ref 8–16)
APTT PPP: 29.7 SEC (ref 25.6–35.8)
AST SERPL-CCNC: 38 U/L (ref 10–40)
BASOPHILS # BLD AUTO: 0.02 K/UL (ref 0–0.2)
BASOPHILS NFR BLD: 0.3 % (ref 0–1.9)
BILIRUB SERPL-MCNC: 1 MG/DL (ref 0.1–1)
BILIRUB UR QL STRIP: NEGATIVE
BUN SERPL-MCNC: 5 MG/DL (ref 6–20)
CALCIUM SERPL-MCNC: 9.1 MG/DL (ref 8.7–10.5)
CHLORIDE SERPL-SCNC: 102 MMOL/L (ref 95–110)
CHOLEST SERPL-MCNC: 253 MG/DL (ref 120–199)
CHOLEST/HDLC SERPL: 3.5 {RATIO} (ref 2–5)
CK MB SERPL-MCNC: 0.9 NG/ML (ref 0.1–6.5)
CLARITY UR: CLEAR
CO2 SERPL-SCNC: 27 MMOL/L (ref 23–29)
COLOR UR: YELLOW
CREAT SERPL-MCNC: 0.7 MG/DL (ref 0.5–1.4)
DIFFERENTIAL METHOD: ABNORMAL
EOSINOPHIL # BLD AUTO: 0.1 K/UL (ref 0–0.5)
EOSINOPHIL NFR BLD: 1.9 % (ref 0–8)
ERYTHROCYTE [DISTWIDTH] IN BLOOD BY AUTOMATED COUNT: 14.5 % (ref 11.5–14.5)
EST. GFR  (AFRICAN AMERICAN): >60 ML/MIN/1.73 M^2
EST. GFR  (NON AFRICAN AMERICAN): >60 ML/MIN/1.73 M^2
ESTIMATED AVG GLUCOSE: 111 MG/DL (ref 68–131)
GLUCOSE SERPL-MCNC: 88 MG/DL (ref 70–110)
GLUCOSE UR QL STRIP: NEGATIVE
HBA1C MFR BLD: 5.5 % (ref 4.5–6.2)
HCT VFR BLD AUTO: 35.1 % (ref 37–48.5)
HDLC SERPL-MCNC: 72 MG/DL (ref 40–75)
HDLC SERPL: 28.5 % (ref 20–50)
HGB BLD-MCNC: 10.8 G/DL (ref 12–16)
HGB UR QL STRIP: NEGATIVE
IMM GRANULOCYTES # BLD AUTO: 0.01 K/UL (ref 0–0.04)
IMM GRANULOCYTES NFR BLD AUTO: 0.2 % (ref 0–0.5)
INR PPP: 1.1
KETONES UR QL STRIP: ABNORMAL
LDLC SERPL CALC-MCNC: 162 MG/DL (ref 63–159)
LEUKOCYTE ESTERASE UR QL STRIP: NEGATIVE
LYMPHOCYTES # BLD AUTO: 2.4 K/UL (ref 1–4.8)
LYMPHOCYTES NFR BLD: 40 % (ref 18–48)
MCH RBC QN AUTO: 24.4 PG (ref 27–31)
MCHC RBC AUTO-ENTMCNC: 30.8 G/DL (ref 32–36)
MCV RBC AUTO: 79 FL (ref 82–98)
MONOCYTES # BLD AUTO: 0.4 K/UL (ref 0.3–1)
MONOCYTES NFR BLD: 6.3 % (ref 4–15)
NEUTROPHILS # BLD AUTO: 3 K/UL (ref 1.8–7.7)
NEUTROPHILS NFR BLD: 51.3 % (ref 38–73)
NITRITE UR QL STRIP: NEGATIVE
NONHDLC SERPL-MCNC: 181 MG/DL
NRBC BLD-RTO: 0 /100 WBC
PH UR STRIP: 6 [PH] (ref 5–8)
PLATELET # BLD AUTO: 389 K/UL (ref 150–450)
PMV BLD AUTO: 10.1 FL (ref 9.2–12.9)
POTASSIUM SERPL-SCNC: 3.6 MMOL/L (ref 3.5–5.1)
PROT SERPL-MCNC: 7.4 G/DL (ref 6–8.4)
PROT UR QL STRIP: NEGATIVE
PROTHROMBIN TIME: 13.8 SEC (ref 11.8–14.3)
RBC # BLD AUTO: 4.42 M/UL (ref 4–5.4)
SARS-COV-2 RDRP RESP QL NAA+PROBE: NEGATIVE
SODIUM SERPL-SCNC: 140 MMOL/L (ref 136–145)
SP GR UR STRIP: >1.03 (ref 1–1.03)
TRIGL SERPL-MCNC: 95 MG/DL (ref 30–150)
TROPONIN I SERPL DL<=0.01 NG/ML-MCNC: <0.03 NG/ML
TROPONIN I SERPL DL<=0.01 NG/ML-MCNC: <0.03 NG/ML
TSH SERPL DL<=0.005 MIU/L-ACNC: 4.04 UIU/ML (ref 0.34–5.6)
URN SPEC COLLECT METH UR: ABNORMAL
UROBILINOGEN UR STRIP-ACNC: NEGATIVE EU/DL
WBC # BLD AUTO: 5.92 K/UL (ref 3.9–12.7)

## 2021-06-29 PROCEDURE — 63600175 PHARM REV CODE 636 W HCPCS: Performed by: EMERGENCY MEDICINE

## 2021-06-29 PROCEDURE — 82553 CREATINE MB FRACTION: CPT | Performed by: NURSE PRACTITIONER

## 2021-06-29 PROCEDURE — 36415 COLL VENOUS BLD VENIPUNCTURE: CPT | Performed by: INTERNAL MEDICINE

## 2021-06-29 PROCEDURE — 97530 THERAPEUTIC ACTIVITIES: CPT

## 2021-06-29 PROCEDURE — 99213 PR OFFICE/OUTPT VISIT, EST, LEVL III, 20-29 MIN: ICD-10-PCS | Mod: 95,,, | Performed by: PSYCHIATRY & NEUROLOGY

## 2021-06-29 PROCEDURE — 92610 EVALUATE SWALLOWING FUNCTION: CPT

## 2021-06-29 PROCEDURE — G0378 HOSPITAL OBSERVATION PER HR: HCPCS

## 2021-06-29 PROCEDURE — 25000003 PHARM REV CODE 250: Performed by: NURSE PRACTITIONER

## 2021-06-29 PROCEDURE — U0002 COVID-19 LAB TEST NON-CDC: HCPCS | Performed by: EMERGENCY MEDICINE

## 2021-06-29 PROCEDURE — 84484 ASSAY OF TROPONIN QUANT: CPT | Performed by: NURSE PRACTITIONER

## 2021-06-29 PROCEDURE — 25000003 PHARM REV CODE 250: Performed by: EMERGENCY MEDICINE

## 2021-06-29 PROCEDURE — 84484 ASSAY OF TROPONIN QUANT: CPT | Mod: 91 | Performed by: INTERNAL MEDICINE

## 2021-06-29 PROCEDURE — 96374 THER/PROPH/DIAG INJ IV PUSH: CPT

## 2021-06-29 PROCEDURE — 85610 PROTHROMBIN TIME: CPT | Performed by: NURSE PRACTITIONER

## 2021-06-29 PROCEDURE — 96375 TX/PRO/DX INJ NEW DRUG ADDON: CPT

## 2021-06-29 PROCEDURE — 99900031 HC PATIENT EDUCATION (STAT)

## 2021-06-29 PROCEDURE — 97166 OT EVAL MOD COMPLEX 45 MIN: CPT

## 2021-06-29 PROCEDURE — 36415 COLL VENOUS BLD VENIPUNCTURE: CPT | Performed by: EMERGENCY MEDICINE

## 2021-06-29 PROCEDURE — 99213 OFFICE O/P EST LOW 20 MIN: CPT | Mod: 95,,, | Performed by: PSYCHIATRY & NEUROLOGY

## 2021-06-29 PROCEDURE — 97162 PT EVAL MOD COMPLEX 30 MIN: CPT

## 2021-06-29 PROCEDURE — 81003 URINALYSIS AUTO W/O SCOPE: CPT | Performed by: EMERGENCY MEDICINE

## 2021-06-29 PROCEDURE — 99900035 HC TECH TIME PER 15 MIN (STAT)

## 2021-06-29 PROCEDURE — 63600175 PHARM REV CODE 636 W HCPCS: Performed by: INTERNAL MEDICINE

## 2021-06-29 PROCEDURE — 85730 THROMBOPLASTIN TIME PARTIAL: CPT | Performed by: NURSE PRACTITIONER

## 2021-06-29 RX ORDER — SODIUM CHLORIDE 0.9 % (FLUSH) 0.9 %
10 SYRINGE (ML) INJECTION
Status: DISCONTINUED | OUTPATIENT
Start: 2021-06-29 | End: 2021-06-29 | Stop reason: HOSPADM

## 2021-06-29 RX ORDER — ASPIRIN 325 MG
325 TABLET, DELAYED RELEASE (ENTERIC COATED) ORAL DAILY
Status: DISCONTINUED | OUTPATIENT
Start: 2021-06-29 | End: 2021-06-29 | Stop reason: HOSPADM

## 2021-06-29 RX ORDER — PROMETHAZINE HYDROCHLORIDE 12.5 MG/1
12.5 TABLET ORAL DAILY PRN
Status: DISCONTINUED | OUTPATIENT
Start: 2021-06-29 | End: 2021-06-29 | Stop reason: HOSPADM

## 2021-06-29 RX ORDER — FLUOXETINE HYDROCHLORIDE 20 MG/1
20 CAPSULE ORAL DAILY
Status: DISCONTINUED | OUTPATIENT
Start: 2021-06-29 | End: 2021-06-29 | Stop reason: HOSPADM

## 2021-06-29 RX ORDER — DIPHENHYDRAMINE HYDROCHLORIDE 50 MG/ML
25 INJECTION INTRAMUSCULAR; INTRAVENOUS
Status: COMPLETED | OUTPATIENT
Start: 2021-06-29 | End: 2021-06-29

## 2021-06-29 RX ORDER — ATORVASTATIN CALCIUM 20 MG/1
40 TABLET, FILM COATED ORAL DAILY
Status: DISCONTINUED | OUTPATIENT
Start: 2021-06-29 | End: 2021-06-29 | Stop reason: HOSPADM

## 2021-06-29 RX ORDER — ONDANSETRON 2 MG/ML
4 INJECTION INTRAMUSCULAR; INTRAVENOUS EVERY 8 HOURS PRN
Status: DISCONTINUED | OUTPATIENT
Start: 2021-06-29 | End: 2021-06-29 | Stop reason: HOSPADM

## 2021-06-29 RX ORDER — PANTOPRAZOLE SODIUM 40 MG/1
40 TABLET, DELAYED RELEASE ORAL 2 TIMES DAILY
Status: DISCONTINUED | OUTPATIENT
Start: 2021-06-29 | End: 2021-06-29 | Stop reason: HOSPADM

## 2021-06-29 RX ORDER — PROMETHAZINE HYDROCHLORIDE 12.5 MG/1
12.5 TABLET ORAL DAILY
Status: DISCONTINUED | OUTPATIENT
Start: 2021-06-29 | End: 2021-06-29

## 2021-06-29 RX ORDER — GABAPENTIN 300 MG/1
600 CAPSULE ORAL 3 TIMES DAILY
Status: DISCONTINUED | OUTPATIENT
Start: 2021-06-29 | End: 2021-06-29 | Stop reason: HOSPADM

## 2021-06-29 RX ORDER — OXYCODONE AND ACETAMINOPHEN 10; 325 MG/1; MG/1
1 TABLET ORAL
Status: COMPLETED | OUTPATIENT
Start: 2021-06-29 | End: 2021-06-29

## 2021-06-29 RX ORDER — PREGABALIN 25 MG/1
50 CAPSULE ORAL NIGHTLY
Status: DISCONTINUED | OUTPATIENT
Start: 2021-06-29 | End: 2021-06-29

## 2021-06-29 RX ORDER — ACETAMINOPHEN 325 MG/1
650 TABLET ORAL EVERY 6 HOURS PRN
Status: DISCONTINUED | OUTPATIENT
Start: 2021-06-29 | End: 2021-06-29 | Stop reason: HOSPADM

## 2021-06-29 RX ORDER — LORAZEPAM 2 MG/ML
0.5 INJECTION INTRAMUSCULAR ONCE
Status: COMPLETED | OUTPATIENT
Start: 2021-06-29 | End: 2021-06-29

## 2021-06-29 RX ORDER — PROMETHAZINE HYDROCHLORIDE 12.5 MG/1
12.5 TABLET ORAL EVERY 6 HOURS PRN
Qty: 12 TABLET | Refills: 0 | Status: SHIPPED | OUTPATIENT
Start: 2021-06-29

## 2021-06-29 RX ORDER — BUTALBITAL, ACETAMINOPHEN AND CAFFEINE 50; 325; 40 MG/1; MG/1; MG/1
1 TABLET ORAL 2 TIMES DAILY PRN
Status: DISCONTINUED | OUTPATIENT
Start: 2021-06-29 | End: 2021-06-29 | Stop reason: HOSPADM

## 2021-06-29 RX ADMIN — OXYCODONE HYDROCHLORIDE AND ACETAMINOPHEN 1 TABLET: 10; 325 TABLET ORAL at 01:06

## 2021-06-29 RX ADMIN — LAMOTRIGINE 150 MG: 25 TABLET ORAL at 08:06

## 2021-06-29 RX ADMIN — ATORVASTATIN CALCIUM 40 MG: 20 TABLET, FILM COATED ORAL at 08:06

## 2021-06-29 RX ADMIN — ASPIRIN 325 MG: 325 TABLET, COATED ORAL at 08:06

## 2021-06-29 RX ADMIN — GABAPENTIN 600 MG: 300 CAPSULE ORAL at 02:06

## 2021-06-29 RX ADMIN — LORAZEPAM 0.5 MG: 2 INJECTION INTRAMUSCULAR; INTRAVENOUS at 08:06

## 2021-06-29 RX ADMIN — BUTALBITAL, ACETAMINOPHEN AND CAFFEINE 1 TABLET: 50; 325; 40 TABLET ORAL at 11:06

## 2021-06-29 RX ADMIN — DIPHENHYDRAMINE HYDROCHLORIDE 25 MG: 50 INJECTION INTRAMUSCULAR; INTRAVENOUS at 01:06

## 2021-06-29 RX ADMIN — FLUOXETINE 20 MG: 20 CAPSULE ORAL at 08:06

## 2021-06-29 RX ADMIN — GABAPENTIN 600 MG: 300 CAPSULE ORAL at 08:06

## 2021-06-29 RX ADMIN — PROMETHAZINE HYDROCHLORIDE 12.5 MG: 12.5 TABLET ORAL at 02:06

## 2021-06-29 RX ADMIN — PANTOPRAZOLE SODIUM 40 MG: 40 TABLET, DELAYED RELEASE ORAL at 08:06

## 2021-06-30 LAB
MISCELLANEOUS TEST NAME: NORMAL
REFERENCE LAB: NORMAL
SPECIMEN TYPE: NORMAL
TEST RESULT: NORMAL

## 2021-07-02 PROBLEM — F45.0 DEPRESSION WITH SOMATIZATION: Status: ACTIVE | Noted: 2020-05-14

## 2021-07-09 ENCOUNTER — INFUSION (OUTPATIENT)
Dept: INFUSION THERAPY | Facility: HOSPITAL | Age: 41
End: 2021-07-09
Attending: INTERNAL MEDICINE
Payer: MEDICAID

## 2021-07-09 VITALS
DIASTOLIC BLOOD PRESSURE: 73 MMHG | BODY MASS INDEX: 26.24 KG/M2 | WEIGHT: 153.69 LBS | HEIGHT: 64 IN | SYSTOLIC BLOOD PRESSURE: 109 MMHG | TEMPERATURE: 98 F | HEART RATE: 67 BPM | OXYGEN SATURATION: 98 % | RESPIRATION RATE: 18 BRPM

## 2021-07-09 DIAGNOSIS — D50.8 IRON DEFICIENCY ANEMIA DUE TO DIETARY CAUSES: Primary | ICD-10-CM

## 2021-07-09 PROCEDURE — 63600175 PHARM REV CODE 636 W HCPCS: Performed by: NURSE PRACTITIONER

## 2021-07-09 PROCEDURE — 96365 THER/PROPH/DIAG IV INF INIT: CPT

## 2021-07-09 PROCEDURE — 96375 TX/PRO/DX INJ NEW DRUG ADDON: CPT

## 2021-07-09 PROCEDURE — 25000003 PHARM REV CODE 250: Performed by: NURSE PRACTITIONER

## 2021-07-09 RX ORDER — METHYLPREDNISOLONE SOD SUCC 125 MG
125 VIAL (EA) INJECTION
Status: COMPLETED | OUTPATIENT
Start: 2021-07-09 | End: 2021-07-09

## 2021-07-09 RX ORDER — SODIUM CHLORIDE 0.9 % (FLUSH) 0.9 %
10 SYRINGE (ML) INJECTION
Status: DISCONTINUED | OUTPATIENT
Start: 2021-07-09 | End: 2021-07-09 | Stop reason: HOSPADM

## 2021-07-09 RX ADMIN — ONDANSETRON 16 MG: 2 INJECTION INTRAMUSCULAR; INTRAVENOUS at 02:07

## 2021-07-09 RX ADMIN — METHYLPREDNISOLONE SODIUM SUCCINATE 125 MG: 125 INJECTION, POWDER, FOR SOLUTION INTRAMUSCULAR; INTRAVENOUS at 03:07

## 2021-07-09 RX ADMIN — FERUMOXYTOL 510 MG: 510 INJECTION INTRAVENOUS at 02:07

## 2021-07-12 ENCOUNTER — TELEPHONE (OUTPATIENT)
Dept: HEMATOLOGY/ONCOLOGY | Facility: CLINIC | Age: 41
End: 2021-07-12

## 2021-07-14 ENCOUNTER — TELEPHONE (OUTPATIENT)
Dept: INFUSION THERAPY | Facility: HOSPITAL | Age: 41
End: 2021-07-14

## 2021-07-23 ENCOUNTER — TELEPHONE (OUTPATIENT)
Dept: RHEUMATOLOGY | Facility: CLINIC | Age: 41
End: 2021-07-23

## 2021-08-11 ENCOUNTER — OFFICE VISIT (OUTPATIENT)
Dept: HEMATOLOGY/ONCOLOGY | Facility: CLINIC | Age: 41
End: 2021-08-11
Payer: MEDICAID

## 2021-08-11 VITALS
DIASTOLIC BLOOD PRESSURE: 76 MMHG | TEMPERATURE: 99 F | HEART RATE: 73 BPM | SYSTOLIC BLOOD PRESSURE: 121 MMHG | RESPIRATION RATE: 17 BRPM | BODY MASS INDEX: 26.13 KG/M2 | WEIGHT: 152.19 LBS

## 2021-08-11 DIAGNOSIS — D50.8 IRON DEFICIENCY ANEMIA DUE TO DIETARY CAUSES: Primary | ICD-10-CM

## 2021-08-11 PROCEDURE — 99213 PR OFFICE/OUTPT VISIT, EST, LEVL III, 20-29 MIN: ICD-10-PCS | Mod: S$GLB,,, | Performed by: INTERNAL MEDICINE

## 2021-08-11 PROCEDURE — 99213 OFFICE O/P EST LOW 20 MIN: CPT | Mod: S$GLB,,, | Performed by: INTERNAL MEDICINE

## 2021-10-29 LAB
BASOPHILS # BLD AUTO: 41 CELLS/UL (ref 0–200)
BASOPHILS NFR BLD AUTO: 0.6 %
EOSINOPHIL # BLD AUTO: 109 CELLS/UL (ref 15–500)
EOSINOPHIL NFR BLD AUTO: 1.6 %
ERYTHROCYTE [DISTWIDTH] IN BLOOD BY AUTOMATED COUNT: 17.8 % (ref 11–15)
FERRITIN SERPL-MCNC: 7 NG/ML (ref 16–232)
HCT VFR BLD AUTO: 38.9 % (ref 35–45)
HGB BLD-MCNC: 12.6 G/DL (ref 11.7–15.5)
LYMPHOCYTES # BLD AUTO: 1761 CELLS/UL (ref 850–3900)
LYMPHOCYTES NFR BLD AUTO: 25.9 %
MCH RBC QN AUTO: 28.1 PG (ref 27–33)
MCHC RBC AUTO-ENTMCNC: 32.4 G/DL (ref 32–36)
MCV RBC AUTO: 86.8 FL (ref 80–100)
MONOCYTES # BLD AUTO: 422 CELLS/UL (ref 200–950)
MONOCYTES NFR BLD AUTO: 6.2 %
NEUTROPHILS # BLD AUTO: 4468 CELLS/UL (ref 1500–7800)
NEUTROPHILS NFR BLD AUTO: 65.7 %
PLATELET # BLD AUTO: 385 THOUSAND/UL (ref 140–400)
PMV BLD REES-ECKER: 10.8 FL (ref 7.5–12.5)
RBC # BLD AUTO: 4.48 MILLION/UL (ref 3.8–5.1)
WBC # BLD AUTO: 6.8 THOUSAND/UL (ref 3.8–10.8)

## 2022-01-19 ENCOUNTER — HOSPITAL ENCOUNTER (EMERGENCY)
Facility: HOSPITAL | Age: 42
Discharge: HOME | End: 2022-01-19
Attending: EMERGENCY MEDICINE
Payer: MEDICAID

## 2022-01-19 VITALS
HEART RATE: 63 BPM | WEIGHT: 153 LBS | OXYGEN SATURATION: 98 % | DIASTOLIC BLOOD PRESSURE: 51 MMHG | HEIGHT: 64 IN | SYSTOLIC BLOOD PRESSURE: 94 MMHG | RESPIRATION RATE: 16 BRPM | BODY MASS INDEX: 26.12 KG/M2 | TEMPERATURE: 98 F

## 2022-01-19 DIAGNOSIS — R53.1 ACUTE LEFT-SIDED WEAKNESS: Primary | ICD-10-CM

## 2022-01-19 PROBLEM — R29.898 LEFT LEG WEAKNESS: Status: ACTIVE | Noted: 2022-01-19

## 2022-01-19 LAB
ALBUMIN SERPL BCP-MCNC: 4.6 G/DL (ref 3.5–5.2)
ALP SERPL-CCNC: 75 U/L (ref 55–135)
ALT SERPL W/O P-5'-P-CCNC: 16 U/L (ref 10–44)
ANION GAP SERPL CALC-SCNC: 10 MMOL/L (ref 8–16)
APTT PPP: 28.1 SEC (ref 23.3–35.1)
AST SERPL-CCNC: 16 U/L (ref 10–40)
BASOPHILS # BLD AUTO: 0.03 K/UL (ref 0–0.2)
BASOPHILS NFR BLD: 0.4 % (ref 0–1.9)
BILIRUB SERPL-MCNC: 0.5 MG/DL (ref 0.1–1)
BUN SERPL-MCNC: 11 MG/DL (ref 6–20)
CALCIUM SERPL-MCNC: 9.4 MG/DL (ref 8.7–10.5)
CHLORIDE SERPL-SCNC: 102 MMOL/L (ref 95–110)
CK MB SERPL-MCNC: 1 NG/ML (ref 0.1–6.5)
CK SERPL-CCNC: 86 U/L (ref 20–180)
CO2 SERPL-SCNC: 26 MMOL/L (ref 23–29)
CREAT SERPL-MCNC: 0.8 MG/DL (ref 0.5–1.4)
CREAT SERPL-MCNC: 0.8 MG/DL (ref 0.5–1.4)
DIFFERENTIAL METHOD: ABNORMAL
EOSINOPHIL # BLD AUTO: 0.2 K/UL (ref 0–0.5)
EOSINOPHIL NFR BLD: 2 % (ref 0–8)
ERYTHROCYTE [DISTWIDTH] IN BLOOD BY AUTOMATED COUNT: 13.3 % (ref 11.5–14.5)
EST. GFR  (AFRICAN AMERICAN): >60 ML/MIN/1.73 M^2
EST. GFR  (NON AFRICAN AMERICAN): >60 ML/MIN/1.73 M^2
GLUCOSE SERPL-MCNC: 89 MG/DL (ref 70–110)
GLUCOSE SERPL-MCNC: 90 MG/DL (ref 70–110)
HCT VFR BLD AUTO: 39.8 % (ref 37–48.5)
HGB BLD-MCNC: 12.7 G/DL (ref 12–16)
IMM GRANULOCYTES # BLD AUTO: 0.03 K/UL (ref 0–0.04)
IMM GRANULOCYTES NFR BLD AUTO: 0.4 % (ref 0–0.5)
INR PPP: 1.1
LIPASE SERPL-CCNC: 31 U/L (ref 4–60)
LYMPHOCYTES # BLD AUTO: 2.4 K/UL (ref 1–4.8)
LYMPHOCYTES NFR BLD: 29.8 % (ref 18–48)
MAGNESIUM SERPL-MCNC: 2.1 MG/DL (ref 1.6–2.6)
MCH RBC QN AUTO: 28.2 PG (ref 27–31)
MCHC RBC AUTO-ENTMCNC: 31.9 G/DL (ref 32–36)
MCV RBC AUTO: 88 FL (ref 82–98)
MONOCYTES # BLD AUTO: 0.5 K/UL (ref 0.3–1)
MONOCYTES NFR BLD: 6 % (ref 4–15)
NEUTROPHILS # BLD AUTO: 5 K/UL (ref 1.8–7.7)
NEUTROPHILS NFR BLD: 61.4 % (ref 38–73)
NRBC BLD-RTO: 0 /100 WBC
PLATELET # BLD AUTO: 390 K/UL (ref 150–450)
PMV BLD AUTO: 10.2 FL (ref 9.2–12.9)
POTASSIUM SERPL-SCNC: 3.6 MMOL/L (ref 3.5–5.1)
PROT SERPL-MCNC: 7.4 G/DL (ref 6–8.4)
PROTHROMBIN TIME: 13.2 SEC (ref 11.4–13.7)
RBC # BLD AUTO: 4.51 M/UL (ref 4–5.4)
SAMPLE: NORMAL
SARS-COV-2 RDRP RESP QL NAA+PROBE: NEGATIVE
SODIUM SERPL-SCNC: 138 MMOL/L (ref 136–145)
TROPONIN I SERPL DL<=0.01 NG/ML-MCNC: <0.03 NG/ML
TSH SERPL DL<=0.005 MIU/L-ACNC: 1.78 UIU/ML (ref 0.34–5.6)
WBC # BLD AUTO: 8.06 K/UL (ref 3.9–12.7)

## 2022-01-19 PROCEDURE — 80053 COMPREHEN METABOLIC PANEL: CPT | Performed by: EMERGENCY MEDICINE

## 2022-01-19 PROCEDURE — 99213 PR OFFICE/OUTPT VISIT, EST, LEVL III, 20-29 MIN: ICD-10-PCS | Mod: 95,,, | Performed by: STUDENT IN AN ORGANIZED HEALTH CARE EDUCATION/TRAINING PROGRAM

## 2022-01-19 PROCEDURE — 93010 ELECTROCARDIOGRAM REPORT: CPT | Mod: ,,, | Performed by: INTERNAL MEDICINE

## 2022-01-19 PROCEDURE — 99285 EMERGENCY DEPT VISIT HI MDM: CPT | Mod: 25

## 2022-01-19 PROCEDURE — 85025 COMPLETE CBC W/AUTO DIFF WBC: CPT | Performed by: EMERGENCY MEDICINE

## 2022-01-19 PROCEDURE — 93010 EKG 12-LEAD: ICD-10-PCS | Mod: ,,, | Performed by: INTERNAL MEDICINE

## 2022-01-19 PROCEDURE — 82553 CREATINE MB FRACTION: CPT | Performed by: EMERGENCY MEDICINE

## 2022-01-19 PROCEDURE — 83735 ASSAY OF MAGNESIUM: CPT | Performed by: EMERGENCY MEDICINE

## 2022-01-19 PROCEDURE — U0002 COVID-19 LAB TEST NON-CDC: HCPCS | Performed by: INTERNAL MEDICINE

## 2022-01-19 PROCEDURE — 84443 ASSAY THYROID STIM HORMONE: CPT | Performed by: EMERGENCY MEDICINE

## 2022-01-19 PROCEDURE — 82962 GLUCOSE BLOOD TEST: CPT

## 2022-01-19 PROCEDURE — 96374 THER/PROPH/DIAG INJ IV PUSH: CPT | Mod: 59

## 2022-01-19 PROCEDURE — 63600175 PHARM REV CODE 636 W HCPCS: Performed by: EMERGENCY MEDICINE

## 2022-01-19 PROCEDURE — 25500020 PHARM REV CODE 255: Performed by: EMERGENCY MEDICINE

## 2022-01-19 PROCEDURE — 25000003 PHARM REV CODE 250: Performed by: EMERGENCY MEDICINE

## 2022-01-19 PROCEDURE — 83690 ASSAY OF LIPASE: CPT | Performed by: EMERGENCY MEDICINE

## 2022-01-19 PROCEDURE — 99213 OFFICE O/P EST LOW 20 MIN: CPT | Mod: 95,,, | Performed by: STUDENT IN AN ORGANIZED HEALTH CARE EDUCATION/TRAINING PROGRAM

## 2022-01-19 PROCEDURE — 84484 ASSAY OF TROPONIN QUANT: CPT | Performed by: EMERGENCY MEDICINE

## 2022-01-19 PROCEDURE — 93005 ELECTROCARDIOGRAM TRACING: CPT | Performed by: INTERNAL MEDICINE

## 2022-01-19 PROCEDURE — 85730 THROMBOPLASTIN TIME PARTIAL: CPT | Performed by: EMERGENCY MEDICINE

## 2022-01-19 PROCEDURE — 82550 ASSAY OF CK (CPK): CPT | Performed by: EMERGENCY MEDICINE

## 2022-01-19 PROCEDURE — 85610 PROTHROMBIN TIME: CPT | Performed by: EMERGENCY MEDICINE

## 2022-01-19 RX ORDER — RIMEGEPANT SULFATE 75 MG/75MG
22 TABLET, ORALLY DISINTEGRATING ORAL
COMMUNITY
Start: 2021-07-06 | End: 2022-04-14

## 2022-01-19 RX ORDER — TOPIRAMATE 25 MG/1
25 TABLET ORAL DAILY
COMMUNITY
End: 2022-04-14

## 2022-01-19 RX ORDER — SIMVASTATIN 10 MG/1
TABLET, FILM COATED ORAL
COMMUNITY
Start: 2022-01-13 | End: 2022-04-14

## 2022-01-19 RX ORDER — ONDANSETRON 2 MG/ML
4 INJECTION INTRAMUSCULAR; INTRAVENOUS
Status: DISCONTINUED | OUTPATIENT
Start: 2022-01-19 | End: 2022-01-19

## 2022-01-19 RX ORDER — MAGNESIUM OXIDE/MAG AA CHELATE 300 MG
CAPSULE ORAL
COMMUNITY
End: 2022-04-14

## 2022-01-19 RX ORDER — PROPRANOLOL HYDROCHLORIDE 60 MG/1
TABLET ORAL
COMMUNITY
End: 2022-04-14

## 2022-01-19 RX ORDER — ATOGEPANT 30 MG/1
TABLET ORAL
COMMUNITY
Start: 2021-11-17 | End: 2022-04-14

## 2022-01-19 RX ORDER — ELETRIPTAN HYDROBROMIDE 40 MG/1
TABLET, FILM COATED ORAL
COMMUNITY
End: 2022-04-14

## 2022-01-19 RX ORDER — BUSPIRONE HYDROCHLORIDE 10 MG/1
10 TABLET ORAL 3 TIMES DAILY
COMMUNITY
Start: 2022-01-11

## 2022-01-19 RX ORDER — LOSARTAN POTASSIUM 100 MG/1
TABLET ORAL
COMMUNITY
End: 2022-04-14

## 2022-01-19 RX ORDER — PHENTERMINE HYDROCHLORIDE 37.5 MG/1
37.5 TABLET ORAL EVERY MORNING
COMMUNITY
Start: 2021-12-17 | End: 2022-04-14

## 2022-01-19 RX ORDER — LANSOPRAZOLE 30 MG/1
CAPSULE, DELAYED RELEASE ORAL
COMMUNITY
Start: 2021-09-13 | End: 2022-04-14

## 2022-01-19 RX ORDER — TOPIRAMATE 100 MG/1
TABLET, FILM COATED ORAL
COMMUNITY
End: 2022-04-14

## 2022-01-19 RX ORDER — EPTINEZUMAB-JJMR 100 MG/ML
INJECTION INTRAVENOUS
COMMUNITY
Start: 2021-03-30 | End: 2023-01-06

## 2022-01-19 RX ORDER — SUMATRIPTAN 50 MG/1
TABLET, FILM COATED ORAL
COMMUNITY
End: 2022-04-14

## 2022-01-19 RX ORDER — LASMIDITAN 100 MG/1
100 TABLET ORAL DAILY PRN
COMMUNITY
Start: 2021-11-17 | End: 2022-04-14

## 2022-01-19 RX ORDER — DIPHENHYDRAMINE HCL 25 MG
25 CAPSULE ORAL
Status: COMPLETED | OUTPATIENT
Start: 2022-01-19 | End: 2022-01-19

## 2022-01-19 RX ORDER — CLOPIDOGREL BISULFATE 75 MG/1
75 TABLET ORAL DAILY
COMMUNITY
End: 2022-04-14

## 2022-01-19 RX ORDER — METOCLOPRAMIDE HYDROCHLORIDE 5 MG/ML
10 INJECTION INTRAMUSCULAR; INTRAVENOUS
Status: COMPLETED | OUTPATIENT
Start: 2022-01-19 | End: 2022-01-19

## 2022-01-19 RX ORDER — VILAZODONE HYDROCHLORIDE 20 MG/1
20 TABLET ORAL DAILY
COMMUNITY
End: 2022-04-17

## 2022-01-19 RX ORDER — KETOROLAC TROMETHAMINE 10 MG/1
TABLET, FILM COATED ORAL
COMMUNITY
End: 2022-04-14

## 2022-01-19 RX ORDER — ONDANSETRON 4 MG/1
4 TABLET, FILM COATED ORAL EVERY 8 HOURS PRN
Qty: 12 TABLET | Refills: 0 | Status: SHIPPED | OUTPATIENT
Start: 2022-01-19 | End: 2022-04-14

## 2022-01-19 RX ADMIN — DIPHENHYDRAMINE HYDROCHLORIDE 25 MG: 25 CAPSULE ORAL at 07:01

## 2022-01-19 RX ADMIN — METOCLOPRAMIDE 10 MG: 5 INJECTION, SOLUTION INTRAMUSCULAR; INTRAVENOUS at 07:01

## 2022-01-19 RX ADMIN — IOHEXOL 100 ML: 350 INJECTION, SOLUTION INTRAVENOUS at 05:01

## 2022-01-19 NOTE — ED PROVIDER NOTES
Encounter Date: 1/19/2022       History     Chief Complaint   Patient presents with    Numbness     L leg tingling and numbness since 1630 today      Patient reports she had multiple strokes in the past.  Old records show patient was admitted with complex migraine with extremity weakness in the past.  Patient reports at approximately 4:30 p.m. she started with left arm and leg weakness.  No trouble with vision or speech.  Similar symptoms with strokes in the past.  No fever chills.  No recent illness.  No relieving factors at home.  Symptoms continue in the emergency room.        Review of patient's allergies indicates:   Allergen Reactions    Gluten protein Other (See Comments)     Sever stomach ache, very tired feeling    Prochlorperazine Other (See Comments) and Itching    Prochlorperazine edisylate      Anxiety, itching  Other reaction(s): Unknown     Past Medical History:   Diagnosis Date    Anxiety     Bipolar affective disorder, rapid cycling     bipolar 1    Depression     GERD (gastroesophageal reflux disease)     Migraine headache     Vaginal delivery     x1     Past Surgical History:   Procedure Laterality Date    ABDOMINAL SURGERY      exploratory laparotomy    HYSTERECTOMY  12/15/2017    partial     Family History   Problem Relation Age of Onset    No Known Problems Mother     Hypertension Father     No Known Problems Brother     No Known Problems Daughter      Social History     Tobacco Use    Smoking status: Never Smoker    Smokeless tobacco: Never Used   Substance Use Topics    Alcohol use: Not Currently     Alcohol/week: 0.0 - 1.0 standard drinks     Comment: occasional    Drug use: No     Review of Systems   Constitutional: Negative for chills and fever.   HENT: Negative for congestion.    Eyes: Negative for visual disturbance.   Respiratory: Negative for shortness of breath.    Cardiovascular: Negative for chest pain and palpitations.   Gastrointestinal: Positive for nausea.  Negative for abdominal pain.   Genitourinary: Negative for dysuria.   Musculoskeletal: Negative for joint swelling.   Neurological: Negative for seizures and syncope.   Psychiatric/Behavioral: Negative for confusion.       Physical Exam     Initial Vitals [01/19/22 1738]   BP Pulse Resp Temp SpO2   124/62 95 18 98.2 °F (36.8 °C) 99 %      MAP       --         Physical Exam    Nursing note and vitals reviewed.  Constitutional: She is not diaphoretic. No distress.   HENT:   Head: Normocephalic and atraumatic.   Eyes: Conjunctivae are normal.   Neck:   Normal range of motion.  Cardiovascular: Normal rate.   Pulmonary/Chest: Breath sounds normal.   Abdominal: Abdomen is soft. There is no abdominal tenderness.   Musculoskeletal:         General: Normal range of motion.      Cervical back: Normal range of motion.     Neurological: She is alert and oriented to person, place, and time. No cranial nerve deficit.   Sensation grossly intact to both arms and legs.  There is no pronator drift.  Patient with decreased hand  on the left with clumsiness to left hand.  3/5 strength to left leg.   Skin: No rash noted.   Psychiatric: She has a normal mood and affect.         ED Course   Procedures  Labs Reviewed   CBC W/ AUTO DIFFERENTIAL - Abnormal; Notable for the following components:       Result Value    MCHC 31.9 (*)     All other components within normal limits   APTT   COMPREHENSIVE METABOLIC PANEL   LIPASE   MAGNESIUM   PROTIME-INR   TROPONIN I   TSH   CK-MB   CK   SARS-COV-2 RNA AMPLIFICATION, QUAL   DRUG SCREEN PANEL, URINE EMERGENCY   URINALYSIS, REFLEX TO URINE CULTURE   ISTAT CREATININE   POCT GLUCOSE   POCT CREATININE   POCT GLUCOSE MONITORING CONTINUOUS          Imaging Results          MRI Brain Without Contrast (Final result)  Result time 01/19/22 19:50:46    Final result by Ashely Moss IV, MD (01/19/22 19:50:46)                 Narrative:    MRI of the brain without contrast    HISTORY: Cerebrovascular  accident.    Comparison 6/29/2021.    The brain appears normally formed and myelinated. There are no findings of acute hemorrhage or infarction. There is no restricted diffusion.    No intra-axial mass or significant mass effect is observed.    The ventricular system is appropriate in size and position for age. There are no extra-axial collections.    Appropriate flow voids are demonstrated in the major blood vessels. The skull base and craniocervical junction appear unremarkable. There is scattered mucosal thickening within the paranasal sinuses.    IMPRESSION:    No acute intracranial abnormality.    Electronically signed by:  Ashely Moss MD  1/19/2022 7:50 PM CST Workstation: [id number]                             X-Ray Chest AP Portable (Final result)  Result time 01/19/22 18:21:52    Final result by Ashely Moss IV, MD (01/19/22 18:21:52)                 Narrative:    Chest, single view    HISTORY: Cerebrovascular accident.    Comparison 6/1/2021.    The heart and mediastinum appear unremarkable. There is no acute pulmonary vascular engorgement. The lungs are clear with no infiltrate, volume loss or pleural fluid accumulation observed.    IMPRESSION:    No acute cardiopulmonary disease.    Electronically signed by:  Ashely Moss MD  1/19/2022 6:21 PM CST Workstation: [id number]                             CTA Head and Neck (xpd) (Final result)  Result time 01/19/22 18:08:39    Final result by Ashely Moss IV, MD (01/19/22 18:08:39)                 Narrative:    The study was performed with thin sections with subsequent 3-D reformations  and reconstructions at multiple planes with images permanently stored in the PACS system.    All CT scans at this facility use dose modulation, degenerative reconstruction  and/or weight-based dosing when appropriate to reduce radiation dose to as low as reasonably achievable.    CTA of the head and neck    HISTORY: Neurologic defect, weakness, cerebrovascular  accident.    The examination utilizes 100 mL of intravenous nonionic contrast material.    There is mild motion degradation.    The aortic arch and branches enhance appropriately. The common, internal and external carotid arteries are patent without evidence of hemodynamically significant narrowing. The cervical segments of the vertebral arteries enhance appropriately.    The intracranial segments of the internal carotid arteries and anterior and middle cerebral arteries enhance normally. Similarly, the intracranial segments of the vertebral arteries, basilar artery and branches including the posterior cerebral arteries enhance normally. There are no findings of significant luminal stenosis or occlusion. No aneurysm or vascular malformation is identified.    No acute abnormality is demonstrated within the neck or visualized lung apices.    IMPRESSION:    No findings of significant luminal stenosis involving the cervical segments of the carotid or vertebral arteries.    No findings of significant luminal narrowing involving the major intracranial arteries.    Electronically signed by:  Ashely Moss MD  1/19/2022 6:08 PM CST Workstation: [id number]                             CT Head Without Contrast (Final result)  Result time 01/19/22 18:03:13    Final result by Ashely Moss IV, MD (01/19/22 18:03:13)                 Narrative:    CMS MANDATED QUALITY DATA - CT RADIATION  436    All CT scans at this facility utilize dose modulation, iterative reconstruction, and/or weight based dosing when appropriate to reduce radiation dose to as low as reasonably achievable.    CT of the brain without contrast    HISTORY: Trauma, headache.    The brain parenchyma appears unremarkable. There are no findings of acute hemorrhage or infarction. There is no evidence of an intra-axial mass, mass effect or shift of the midline. The ventricular system is appropriate in size and position for age. There are no extra-axial collections  demonstrated.    Reviewed at bone windows, no acute osseous abnormality is identified. There is scattered mucosal thickening within the paranasal sinuses..    IMPRESSION:    No acute intracranial abnormality.    Electronically signed by:  Ashely Moss MD  1/19/2022 6:03 PM CST Workstation: [id number]                               Medications   iohexoL (OMNIPAQUE 350) injection 100 mL (100 mLs Intravenous Given 1/19/22 1754)   metoclopramide HCl injection 10 mg (10 mg Intravenous Given 1/19/22 1934)   diphenhydrAMINE capsule 25 mg (25 mg Oral Given 1/19/22 1936)     Medical Decision Making:   History:   Old Medical Records: I decided to obtain old medical records.  Clinical Tests:   Lab Tests: Reviewed  Radiological Study: Reviewed  Medical Tests: Reviewed  ED Management:  Patient presents with left-sided weakness.  Review all records show patient with similar symptoms in the past with unremarkable MRI.  Her diagnosis was complex migraine.  Given history of similar symptoms with complex migraines, ischemic stroke is less likely.  Given alternative diagnosis no tPA given per tele neurology recommendation.  Stat MRI ordered.  Hospitalist consulted for admission.    Hospitalist consulted.  Patient is now walking with no problems.  Patient does not warrant admission.  She states she is back to her normal state health and desires discharge.  Given multiple similar presentations with complex migraine with negative MRI will discharge.  Neurologic symptoms are resolved on repeat exam.  Will treat nausea with Zofran.                      Clinical Impression:   Final diagnoses:  [R53.1] Acute left-sided weakness (Primary)          ED Disposition Condition    Discharge Stable        ED Prescriptions     None        Follow-up Information    None          Kuldip Nelson MD  01/19/22 1939       Kuldip Nelson MD  01/19/22 2047

## 2022-01-20 NOTE — HPI
Ms Richard is a 40 y/o female with significant PMHx of complex migraine presented to the ED with weakness and numbness of bilateral lower limbs which started as her Migraine headache. She now has left leg weakness and numbness. Of note patient has previous MRI brain post weakness negative for ischemic stroke.

## 2022-01-20 NOTE — SUBJECTIVE & OBJECTIVE
"  Woke up with symptoms?: no    Recent bleeding noted: no  Does the patient take any Blood Thinners? unknown  Medications: Unknown      Past Medical History: Migraine headache    Past Surgical History: no relevant surgical history    Family History: hypertension    Social History: unable to obtain    Allergies: Gluten Protein  Prochlorperazine  Prochlorperazine Edisylate No relevant allergies    Review of Systems  Objective:   Vitals: Blood pressure (!) 94/51, pulse 63, temperature 98.2 °F (36.8 °C), temperature source Oral, resp. rate 16, height 5' 4" (1.626 m), weight 69.4 kg (153 lb), last menstrual period 12/15/2017, SpO2 98 %. BP: chart review    CT READ: Yes  No hemmorhage. No mass effect. No early infarct signs.     Physical Exam    Left Daniel weakness and numbness  "

## 2022-01-20 NOTE — ASSESSMENT & PLAN NOTE
Acute ischemic stroke v/s COmplext migraine     Given extensive hx of complex migraine, would not give tPA with CTH   rec MRI brain wo STAT

## 2022-01-20 NOTE — CONSULTS
[facility]  Vascular Neurology  Comprehensive Stroke Center  TeleVascular Neurology Acute Consultation Note      Consults    Consulting Provider: JEFF RUVALCABA  Current Providers  No providers found    Patient Location:  [facility] EMERGENCY DEPARTMENT Emergency Department  Spoke hospital nurse at bedside with patient assisting consultant.     Patient information was obtained from patient.         Assessment/Plan:       Diagnoses:   Left leg weakness  Acute ischemic stroke v/s COmplext migraine     Given extensive hx of complex migraine, would not give tPA with CTH   rec MRI brain wo STAT        STROKE DOCUMENTATION     Acute Stroke Times:   Acute Stroke Times   Last Known Normal Date: 01/19/22  Last Known Normal Time: 1630  Symptom Onset Date: 01/19/22  Symptom Onset Time: 1630  Stroke Team Called Date: 01/19/22  Stroke Team Called Time: 1747  Stroke Team Arrival Date: 01/19/22  Stroke Team Arrival Time: 1750  CT Interpretation Time: 1755  Alteplase Recommended: No  Thrombectomy Recommended: No    NIH Scale:  Interval: baseline  1a. Level of Consciousness: 0-->Alert, keenly responsive  1b. LOC Questions: 0-->Answers both questions correctly  1c. LOC Commands: 0-->Performs both tasks correctly  2. Best Gaze: 0-->Normal  3. Visual: 0-->No visual loss  4. Facial Palsy: 0-->Normal symmetrical movements  5a. Motor Arm, Left: 0-->No drift, limb holds 90 (or 45) degrees for full 10 secs  5b. Motor Arm, Right: 0-->No drift, limb holds 90 (or 45) degrees for full 10 secs  6a. Motor Leg, Left: 3-->No effort against gravity, leg falls to bed immediately  6b. Motor Leg, Right: 0-->No drift, leg holds 30 degree position for full 5 secs  7. Limb Ataxia: 0-->Absent  8. Sensory: 1-->Mild-to-moderate sensory loss, patient feels pinprick is less sharp or is dull on the affected side, or there is a loss of superficial pain with pinprick, but patient is aware of being touched  9. Best Language:  "0-->No aphasia, normal  10. Dysarthria: 0-->Normal  11. Extinction and Inattention (formerly Neglect): 0-->No abnormality  Total (NIH Stroke Scale): 4     Modified Blair    Tammie Coma Scale:    ABCD2 Score:    NHCE2HP2-RBV Score:   HAS -BLED Score:   ICH Score:   Hunt & Arellano Classification:       Blood pressure (!) 94/51, pulse 63, temperature 98.2 °F (36.8 °C), temperature source Oral, resp. rate 16, height 5' 4" (1.626 m), weight 69.4 kg (153 lb), last menstrual period 12/15/2017, SpO2 98 %.  Alteplase Eligible?: No  Alteplase Recommendation: Alteplase not recommended due to Suspected stroke mimic   Possible Interventional Revascularization Candidate? No; at this time symptoms not suggestive of large vessel occlusion    Disposition Recommendation: do not transfer    Subjective:     History of Present Illness:  Ms Richard is a 40 y/o female with significant PMHx of complex migraine presented to the ED with weakness and numbness of bilateral lower limbs which started as her Migraine headache. She now has left leg weakness and numbness. Of note patient has previous MRI brain post weakness negative for ischemic stroke.         Woke up with symptoms?: no    Recent bleeding noted: no  Does the patient take any Blood Thinners? unknown  Medications: Unknown      Past Medical History: Migraine headache    Past Surgical History: no relevant surgical history    Family History: hypertension    Social History: unable to obtain    Allergies: Gluten Protein  Prochlorperazine  Prochlorperazine Edisylate No relevant allergies    Review of Systems  Objective:   Vitals: Blood pressure (!) 94/51, pulse 63, temperature 98.2 °F (36.8 °C), temperature source Oral, resp. rate 16, height 5' 4" (1.626 m), weight 69.4 kg (153 lb), last menstrual period 12/15/2017, SpO2 98 %. BP: chart review    CT READ: Yes  No hemmorhage. No mass effect. No early infarct signs.     Physical Exam    Left Daniel weakness and numbness      Recommended the " emergency room physician to have a brief discussion with the patient and/or family if available regarding the risks and benefits of treatment, and to briefly document the occurrence of that discussion in his clinical encounter note.     The attending portion of this evaluation, treatment, and documentation was performed per Ben Madden MD via audiovisual.    Billing code:  (non-stroke, some mimics)    · This patient has neurological symptom(s)/condition/illness, with minimal potential for morbidity and mortality.  · There is a low probability for acute neurological change leading to clinical and possibly life-threatening deterioration requiring highest level of physician preparedness for urgent intervention.  · Care was coordinated with other physicians involved in the patient's care.  · Radiologic studies and laboratory data were reviewed and interpreted, and plan of care was re-assessed based on the results.  · Diagnosis, treatment options and prognosis may have been discussed with the patient and/or family members or caregiver.      In your opinion, this was a: Tier 1 Van Negative    Consult End Time: 11:54 PM     Ben Madden MD  [facility] Stroke [facility]  Vascular Neurology   [facility]

## 2022-02-11 DIAGNOSIS — G45.9 TIA (TRANSIENT ISCHEMIC ATTACK): ICD-10-CM

## 2022-02-11 DIAGNOSIS — R20.2 PARESTHESIA: ICD-10-CM

## 2022-02-11 DIAGNOSIS — R53.1 WEAKNESS: ICD-10-CM

## 2022-02-11 DIAGNOSIS — G43.019 INTRACTABLE MIGRAINE WITHOUT AURA AND WITHOUT STATUS MIGRAINOSUS: Primary | ICD-10-CM

## 2022-03-29 ENCOUNTER — HOSPITAL ENCOUNTER (OUTPATIENT)
Dept: RADIOLOGY | Facility: HOSPITAL | Age: 42
Discharge: HOME | End: 2022-03-29
Attending: STUDENT IN AN ORGANIZED HEALTH CARE EDUCATION/TRAINING PROGRAM
Payer: MEDICAID

## 2022-03-29 DIAGNOSIS — R20.2 PARESTHESIA: ICD-10-CM

## 2022-03-29 DIAGNOSIS — G43.019 INTRACTABLE MIGRAINE WITHOUT AURA AND WITHOUT STATUS MIGRAINOSUS: ICD-10-CM

## 2022-03-29 DIAGNOSIS — G45.9 TIA (TRANSIENT ISCHEMIC ATTACK): ICD-10-CM

## 2022-03-29 DIAGNOSIS — R53.1 WEAKNESS: ICD-10-CM

## 2022-03-29 PROCEDURE — 25500020 PHARM REV CODE 255: Mod: PO | Performed by: STUDENT IN AN ORGANIZED HEALTH CARE EDUCATION/TRAINING PROGRAM

## 2022-03-29 PROCEDURE — 70553 MRI BRAIN STEM W/O & W/DYE: CPT | Mod: TC,PO

## 2022-03-29 PROCEDURE — A9585 GADOBUTROL INJECTION: HCPCS | Mod: PO | Performed by: STUDENT IN AN ORGANIZED HEALTH CARE EDUCATION/TRAINING PROGRAM

## 2022-03-29 RX ORDER — GADOBUTROL 604.72 MG/ML
6.5 INJECTION INTRAVENOUS
Status: COMPLETED | OUTPATIENT
Start: 2022-03-29 | End: 2022-03-29

## 2022-03-29 RX ADMIN — GADOBUTROL 6.5 ML: 604.72 INJECTION INTRAVENOUS at 01:03

## 2022-04-07 ENCOUNTER — PATIENT MESSAGE (OUTPATIENT)
Dept: NEUROLOGY | Facility: CLINIC | Age: 42
End: 2022-04-07
Payer: MEDICAID

## 2022-04-07 ENCOUNTER — TELEPHONE (OUTPATIENT)
Dept: NEUROLOGY | Facility: CLINIC | Age: 42
End: 2022-04-07
Payer: MEDICAID

## 2022-04-07 NOTE — TELEPHONE ENCOUNTER
----- Message from Mariano Barrera sent at 4/7/2022  9:44 AM CDT -----  Regarding: ret call  Type:  Patient Returning Call    Who Called:  Shruthi    Who Left Message for Patient:  [place]    Does the patient know what this is regarding?:  yes    Best Call Back Number:  [phone] // respond through portal if get VM. Pt may not have access to phone today b/c of work.    Additional Information:  i do not see referral in chart. pt states referral is for dr peña b/c referring provider is friends with dr peña.

## 2022-04-14 ENCOUNTER — OFFICE VISIT (OUTPATIENT)
Dept: NEUROLOGY | Facility: CLINIC | Age: 42
End: 2022-04-14
Payer: MEDICAID

## 2022-04-14 VITALS
WEIGHT: 148.81 LBS | RESPIRATION RATE: 17 BRPM | HEART RATE: 82 BPM | HEIGHT: 64 IN | DIASTOLIC BLOOD PRESSURE: 69 MMHG | TEMPERATURE: 98 F | SYSTOLIC BLOOD PRESSURE: 108 MMHG | BODY MASS INDEX: 25.41 KG/M2

## 2022-04-14 DIAGNOSIS — F31.9 BIPOLAR AFFECTIVE DISORDER, REMISSION STATUS UNSPECIFIED: ICD-10-CM

## 2022-04-14 DIAGNOSIS — G43.719 INTRACTABLE CHRONIC MIGRAINE WITHOUT AURA AND WITHOUT STATUS MIGRAINOSUS: ICD-10-CM

## 2022-04-14 DIAGNOSIS — D50.9 IRON DEFICIENCY ANEMIA, UNSPECIFIED IRON DEFICIENCY ANEMIA TYPE: ICD-10-CM

## 2022-04-14 DIAGNOSIS — F31.81 BIPOLAR II DISORDER: ICD-10-CM

## 2022-04-14 DIAGNOSIS — G43.119 INTRACTABLE MIGRAINE WITH AURA WITHOUT STATUS MIGRAINOSUS: ICD-10-CM

## 2022-04-14 DIAGNOSIS — G44.52 NDPH (NEW DAILY PERSISTENT HEADACHE): Primary | ICD-10-CM

## 2022-04-14 DIAGNOSIS — R29.818 TRANSIENT NEUROLOGICAL SYMPTOMS: ICD-10-CM

## 2022-04-14 DIAGNOSIS — F32.A DEPRESSION, UNSPECIFIED DEPRESSION TYPE: ICD-10-CM

## 2022-04-14 DIAGNOSIS — G44.52 NPDH (NEW PERSISTENT DAILY HEADACHE): ICD-10-CM

## 2022-04-14 DIAGNOSIS — F41.9 ANXIETY: ICD-10-CM

## 2022-04-14 DIAGNOSIS — G25.81 RLS (RESTLESS LEGS SYNDROME): ICD-10-CM

## 2022-04-14 DIAGNOSIS — M79.7 FIBROMYALGIA: ICD-10-CM

## 2022-04-14 PROCEDURE — 1160F PR REVIEW ALL MEDS BY PRESCRIBER/CLIN PHARMACIST DOCUMENTED: ICD-10-PCS | Mod: CPTII,,, | Performed by: PSYCHIATRY & NEUROLOGY

## 2022-04-14 PROCEDURE — 99999 PR PBB SHADOW E&M-EST. PATIENT-LVL V: ICD-10-PCS | Mod: PBBFAC,,, | Performed by: PSYCHIATRY & NEUROLOGY

## 2022-04-14 PROCEDURE — 99215 OFFICE O/P EST HI 40 MIN: CPT | Mod: S$PBB,,, | Performed by: PSYCHIATRY & NEUROLOGY

## 2022-04-14 PROCEDURE — 3074F PR MOST RECENT SYSTOLIC BLOOD PRESSURE < 130 MM HG: ICD-10-PCS | Mod: CPTII,,, | Performed by: PSYCHIATRY & NEUROLOGY

## 2022-04-14 PROCEDURE — 3008F BODY MASS INDEX DOCD: CPT | Mod: CPTII,,, | Performed by: PSYCHIATRY & NEUROLOGY

## 2022-04-14 PROCEDURE — 99215 OFFICE O/P EST HI 40 MIN: CPT | Mod: PBBFAC,PO | Performed by: PSYCHIATRY & NEUROLOGY

## 2022-04-14 PROCEDURE — 1159F MED LIST DOCD IN RCRD: CPT | Mod: CPTII,,, | Performed by: PSYCHIATRY & NEUROLOGY

## 2022-04-14 PROCEDURE — 99999 PR PBB SHADOW E&M-EST. PATIENT-LVL V: CPT | Mod: PBBFAC,,, | Performed by: PSYCHIATRY & NEUROLOGY

## 2022-04-14 PROCEDURE — 99417 PR PROLONGED SVC, OUTPT, W/WO DIRECT PT CONTACT,  EA ADDTL 15 MIN: ICD-10-PCS | Mod: S$PBB,,, | Performed by: PSYCHIATRY & NEUROLOGY

## 2022-04-14 PROCEDURE — 99215 PR OFFICE/OUTPT VISIT, EST, LEVL V, 40-54 MIN: ICD-10-PCS | Mod: S$PBB,,, | Performed by: PSYCHIATRY & NEUROLOGY

## 2022-04-14 PROCEDURE — 1160F RVW MEDS BY RX/DR IN RCRD: CPT | Mod: CPTII,,, | Performed by: PSYCHIATRY & NEUROLOGY

## 2022-04-14 PROCEDURE — 3074F SYST BP LT 130 MM HG: CPT | Mod: CPTII,,, | Performed by: PSYCHIATRY & NEUROLOGY

## 2022-04-14 PROCEDURE — 3008F PR BODY MASS INDEX (BMI) DOCUMENTED: ICD-10-PCS | Mod: CPTII,,, | Performed by: PSYCHIATRY & NEUROLOGY

## 2022-04-14 PROCEDURE — 3078F DIAST BP <80 MM HG: CPT | Mod: CPTII,,, | Performed by: PSYCHIATRY & NEUROLOGY

## 2022-04-14 PROCEDURE — 99417 PROLNG OP E/M EACH 15 MIN: CPT | Mod: S$PBB,,, | Performed by: PSYCHIATRY & NEUROLOGY

## 2022-04-14 PROCEDURE — 3078F PR MOST RECENT DIASTOLIC BLOOD PRESSURE < 80 MM HG: ICD-10-PCS | Mod: CPTII,,, | Performed by: PSYCHIATRY & NEUROLOGY

## 2022-04-14 PROCEDURE — 1159F PR MEDICATION LIST DOCUMENTED IN MEDICAL RECORD: ICD-10-PCS | Mod: CPTII,,, | Performed by: PSYCHIATRY & NEUROLOGY

## 2022-04-14 RX ORDER — PANTOPRAZOLE SODIUM 40 MG/1
40 TABLET, DELAYED RELEASE ORAL DAILY
COMMUNITY
Start: 2022-03-14

## 2022-04-14 NOTE — PATIENT INSTRUCTIONS
"1- For preventive management: A. Recommend increase Vyepti to 300mg every 12 weeks (for at least 2 infusions)         B. Recommend rechallenging with Botox 155 units every 12 weeks for at least 4 cycles of Botox.     Data on Both CGRP Mab and Botox:              Evidence for CGRP Mab and Botox     A. RUPALI Scruggs Et al. (2019) CGRP antibodies as adjunctive prophylactic therapy for prolonging the therapeutic if of Onabotulinumtoxin A  injections among chronic migraine patients.  Sixty-first annual meeting of the American Headache Society, [place], [place].     LILI Polanco Et al. (2021).  "The American Headache Society: consensus statement update on integrating new migraine treatments into clinical practice."  Headache:  The Journal of head and face pain     C. ALESIA Barclay et al (2021). " Will world evidence of for control of chronic migraine patient is receiving CGRP monoclonal antibody therapy I did to Onabotulinumtoxin A  a retrospective chart reivew".  Pain There     D. Yudith BUCHANAN et al. (2021). "Teratoma and on a botulinum toxin a combination therapy for the prevention of intractable chronic migraine without aura:  Retrospective analysis". J Pain Palliative Care Pharmacother 35(1): 1-6.      E. Monty VASQUEZ Et al. (2021). " Efficacy and tolerability of calcitonin gene related peptide targeted monoclonal antibody medications as an add on therapy to Onabotulinumtoxin A in patients with chronic migraine". Pain Med.      F. CHANEL Muir et al. (2021).  "Additive interaction betweenOnabotulinumtoxin A and Erenumab patients with refractory migraine."  Front Neurol 12: 094704.            C. Recommend memantine increasing by 5mg per week until 10mg twice a day for at least 3-6 months of duration.     2- Acute management: For acute management of escalations in pain with the numbness/tingling/weakness: Diamox 500mg + benadryl 25mg + Reglan 10mg   For escalations in pain: Elyxyb 1 bottle, at onset of escalation to " severe pain, max 1/day.     3- Consider FL 41 tinted glasses for photophobia (light sensitivity)    Somnilight.Heap  Theraspecs.Heap  Axonoptics.Heap    4-Allay lamp for light source for patients with lightsensitivity due to headache     5- Neuromodulation devices:  Cefaly device - [email or web address].[email or web address]- trigeminal stimulator for acute/prevention  Nerivio - prescription necessary - for acute management of migraine, along with smartphone saud - [email or web address]  Relivion- prescription necessary - for acute management of migraine - [email or web address]  Gammacore - prescription necessary -for acute/preventive management of migraine - [email or web address]    6- More information: americanmigrainefoundation.org

## 2022-04-14 NOTE — PROGRESS NOTES
[facility]    Chief complaint: migraine   Referred by: No referring provider defined for this encounter.     History of Present Illness:    41Y RH F with anxiety, bipolar 1 affective disorder (rapid cycling) , depression, RLS, chronic migraine, migraine with aura, recurrent TIAs, iron deficiency anemia,  fibromyalgia   GERD who presents for initial evaluation of headache. She is her with her  and both provide history.  Patient is currently followed by outside neurologist Dr. Mickey Nugent and his team and has been on many medications. She has had extensive evaluation including neuroimaging as well as labs for neurological workup. Also had neuroimaging during acute attacks per notes of LLE without acute stroke. She has frequent ER visits, most recent ER visit was on 1/16/22 at which point presented with LUE/LLE numbness/weakness, no other symptoms, NIHSS was 4 per vascular neurology tele consultation (3- LLE drift, 1 -sensory). She had many similar presentations in the past without any acute stroke, but carries a diagnosis of multiple TIAs without any documented hypercoagulability. She had no TPA, she had MRI without any acute stroke, CTA head/neck which was normal as well. She was treated with benadryl 25mg, reglan 10mg and zofran 4mg and was discharged as she reported doing better and she was ambulating well.  She is followed by psychiatry and mentions that she is not doing well and her psychiatrist is pushing her for an inpatient treatment for depression, bipolar, but she is holding off.     Headache history  Age at onset and course over time: she had mentions that she had very neglible history of headache, they were a few times a month and given Fioricet for them and that would resolve it. She denies them being with light/sound sensitivity/nausea/disability with them. 7 years ago, she mentions that she had a partial hysterectomy, about 6 months after that began having  migraine, initially she reports that from the first days she had the attack she has had a 24/7 constant headache, not always daily severe migraine, but it's everyday. She mentions that initially intensity varied, over the years the headache has worsened in the intensity and disability from it. The last few years it's not below a 4/10 in intensity. No headache free times. She will have severe escalations 1-2 times a week or so. These last typically for about 1 day or so. The last 8 days her attacks have been worse and more intense. Of note she has had extensive workup for this headache and other neurological symptoms she has had that have including MRIs w/wo contrast, MRI wo contrast, MRA head, CTA head/neck, LP, serum labs including paraneoplastic panel, multiple vitamin levels, autoimmune disease, celiac disease, etc which have been normal.     Baseline migraine: it's a constant holocephalic moderate pain that quickly escalates. She is having to take Aleve Headache daily. She is light/sound sensitivities, sharp pain superimposed.     Severe migraine: as the pain elevates to 10/10 pain , this happened 4 times , typically goes over the days she will have gradually worsening, typically only on right side typically in the RLE numbness that develops over a minute or so. Numbness is in the RLE foot into hip area, only one time the RUE got numb/tingly. She mentions that the 2 times she has had it which lasted for a few weeks of RLE weakness and numbness. She mentions that after a few weeks it resolved. She denies any RUE/facial weakness. She will have severe nausea will use phenergan for the nausea which helps a bit. She will also have blurry vision with these severe attacks. She will dizziness (lightheadedness), with a 7/10 in intensity. She has word findign difficulty and difficulty with focusing. Her  reports about 1.5 years ago she had been having a severe migraine that she collapsed with a severe migraine  attack, difficulty waking her up, eyes opened , but no other response, no movement or anything.     Stabbing pain in the Rt temporal area- she will have sudden onset of the temporal area which is     Location: Rt temple, but holocephalic   Character: throbbing/pounding, stabbing, sharp   Intensity: 4-10/10, currently 6/10  Frequency: 30/30 days, constant, no headache free times   Duration: constant   Worse during midday  Wakes up daily with headache   Aura: after days of building migraine will have RLE numbness/tingling and at times weakness.   Weakness in the RLE has lasted weeks to where she has needed PT and rehab.   Associated symptoms: photophobia, phonophobia, osmophobia, kinesiophobia, neck tightness/pain, nausea  Other neurologic symptoms: dizziness, vertigo, blurred vision, mood changes, difficulty performing tasks, concentration  Precipitating factors:  weather changes, stress  Alleviating factors: sleep, darkness, heat, ice, massage, local pressure, medications  Aggravating factors: exposure to light, coughing/sneezing/bending over (aggravates headache already present), stress  Family history of headache: none   ER/UC visits: multiple   Caffeine: 1 coffee max  Sleep: poor trouble staying asleep and unrefreshed sleep     Medication history:  Current  Acute:  Fioricet - 2 tabs at a time , 2 times a week or so - does not work   Aleve headache - she is taking that daily, for about 1 month now.     Prevention:  Lamotrigine 150mg bid - for mood , no help headache  Gabapentin 400mg/800mg - for fibromyalgia   Vyepti 100mg x 3 infusions, last infusion 2-3 weeks ago , initially helped intensity, but does not feel it's helping currently    Acute/not effective :  All 7 triptans per her report - not effective, no side effects in po or NS   Excedrin   Baclofen - no benefit   ubrelvy 100mg   reyvow 100mg - worsened headache, made her RLS worse   nurtec ODT 75mg - extreme nausea   Percocet- does help some, gets rid of  migraine severe attack, but short time only , then migraine returns.     Prevention:  topiramate - dizziness, dry mouth , even the lower doses  Tegretol - for mood , no help headache   Lamotrigine 150mg - for mood   Dilantin - recalls the name of it, but no side effects   Lyrica 25mg tid - no beneft   depakote- very sleepy/lethargic, almost had MVA with it   Botox 155 units - for 1.5 years, it helped in the beginning the intensity, initially dropped, has not had BTX now for about 2 years.   aimovig 70mg - 3 months   emgality 120mg monthly  - for 3 months   ajovy 225 - thinks for about 3 months , maybe gave injection site reaction  vyepti 100mg x 3 infusions- not much help   Qulipta 60mg - 3 packs of samples, no benefit , believes took it for about 3 months.  Baclofen - no benefit   lexapro  celexa  Protriptyline  Trazodone  wellbutrin  nortripyline  Venlafaxine  Imipramine   Amitriptyline  benzos   Carvedilol - dizziness   Propranolol - dizziness, hypotension     MIDAS:      Neuroimaging and other studies:   Results for orders placed or performed during the hospital encounter of 03/29/22   MRI Brain W WO Contrast    Narrative    Reason: G43.019 G45.9 R20.2 R53.1 Hx of four TIA's, the last 3 months ago. Hx of intractable migraines x years. 6.5 ml gadavist    TECHNIQUE: MRI brain without and with 6.5 mL of Gadavist    COMPARISON: MRI brain without contrast 1/19/2022, 6/29/2021 CT 1/19/2022    FINDINGS:  Diffusion-weighted imaging is normal.    Gray and white matter are normal. No intracranial mass effect or midline shift. Following IV contrast, no abnormal intra-axial or extra-axial enhancement occurs. Pineal cyst shows minimal thin peripheral enhancement measuring 10 mm, unchanged in size and appearance since 6/29/2021.    The ventricles and cisterns are maintained. Intracranial flow voids are normal.    No abnormality of the calvarial bone marrow. Sinuses and mastoids are clear.    IMPRESSION:    1. No acute  intracranial abnormality.  2. 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    Electronically signed by:  Juan Sparrow MD  3/29/2022 3:06 PM CDT Workstation: [id number]   Results for orders placed or performed during the hospital encounter of 01/19/22   MRI Brain Without Contrast    Narrative    MRI of the brain without contrast    HISTORY: Cerebrovascular accident.    Comparison 6/29/2021.    The brain appears normally formed and myelinated. There are no findings of acute hemorrhage or infarction. There is no restricted diffusion.    No intra-axial mass or significant mass effect is observed.    The ventricular system is appropriate in size and position for age. There are no extra-axial collections.    Appropriate flow voids are demonstrated in the major blood vessels. The skull base and craniocervical junction appear unremarkable. There is scattered mucosal thickening within the paranasal sinuses.    IMPRESSION:    No acute intracranial abnormality.    Electronically signed by:  Ashely Moss MD  1/19/2022 7:50 PM [facility] Workstation: [id number]   CT Head Without Contrast    Narrative    CMS MANDATED QUALITY DATA - CT RADIATION  436    All CT scans at this facility utilize dose modulation, iterative reconstruction, and/or weight based dosing when appropriate to reduce radiation dose to as low as reasonably achievable.    CT of the brain without contrast    HISTORY: Trauma, headache.    The brain parenchyma appears unremarkable. There are no findings of acute hemorrhage or infarction. There is no evidence of an intra-axial mass, mass effect or shift of the midline. The ventricular system is appropriate in size and position for age. There are no extra-axial collections demonstrated.    Reviewed at bone windows, no acute osseous abnormality is identified. There is scattered mucosal thickening within the paranasal sinuses..    IMPRESSION:    No acute intracranial abnormality.    Electronically signed by:  Ashely Moss MD   1/19/2022 6:03 PM [facility] Workstation: [id number]   Results for orders placed or performed during the hospital encounter of 12/18/20   MRA Brain without contrast    Narrative    MRA brain    CLINICAL DATA: Dizziness    FINDINGS: 3-D time-of-flight intracranial MRA was performed.    There is normal flow sensitive signal within the intracranial portions  of the internal carotid arteries. Both vertebral arteries are patent  and within normal limits. The basilar artery also has a normal  appearance.    The bilateral anterior, middle, and posterior cerebral arteries are  normal in appearance, with no evidence of major branch occlusion,  high-grade stenosis, or aneurysm.    Electronically Signed by Tony Rosado M.D. on 12/18/2020 1:26 PM     CTA head/neck 1/19/22:  IMPRESSION:     No findings of significant luminal stenosis involving the cervical segments of the carotid or vertebral arteries.     No findings of significant luminal narrowing involving the major intracranial arteries.    CTA head/neck 6/28/21:  Impression:     Normal CTA of the neck and brain.    CT head 6/28/21:  IMPRESSION:     There are no acute intracranial findings.    MRI C spine 10/23/20:  IMPRESSION:     1. Minor degenerative change at C4-C5.  2. Mild C5-C6 degenerative disc disease resulting in minor central  canal narrowing with anterior cord contact and mild left neural  foramen narrowing    MRI T spine 10/23/20:   IMPRESSION:  Near normal MRI of the thoracic spine without significant spinal canal  or neural foraminal stenosis.    LP 3/2/20:  After obtaining written informed consent, which included a discussion of the risks and benefits of the procedure to include infection, bleeding, nerve injury, bleeding into the spine requiring surgery, paralysis, and postprocedural headache, and allowing the patient the opportunity to ask questions, the patient agreed to the procedure.     The patient was placed prone on the fluoroscopy table, with a  suitable skin entrance site overlying the L4-L5 interspace localized. The skin was marked, and then prepped and draped in sterile fashion, with several milliliters of lidocaine 1% injected subcutaneously to achieve adequate local anesthesia. Following, a 22 gauge coaxial spinal needle was advanced under intermittent fluoroscopic guidance into the thecal sac, with intrathecal positioning confirmed with return of clear, colorless CSF.  Opening pressure was measured at 6 mm of water.     A total of 12 mL of cerebrospinal fluid was collected in 4 vials. The needle was removed and a bandage was applied to the skin. The patient tolerated the procedure well, with no immediate postprocedural complications     Impression:     1. Normal opening pressure.  2. Successful fluoroscopically guided lumbar puncture.     CSF labs:  Component Ref Range & Units 2 yr ago   (3/2/20) 2 yr ago   (3/2/20)   Heme Aliquot mL 2.0  2.0    Appearance, CSF Clear Clear  Clear    Color, CSF Colorless Colorless  Colorless CM    Comment: supernate (spun down ) color is colorless   WBC, CSF 0 - 5 /cu mm 1  1    RBC, CSF 0 /cu mm 0  1 Abnormal     Lymphs, CSF 40 - 80 % 100 High   100 High       Component Ref Range & Units 2 yr ago    Glucose, CSF 40 - 70 mg/dL 69      Component Ref Range & Units 2 yr ago    Protein, CSF 15 - 40 mg/dL 27      IgG, Quant, CSF                1.8              mg/dL    BN     Reference Range: 0.0-8.6                                 Albumin, CSF                   16               mg/dL    BN     Reference Range: 11-48                                   Albumin                        4.0              g/dL     MB     Reference Range: 3.8-4.8                                 Immunoglobulin G, Qn, Serum    776              mg/dL    MB     Reference Range: 700-1600                                 CASPR2-IgG CBA Negative Negative      LGI1-IgG CBA Negative Negative     Antigliadin Abs, IgA <20 UNITS 3    Comment: Interpretation:  Negative   Antigliadin Ab IgG <20 UNITS 2    Comment: Interpretation: Negative   Test performed at [facility],   [address] , [place]  [address]     [phone]   Greg Eller MD  - Medical Director    TTG IgA <20 UNITS 2    Comment: Interpretation: Negative   TTG IgG <20 UNITS 3    Comment: Interpretation: Negative   Immunoglobulin A (IgA) 70 - 400 mg/dL 105      Serum paraneoplastic panel 11/20 - negative   Component Ref Range & Units 1 yr ago    Thyroperoxidase Antibodies <6.0 IU/mL <6.0        ROS: The fourteen point review of system was performed.       Constitutional:  Denies fevers/cold or heat intolerance, weight loss/gain or fatigue.  Eyes:                        Denies diplopia, ptosis, visual field defects or ocular disease   excepting any listed above.  ENT:   Denies hearing loss, infection, carcinoma or other diseases excepting any listed above.   Cardiovascular:  Denies stroke, CAD, arrhythmia, CHF or other disease excepting any listed above.  Pulmonary:  Denies dyspnea, COPD, RAD or infection or neoplasm excepting any listed above.  Gastrointestinal: Denies ulcer disease, liver or other disease excepting any listed above.  Skin:   Denies rash, skin cancer, or other cutaneous disorder excepting any listed above.  Neurological:  See HPI  Musculoskeletal: Denies RA, fractures or other joint or skeletal problems excepting any listed above.  Heme/Lymphatic: Denies any blood or lymph system neoplasm or disorder excepting any listed above.  Endocrine:  Denies any thyroid or other disorders, excepting any listed above.  Allergic/Immuno: Denies any autoimmune disease or allergy excepting any listed above.  Psychiatric:  Denies any disorder or treatment excepting any listed above.  Urologic:  Denies any difficulties with the urinary system or sexual function except noted above.    Past Medical History:   Diagnosis Date    Anxiety     Bipolar affective disorder, rapid cycling      bipolar 1    Depression     GERD (gastroesophageal reflux disease)     Migraine headache     Vaginal delivery     x1       Past Surgical History:   Procedure Laterality Date    ABDOMINAL SURGERY      exploratory laparotomy    HYSTERECTOMY  12/15/2017    partial         Family History   Problem Relation Age of Onset    No Known Problems Mother     Hypertension Father     No Known Problems Brother     No Known Problems Daughter        Social history:  Occupation:  Smoking, Alcohol, IVDU:  Social History     Tobacco Use    Smoking status: Never Smoker    Smokeless tobacco: Never Used   Substance Use Topics    Alcohol use: Not Currently     Alcohol/week: 0.0 - 1.0 standard drinks     Comment: occasional    Drug use: No         Review of patient's allergies indicates:   Allergen Reactions    Gluten protein Other (See Comments)     Sever stomach ache, very tired feeling    Prochlorperazine Other (See Comments) and Itching    Prochlorperazine edisylate      Anxiety, itching  Other reaction(s): Unknown         Current Outpatient Medications:     atogepant (QULIPTA) 30 mg Tab, 2 tablets, Disp: , Rfl:     busPIRone (BUSPAR) 10 MG tablet, Take 10 mg by mouth 3 (three) times daily., Disp: , Rfl:     butalbital-acetaminophen-caffeine -40 mg (FIORICET, ESGIC) -40 mg per tablet, Take 1 tablet by mouth every 6 (six) hours as needed for Pain or Headaches., Disp: , Rfl:     clonazePAM (KLONOPIN) 2 MG Tab, Take 2 mg by mouth 2 (two) times daily as needed., Disp: , Rfl:     clopidogreL (PLAVIX) 75 mg tablet, Take 75 mg by mouth once daily., Disp: , Rfl:     eletriptan (RELPAX) 40 MG tablet, 1/2 - 1 tablet as needed one time; may repeat in one hour, Disp: , Rfl:     eptinezumab-jjmr (VYEPTI) 100 mg/mL Soln, as directed, Disp: , Rfl:     FLUoxetine 20 MG capsule, Take 20 mg by mouth once daily., Disp: , Rfl:     gabapentin (NEURONTIN) 600 MG tablet, Take 600 mg by mouth 3 (three) times daily. , Disp:  , Rfl:     ketorolac (TORADOL) 10 mg tablet, 1 tablet with food or milk as needed, Disp: , Rfl:     lamoTRIgine (LAMICTAL) 150 MG Tab, Take 150 mg by mouth 2 (two) times daily. , Disp: , Rfl:     lansoprazole (PREVACID) 30 MG capsule, , Disp: , Rfl:     lasmiditan (REYVOW) tablet 100 mg, Take 100 mg by mouth daily as needed., Disp: , Rfl:     losartan (COZAAR) 100 MG tablet, 1 tablet, Disp: , Rfl:     magnesium oxide-Mg AA chelate (MAGNESIUM, OXIDE/AA CHELATE,) 300 mg Cap, 1 capsule with a meal, Disp: , Rfl:     ondansetron (ZOFRAN) 4 MG tablet, Take 8 mg by mouth 3 (three) times daily as needed., Disp: , Rfl:     ondansetron (ZOFRAN) 4 MG tablet, Take 1 tablet (4 mg total) by mouth every 8 (eight) hours as needed for Nausea., Disp: 12 tablet, Rfl: 0    oxyCODONE-acetaminophen (PERCOCET)  mg per tablet, Take 1 tablet by mouth every 4 (four) hours as needed for Pain., Disp: , Rfl:     pantoprazole (PROTONIX) 40 MG tablet, Take 40 mg by mouth once daily., Disp: , Rfl:     phentermine (ADIPEX-P) 37.5 mg tablet, Take 37.5 mg by mouth every morning., Disp: , Rfl:     promethazine (PHENERGAN) 12.5 MG Tab, Take 1 tablet (12.5 mg total) by mouth every 6 (six) hours as needed (headache)., Disp: 12 tablet, Rfl: 0    propranoloL (INDERAL) 60 MG tablet, 1 tablet, Disp: , Rfl:     rimegepant (NURTEC) 75 mg odt, 22 mg., Disp: , Rfl:     simvastatin (ZOCOR) 10 MG tablet, , Disp: , Rfl:     sumatriptan (IMITREX) 50 MG tablet, 1 tablet as needed, Disp: , Rfl:     topiramate (TOPAMAX) 100 MG tablet, 1 tablet, Disp: , Rfl:     topiramate (TOPAMAX) 25 MG tablet, Take 25 mg by mouth once daily., Disp: , Rfl:     vilazodone (VIIBRYD) 20 mg Tab, Take 20 mg by mouth once daily., Disp: , Rfl:       PHYSICAL EXAMINATION  Vitals:    04/14/22 0954   BP: 108/69   Pulse: 82   Resp: 17   Temp: 97.8 °F (36.6 °C)     General: The patient is a well-developed, well-nourished looking of stated age in no acute distress.  Head:  Normocephalic, atraumatic  Eyes, ears, nose and throat: normal.  Neck: Supple  Cardiovascular:  No carotid bruits.  Regular rate and rhythm.   GI: Abdomen soft, not tender, not distended.  Skin: no rashes  Extremities: No clubbing, cyanosis, edema.  NEUROLOGIC EXAM:  MENTAL: The patient is awake, alert and oriented times to time, place, location and situation. Intact recent memory, attention, concentration. Language and speech are normal. No aphasia, no dysarthria  CRANIAL NERVES:   CN II: visual fields are intact to confrontation with no defects;  funduscopic examination is within normal limits without evidence of papilledema and signs of venous pulsations.  Pupils are equal, round and reactive to light and accommodation.    III, IV and VI: extraocular movements are intact to all directions of gaze with normal convergence.  V: facial sensation is intact to light touch in divisions V1 through V3.    VII: Facial muscle strength is intact to eyebrow raising, forceful eyelid closure, and cheek puffing.    VIII: Hearing acuity is intact to finger rub.  IX, X: palate rises symmetrically and uvula midline.    XI: Sternocleidomastoid and trapezius strength are 5/5 bilaterally.    XII: Tongue protrudes midline without atrophy or fasciculations.   MOTOR EXAM: No atrophy or fasciculations are present. No pronator drift,  shows normal strength ( 5/5 strength )in all 4 extremities. Tone is normal. SENSORY EXAM: intact pinprick, light touch, vibration, and position bilaterally.  CEREBELLAR EXAM: shows normal finger-to-nose and heel-to-shin.   DEEP TENDON REFLEXES:  +2 in brachioradialis, biceps, triceps, knee jerks, ankle jerks, downgoing toes b/l. No abnormal reflexes are present.  GAIT/STANCE: Romberg Negative.  Standard gait was normal with normal stride, arm swing and turning.  Normal heel and toe walking and tandem gait.       Impression:  New Daily persistent Headache - she had no headache history until 7 years ago had  sudden onset headache that from onset has never resolved and is 24/7 constant pain which over the years has become more severe in intensity and associated symptoms with the severe attacks. She has presented multiple times to ER for severe headache and acute neurological symptoms including numbness and weakness of LLE, slurred speech,  some episodes described as b/l LE numbness/weakness. She has had neuroimaging acutely during these episodes without any acute stroke on neuroimaging with CT head, MRI brain , CTA head. She has also had neuroimaging with MRI brain w/wo contrast, MRI C/T spine and other laboratory work including LP with csf studies. She has been on many medications with indication for migraine without any benefit including Botox, all 4 CGRP Mabs, 3 gepants, multiple AEDs, multiple anithypertensives and also antidepressives. 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  Discussed some options for headache prevention, discussed NDPH can be refractory to treatment and we treat as the phenotype it resembles which is chronic migraine. There are only a few things she has not tried higher dose of vyepti to 300mg + Botox given her NDPH diagnosis up to 4-5 cycles of Botox would be recommended along with a CGRP Mab, memantine, candesartan have not been tried. She has not used neuromodulation devices, these were discussed on today's visit as well.     Chronic migraine - for 7 years now     Transient neurological symptoms - she has as the headache intensifies over days she will have paresthesias in her LEs and also her LUE/LLE  And as the days continue will start having weakness of the LLE mainly that in the past has lasted days to weeks and inability to ambulate needing rehabilitation per her self report.  On chart review has presented from b/l LEs numbness from hip down to toes and also LE weakness where she can't move legs. She has had the symptoms for over 1 hour , which is much longer than would be expected for migraine  "with aura.  She has had extensive workup which has been negative.  DDx: prolonged aura (but would not cause b/l symptoms), hemiplegic migraine, and more likely from history functional neurological deficits due to severe pain.       Comorbidities:  anxiety, bipolar 1 affective disorder (rapid cycling) , depression - followed closely by psychiatry    RLS   recurrent TIA- multiple neuroimaging studies, no infarctions chronic and has not had any acute infarctions, the syptoms she reports are associated to episodes of severe headache and prolonged ?migraine with aura vs. FND , not c/w TIA from chart review and patient report      iron deficiency anemia   fibromyalgia   GERD     Plan:   1- For preventive management: A. Recommend increase Vyepti to 300mg every 12 weeks (for at least 2 infusions)         B. Recommend rechallenging with Botox 155 units every 12 weeks for at least 4 cycles of Botox.   Data on Both CGRP Mab and Botox:              Evidence for CGRP Mab and Botox     A. RUPALI Scruggs Et al. (2019) CGRP antibodies as adjunctive prophylactic therapy for prolonging the therapeutic if of Onabotulinumtoxin A  injections among chronic migraine patients.  Sixty-first annual meeting of the American Headache Society, [place], [place].     MAURO. LILI Moura Et al. (2021).  "The American Headache Society: consensus statement update on integrating new migraine treatments into clinical practice."  Headache:  The Journal of head and face pain     C. ELKIN Barclay. et al (2021). " Will world evidence of for control of chronic migraine patient is receiving CGRP monoclonal antibody therapy I did to Onabotulinumtoxin A  a retrospective chart reivew".  Pain There     D. Yudith BUCHANAN et al. (2021). "Teratoma and on a botulinum toxin a combination therapy for the prevention of intractable chronic migraine without aura:  Retrospective analysis". J Pain Palliative Care Pharmacother 35(1): 1-6.      MANJINDER. Monty VASQUEZ Et al. (2021). " " "Efficacy and tolerability of calcitonin gene related peptide targeted monoclonal antibody medications as an add on therapy to Onabotulinumtoxin A in patients with chronic migraine". Pain Med.      F. Wood M et al. (2021).  "Additive interaction betweenOnabotulinumtoxin A and Erenumab patients with refractory migraine."  Front Neurol 12: 194861.            C. Recommend considering memantine increasing by 5mg per week until 10mg twice a day for at least 3-6 months.     2- Acute management: For acute management of escalations in pain with the numbness/tingling/weakness: Diamox 500mg + benadryl 25mg + Reglan 10mg   For escalations in pain: Elyxyb 1 bottle, at onset of escalation to severe pain, max 1/day.     3- Consider FL 41 tinted glasses for photophobia (light sensitivity)    [employer]  [employer]at[employer]estarsopticsThe [employer]    4-Allay lamp for light source for patients with lightsensitivity due to headache     5- Neuromodulation devices:  Cefaly device - cefaly.com- trigeminal stimulator for acute/prevention  Nerivio - prescription necessary - for acute management of migraine, along with smartphone saud - nerivio.com  Relivion- prescription necessary - for acute management of migraine - relivion.com  Gammacore - prescription necessary -for acute/preventive management of migraine - [employer]    6- More information: [email or web address].org    RTC PRN         Ashleigh Aponte MD   Board Certified Neurologist  [facility] Certified Headache Medicine     I spent 96   minutes on day of this encounter preparing, treating, and managing this patient with NDPH, chronic migraine, transient neurological symptoms, depression, anxiety , bipolar disorder, RLS, fibromyalgia, iron deficiency anemia.           "

## 2022-04-19 NOTE — MR AVS SNAPSHOT
[facility] [place] - OB/ GYN  [address]  [address]  [place] [address]  Phone: [phone]                  Shruthi Richard   3/1/2017 2:00 PM   Office Visit    Description:  Female : 1980   Provider:  Eugenio Francis MD   Department:  [facility] OB/ GYN           Reason for Visit     Annual Exam           Diagnoses this Visit        Comments    Annual physical exam    -  Primary     Encounter for gynecological examination without abnormal finding         Dysmenorrhea         Pelvic pain in female         Acute vaginitis         Endometriosis                To Do List           Future Appointments        Provider Department Dept Phone    3/1/2017 2:00 PM Eugenio Francis MD [facility] OB/ -[phone]      Goals (5 Years of Data)     None      Follow-Up and Disposition     Return in about 2 weeks (around 3/15/2017).      [facility]     [facility] - 24/ Assistance  Registered nurses in the [facility] provide clinical advisement, health education, appointment booking, and other advisory services.  Call for this free service at [phone].             Medications           Message regarding Medications     Verify the changes and/or additions to your medication regime listed below are the same as discussed with your clinician today.  If any of these changes or additions are incorrect, please notify your healthcare provider.             Verify that the below list of medications is an accurate representation of the medications you are currently taking.  If none reported, the list may be blank. If incorrect, please contact your healthcare provider. Carry this list with you in case of emergency.           Current Medications     fluoxetine (PROZAC) 10 MG capsule PROZAC 10 MG CAPS    folic acid (FOLVITE) 1 MG tablet            Clinical Reference Information           Your Vitals Were     BP                   110/60           Blood Pressure          Most Recent  Patient is due today for INR today.      Anticoagulation diagnosis:  Current Dose: Warfarin 3 mg on TU & Fri, 4 mg all other days   Goal: 2.0-3.0  Last INR: 4/5/22 1.9    Patient resides at [facility].  Will forward to Gayatri DAVEY NP to watch for INR results.       Value    BP  110/60      Allergies as of 3/1/2017     No Known Allergies      Immunizations Administered on Date of Encounter - 3/1/2017     None      Orders Placed During Today's Visit     Future Labs/Procedures Expected by Expires      3/1/2017 4/30/2018    CBC auto differential  3/1/2017 4/30/2018    Comprehensive metabolic panel  3/1/2017 4/30/2018    TSH  3/1/2017 4/30/2018    US Pelvis Comp with Transvag NON-OB (xpd  3/1/2017 3/1/2018      [facility] Sign-Up     Activating your [facility] account is as easy as 1-2-3!     1) Visit [email or web address], select Sign Up Now, enter this activation code and your date of birth, then select Next.  [email or web address]  Expires: 4/15/2017  1:15 PM      2) Create a username and password to use when you visit [facility] in the future and select a security question in case you lose your password and select Next.    3) Enter your e-mail address and click Sign Up!    Additional Information  If you have questions, please e-mail [email or web address] or call [phone] to talk to our [facility] staff. Remember, [facility] is NOT to be used for urgent needs. For medical emergencies, dial 911.         Instructions      Today's Plan:  Refer to your After Visit Summary for more information on your visit.       Talking to Your Healthcare Provider: Play an Active Role  To get the most out of your healthcare, take an active role. This means thinking of every healthcare provider (HCP) as a partner in your care. Below are things you can do to help that partnership go smoothly.    Tell the truth.  To get the best possible care, you need to be honest with your healthcare provider.      Dont be afraid to talk about sensitive subjects.  Keep in mind that your HCP is used to talking about personal matters.    Stay focused.  Try not to think about what your HCP said 5 minutes ago or what you will say next.    Take notes.  When you write down what you hear, you listen more carefully. Dont write  down every word your HCP says, just the main points.    Look at your HCP.  When you make eye contact, you show you are listening and you get more out of the discussion.    Repeat back.  Say something like What I hear you saying is... This shows you are listening and understanding, and gives your HCP a chance to correct any mistakes.     Keep all your appointments.  If you cant make it, call as soon as you know.    Be on time.  When a patient is late for an appointment, it can throw the HCP and his or her staff off schedule.    If you are having a medical test, follow your HCPs instructions.  For some medical tests, you need to fast (not eat or drink). For others, you need to take medication.    Do what your HCP asks.  Your HCP may give you instructions concerning things such as medication use, diet, or exercise.    If you cant follow your HCPs instructions, be sure to speak up.  Whether your medication makes you sick or you cant give up junk food, let your HCP know. Together, you may be able to solve the problem.    Talk about over-the-counter drugs.  If you are receiving treatment, ask your HCP which over-the-counter drugs you can take.    Take prescription drugs as instructed.  Dont take more or less than your doctor prescribed. Dont stop taking your medication without talking to your doctor first.    Make sure your primary doctor is kept up-to-date.  If you are seeing a specialist, ask the specialist to let your primary doctor know how you are doing.    Ask your HCP how to contact him or her.  Find out if there are certain times during the day when your HCP takes phone calls. Ask who to call if you need an answer right away.    © 2625-4108 [employer], [address] [place] [address], [place] [address]. All rights reserved. This information is not intended as a substitute for professional medical care. Always follow your healthcare professional's instructions.      Thank you for your visit today. Please call  our office if you can NOT make your future appointment.    If you are a smoker and would like to quit smoking and need help please call 1-800-QUIT-NOW (1-374.296.3058)         Language Assistance Services     ATTENTION: Language assistance services are available, free of charge. Please call [phone].      ATENCIÓN: Si habla ulises, tiene a ledesma disposición servicios gratuitos de asistencia lingüística. Llame al [phone].     CHÚ Ý: N?u b?n nói Ti?ng Vi?t, có các d?ch v? h? tr? ngôn ng? mi?n phí dành cho b?n. G?i s? [phone].         [facility] - OB/ GYN complies with applicable Federal civil rights laws and does not discriminate on the basis of race, color, national origin, age, disability, or sex.

## 2022-04-30 ENCOUNTER — HOSPITAL ENCOUNTER (EMERGENCY)
Facility: HOSPITAL | Age: 42
Discharge: HOME | End: 2022-04-30
Attending: EMERGENCY MEDICINE
Payer: MEDICAID

## 2022-04-30 VITALS
DIASTOLIC BLOOD PRESSURE: 87 MMHG | RESPIRATION RATE: 18 BRPM | WEIGHT: 146 LBS | OXYGEN SATURATION: 100 % | TEMPERATURE: 98 F | SYSTOLIC BLOOD PRESSURE: 110 MMHG | BODY MASS INDEX: 25.06 KG/M2 | HEART RATE: 71 BPM

## 2022-04-30 DIAGNOSIS — Z76.0 MEDICATION REFILL: Primary | ICD-10-CM

## 2022-04-30 PROCEDURE — 25000003 PHARM REV CODE 250: Performed by: EMERGENCY MEDICINE

## 2022-04-30 PROCEDURE — 99283 EMERGENCY DEPT VISIT LOW MDM: CPT

## 2022-04-30 RX ORDER — FLUOXETINE 20 MG/1
20 TABLET ORAL DAILY
Qty: 10 TABLET | Refills: 0 | Status: SHIPPED | OUTPATIENT
Start: 2022-04-30 | End: 2023-04-30

## 2022-04-30 RX ORDER — LAMOTRIGINE 25 MG/1
150 TABLET ORAL
Status: COMPLETED | OUTPATIENT
Start: 2022-04-30 | End: 2022-04-30

## 2022-04-30 RX ORDER — FLUOXETINE HYDROCHLORIDE 20 MG/1
20 CAPSULE ORAL
Status: COMPLETED | OUTPATIENT
Start: 2022-04-30 | End: 2022-04-30

## 2022-04-30 RX ORDER — LAMOTRIGINE 150 MG/1
150 TABLET ORAL 2 TIMES DAILY
Qty: 20 TABLET | Refills: 0 | Status: SHIPPED | OUTPATIENT
Start: 2022-04-30 | End: 2023-04-30

## 2022-04-30 RX ADMIN — LAMOTRIGINE 150 MG: 25 TABLET ORAL at 11:04

## 2022-04-30 RX ADMIN — FLUOXETINE 20 MG: 20 CAPSULE ORAL at 11:04

## 2022-05-01 NOTE — ED PROVIDER NOTES
Encounter Date: 4/30/2022       History     Chief Complaint   Patient presents with    Medication Refill     Patient requesting refill for Lamictal and Prozac, states she has been out for three days     Patient does report that she has been out of her Lamictal and Prozac over last 3 days states she has been itching today she attempted to contact her provider on Friday but he was out of the office and they were unable to refill her medications at that time states she has been on these medications for some time now is concerned about withdrawal symptoms        Review of patient's allergies indicates:   Allergen Reactions    Gluten protein Other (See Comments)     Sever stomach ache, very tired feeling    Prochlorperazine Other (See Comments) and Itching    Prochlorperazine edisylate      Anxiety, itching  Other reaction(s): Unknown     Past Medical History:   Diagnosis Date    Anxiety     Bipolar affective disorder, rapid cycling     bipolar 1    Depression     GERD (gastroesophageal reflux disease)     Migraine headache     Vaginal delivery     x1     Past Surgical History:   Procedure Laterality Date    ABDOMINAL SURGERY      exploratory laparotomy    HYSTERECTOMY  12/15/2017    partial     Family History   Problem Relation Age of Onset    No Known Problems Mother     Hypertension Father     No Known Problems Brother     No Known Problems Daughter      Social History     Tobacco Use    Smoking status: Never Smoker    Smokeless tobacco: Never Used   Substance Use Topics    Alcohol use: Not Currently     Alcohol/week: 0.0 - 1.0 standard drinks     Comment: occasional    Drug use: No     Review of Systems   Skin:        Itchy       Physical Exam     Initial Vitals [04/30/22 2206]   BP Pulse Resp Temp SpO2   102/71 68 18 -- 100 %      MAP       --         Physical Exam    Constitutional: She appears well-developed and well-nourished. No distress.   HENT:   Head: Normocephalic and atraumatic.   Right  Ear: External ear normal.   Left Ear: External ear normal.   Mouth/Throat: Oropharynx is clear and moist.   Eyes: Conjunctivae and EOM are normal. Pupils are equal, round, and reactive to light.   Neck: Neck supple.   Normal range of motion.  Cardiovascular: Normal rate, regular rhythm, normal heart sounds and intact distal pulses.   Pulmonary/Chest: Breath sounds normal. No respiratory distress.   Abdominal: Abdomen is soft. Bowel sounds are normal. There is no abdominal tenderness.   Musculoskeletal:         General: No edema. Normal range of motion.      Cervical back: Normal range of motion and neck supple.     Neurological: She is alert and oriented to person, place, and time. GCS score is 15. GCS eye subscore is 4. GCS verbal subscore is 5. GCS motor subscore is 6.   Skin: Skin is warm and dry. Capillary refill takes less than 2 seconds.   Psychiatric: She has a normal mood and affect. Her behavior is normal.         ED Course   Procedures  Labs Reviewed - No data to display       Imaging Results    None          Medications   lamoTRIgine tablet 150 mg (has no administration in time range)   FLUoxetine capsule 20 mg (has no administration in time range)     Medical Decision Making:   ED Management:  I have given patient dose of Lamictal emergency department will provide with a prescription for Prozac and Lamictal to restarted home recommend she contact her primary provider dispense per therefore taken morning return to ER for any worsened symptoms or new symptoms                      Clinical Impression:   Final diagnoses:  [Z76.0] Medication refill (Primary)          ED Disposition Condition    Discharge Stable        ED Prescriptions     Medication Sig Dispense Start Date End Date Auth. Provider    FLUoxetine 20 MG tablet Take 1 tablet (20 mg total) by mouth once daily. 10 tablet 4/30/2022 4/30/2023 Tomás Maza MD    lamoTRIgine (LAMICTAL) 150 MG Tab Take 1 tablet (150 mg total) by mouth 2 (two) times  daily. 20 tablet 4/30/2022 4/30/2023 Tomás Maza MD        Follow-up Information    None          Tomás Maza MD  04/30/22 7969

## 2023-01-06 ENCOUNTER — HOSPITAL ENCOUNTER (EMERGENCY)
Facility: HOSPITAL | Age: 43
Discharge: LEFT BEFORE DISCHARGE | End: 2023-01-06
Attending: EMERGENCY MEDICINE
Payer: MEDICAID

## 2023-01-06 VITALS
DIASTOLIC BLOOD PRESSURE: 53 MMHG | BODY MASS INDEX: 27.46 KG/M2 | HEART RATE: 67 BPM | RESPIRATION RATE: 19 BRPM | SYSTOLIC BLOOD PRESSURE: 109 MMHG | WEIGHT: 160 LBS | TEMPERATURE: 98 F | OXYGEN SATURATION: 99 %

## 2023-01-06 DIAGNOSIS — I63.9 STROKE: ICD-10-CM

## 2023-01-06 DIAGNOSIS — R51.9 NONINTRACTABLE HEADACHE, UNSPECIFIED CHRONICITY PATTERN, UNSPECIFIED HEADACHE TYPE: Primary | ICD-10-CM

## 2023-01-06 PROBLEM — R20.0 LEFT SIDED NUMBNESS: Status: ACTIVE | Noted: 2023-01-06

## 2023-01-06 PROBLEM — G43.109 MIGRAINE WITH AURA: Status: ACTIVE | Noted: 2023-01-06

## 2023-01-06 LAB
ALBUMIN SERPL BCP-MCNC: 4.4 G/DL (ref 3.5–5.2)
ALP SERPL-CCNC: 51 U/L (ref 55–135)
ALT SERPL W/O P-5'-P-CCNC: 27 U/L (ref 10–44)
ANION GAP SERPL CALC-SCNC: 8 MMOL/L (ref 8–16)
AST SERPL-CCNC: 25 U/L (ref 10–40)
BASOPHILS # BLD AUTO: 0.04 K/UL (ref 0–0.2)
BASOPHILS NFR BLD: 0.5 % (ref 0–1.9)
BILIRUB SERPL-MCNC: 0.5 MG/DL (ref 0.1–1)
BUN SERPL-MCNC: 18 MG/DL (ref 6–20)
CALCIUM SERPL-MCNC: 10 MG/DL (ref 8.7–10.5)
CHLORIDE SERPL-SCNC: 102 MMOL/L (ref 95–110)
CHOLEST SERPL-MCNC: 263 MG/DL (ref 120–199)
CHOLEST/HDLC SERPL: 4 {RATIO} (ref 2–5)
CO2 SERPL-SCNC: 29 MMOL/L (ref 23–29)
CREAT SERPL-MCNC: 0.9 MG/DL (ref 0.5–1.4)
CREAT SERPL-MCNC: 1 MG/DL (ref 0.5–1.4)
DIFFERENTIAL METHOD: NORMAL
EOSINOPHIL # BLD AUTO: 0.2 K/UL (ref 0–0.5)
EOSINOPHIL NFR BLD: 2 % (ref 0–8)
ERYTHROCYTE [DISTWIDTH] IN BLOOD BY AUTOMATED COUNT: 13.1 % (ref 11.5–14.5)
EST. GFR  (NO RACE VARIABLE): >60 ML/MIN/1.73 M^2
GLUCOSE SERPL-MCNC: 93 MG/DL (ref 70–110)
HCT VFR BLD AUTO: 40.2 % (ref 37–48.5)
HDLC SERPL-MCNC: 66 MG/DL (ref 40–75)
HDLC SERPL: 25.1 % (ref 20–50)
HGB BLD-MCNC: 13.4 G/DL (ref 12–16)
IMM GRANULOCYTES # BLD AUTO: 0.02 K/UL (ref 0–0.04)
IMM GRANULOCYTES NFR BLD AUTO: 0.3 % (ref 0–0.5)
INR PPP: 1 (ref 0.8–1.2)
LDLC SERPL CALC-MCNC: 161.6 MG/DL (ref 63–159)
LYMPHOCYTES # BLD AUTO: 2.8 K/UL (ref 1–4.8)
LYMPHOCYTES NFR BLD: 37.9 % (ref 18–48)
MCH RBC QN AUTO: 30.9 PG (ref 27–31)
MCHC RBC AUTO-ENTMCNC: 33.3 G/DL (ref 32–36)
MCV RBC AUTO: 93 FL (ref 82–98)
MONOCYTES # BLD AUTO: 0.5 K/UL (ref 0.3–1)
MONOCYTES NFR BLD: 6.4 % (ref 4–15)
NEUTROPHILS # BLD AUTO: 4 K/UL (ref 1.8–7.7)
NEUTROPHILS NFR BLD: 52.9 % (ref 38–73)
NONHDLC SERPL-MCNC: 197 MG/DL
NRBC BLD-RTO: 0 /100 WBC
PLATELET # BLD AUTO: 364 K/UL (ref 150–450)
PMV BLD AUTO: 9.7 FL (ref 9.2–12.9)
POTASSIUM SERPL-SCNC: 4 MMOL/L (ref 3.5–5.1)
PROT SERPL-MCNC: 7.4 G/DL (ref 6–8.4)
PROTHROMBIN TIME: 10.3 SEC (ref 9–12.5)
RBC # BLD AUTO: 4.33 M/UL (ref 4–5.4)
SAMPLE: NORMAL
SODIUM SERPL-SCNC: 139 MMOL/L (ref 136–145)
TRIGL SERPL-MCNC: 177 MG/DL (ref 30–150)
TSH SERPL DL<=0.005 MIU/L-ACNC: 1.94 UIU/ML (ref 0.34–5.6)
WBC # BLD AUTO: 7.5 K/UL (ref 3.9–12.7)

## 2023-01-06 PROCEDURE — 99213 PR OFFICE/OUTPT VISIT, EST, LEVL III, 20-29 MIN: ICD-10-PCS | Mod: 95,,, | Performed by: STUDENT IN AN ORGANIZED HEALTH CARE EDUCATION/TRAINING PROGRAM

## 2023-01-06 PROCEDURE — 63600175 PHARM REV CODE 636 W HCPCS: Performed by: EMERGENCY MEDICINE

## 2023-01-06 PROCEDURE — 80053 COMPREHEN METABOLIC PANEL: CPT | Performed by: NURSE PRACTITIONER

## 2023-01-06 PROCEDURE — 96374 THER/PROPH/DIAG INJ IV PUSH: CPT

## 2023-01-06 PROCEDURE — 93010 EKG 12-LEAD: ICD-10-PCS | Mod: ,,, | Performed by: INTERNAL MEDICINE

## 2023-01-06 PROCEDURE — 85610 PROTHROMBIN TIME: CPT | Performed by: NURSE PRACTITIONER

## 2023-01-06 PROCEDURE — 99213 OFFICE O/P EST LOW 20 MIN: CPT | Mod: 95,,, | Performed by: STUDENT IN AN ORGANIZED HEALTH CARE EDUCATION/TRAINING PROGRAM

## 2023-01-06 PROCEDURE — 85025 COMPLETE CBC W/AUTO DIFF WBC: CPT | Performed by: NURSE PRACTITIONER

## 2023-01-06 PROCEDURE — 93005 ELECTROCARDIOGRAM TRACING: CPT | Performed by: INTERNAL MEDICINE

## 2023-01-06 PROCEDURE — 99285 EMERGENCY DEPT VISIT HI MDM: CPT | Mod: 25

## 2023-01-06 PROCEDURE — 80061 LIPID PANEL: CPT | Performed by: NURSE PRACTITIONER

## 2023-01-06 PROCEDURE — 84443 ASSAY THYROID STIM HORMONE: CPT | Performed by: NURSE PRACTITIONER

## 2023-01-06 PROCEDURE — 96375 TX/PRO/DX INJ NEW DRUG ADDON: CPT

## 2023-01-06 PROCEDURE — 93010 ELECTROCARDIOGRAM REPORT: CPT | Mod: ,,, | Performed by: INTERNAL MEDICINE

## 2023-01-06 RX ORDER — DIPHENHYDRAMINE HYDROCHLORIDE 50 MG/ML
12.5 INJECTION INTRAMUSCULAR; INTRAVENOUS
Status: COMPLETED | OUTPATIENT
Start: 2023-01-06 | End: 2023-01-06

## 2023-01-06 RX ORDER — PROMETHAZINE HYDROCHLORIDE 25 MG/1
25 TABLET ORAL EVERY 12 HOURS
COMMUNITY
Start: 2022-12-24

## 2023-01-06 RX ORDER — METOCLOPRAMIDE HYDROCHLORIDE 5 MG/ML
10 INJECTION INTRAMUSCULAR; INTRAVENOUS
Status: COMPLETED | OUTPATIENT
Start: 2023-01-06 | End: 2023-01-06

## 2023-01-06 RX ADMIN — METOCLOPRAMIDE 10 MG: 5 INJECTION, SOLUTION INTRAMUSCULAR; INTRAVENOUS at 08:01

## 2023-01-06 RX ADMIN — DIPHENHYDRAMINE HYDROCHLORIDE 12.5 MG: 50 INJECTION INTRAMUSCULAR; INTRAVENOUS at 08:01

## 2023-01-07 NOTE — SUBJECTIVE & OBJECTIVE
Woke up with symptoms?: no    Recent bleeding noted: no  Does the patient take any Blood Thinners? no  Medications: No relevant medications      Past Medical History: tia and migraine    Past Surgical History: no relevant surgical history    Family History: no relevant history    Social History: no smoking, no drinking, no drugs    Allergies: Gluten Protein  Prochlorperazine  Prochlorperazine Edisylate No relevant allergies    Review of Systems   Constitutional: Negative.  Negative for appetite change, chills and fever.   HENT: Negative for trouble swallowing and voice change.    Eyes: Negative for visual disturbance.   Respiratory: Negative for shortness of breath.    Gastrointestinal: Negative for nausea and vomiting.   Endocrine: Negative.    Genitourinary: Negative.    Musculoskeletal: Negative.  Negative for gait problem.   Skin: Negative.    Neurological: Positive for numbness and headaches. Negative for dizziness, facial asymmetry and weakness.   Psychiatric/Behavioral: Negative.      Objective:   Vitals: Blood pressure 113/74, pulse 73, temperature 98.4 °F (36.9 °C), temperature source Oral, resp. rate 20, weight 72.6 kg (160 lb), last menstrual period 12/15/2017, SpO2 98 %.     CT READ: Yes  No hemmorhage. No mass effect. No early infarct signs.     Physical Exam  Constitutional:       Appearance: Normal appearance.   HENT:      Head: Normocephalic and atraumatic.   Eyes:      Extraocular Movements: Extraocular movements intact.   Cardiovascular:      Rate and Rhythm: Normal rate.   Pulmonary:      Effort: Pulmonary effort is normal.   Abdominal:      General: Abdomen is flat.   Musculoskeletal:         General: No swelling or tenderness. Normal range of motion.      Cervical back: Normal range of motion.   Neurological:      Mental Status: She is alert and oriented to person, place, and time.      Cranial Nerves: No cranial nerve deficit.      Sensory: Sensory deficit present.      Motor: No weakness.       Comments: Reports 10/10 HA.   No focal weakness.   LUE and LLE sensory deficit noted

## 2023-01-07 NOTE — ASSESSMENT & PLAN NOTE
41 yo female w/ PMHx of anxiety, bipolar 1 affective disorder (rapid cycling), depression, RLS, chronic migraine with aura, recurrent self-reported  TIAs, iron deficiency anemia, fibromyalgia, GERD who presents w/ a severe HA and isolated left sided numbness.   Similar presentations before - 6-7 time previously.  Numbness present in LUE and LLE and started at around 16:00 PM.   No other symptoms observed on examination.   Patient not on AC or antiplatelets at home.     NIHSS 1 for sensory deficit only.   Not a TNK/tPA candidate. CTH unremarkable. Appears patient's deficits related to the migraine, and thus would defer pain treatment to ER.   Most recent MRI reviewed - no evidence of chronic microvascular changes or any ischemic changes to account for recurrent LSW/LSN as a vascular event.     Suspect reported-TIA events are all migraine related. Recommend symptomatic treatment and possible admission if HA is not resolved.

## 2023-01-07 NOTE — CONSULTS
[facility]  Vascular Neurology  Comprehensive Stroke Center  TeleVascular Neurology Acute Consultation Note      Consult to Telemedicine - Acute Stroke  Consult performed by: Heide Franco MD  Consult ordered by: Ashanti Almaraz NP        Consulting Provider: JONA BERNAL  Current Providers  No providers found    Patient Location:  [facility] EMERGENCY DEPARTMENT Emergency Department  Spoke hospital nurse at bedside with patient assisting consultant.     Patient information was obtained from patient, spouse/SO, and past medical records.         Assessment/Plan:       Diagnoses:   * Migraine with aura  43 yo female w/ PMHx of anxiety, bipolar 1 affective disorder (rapid cycling), depression, RLS, chronic migraine with aura, recurrent self-reported  TIAs, iron deficiency anemia, fibromyalgia, GERD who presents w/ a severe HA and isolated left sided numbness.   Similar presentations before - 6-7 time previously.  Numbness present in LUE and LLE and started at around 16:00 PM.   No other symptoms observed on examination.   Patient not on AC or antiplatelets at home.     NIHSS 1 for sensory deficit only.   Not a TNK/tPA candidate. CTH unremarkable. Appears patient's deficits related to the migraine, and thus would defer pain treatment to ER.   Most recent MRI reviewed - no evidence of chronic microvascular changes or any ischemic changes to account for recurrent LSW/LSN as a vascular event.     Suspect reported-TIA events are all migraine related. Recommend symptomatic treatment and possible admission if HA is not resolved.     Left sided numbness  - See principal program         STROKE DOCUMENTATION     Acute Stroke Times:   Acute Stroke Times   Last Known Normal Date: 01/06/23  Last Known Normal Time: 1600  Symptom Onset Date: 01/06/23  Symptom Onset Time: 1600  Stroke Team Called Date: 01/06/23  Stroke Team Called Time: 1945  Stroke Team Arrival Date: 01/06/23  Stroke Team Arrival  Time: 1948  CT Interpretation Time: 2005  Thrombolytic Therapy Recommended: No    NIH Scale:  1a. Level of Consciousness: 0-->Alert, keenly responsive  1b. LOC Questions: 0-->Answers both questions correctly  1c. LOC Commands: 0-->Performs both tasks correctly  2. Best Gaze: 0-->Normal  3. Visual: 0-->No visual loss  4. Facial Palsy: 0-->Normal symmetrical movements  5a. Motor Arm, Left: 0-->No drift, limb holds 90 (or 45) degrees for full 10 secs  5b. Motor Arm, Right: 0-->No drift, limb holds 90 (or 45) degrees for full 10 secs  6a. Motor Leg, Left: 0-->No drift, leg holds 30 degree position for full 5 secs  6b. Motor Leg, Right: 0-->No drift, leg holds 30 degree position for full 5 secs  7. Limb Ataxia: 0-->Absent  8. Sensory: 1-->Mild-to-moderate sensory loss, patient feels pinprick is less sharp or is dull on the affected side, or there is a loss of superficial pain with pinprick, but patient is aware of being touched  9. Best Language: 0-->No aphasia, normal  10. Dysarthria: 0-->Normal  11. Extinction and Inattention (formerly Neglect): 0-->No abnormality  Total (NIH Stroke Scale): 1     Modified Emma Score: 0  Tammie Coma Scale:    ABCD2 Score:    [id number] Score:   HAS -BLED Score:   ICH Score:   Hunt & Arellano Classification:       Blood pressure 113/74, pulse 73, temperature 98.4 °F (36.9 °C), temperature source Oral, resp. rate 20, weight 72.6 kg (160 lb), last menstrual period 12/15/2017, SpO2 98 %.  Eligible for thrombolytic therapy?: Yes  Thrombolytic therapy recomended: Thrombolytic therapy not recommended due to Suspected stroke mimic  and Mild Non-Disabling Symptoms  Possible Interventional Revascularization Candidate? No; at this time symptoms not suggestive of large vessel occlusion    Disposition Recommendation:  As per ED    Subjective:     History of Present Illness:  41 yo female w/ PMHx of anxiety, bipolar 1 affective disorder (rapid cycling), depression, RLS, chronic migraine with aura,  recurrent self-reported  TIAs, iron deficiency anemia, fibromyalgia, GERD who presents w/ a severe HA and isolated left sided numbness.   Similar presentations before - 6-7 time previously. Started about a year or so ago. Symptoms start after a severe migraine and last for 24 hours.   L-sided numbness, weakness and stuttering as well as difficulty processing information.    is at bedside to assist with history.   Numbness present in LUE and LLE and started at around 16:00 PM.   No other symptoms observed on examination.   Patient not on AC or antiplatelets at home.             Woke up with symptoms?: no    Recent bleeding noted: no  Does the patient take any Blood Thinners? no  Medications: No relevant medications      Past Medical History: tia and migraine    Past Surgical History: no relevant surgical history    Family History: no relevant history    Social History: no smoking, no drinking, no drugs    Allergies: Gluten Protein  Prochlorperazine  Prochlorperazine Edisylate No relevant allergies    Review of Systems   Constitutional: Negative.  Negative for appetite change, chills and fever.   HENT: Negative for trouble swallowing and voice change.    Eyes: Negative for visual disturbance.   Respiratory: Negative for shortness of breath.    Gastrointestinal: Negative for nausea and vomiting.   Endocrine: Negative.    Genitourinary: Negative.    Musculoskeletal: Negative.  Negative for gait problem.   Skin: Negative.    Neurological: Positive for numbness and headaches. Negative for dizziness, facial asymmetry and weakness.   Psychiatric/Behavioral: Negative.      Objective:   Vitals: Blood pressure 113/74, pulse 73, temperature 98.4 °F (36.9 °C), temperature source Oral, resp. rate 20, weight 72.6 kg (160 lb), last menstrual period 12/15/2017, SpO2 98 %.     CT READ: Yes  No hemmorhage. No mass effect. No early infarct signs.     Physical Exam  Constitutional:       Appearance: Normal appearance.   HENT:       Head: Normocephalic and atraumatic.   Eyes:      Extraocular Movements: Extraocular movements intact.   Cardiovascular:      Rate and Rhythm: Normal rate.   Pulmonary:      Effort: Pulmonary effort is normal.   Abdominal:      General: Abdomen is flat.   Musculoskeletal:         General: No swelling or tenderness. Normal range of motion.      Cervical back: Normal range of motion.   Neurological:      Mental Status: She is alert and oriented to person, place, and time.      Cranial Nerves: No cranial nerve deficit.      Sensory: Sensory deficit present.      Motor: No weakness.      Comments: Reports 10/10 HA.   No focal weakness.   LUE and LLE sensory deficit noted              Recommended the emergency room physician to have a brief discussion with the patient and/or family if available regarding the  risks and benefits of treatment, and to briefly document the occurrence of that discussion in his clinical encounter note.     The attending portion of this evaluation, treatment, and documentation was performed per Heide Franco MD via audiovisual.    Billing code:  (non-intervention mild to moderate stroke, TIA, some mimics)      This patient has a critical neurological condition/illness, with some potential for high morbidity and mortality.  There is a moderate probability for acute neurological change leading to clinical and possibly life-threatening deterioration requiring highest level of physician preparedness for urgent intervention.  Care was coordinated with other physicians involved in the patient's care.  Radiologic studies and laboratory data were reviewed and interpreted, and plan of care was re-assessed based on the results.  Diagnosis, treatment options and prognosis may have been discussed with the patient and/or family members or caregiver.    In your opinion, this was a: Tier 2 Van Negative    Consult End Time: 8:20 PM     Heide Franco MD  [facility]  Vascular Neurology    [facility]

## 2023-01-07 NOTE — HPI
43 yo female w/ PMHx of anxiety, bipolar 1 affective disorder (rapid cycling), depression, RLS, chronic migraine with aura, recurrent self-reported  TIAs, iron deficiency anemia, fibromyalgia, GERD who presents w/ a severe HA and isolated left sided numbness.   Similar presentations before - 6-7 time previously. Started about a year or so ago. Symptoms start after a severe migraine and last for 24 hours.   L-sided numbness, weakness and stuttering as well as difficulty processing information.    is at bedside to assist with history.   Numbness present in LUE and LLE and started at around 16:00 PM.   No other symptoms observed on examination.   Patient not on AC or antiplatelets at home.

## 2023-01-07 NOTE — ED PROVIDER NOTES
Encounter Date: 1/6/2023       History     Chief Complaint   Patient presents with    Headache     Headache with left sided arm and leg tingling since yesterday     42-year-old female presents complaining of left-sided tingling patient reports tingling in the arm and leg patient reports time of onset was yesterday, patient reports history of bipolar disorder, depression, atypical migraines, migraines with hemiparesis, patient reports that she has a migraine currently and patient noted to have multiple previous presentations with similar symptoms.    Review of patient's allergies indicates:   Allergen Reactions    Gluten protein Other (See Comments)     Sever stomach ache, very tired feeling    Prochlorperazine Other (See Comments) and Itching    Prochlorperazine edisylate      Anxiety, itching  Other reaction(s): Unknown     Past Medical History:   Diagnosis Date    Anxiety     Bipolar affective disorder, rapid cycling     bipolar 1    Depression     GERD (gastroesophageal reflux disease)     Migraine headache     Vaginal delivery     x1     Past Surgical History:   Procedure Laterality Date    ABDOMINAL SURGERY      exploratory laparotomy    HYSTERECTOMY  12/15/2017    partial     Family History   Problem Relation Age of Onset    No Known Problems Mother     Hypertension Father     No Known Problems Brother     No Known Problems Daughter      Social History     Tobacco Use    Smoking status: Never    Smokeless tobacco: Never   Substance Use Topics    Alcohol use: Not Currently     Alcohol/week: 0.0 - 1.0 standard drinks     Comment: occasional    Drug use: No     Review of Systems   Constitutional:  Negative for fever.   HENT:  Negative for congestion, rhinorrhea, sore throat and trouble swallowing.    Eyes:  Negative for visual disturbance.   Respiratory:  Negative for cough, chest tightness, shortness of breath and wheezing.    Cardiovascular:  Negative for chest pain, palpitations and leg swelling.    Gastrointestinal:  Negative for abdominal distention, abdominal pain, constipation, diarrhea, nausea and vomiting.   Genitourinary:  Negative for difficulty urinating, dysuria, flank pain and frequency.   Musculoskeletal:  Negative for arthralgias, back pain, joint swelling and neck pain.   Skin:  Negative for color change and rash.   Neurological:  Negative for dizziness, syncope, speech difficulty, weakness, numbness and headaches.        Paresthesias   All other systems reviewed and are negative.    Physical Exam     Initial Vitals [01/06/23 1934]   BP Pulse Resp Temp SpO2   113/74 73 20 98.4 °F (36.9 °C) 98 %      MAP       --         Physical Exam    Nursing note and vitals reviewed.  Constitutional: She appears well-developed and well-nourished. She is not diaphoretic. No distress.   HENT:   Head: Normocephalic and atraumatic.   Right Ear: External ear normal.   Left Ear: External ear normal.   Nose: Nose normal.   Mouth/Throat: Oropharynx is clear and moist. No oropharyngeal exudate.   Eyes: Conjunctivae and EOM are normal. Pupils are equal, round, and reactive to light. Right eye exhibits no discharge. Left eye exhibits no discharge. No scleral icterus.   Neck: Neck supple. No thyromegaly present. No tracheal deviation present. No JVD present.   Normal range of motion.  Cardiovascular:  Normal rate, regular rhythm, normal heart sounds and intact distal pulses.     Exam reveals no gallop and no friction rub.       No murmur heard.  Pulmonary/Chest: Breath sounds normal. No stridor. No respiratory distress. She has no wheezes. She has no rhonchi. She has no rales. She exhibits no tenderness.   Abdominal: Abdomen is soft. Bowel sounds are normal. She exhibits no distension and no mass. There is no abdominal tenderness. There is no rebound and no guarding.   Musculoskeletal:         General: No tenderness or edema. Normal range of motion.      Cervical back: Normal range of motion and neck supple.      Lymphadenopathy:     She has no cervical adenopathy.   Neurological: She is alert and oriented to person, place, and time. She has normal strength. No cranial nerve deficit or sensory deficit.   No pronator drift, no dysmetria, no dyskinesia, gait stable, strength 5/5 x 4, no gross neurosensory deficits, CN II-XII grossly intact.     Skin: Skin is warm and dry. No rash and no abscess noted. No erythema. No pallor.       ED Course   Procedures  Labs Reviewed   COMPREHENSIVE METABOLIC PANEL - Abnormal; Notable for the following components:       Result Value    Alkaline Phosphatase 51 (*)     All other components within normal limits   LIPID PANEL - Abnormal; Notable for the following components:    Cholesterol 263 (*)     Triglycerides 177 (*)     LDL Cholesterol 161.6 (*)     All other components within normal limits   CBC W/ AUTO DIFFERENTIAL   PROTIME-INR   TSH   ISTAT CREATININE   POCT GLUCOSE MONITORING CONTINUOUS          Imaging Results              CT Head Without Contrast (Final result)  Result time 01/06/23 20:08:58      Final result by Greg Franco Jr., MD (01/06/23 20:08:58)                   Narrative:    CT OF THE HEAD WITHOUT CONTRAST    HISTORY: Headache. Right-sided arm and leg tingling    Technical factors:   Spiral acquisition of the brain was generated at 3.0 mm thickness from the skull base to the skull vertex in helical fashion in the absence of intravenous contrast.  Additional coronal and sagittal reconstructed images were also included and reviewed.    CMS MANDATED QUALITY DATA - CT RADIATION  436    All CT scans at this facility utilize dose modulation, iterative reconstruction, and/or weight based dosing when appropriate to reduce radiation dose to as low as reasonably achievable.        FINDINGS:  There is exquisite differentiation between the gray and white matter.  The substance of the brain is relatively well maintained without alteration the attenuation pattern that would  reflect intraparenchymal hemorrhage nor mass lesion or acute infarct.  There is no evidence of sub nor epidural collections.  The ventricles are equal and symmetric.  Calvarium appears intact.  The mastoids and visualized paranasal sinuses are unremarkable in CT appearance.    IMPRESSION: Negative unenhanced CT scan of the brain.    Electronically signed by:  Greg Franco MD  1/6/2023 8:08 PM CST Workstation: [id number]                                     Medications   metoclopramide HCl injection 10 mg (10 mg Intravenous Given 1/6/23 2046)   diphenhydrAMINE injection 12.5 mg (12.5 mg Intravenous Given 1/6/23 2046)     Medical Decision Making:   History:   Old Medical Records: I decided to obtain old medical records.  Initial Assessment:   Emergent evaluation of a 42-year-old female presenting with paresthesias differential diagnosis includes atypical migraine, electrolyte abnormality, endocrine dysfunction I have low suspicion for CVA given lack of hard neuro findings, patient consulted to tele stroke services they do not feel that patient is having a stroke, they do not recommend tPA at this time, patient to receive MRI and will evaluate from there.          Attending Attestation:             Attending ED Notes:   Patient called nurse to bedside, reports that she would like to leave.  Dr. Nichole went to bedside to evaluate patient she could not be convinced to stay.  Patient found to be alert and oriented x3 and appears to have capacity to make her own medical decisions patient signed out of the hospital against medical advice.    Patient advised to follow-up with her neurologist as soon as possible.  Patient understands that by leaving there is a risk of death disability and deterioration.                 Clinical Impression:   Final diagnoses:  [R51.9] Nonintractable headache, unspecified chronicity pattern, unspecified headache type (Primary)        ED Disposition Condition    AMA Stable                Serge  MD Kiana  01/06/23 6604

## 2023-01-10 NOTE — TELEPHONE ENCOUNTER
Pt aware we are waiting on a response from .   Hemigard Postcare Instructions: The HEMIGARD strips are to remain completely dry for at least 5-7 days.

## 2023-01-21 ENCOUNTER — HOSPITAL ENCOUNTER (EMERGENCY)
Facility: HOSPITAL | Age: 43
Discharge: HOME | End: 2023-01-22
Attending: EMERGENCY MEDICINE
Payer: MEDICAID

## 2023-01-21 DIAGNOSIS — R31.9 HEMATURIA, UNSPECIFIED TYPE: Primary | ICD-10-CM

## 2023-01-21 LAB
B-HCG UR QL: NEGATIVE
BACTERIA #/AREA URNS HPF: ABNORMAL /HPF
BILIRUB UR QL STRIP: NEGATIVE
CLARITY UR: CLEAR
COLOR UR: YELLOW
GLUCOSE UR QL STRIP: NEGATIVE
HGB UR QL STRIP: ABNORMAL
KETONES UR QL STRIP: NEGATIVE
LEUKOCYTE ESTERASE UR QL STRIP: NEGATIVE
MICROSCOPIC COMMENT: ABNORMAL
NITRITE UR QL STRIP: NEGATIVE
PH UR STRIP: 6 [PH] (ref 5–8)
PROT UR QL STRIP: ABNORMAL
RBC #/AREA URNS HPF: 13 /HPF (ref 0–4)
SP GR UR STRIP: 1 (ref 1–1.03)
SQUAMOUS #/AREA URNS HPF: 3 /HPF
URATE CRY URNS QL MICRO: ABNORMAL
URN SPEC COLLECT METH UR: ABNORMAL
UROBILINOGEN UR STRIP-ACNC: NEGATIVE EU/DL
WBC #/AREA URNS HPF: 2 /HPF (ref 0–5)

## 2023-01-21 PROCEDURE — 81025 URINE PREGNANCY TEST: CPT | Performed by: EMERGENCY MEDICINE

## 2023-01-21 PROCEDURE — 81000 URINALYSIS NONAUTO W/SCOPE: CPT | Performed by: EMERGENCY MEDICINE

## 2023-01-21 PROCEDURE — 99283 EMERGENCY DEPT VISIT LOW MDM: CPT

## 2023-01-21 RX ORDER — DOXYCYCLINE 100 MG/1
100 CAPSULE ORAL 2 TIMES DAILY
Qty: 14 CAPSULE | Refills: 0 | Status: SHIPPED | OUTPATIENT
Start: 2023-01-21 | End: 2023-01-21 | Stop reason: CLARIF

## 2023-01-21 RX ORDER — CEPHALEXIN 500 MG/1
500 CAPSULE ORAL 4 TIMES DAILY
Qty: 20 CAPSULE | Refills: 0 | Status: SHIPPED | OUTPATIENT
Start: 2023-01-21 | End: 2023-01-26

## 2023-01-22 VITALS
TEMPERATURE: 98 F | WEIGHT: 165 LBS | BODY MASS INDEX: 28.17 KG/M2 | SYSTOLIC BLOOD PRESSURE: 116 MMHG | OXYGEN SATURATION: 99 % | RESPIRATION RATE: 18 BRPM | DIASTOLIC BLOOD PRESSURE: 72 MMHG | HEART RATE: 88 BPM | HEIGHT: 64 IN

## 2023-01-22 NOTE — ED PROVIDER NOTES
Encounter Date: 1/21/2023       History     Chief Complaint   Patient presents with    Vaginal Bleeding     +urinary frequency, bright red blood in their underwear     This patient presents with the so complaint of blood in her underpants.  Patient states that she wiped her urethral area and got some blood on the tissue.  She also started some tissue into her vagina and there was no blood present there.  Patient denies any pain she has no other complaints of at the present time patient has had normal menses.  No other complaints of at the present time.    The history is provided by the patient.   Review of patient's allergies indicates:   Allergen Reactions    Gluten protein Other (See Comments)     Sever stomach ache, very tired feeling    Prochlorperazine Other (See Comments) and Itching    Prochlorperazine edisylate      Anxiety, itching  Other reaction(s): Unknown     Past Medical History:   Diagnosis Date    Anxiety     Bipolar affective disorder, rapid cycling     bipolar 1    Depression     GERD (gastroesophageal reflux disease)     Migraine headache     Vaginal delivery     x1     Past Surgical History:   Procedure Laterality Date    ABDOMINAL SURGERY      exploratory laparotomy    HYSTERECTOMY  12/15/2017    partial     Family History   Problem Relation Age of Onset    No Known Problems Mother     Hypertension Father     No Known Problems Brother     No Known Problems Daughter      Social History     Tobacco Use    Smoking status: Never    Smokeless tobacco: Never   Substance Use Topics    Alcohol use: Not Currently     Alcohol/week: 0.0 - 1.0 standard drinks     Comment: occasional    Drug use: No     Review of Systems   Constitutional: Negative.    HENT: Negative.     Eyes: Negative.    Respiratory: Negative.     Cardiovascular: Negative.    Gastrointestinal: Negative.    Endocrine: Negative.    Genitourinary:  Positive for hematuria.   Musculoskeletal: Negative.    Skin: Negative.     Allergic/Immunologic: Negative.    Neurological: Negative.    Hematological: Negative.    Psychiatric/Behavioral: Negative.     All other systems reviewed and are negative.    Physical Exam     Initial Vitals [01/21/23 2300]   BP Pulse Resp Temp SpO2   113/68 90 18 97.9 °F (36.6 °C) 99 %      MAP       --         Physical Exam    Nursing note and vitals reviewed.  Constitutional: Vital signs are normal. She appears well-developed. She is active and cooperative.   HENT:   Head: Normocephalic and atraumatic.   Eyes: Conjunctivae, EOM and lids are normal. Pupils are equal, round, and reactive to light.   Neck: Trachea normal. Neck supple. No thyroid mass present.    Full passive range of motion without pain.     Cardiovascular:  Normal rate, regular rhythm, S1 normal, S2 normal, normal heart sounds, intact distal pulses and normal pulses.           Pulmonary/Chest: Effort normal.   Abdominal: Abdomen is soft and flat. Bowel sounds are normal.   Genitourinary:    Genitourinary Comments: Deferred     Musculoskeletal:         General: Normal range of motion.      Cervical back: Full passive range of motion without pain and neck supple.     Lymphadenopathy:     She has no axillary adenopathy.   Neurological: She is alert and oriented to person, place, and time.   Skin: Skin is warm, dry and intact.   Psychiatric: She has a normal mood and affect. Her speech is normal and behavior is normal. Judgment and thought content normal. Cognition and memory are normal.       ED Course   Procedures  Labs Reviewed   URINALYSIS - Abnormal; Notable for the following components:       Result Value    Protein, UA Trace (*)     Occult Blood UA 3+ (*)     All other components within normal limits   URINALYSIS MICROSCOPIC - Abnormal; Notable for the following components:    RBC, UA 13 (*)     All other components within normal limits   PREGNANCY TEST, URINE RAPID    Narrative:     Specimen Source->Urine        Results for orders placed or  performed during the hospital encounter of 01/21/23   Urinalysis   Result Value Ref Range    Specimen UA Urine, Clean Catch     Color, UA Yellow Yellow, Straw, Leeanna    Appearance, UA Clear Clear    pH, UA 6.0 5.0 - 8.0    Specific Gravity, UA 1.005 1.005 - 1.030    Protein, UA Trace (A) Negative    Glucose, UA Negative Negative    Ketones, UA Negative Negative    Bilirubin (UA) Negative Negative    Occult Blood UA 3+ (A) Negative    Nitrite, UA Negative Negative    Urobilinogen, UA Negative <2.0 EU/dL    Leukocytes, UA Negative Negative   Pregnancy, urine rapid   Result Value Ref Range    Preg Test, Ur Negative    Urinalysis Microscopic   Result Value Ref Range    RBC, UA 13 (H) 0 - 4 /hpf    WBC, UA 2 0 - 5 /hpf    Bacteria Rare None-Occ /hpf    Squam Epithel, UA 3 /hpf    Uric Acid Danielle, UA Occasional None-Moderate    Microscopic Comment SEE COMMENT          Imaging Results    None          Medications - No data to display                           Clinical Impression:   Final diagnoses:  [R31.9] Hematuria, unspecified type (Primary)        ED Disposition Condition    Discharge Stable          ED Prescriptions       Medication Sig Dispense Start Date End Date Auth. Provider    doxycycline (MONODOX) 100 MG capsule  (Status: Discontinued) Take 1 capsule (100 mg total) by mouth 2 (two) times daily. for 7 days 14 capsule 1/21/2023 1/21/2023 Rico Blum MD    cephALEXin (KEFLEX) 500 MG capsule Take 1 capsule (500 mg total) by mouth 4 (four) times daily. for 5 days 20 capsule 1/21/2023 1/26/2023 Rico Blum MD          Follow-up Information       Follow up With Specialties Details Why Contact Info    Urology  Schedule an appointment as soon as possible for a visit in 1 week As needed              Rico Blum MD  01/21/23 3642

## 2023-01-22 NOTE — DISCHARGE INSTRUCTIONS
Repeat urinalysis in 1 week to confirm blood in urine has resolved.  If blood in urine has not resolved please follow-up with a urologist.

## 2023-09-21 NOTE — TRANSFER OF CARE
"Anesthesia Transfer of Care Note    Patient: Shruthi Richard    Procedure(s) Performed: Procedure(s) (LRB):  LAPAROSCOPY-DIAGNOSTIC (N/A)    Patient location: PACU    Anesthesia Type: general    Transport from OR: Transported from OR on room air with adequate spontaneous ventilation    Post pain: adequate analgesia    Post assessment: no apparent anesthetic complications and tolerated procedure well    Post vital signs: stable    Level of consciousness: awake and alert    Nausea/Vomiting: no nausea/vomiting    Complications: none          Last vitals:   Visit Vitals    BP (!) 102/52    Pulse 73    Temp 36.6 °C (97.9 °F) (Oral)    Resp 16    Ht 5' 3" (1.6 m)    Wt 57.2 kg (126 lb 1.7 oz)    LMP 02/22/2017 (Exact Date)    SpO2 100%    BMI 22.34 kg/m2     " No

## 2024-02-06 ENCOUNTER — HOSPITAL ENCOUNTER (EMERGENCY)
Facility: HOSPITAL | Age: 44
Discharge: LEFT BEFORE DISCHARGE | End: 2024-02-06

## 2024-02-06 VITALS
SYSTOLIC BLOOD PRESSURE: 107 MMHG | TEMPERATURE: 102 F | WEIGHT: 160 LBS | DIASTOLIC BLOOD PRESSURE: 66 MMHG | OXYGEN SATURATION: 98 % | RESPIRATION RATE: 20 BRPM | BODY MASS INDEX: 27.31 KG/M2 | HEART RATE: 111 BPM | HEIGHT: 64 IN

## 2024-02-06 PROCEDURE — 99283 EMERGENCY DEPT VISIT LOW MDM: CPT

## 2024-02-06 PROCEDURE — 25000003 PHARM REV CODE 250: Performed by: PHYSICIAN ASSISTANT

## 2024-02-06 RX ORDER — ACETAMINOPHEN 500 MG
1000 TABLET ORAL
Status: COMPLETED | OUTPATIENT
Start: 2024-02-06 | End: 2024-02-06

## 2024-02-06 RX ADMIN — ACETAMINOPHEN 1000 MG: 500 TABLET ORAL at 05:02

## 2024-02-06 NOTE — FIRST PROVIDER EVALUATION
Emergency Department TeleTriage Encounter Note      CHIEF COMPLAINT    Chief Complaint   Patient presents with    COVID-19 Concerns     Pt states + covid. Sinus congestion, fever. Sent by urgent care for fever.        VITAL SIGNS   Initial Vitals   BP Pulse Resp Temp SpO2   02/06/24 1744 02/06/24 1742 02/06/24 1742 02/06/24 1742 02/06/24 1742   107/66 (!) 111 20 (!) 102.3 °F (39.1 °C) 98 %      MAP       --                   ALLERGIES    Review of patient's allergies indicates:   Allergen Reactions    Gluten protein Other (See Comments)     Sever stomach ache, very tired feeling    Prochlorperazine Other (See Comments) and Itching    Prochlorperazine edisylate      Anxiety, itching  Other reaction(s): Unknown       PROVIDER TRIAGE NOTE  Patient presents with complaint of fever.  Was positive for COVID at urgent care and sent to the ER for fever.  Has not taken any medications for her fever.  Patient is very well-appearing.      Phy:   Constitutional: well nourished, well developed, appearing stated age, NAD        Initial orders will be placed and care will be transferred to an alternate provider when patient is roomed for a full evaluation. Any additional orders and the final disposition will be determined by that provider.        ORDERS  Labs Reviewed - No data to display    ED Orders (720h ago, onward)      None              Virtual Visit Note: The provider triage portion of this emergency department evaluation and documentation was performed via [employer], a HIPAA-compliant telemedicine application, in concert with a tele-presenter in the room. A face to face patient evaluation with one of my colleagues will occur once the patient is placed in an emergency department room.      DISCLAIMER: This note was prepared with [employer] voice recognition transcription software. Garbled syntax, mangled pronouns, and other bizarre constructions may be attributed to that software system.

## 2024-03-01 NOTE — TELEPHONE ENCOUNTER
----- Message from Mel Zeng sent at 10/27/2017 10:38 AM CDT -----  Contact:   Yonathan called stating he has some concerns he would like to discuss regarding wife's hospital stay. Contact him at [phone].    Thanks-    
Spoke with  he is requesting a call from Dr. Gaines. Pt was put in a psych clark  And he does not know what to do. Pts  informed that Dr. Gaines is not in the office. Pts  advised to call our office on Monday to speak with Dr. Gaines.  
Opt out

## 2024-12-26 PROCEDURE — 99285 EMERGENCY DEPT VISIT HI MDM: CPT

## 2024-12-26 PROCEDURE — 96376 TX/PRO/DX INJ SAME DRUG ADON: CPT

## 2024-12-27 ENCOUNTER — HOSPITAL ENCOUNTER (OUTPATIENT)
Facility: HOSPITAL | Age: 44
Discharge: HOME | End: 2024-12-28
Attending: STUDENT IN AN ORGANIZED HEALTH CARE EDUCATION/TRAINING PROGRAM | Admitting: INTERNAL MEDICINE
Payer: MEDICARE

## 2024-12-27 DIAGNOSIS — R07.9 CHEST PAIN: ICD-10-CM

## 2024-12-27 DIAGNOSIS — R51.9 NONINTRACTABLE HEADACHE, UNSPECIFIED CHRONICITY PATTERN, UNSPECIFIED HEADACHE TYPE: Primary | ICD-10-CM

## 2024-12-27 LAB
ALBUMIN SERPL BCP-MCNC: 4.3 G/DL (ref 3.5–5.2)
ALP SERPL-CCNC: 131 U/L (ref 55–135)
ALT SERPL W/O P-5'-P-CCNC: 26 U/L (ref 10–44)
ANION GAP SERPL CALC-SCNC: 6 MMOL/L (ref 8–16)
AST SERPL-CCNC: 14 U/L (ref 10–40)
BASOPHILS # BLD AUTO: 0.05 K/UL (ref 0–0.2)
BASOPHILS NFR BLD: 0.6 % (ref 0–1.9)
BILIRUB SERPL-MCNC: 0.3 MG/DL (ref 0.1–1)
BUN SERPL-MCNC: 8 MG/DL (ref 6–20)
CALCIUM SERPL-MCNC: 9 MG/DL (ref 8.7–10.5)
CHLORIDE SERPL-SCNC: 103 MMOL/L (ref 95–110)
CO2 SERPL-SCNC: 28 MMOL/L (ref 23–29)
CREAT SERPL-MCNC: 0.7 MG/DL (ref 0.5–1.4)
DIFFERENTIAL METHOD BLD: ABNORMAL
EOSINOPHIL # BLD AUTO: 0.3 K/UL (ref 0–0.5)
EOSINOPHIL NFR BLD: 3.2 % (ref 0–8)
ERYTHROCYTE [DISTWIDTH] IN BLOOD BY AUTOMATED COUNT: 14.6 % (ref 11.5–14.5)
EST. GFR  (NO RACE VARIABLE): >60 ML/MIN/1.73 M^2
GLUCOSE SERPL-MCNC: 65 MG/DL (ref 70–110)
HCT VFR BLD AUTO: 41.6 % (ref 37–48.5)
HGB BLD-MCNC: 13.3 G/DL (ref 12–16)
IMM GRANULOCYTES # BLD AUTO: 0.03 K/UL (ref 0–0.04)
IMM GRANULOCYTES NFR BLD AUTO: 0.3 % (ref 0–0.5)
LYMPHOCYTES # BLD AUTO: 3.7 K/UL (ref 1–4.8)
LYMPHOCYTES NFR BLD: 41 % (ref 18–48)
MAGNESIUM SERPL-MCNC: 2.2 MG/DL (ref 1.6–2.6)
MCH RBC QN AUTO: 31.4 PG (ref 27–31)
MCHC RBC AUTO-ENTMCNC: 32 G/DL (ref 32–36)
MCV RBC AUTO: 98 FL (ref 82–98)
MONOCYTES # BLD AUTO: 0.6 K/UL (ref 0.3–1)
MONOCYTES NFR BLD: 6.3 % (ref 4–15)
NEUTROPHILS # BLD AUTO: 4.4 K/UL (ref 1.8–7.7)
NEUTROPHILS NFR BLD: 48.6 % (ref 38–73)
NRBC BLD-RTO: 0 /100 WBC
PLATELET # BLD AUTO: 420 K/UL (ref 150–450)
PMV BLD AUTO: 10.1 FL (ref 9.2–12.9)
POTASSIUM SERPL-SCNC: 4.1 MMOL/L (ref 3.5–5.1)
PROT SERPL-MCNC: 6.9 G/DL (ref 6–8.4)
RBC # BLD AUTO: 4.23 M/UL (ref 4–5.4)
SODIUM SERPL-SCNC: 137 MMOL/L (ref 136–145)
WBC # BLD AUTO: 8.99 K/UL (ref 3.9–12.7)

## 2024-12-27 PROCEDURE — 96375 TX/PRO/DX INJ NEW DRUG ADDON: CPT | Mod: 59

## 2024-12-27 PROCEDURE — 96366 THER/PROPH/DIAG IV INF ADDON: CPT

## 2024-12-27 PROCEDURE — 80053 COMPREHEN METABOLIC PANEL: CPT | Performed by: STUDENT IN AN ORGANIZED HEALTH CARE EDUCATION/TRAINING PROGRAM

## 2024-12-27 PROCEDURE — 83735 ASSAY OF MAGNESIUM: CPT | Performed by: STUDENT IN AN ORGANIZED HEALTH CARE EDUCATION/TRAINING PROGRAM

## 2024-12-27 PROCEDURE — 25500020 PHARM REV CODE 255

## 2024-12-27 PROCEDURE — 96365 THER/PROPH/DIAG IV INF INIT: CPT

## 2024-12-27 PROCEDURE — 25000003 PHARM REV CODE 250: Performed by: INTERNAL MEDICINE

## 2024-12-27 PROCEDURE — 96376 TX/PRO/DX INJ SAME DRUG ADON: CPT | Mod: 59

## 2024-12-27 PROCEDURE — G0378 HOSPITAL OBSERVATION PER HR: HCPCS

## 2024-12-27 PROCEDURE — 63600175 PHARM REV CODE 636 W HCPCS

## 2024-12-27 PROCEDURE — 85025 COMPLETE CBC W/AUTO DIFF WBC: CPT | Performed by: STUDENT IN AN ORGANIZED HEALTH CARE EDUCATION/TRAINING PROGRAM

## 2024-12-27 PROCEDURE — 63600175 PHARM REV CODE 636 W HCPCS: Performed by: INTERNAL MEDICINE

## 2024-12-27 PROCEDURE — 63600175 PHARM REV CODE 636 W HCPCS: Performed by: STUDENT IN AN ORGANIZED HEALTH CARE EDUCATION/TRAINING PROGRAM

## 2024-12-27 PROCEDURE — 36415 COLL VENOUS BLD VENIPUNCTURE: CPT | Performed by: STUDENT IN AN ORGANIZED HEALTH CARE EDUCATION/TRAINING PROGRAM

## 2024-12-27 PROCEDURE — A9585 GADOBUTROL INJECTION: HCPCS

## 2024-12-27 RX ORDER — ONDANSETRON HYDROCHLORIDE 2 MG/ML
4 INJECTION, SOLUTION INTRAVENOUS
Status: COMPLETED | OUTPATIENT
Start: 2024-12-27 | End: 2024-12-27

## 2024-12-27 RX ORDER — ONDANSETRON HYDROCHLORIDE 2 MG/ML
4 INJECTION, SOLUTION INTRAVENOUS EVERY 6 HOURS PRN
Status: DISCONTINUED | OUTPATIENT
Start: 2024-12-27 | End: 2024-12-28 | Stop reason: HOSPADM

## 2024-12-27 RX ORDER — LASMIDITAN 100 MG/1
100 TABLET ORAL
COMMUNITY

## 2024-12-27 RX ORDER — ACETAMINOPHEN 325 MG/1
650 TABLET ORAL EVERY 4 HOURS PRN
Status: DISCONTINUED | OUTPATIENT
Start: 2024-12-27 | End: 2024-12-28 | Stop reason: HOSPADM

## 2024-12-27 RX ORDER — METOCLOPRAMIDE HYDROCHLORIDE 5 MG/ML
10 INJECTION INTRAMUSCULAR; INTRAVENOUS EVERY 8 HOURS
Status: DISCONTINUED | OUTPATIENT
Start: 2024-12-27 | End: 2024-12-28 | Stop reason: HOSPADM

## 2024-12-27 RX ORDER — BUSPIRONE HYDROCHLORIDE 10 MG/1
10 TABLET ORAL 3 TIMES DAILY
Start: 2024-12-27

## 2024-12-27 RX ORDER — ACETAMINOPHEN 325 MG/1
650 TABLET ORAL EVERY 8 HOURS PRN
Status: DISCONTINUED | OUTPATIENT
Start: 2024-12-27 | End: 2024-12-28 | Stop reason: HOSPADM

## 2024-12-27 RX ORDER — CLONAZEPAM 1 MG/1
2 TABLET ORAL 2 TIMES DAILY PRN
Status: DISCONTINUED | OUTPATIENT
Start: 2024-12-27 | End: 2024-12-28 | Stop reason: HOSPADM

## 2024-12-27 RX ORDER — HYDROMORPHONE HYDROCHLORIDE 1 MG/ML
0.5 INJECTION, SOLUTION INTRAMUSCULAR; INTRAVENOUS; SUBCUTANEOUS
Status: COMPLETED | OUTPATIENT
Start: 2024-12-27 | End: 2024-12-27

## 2024-12-27 RX ORDER — SODIUM,POTASSIUM PHOSPHATES 280-250MG
2 POWDER IN PACKET (EA) ORAL
Status: DISCONTINUED | OUTPATIENT
Start: 2024-12-27 | End: 2024-12-28 | Stop reason: HOSPADM

## 2024-12-27 RX ORDER — ACETAMINOPHEN 325 MG/1
650 TABLET ORAL EVERY 4 HOURS PRN
COMMUNITY
Start: 2024-12-06 | End: 2024-12-27

## 2024-12-27 RX ORDER — TALC
6 POWDER (GRAM) TOPICAL NIGHTLY PRN
Status: DISCONTINUED | OUTPATIENT
Start: 2024-12-27 | End: 2024-12-28 | Stop reason: HOSPADM

## 2024-12-27 RX ORDER — ONDANSETRON HYDROCHLORIDE 2 MG/ML
4 INJECTION, SOLUTION INTRAVENOUS ONCE
Status: COMPLETED | OUTPATIENT
Start: 2024-12-27 | End: 2024-12-27

## 2024-12-27 RX ORDER — METHYLPREDNISOLONE 4 MG/1
4 TABLET ORAL DAILY
COMMUNITY
End: 2024-12-27

## 2024-12-27 RX ORDER — OXYCODONE HYDROCHLORIDE 5 MG/1
5 TABLET ORAL EVERY 6 HOURS PRN
COMMUNITY

## 2024-12-27 RX ORDER — GLUCAGON 1 MG
1 KIT INJECTION
Status: DISCONTINUED | OUTPATIENT
Start: 2024-12-27 | End: 2024-12-28 | Stop reason: HOSPADM

## 2024-12-27 RX ORDER — IBUPROFEN 200 MG
24 TABLET ORAL
Status: DISCONTINUED | OUTPATIENT
Start: 2024-12-27 | End: 2024-12-28 | Stop reason: HOSPADM

## 2024-12-27 RX ORDER — TOPIRAMATE 50 MG/1
1 TABLET, FILM COATED ORAL 2 TIMES DAILY
COMMUNITY

## 2024-12-27 RX ORDER — BUSPIRONE HYDROCHLORIDE 5 MG/1
10 TABLET ORAL 3 TIMES DAILY
Status: DISCONTINUED | OUTPATIENT
Start: 2024-12-27 | End: 2024-12-28 | Stop reason: HOSPADM

## 2024-12-27 RX ORDER — LANOLIN ALCOHOL/MO/W.PET/CERES
800 CREAM (GRAM) TOPICAL
Status: DISCONTINUED | OUTPATIENT
Start: 2024-12-27 | End: 2024-12-28 | Stop reason: HOSPADM

## 2024-12-27 RX ORDER — FLUOXETINE HYDROCHLORIDE 20 MG/1
20 CAPSULE ORAL DAILY
Status: DISCONTINUED | OUTPATIENT
Start: 2024-12-27 | End: 2024-12-28 | Stop reason: HOSPADM

## 2024-12-27 RX ORDER — DEXAMETHASONE SODIUM PHOSPHATE 10 MG/ML
8 INJECTION INTRAMUSCULAR; INTRAVENOUS ONCE
Status: COMPLETED | OUTPATIENT
Start: 2024-12-27 | End: 2024-12-27

## 2024-12-27 RX ORDER — NALOXONE HYDROCHLORIDE 4 MG/.1ML
1 SPRAY NASAL ONCE
COMMUNITY

## 2024-12-27 RX ORDER — DIPHENHYDRAMINE HYDROCHLORIDE 50 MG/ML
25 INJECTION INTRAMUSCULAR; INTRAVENOUS EVERY 6 HOURS PRN
Status: DISCONTINUED | OUTPATIENT
Start: 2024-12-27 | End: 2024-12-27

## 2024-12-27 RX ORDER — MAGNESIUM 200 MG
1 TABLET ORAL DAILY
COMMUNITY

## 2024-12-27 RX ORDER — GADOBUTROL 604.72 MG/ML
7 INJECTION INTRAVENOUS
Status: COMPLETED | OUTPATIENT
Start: 2024-12-27 | End: 2024-12-27

## 2024-12-27 RX ORDER — IBUPROFEN 200 MG
16 TABLET ORAL
Status: DISCONTINUED | OUTPATIENT
Start: 2024-12-27 | End: 2024-12-28 | Stop reason: HOSPADM

## 2024-12-27 RX ORDER — DIPHENHYDRAMINE HYDROCHLORIDE 50 MG/ML
25 INJECTION INTRAMUSCULAR; INTRAVENOUS EVERY 8 HOURS
Status: DISCONTINUED | OUTPATIENT
Start: 2024-12-27 | End: 2024-12-28 | Stop reason: HOSPADM

## 2024-12-27 RX ORDER — SODIUM CHLORIDE 0.9 % (FLUSH) 0.9 %
3 SYRINGE (ML) INJECTION EVERY 12 HOURS PRN
Status: DISCONTINUED | OUTPATIENT
Start: 2024-12-27 | End: 2024-12-28 | Stop reason: HOSPADM

## 2024-12-27 RX ORDER — CYCLOBENZAPRINE HCL 10 MG
10 TABLET ORAL 3 TIMES DAILY PRN
Status: ON HOLD | COMMUNITY
End: 2024-12-27 | Stop reason: HOSPADM

## 2024-12-27 RX ORDER — KETOROLAC TROMETHAMINE 30 MG/ML
15 INJECTION, SOLUTION INTRAMUSCULAR; INTRAVENOUS EVERY 6 HOURS PRN
Status: DISCONTINUED | OUTPATIENT
Start: 2024-12-27 | End: 2024-12-28 | Stop reason: HOSPADM

## 2024-12-27 RX ORDER — ALUMINUM HYDROXIDE, MAGNESIUM HYDROXIDE, AND SIMETHICONE 1200; 120; 1200 MG/30ML; MG/30ML; MG/30ML
30 SUSPENSION ORAL 4 TIMES DAILY PRN
Status: DISCONTINUED | OUTPATIENT
Start: 2024-12-27 | End: 2024-12-28 | Stop reason: HOSPADM

## 2024-12-27 RX ORDER — METHYLPREDNISOLONE 4 MG/1
TABLET ORAL
Qty: 21 EACH | Refills: 0 | Status: SHIPPED | OUTPATIENT
Start: 2024-12-27 | End: 2024-12-28

## 2024-12-27 RX ORDER — NALOXONE HCL 0.4 MG/ML
0.02 VIAL (ML) INJECTION
Status: DISCONTINUED | OUTPATIENT
Start: 2024-12-27 | End: 2024-12-28 | Stop reason: HOSPADM

## 2024-12-27 RX ORDER — AMOXICILLIN 250 MG
1 CAPSULE ORAL 2 TIMES DAILY
Status: DISCONTINUED | OUTPATIENT
Start: 2024-12-27 | End: 2024-12-28 | Stop reason: HOSPADM

## 2024-12-27 RX ORDER — SODIUM CHLORIDE 9 MG/ML
INJECTION, SOLUTION INTRAVENOUS CONTINUOUS
Status: DISCONTINUED | OUTPATIENT
Start: 2024-12-27 | End: 2024-12-28 | Stop reason: HOSPADM

## 2024-12-27 RX ORDER — FAMOTIDINE 10 MG/ML
20 INJECTION INTRAVENOUS 2 TIMES DAILY
Status: DISCONTINUED | OUTPATIENT
Start: 2024-12-27 | End: 2024-12-28 | Stop reason: HOSPADM

## 2024-12-27 RX ADMIN — DIPHENHYDRAMINE HYDROCHLORIDE 25 MG: 50 INJECTION INTRAMUSCULAR; INTRAVENOUS at 10:12

## 2024-12-27 RX ADMIN — FLUOXETINE 20 MG: 20 CAPSULE ORAL at 04:12

## 2024-12-27 RX ADMIN — LAMOTRIGINE 150 MG: 100 TABLET ORAL at 08:12

## 2024-12-27 RX ADMIN — METOCLOPRAMIDE HYDROCHLORIDE 10 MG: 5 INJECTION INTRAMUSCULAR; INTRAVENOUS at 10:12

## 2024-12-27 RX ADMIN — SODIUM CHLORIDE 500 MG: 9 INJECTION, SOLUTION INTRAVENOUS at 01:12

## 2024-12-27 RX ADMIN — SODIUM CHLORIDE 500 MG: 9 INJECTION, SOLUTION INTRAVENOUS at 09:12

## 2024-12-27 RX ADMIN — SODIUM CHLORIDE: 9 INJECTION, SOLUTION INTRAVENOUS at 01:12

## 2024-12-27 RX ADMIN — SENNOSIDES AND DOCUSATE SODIUM 1 TABLET: 8.6; 5 TABLET ORAL at 08:12

## 2024-12-27 RX ADMIN — GADOBUTROL 7 ML: 604.72 INJECTION INTRAVENOUS at 08:12

## 2024-12-27 RX ADMIN — HYDROMORPHONE HYDROCHLORIDE 0.5 MG: 1 INJECTION, SOLUTION INTRAMUSCULAR; INTRAVENOUS; SUBCUTANEOUS at 02:12

## 2024-12-27 RX ADMIN — KETOROLAC TROMETHAMINE 15 MG: 30 INJECTION, SOLUTION INTRAMUSCULAR; INTRAVENOUS at 07:12

## 2024-12-27 RX ADMIN — BUSPIRONE HYDROCHLORIDE 10 MG: 5 TABLET ORAL at 08:12

## 2024-12-27 RX ADMIN — KETOROLAC TROMETHAMINE 15 MG: 30 INJECTION, SOLUTION INTRAMUSCULAR; INTRAVENOUS at 10:12

## 2024-12-27 RX ADMIN — DEXAMETHASONE SODIUM PHOSPHATE 8 MG: 10 INJECTION INTRAMUSCULAR; INTRAVENOUS at 02:12

## 2024-12-27 RX ADMIN — ONDANSETRON 4 MG: 2 INJECTION INTRAMUSCULAR; INTRAVENOUS at 02:12

## 2024-12-27 RX ADMIN — GABAPENTIN 400 MG: 300 CAPSULE ORAL at 08:12

## 2024-12-27 RX ADMIN — ONDANSETRON 4 MG: 2 INJECTION INTRAMUSCULAR; INTRAVENOUS at 10:12

## 2024-12-27 RX ADMIN — METOCLOPRAMIDE HYDROCHLORIDE 10 MG: 5 INJECTION INTRAMUSCULAR; INTRAVENOUS at 02:12

## 2024-12-27 RX ADMIN — BUSPIRONE HYDROCHLORIDE 10 MG: 5 TABLET ORAL at 04:12

## 2024-12-27 RX ADMIN — FAMOTIDINE 20 MG: 10 INJECTION, SOLUTION INTRAVENOUS at 09:12

## 2024-12-27 RX ADMIN — DIPHENHYDRAMINE HYDROCHLORIDE 25 MG: 50 INJECTION INTRAMUSCULAR; INTRAVENOUS at 02:12

## 2024-12-27 NOTE — PLAN OF CARE
[facility]  Initial Discharge Assessment       Primary Care Provider: Domingo Baeza MD    Admission Diagnosis: Nonintractable headache, unspecified chronicity pattern, unspecified headache type [R51.9]    Admission Date: 12/27/2024  Expected Discharge Date: 12/29/2024     completed discharge assessment with pt at bedside. Pt AAOx4s. Pt lives at home with minor child and spouse. Demographics, PCP, and insurance verified. No home health. No dialysis. Pt completes ADLs without assistance. Pt to discharge home via family transport. Pt has no other needs to be addressed at this time.     Transition of Care Barriers: None    Payor: MEDICARE / Plan: MEDICARE PART A & B / Product Type: Government /     Extended Emergency Contact Information  Primary Emergency Contact: Yonathan Richard  Address: [address] [place], [place] [id number] [place] [address]  Home Phone: [phone]  Mobile Phone: [phone]  Relation: Spouse    Discharge Plan A: Home with family  Discharge Plan B: Home with family      [id number] - [place] - [address]  [address]  [place] [address]  Phone: [phone] Fax: [phone]    [id number] - [place] - [address]  [address]  [place] [facility] [address]  Phone: [phone] Fax: [phone]      Initial Assessment (most recent)       Adult Discharge Assessment - 12/27/24 1022          Discharge Assessment    Assessment Type Discharge Planning Assessment     Confirmed/corrected address, phone number and insurance Yes     Confirmed Demographics Correct on Facesheet     Source of Information patient     When was your last doctors appointment? --   Month ago    Does patient/caregiver understand observation status Yes     Reason For Admission Nonintractable headache     People in Home significant other;child(bambi), dependent     Facility Arrived From: Home     Do you expect to return to your current living  situation? Yes     Do you have help at home or someone to help you manage your care at home? Yes     Who are your caregiver(s) and their phone number(s)? Yonathan Jose Manuel (spouse) [phone]     Prior to hospitilization cognitive status: Unable to Assess     Current cognitive status: Alert/Oriented     Walking or Climbing Stairs Difficulty no     Dressing/Bathing Difficulty no     Home Accessibility wheelchair accessible     Home Layout Able to live on 1st floor     Equipment Currently Used at Home none     Readmission within 30 days? No     Patient currently being followed by outpatient case management? No     Do you currently have service(s) that help you manage your care at home? No     Do you take prescription medications? Yes     Do you have prescription coverage? Yes     Coverage MEDICARE - MEDICARE PART A & B     Do you have any problems affording any of your prescribed medications? No     Is the patient taking medications as prescribed? yes     Who is going to help you get home at discharge? Yonathan Richard (Spouse) [phone]     How do you get to doctors appointments? car, drives self     Are you on dialysis? No     Do you take coumadin? No     Discharge Plan A Home with family     Discharge Plan B Home with family     DME Needed Upon Discharge  none     Discharge Plan discussed with: Patient     Transition of Care Barriers None

## 2024-12-27 NOTE — CONSULTS
[facility]  Department of Neurology  Neurology Consultation Note        PATIENT NAME: Shruthi Richard  MRN: [id number]  CSN: [id number]      TODAY'S DATE: 12/27/2024  ADMIT DATE: 12/27/2024                            CONSULTING PROVIDER: Eulalio Rubi MD  CONSULT REQUESTED BY: Michelle Vernon MD      Reason for consult:  Headache      History obtained from chart review and the patient.    HPI : Shruthi Richard is a 44 y.o. female with a history of bipolar disorder, migraine headaches, pineal cyst status post surgical removal, presents to the hospital with intractable headache.  She states that she has a history of migraines and she has tried all medications including Botox and has not helped with her migraines.  She only takes as needed Fioricet for migraines.  She had craniotomy and pineal cyst removal done in [place] after she was told will help with her headaches.  She states the surgery was done on December 4th and she was discharged 4 days after the surgery.  She states that her headache has never improved since then and somewhat got worsened in the last 10 days for which she called the neurosurgeon and she was given pack of Medrol Dosepak with no improvement in symptoms.  Her headaches are mostly in the back of the head and neck, associated with nausea, photophobia.  She also endorses of blurred vision with the headache    She denies any associated focal weakness or sensory changes or speech trouble.        PREVIOUS MEDICAL HISTORY:  Past Medical History:   Diagnosis Date    Anxiety     Bipolar affective disorder, rapid cycling     bipolar 1    Depression     GERD (gastroesophageal reflux disease)     Migraine headache     Vaginal delivery     x1     PREVIOUS SURGICAL HISTORY:  Past Surgical History:   Procedure Laterality Date    ABDOMINAL SURGERY      exploratory laparotomy    HYSTERECTOMY  12/15/2017    partial     FAMILY MEDICAL HISTORY:  Family History   Problem Relation  Name Age of Onset    No Known Problems Mother      Hypertension Father      No Known Problems Brother      No Known Problems Daughter       ALLERGIES:  Review of patient's allergies indicates:   Allergen Reactions    Gluten protein Other (See Comments)     Sever stomach ache, very tired feeling    Prochlorperazine Other (See Comments) and Itching    Prochlorperazine edisylate      Anxiety, itching  Other reaction(s): Unknown     HOME MEDICATIONS:  Prior to Admission medications    Medication Sig Start Date End Date Taking? Authorizing Provider   butalbital-acetaminophen-caffeine -40 mg (FIORICET, ESGIC) -40 mg per tablet Take 1 tablet by mouth every 6 (six) hours as needed for Pain or Headaches.   Yes Provider, Historical   clonazePAM (KLONOPIN) 2 MG Tab Take 2 mg by mouth 2 (two) times daily as needed.   Yes Provider, Historical   cyclobenzaprine (FLEXERIL) 10 MG tablet Take 10 mg by mouth 3 (three) times daily as needed for Muscle spasms.   Yes Provider, Historical   FLUoxetine 20 MG capsule Take 20 mg by mouth once daily.   Yes Provider, Historical   gabapentin (NEURONTIN) 800 MG tablet Take 800 mg by mouth 3 (three) times daily.   Yes Provider, Historical   lamoTRIgine (LAMICTAL) 150 MG Tab Take 150 mg by mouth 2 (two) times daily.    Yes Provider, Historical   lasmiditan (REYVOW) tablet 100 mg Take 100 mg by mouth as needed.   Yes Provider, Historical   magnesium 200 mg Tab Take 1 tablet by mouth once daily.   Yes Provider, Historical   naloxone (NARCAN) 4 mg/actuation Spry 1 spray by Nasal route once.   Yes Provider, Historical   oxyCODONE (ROXICODONE) 5 MG immediate release tablet Take 5 mg by mouth every 6 (six) hours as needed for Pain.   Yes Provider, Historical   promethazine (PHENERGAN) 25 MG tablet Take 25 mg by mouth every 12 (twelve) hours.   Yes Provider, Historical   topiramate (TOPAMAX) 50 MG tablet Take 1 tablet by mouth 2 (two) times daily.   Yes Provider, Historical   acetaminophen  (TYLENOL) 325 MG tablet Take 650 mg by mouth every 4 (four) hours as needed. 12/6/24 12/27/24 Yes Provider, Historical   methylPREDNISolone (MEDROL) 4 MG Tab Take 4 mg by mouth once daily.  12/27/24 Yes Provider, Historical   pantoprazole (PROTONIX) 40 MG tablet Take 40 mg by mouth 2 (two) times daily.    Provider, Historical   busPIRone (BUSPAR) 10 MG tablet Take 10 mg by mouth 3 (three) times daily. 1/11/22 12/27/24  Provider, Historical   promethazine (PHENERGAN) 12.5 MG Tab Take 1 tablet (12.5 mg total) by mouth every 6 (six) hours as needed (headache). 6/29/21 12/27/24  Mayra Nair MD     CURRENT SCHEDULED MEDICATIONS:   famotidine (PF)  20 mg Intravenous BID    senna-docusate 8.6-50 mg  1 tablet Oral BID     CURRENT INFUSIONS:    CURRENT PRN MEDICATIONS:    Current Facility-Administered Medications:     acetaminophen, 650 mg, Oral, Q8H PRN    acetaminophen, 650 mg, Oral, Q4H PRN    aluminum-magnesium hydroxide-simethicone, 30 mL, Oral, QID PRN    dextrose 50%, 12.5 g, Intravenous, PRN    dextrose 50%, 25 g, Intravenous, PRN    diphenhydrAMINE, 25 mg, Intravenous, Q6H PRN    glucagon (human recombinant), 1 mg, Intramuscular, PRN    glucose, 16 g, Oral, PRN    glucose, 24 g, Oral, PRN    ketorolac, 15 mg, Intravenous, Q6H PRN    magnesium oxide, 800 mg, Oral, PRN    magnesium oxide, 800 mg, Oral, PRN    melatonin, 6 mg, Oral, Nightly PRN    naloxone, 0.02 mg, Intravenous, PRN    ondansetron, 4 mg, Intravenous, Q6H PRN    potassium bicarbonate, 35 mEq, Oral, PRN    potassium bicarbonate, 50 mEq, Oral, PRN    potassium bicarbonate, 60 mEq, Oral, PRN    potassium, sodium phosphates, 2 packet, Oral, PRN    potassium, sodium phosphates, 2 packet, Oral, PRN    potassium, sodium phosphates, 2 packet, Oral, PRN    sodium chloride 0.9%, 3 mL, Intravenous, Q12H PRN    REVIEW OF SYSTEMS:  Please refer to the HPI for all pertinent positive and negative findings. A comprehensive review of all other systems was  "negative.    PHYSICAL EXAM:  Patient Vitals for the past 24 hrs:   BP Temp Temp src Pulse Resp SpO2 Height Weight   12/27/24 1022 116/62 -- -- 74 -- 100 % -- --   12/27/24 1019 -- -- -- 75 -- 100 % -- --   12/27/24 1015 (!) 105/58 -- -- 79 -- 100 % -- --   12/27/24 1012 -- -- -- 78 -- 100 % -- --   12/27/24 1010 (!) 105/59 -- -- 75 -- 100 % -- --   12/27/24 1005 (!) 101/55 -- -- 77 -- 98 % -- --   12/27/24 1003 -- -- -- 76 -- 99 % -- --   12/27/24 1000 (!) 102/56 -- -- 82 -- 98 % -- --   12/27/24 0955 (!) 107/55 -- -- 79 -- 98 % -- --   12/27/24 0950 (!) 104/55 -- -- 78 -- 98 % -- --   12/27/24 0945 (!) 100/55 -- -- 73 -- 98 % -- --   12/27/24 0843 (!) 100/57 -- Oral 69 -- -- -- --   12/27/24 0800 (!) 102/50 -- -- 72 -- 97 % -- --   12/27/24 0756 (!) 91/54 -- -- 72 -- 97 % -- --   12/27/24 0750 (!) 96/52 -- -- 70 -- 96 % -- --   12/27/24 0735 (!) 92/50 -- -- 70 -- 96 % -- --   12/27/24 0710 (!) 102/53 -- -- 66 -- 97 % -- --   12/27/24 0615 (!) 98/54 -- -- 66 -- 97 % -- --   12/27/24 0300 (!) 100/55 -- -- 74 -- 96 % -- --   12/27/24 0230 (!) 99/55 -- -- 73 -- 96 % -- --   12/27/24 0210 (!) 101/59 -- -- 79 -- 96 % -- --   12/27/24 0205 (!) 100/59 -- -- 73 -- 98 % -- --   12/27/24 0202 -- -- -- -- 18 -- -- --   12/27/24 0130 (!) 102/59 -- -- 77 -- 99 % -- --   12/27/24 0005 124/85 97.9 °F (36.6 °C) Oral 97 18 98 % 5' 4" (1.626 m) 69.4 kg (153 lb)       GENERAL APPEARANCE: Alert, well-developed, well-nourished female in no acute distress.  HEENT: Normocephalic and atraumatic. PERRL. Oropharynx unremarkable.  PULM: Normal respiratory effort. No accessory muscle use.  CV: RRR.  ABDOMEN: Soft, nontender.  EXTREMITIES: No obvious signs of vascular compromise. Pulses present. No cyanosis, clubbing or edema.  SKIN: Clear; no rashes, lesions or skin breaks in exposed areas.    NEURO:  MENTAL STATUS: Patient awake and oriented to time, place, and person, recent/remote memory normal, attention span/concentration normal, and " speech fluent without paraphasic errors.  Affect euthymic.    CRANIAL NERVES:  CN I: Not tested.  CN II: Fundoscopic exam deferred.  CN III, IV, VI: Pupils equal, round and reactive to light.  Extraocular movements full and intact.  CN V: Facial sensation normal.  CN VII: Facial asymmetry absent.  CN VIII: Hearing grossly normal and equal bilaterally.  No skew deviation or pathologic nystagmus.  CN IX, X: Palate elevates symmetrically. Speech/articulation is clear without dysarthria.  CN XI: Shoulder shrug and chin rotation equal with good strength.  CN XII: Tongue protrusion midline.    MOTOR:  Bulk normal. Tone normal and symmetric throughout.  Abnormal movements absent.  Tremor: none present.  Strength 5/5 throughout.    REFLEXES:  DTRs 2+ throughout.  Plantar response equivocal bilaterally.  SENSATION: grossly intact throughout.  COORDINATION: normal finger-to-nose.  STATION: not tested.  GAIT: not tested.      Labs:  Recent Labs   Lab 12/27/24 0312      K 4.1      CO2 28   BUN 8   CREATININE 0.7   GLU 65*   CALCIUM 9.0   MG 2.2     Recent Labs   Lab 12/27/24 0312   WBC 8.99   HGB 13.3   HCT 41.6        Recent Labs   Lab 12/27/24 0312   ALBUMIN 4.3   PROT 6.9   BILITOT 0.3   ALKPHOS 131   ALT 26   AST 14     Lab Results   Component Value Date    INR 1.0 01/06/2023     Lab Results   Component Value Date    TRIG 177 (H) 01/06/2023    HDL 66 01/06/2023    CHOLHDL 25.1 01/06/2023     Lab Results   Component Value Date    HGBA1C 5.5 06/28/2021     Lab Results   Component Value Date    PROTEINCSF 27 03/02/2020    GLUCCSF 69 03/02/2020       Imaging:  I have reviewed and interpreted the pertinent imaging and lab results.      CT Head Without Contrast  Narrative: EXAMINATION:  CT HEAD WITHOUT CONTRAST    CLINICAL HISTORY:  Subarachnoid hemorrhage (SAH) suspected;sudden onset headache, recently had pineal cyst removal nsgy;    TECHNIQUE:  Low dose axial images were obtained through the head.   Coronal and sagittal reformations were also performed. Contrast was not administered.    COMPARISON:  Head CT 1/6/2023    FINDINGS:  There has been there of posterior craniotomy for reported resection of a pineal cyst noting definitive assessment for residual lesion is limited on this noncontrast CT exam.  There is trace extra-axial hypoattenuation about the craniotomy defect.  This presumably postoperative in etiology.  No evidence of acute intracranial hemorrhage, hydrocephalus, midline shift or mass effect.  The ventricular system is unchanged in size and configuration noting slightly diminutive caliber of the frontal horn of the right lateral ventricle relative to the left, unchanged.  Gray-white matter differentiation appears maintained at this time.  The basal cisterns are patent. The visualized paranasal sinuses and mastoid air cells are clear of acute process.  Impression: Interval postoperative change of posterior craniotomy.  No definite CT evidence of acute intracranial abnormality at this time.  If the patient's headaches are sufficiently clinically suspicious, or associated with signs of elevated ICP, focal neurologic deficits, nausea, or vomiting, further evaluation with MRI can be performed if there are no clinical contraindications.    Electronically signed by: Abigail Hubbard MD  Date:    12/27/2024  Time:    02:35         ASSESSMENT & PLAN:      Intractable headache    Plan:   Etiology of intractable headache could likely be from underlying migraine-status migrainosus versus postsurgical headache.  CT head was done showed postsurgical changes with no evidence of acute intracranial abnormality.  MRI brain ordered and pending.    Will treat with Benadryl, Reglan, Depacon every 8 hours help with headache.    PT OT evaluate and treat.    Will follow        Thank you kindly for including us in the care of this patient. Please do not hesitate to contact us with any questions.          Eulalio Rubi,  MD  Neurology/vascular Neurology  Date of Service: 12/27/2024  10:33 AM    --------------------------------------------------------------------------------------------------------------------------------------------------------------------------------------------------------------------------------------------------------------  Please note: This note was transcribed using voice recognition software. Because of this technology there are often uinintended grammatical, spelling, and other transcription errors. Please disregard these errors.

## 2024-12-27 NOTE — PLAN OF CARE
Contact information added to AVS for patient to contact PCP and Neurologist to schedule hospital follow up appt within 8-10 days.        12/27/24 1602   Post-Acute Status   Hospital Resources/Appts/Education Provided Appointment suggestion unavailable

## 2024-12-27 NOTE — SUBJECTIVE & OBJECTIVE
Past Medical History:   Diagnosis Date    Anxiety     Bipolar affective disorder, rapid cycling     bipolar 1    Depression     GERD (gastroesophageal reflux disease)     Migraine headache     Vaginal delivery     x1       Past Surgical History:   Procedure Laterality Date    ABDOMINAL SURGERY      exploratory laparotomy    HYSTERECTOMY  12/15/2017    partial       Review of patient's allergies indicates:   Allergen Reactions    Gluten protein Other (See Comments)     Sever stomach ache, very tired feeling    Prochlorperazine Other (See Comments) and Itching    Prochlorperazine edisylate      Anxiety, itching  Other reaction(s): Unknown       No current facility-administered medications on file prior to encounter.     Current Outpatient Medications on File Prior to Encounter   Medication Sig    acetaminophen (TYLENOL) 325 MG tablet Take 650 mg by mouth every 4 (four) hours as needed.    cyclobenzaprine (FLEXERIL) 10 MG tablet Take 10 mg by mouth 3 (three) times daily as needed.    lasmiditan (REYVOW) tablet 100 mg 1 tablet as needed Orally Once a day as needed for 30 days    methylPREDNISolone (MEDROL) 4 MG Tab Take 4 mg by mouth once daily.    naloxone (NARCAN) 4 mg/actuation Spry 1 spray by Nasal route once.    oxyCODONE (ROXICODONE) 5 MG immediate release tablet Take 5 mg by mouth every 6 (six) hours as needed.    busPIRone (BUSPAR) 10 MG tablet Take 10 mg by mouth 3 (three) times daily.    butalbital-acetaminophen-caffeine -40 mg (FIORICET, ESGIC) -40 mg per tablet Take 1 tablet by mouth every 6 (six) hours as needed for Pain or Headaches.    clonazePAM (KLONOPIN) 2 MG Tab Take 2 mg by mouth 2 (two) times daily as needed.    FLUoxetine 20 MG capsule Take 20 mg by mouth once daily.    gabapentin (NEURONTIN) 600 MG tablet Take 600 mg by mouth 3 (three) times daily.     lamoTRIgine (LAMICTAL) 150 MG Tab Take 150 mg by mouth 2 (two) times daily.     magnesium 200 mg Tab Take 1 tablet by mouth once daily.     pantoprazole (PROTONIX) 40 MG tablet Take 40 mg by mouth once daily.    promethazine (PHENERGAN) 12.5 MG Tab Take 1 tablet (12.5 mg total) by mouth every 6 (six) hours as needed (headache).    promethazine (PHENERGAN) 25 MG tablet Take 25 mg by mouth every 12 (twelve) hours.    topiramate (TOPAMAX) 50 MG tablet Take 1 tablet by mouth 2 (two) times daily.     Family History       Problem Relation (Age of Onset)    Hypertension Father    No Known Problems Mother, Brother, Daughter          Tobacco Use    Smoking status: Never    Smokeless tobacco: Never   Substance and Sexual Activity    Alcohol use: Not Currently     Alcohol/week: 0.0 - 1.0 standard drinks of alcohol     Comment: occasional    Drug use: No    Sexual activity: Yes     Partners: Male     Review of Systems   Unable to perform ROS: Mental status change     Objective:     Vital Signs (Most Recent):  Temp: 97.9 °F (36.6 °C) (12/27/24 0005)  Pulse: 69 (12/27/24 0843)  Resp: 18 (12/27/24 0202)  BP: (!) 100/57 (12/27/24 0843)  SpO2: 97 % (12/27/24 0800) Vital Signs (24h Range):  Temp:  [97.9 °F (36.6 °C)] 97.9 °F (36.6 °C)  Pulse:  [66-97] 69  Resp:  [18] 18  SpO2:  [96 %-99 %] 97 %  BP: ()/(50-85) 100/57     Weight: 69.4 kg (153 lb)  Body mass index is 26.26 kg/m².     Physical Exam  Vitals and nursing note reviewed.   Constitutional:       Appearance: Normal appearance.   HENT:      Head: Normocephalic.      Nose: Nose normal.   Cardiovascular:      Rate and Rhythm: Normal rate and regular rhythm.      Pulses: Normal pulses.      Heart sounds: Normal heart sounds.   Pulmonary:      Effort: Pulmonary effort is normal.      Breath sounds: Normal breath sounds.   Abdominal:      General: Abdomen is flat. Bowel sounds are normal.      Palpations: Abdomen is soft.   Musculoskeletal:         General: Normal range of motion.   Skin:     General: Skin is warm.      Capillary Refill: Capillary refill takes less than 2 seconds.   Neurological:      General:  "No focal deficit present.      Comments: Sedated sleeping                 Significant Labs: All pertinent labs within the past 24 hours have been reviewed.  CBC:   Recent Labs   Lab 12/27/24 0312   WBC 8.99   HGB 13.3   HCT 41.6        CMP:   Recent Labs   Lab 12/27/24 0312      K 4.1      CO2 28   GLU 65*   BUN 8   CREATININE 0.7   CALCIUM 9.0   PROT 6.9   ALBUMIN 4.3   BILITOT 0.3   ALKPHOS 131   AST 14   ALT 26   ANIONGAP 6*     Cardiac Markers: No results for input(s): "CKMB", "MYOGLOBIN", "BNP", "TROPISTAT" in the last 48 hours.  Coagulation: No results for input(s): "PT", "INR", "APTT" in the last 48 hours.  Lactic Acid: No results for input(s): "LACTATE" in the last 48 hours.  Lipase: No results for input(s): "LIPASE" in the last 48 hours.  Lipid Panel: No results for input(s): "CHOL", "HDL", "LDLCALC", "TRIG", "CHOLHDL" in the last 48 hours.  Magnesium:   Recent Labs   Lab 12/27/24 0312   MG 2.2     Troponin: No results for input(s): "TROPONINI", "TROPONINIHS" in the last 48 hours.  TSH: No results for input(s): "TSH" in the last 4320 hours.  Urine Culture: No results for input(s): "LABURIN" in the last 48 hours.  Urine Studies: No results for input(s): "COLORU", "APPEARANCEUA", "PHUR", "SPECGRAV", "PROTEINUA", "GLUCUA", "KETONESU", "BILIRUBINUA", "OCCULTUA", "NITRITE", "UROBILINOGEN", "LEUKOCYTESUR", "RBCUA", "WBCUA", "BACTERIA", "SQUAMEPITHEL", "HYALINECASTS" in the last 48 hours.    Invalid input(s): "WRIGHTSUR"    Significant Imaging: I have reviewed all pertinent imaging results/findings within the past 24 hours.  I have reviewed and interpreted all pertinent imaging results/findings within the past 24 hours.  "

## 2024-12-27 NOTE — DISCHARGE INSTRUCTIONS
We have sent you with a Medrol Dosepack per your neurosurgeon's recommendation, please call to continue follow-up with him as soon as you are able

## 2024-12-27 NOTE — H&P
[facility] - Emergency Dept  Hospital Medicine  History & Physical    Patient Name: Shruthi Richard  MRN: [id number]  Patient Class: OP- Observation  Admission Date: 12/27/2024  Attending Physician: Vin Trinidad MD  Primary Care Provider: Domingo Baeza MD         Patient information was obtained from patient, EMS personnel, and ER records.     Subjective:     Principal Problem:Nonintractable headache    Chief Complaint:   Chief Complaint   Patient presents with    Headache     Pt hx of migraines.  Pt recent craniotomy and was advised to come to ED for worsening migraine symptoms         HPI: 44-year-old presenting with headache.  Patient has a long history of headache which has led her to see neurosurgeon and have pineal cyst removal in [place].  Patient reports that since her surgery December 4th she continues to have headaches.  Was given trial of steroids but headache not improving.  Has stayed consistently feeling like a pressure behind her eyes and has tried multiple medications such as Fioricet and Tylenol with no relief.  Has had akathisia with Reglan and Compazine in the past.  Around 10 30 patient reports that she was having the same headache that she has been having but all of the sudden it became severely worse and she had blurry vision bilaterally.  Called neurosurgeon on-call for her [place]  Team and was told to come to the ER.        In our ER     CT scan was NEG    No bleed nothing abnormal       They gave her dialudid IV for her headache which worked and she was sleeping since       Sleeping but no distress in ER bed      Vitals were fine.        Labs all normal         Plan is to admit to OBS and get the MRI brain and maybe have neurology see her     Past Medical History:   Diagnosis Date    Anxiety     Bipolar affective disorder, rapid cycling     bipolar 1    Depression     GERD (gastroesophageal reflux disease)     Migraine headache     Vaginal delivery      x1       Past Surgical History:   Procedure Laterality Date    ABDOMINAL SURGERY      exploratory laparotomy    HYSTERECTOMY  12/15/2017    partial       Review of patient's allergies indicates:   Allergen Reactions    Gluten protein Other (See Comments)     Sever stomach ache, very tired feeling    Prochlorperazine Other (See Comments) and Itching    Prochlorperazine edisylate      Anxiety, itching  Other reaction(s): Unknown       No current facility-administered medications on file prior to encounter.     Current Outpatient Medications on File Prior to Encounter   Medication Sig    acetaminophen (TYLENOL) 325 MG tablet Take 650 mg by mouth every 4 (four) hours as needed.    cyclobenzaprine (FLEXERIL) 10 MG tablet Take 10 mg by mouth 3 (three) times daily as needed.    lasmiditan (REYVOW) tablet 100 mg 1 tablet as needed Orally Once a day as needed for 30 days    methylPREDNISolone (MEDROL) 4 MG Tab Take 4 mg by mouth once daily.    naloxone (NARCAN) 4 mg/actuation Spry 1 spray by Nasal route once.    oxyCODONE (ROXICODONE) 5 MG immediate release tablet Take 5 mg by mouth every 6 (six) hours as needed.    busPIRone (BUSPAR) 10 MG tablet Take 10 mg by mouth 3 (three) times daily.    butalbital-acetaminophen-caffeine -40 mg (FIORICET, ESGIC) -40 mg per tablet Take 1 tablet by mouth every 6 (six) hours as needed for Pain or Headaches.    clonazePAM (KLONOPIN) 2 MG Tab Take 2 mg by mouth 2 (two) times daily as needed.    FLUoxetine 20 MG capsule Take 20 mg by mouth once daily.    gabapentin (NEURONTIN) 600 MG tablet Take 600 mg by mouth 3 (three) times daily.     lamoTRIgine (LAMICTAL) 150 MG Tab Take 150 mg by mouth 2 (two) times daily.     magnesium 200 mg Tab Take 1 tablet by mouth once daily.    pantoprazole (PROTONIX) 40 MG tablet Take 40 mg by mouth once daily.    promethazine (PHENERGAN) 12.5 MG Tab Take 1 tablet (12.5 mg total) by mouth every 6 (six) hours as needed (headache).    promethazine  (PHENERGAN) 25 MG tablet Take 25 mg by mouth every 12 (twelve) hours.    topiramate (TOPAMAX) 50 MG tablet Take 1 tablet by mouth 2 (two) times daily.     Family History       Problem Relation (Age of Onset)    Hypertension Father    No Known Problems Mother, Brother, Daughter          Tobacco Use    Smoking status: Never    Smokeless tobacco: Never   Substance and Sexual Activity    Alcohol use: Not Currently     Alcohol/week: 0.0 - 1.0 standard drinks of alcohol     Comment: occasional    Drug use: No    Sexual activity: Yes     Partners: Male     Review of Systems   Unable to perform ROS: Mental status change     Objective:     Vital Signs (Most Recent):  Temp: 97.9 °F (36.6 °C) (12/27/24 0005)  Pulse: 69 (12/27/24 0843)  Resp: 18 (12/27/24 0202)  BP: (!) 100/57 (12/27/24 0843)  SpO2: 97 % (12/27/24 0800) Vital Signs (24h Range):  Temp:  [97.9 °F (36.6 °C)] 97.9 °F (36.6 °C)  Pulse:  [66-97] 69  Resp:  [18] 18  SpO2:  [96 %-99 %] 97 %  BP: ()/(50-85) 100/57     Weight: 69.4 kg (153 lb)  Body mass index is 26.26 kg/m².     Physical Exam  Vitals and nursing note reviewed.   Constitutional:       Appearance: Normal appearance.   HENT:      Head: Normocephalic.      Nose: Nose normal.   Cardiovascular:      Rate and Rhythm: Normal rate and regular rhythm.      Pulses: Normal pulses.      Heart sounds: Normal heart sounds.   Pulmonary:      Effort: Pulmonary effort is normal.      Breath sounds: Normal breath sounds.   Abdominal:      General: Abdomen is flat. Bowel sounds are normal.      Palpations: Abdomen is soft.   Musculoskeletal:         General: Normal range of motion.   Skin:     General: Skin is warm.      Capillary Refill: Capillary refill takes less than 2 seconds.   Neurological:      General: No focal deficit present.      Comments: Sedated sleeping                 Significant Labs: All pertinent labs within the past 24 hours have been reviewed.  CBC:   Recent Labs   Lab 12/27/24  0312   WBC 8.99  "  HGB 13.3   HCT 41.6        CMP:   Recent Labs   Lab 12/27/24  0312      K 4.1      CO2 28   GLU 65*   BUN 8   CREATININE 0.7   CALCIUM 9.0   PROT 6.9   ALBUMIN 4.3   BILITOT 0.3   ALKPHOS 131   AST 14   ALT 26   ANIONGAP 6*     Cardiac Markers: No results for input(s): "CKMB", "MYOGLOBIN", "BNP", "TROPISTAT" in the last 48 hours.  Coagulation: No results for input(s): "PT", "INR", "APTT" in the last 48 hours.  Lactic Acid: No results for input(s): "LACTATE" in the last 48 hours.  Lipase: No results for input(s): "LIPASE" in the last 48 hours.  Lipid Panel: No results for input(s): "CHOL", "HDL", "LDLCALC", "TRIG", "CHOLHDL" in the last 48 hours.  Magnesium:   Recent Labs   Lab 12/27/24  0312   MG 2.2     Troponin: No results for input(s): "TROPONINI", "TROPONINIHS" in the last 48 hours.  TSH: No results for input(s): "TSH" in the last 4320 hours.  Urine Culture: No results for input(s): "LABURIN" in the last 48 hours.  Urine Studies: No results for input(s): "COLORU", "APPEARANCEUA", "PHUR", "SPECGRAV", "PROTEINUA", "GLUCUA", "KETONESU", "BILIRUBINUA", "OCCULTUA", "NITRITE", "UROBILINOGEN", "LEUKOCYTESUR", "RBCUA", "WBCUA", "BACTERIA", "SQUAMEPITHEL", "HYALINECASTS" in the last 48 hours.    Invalid input(s): "WRIGHTSUR"    Significant Imaging: I have reviewed all pertinent imaging results/findings within the past 24 hours.  I have reviewed and interpreted all pertinent imaging results/findings within the past 24 hours.  Assessment/Plan:     * Nonintractable headache    We are not sure exactly why she has the headache .       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   It may not .         PLAN      - IV Dilaudid PRN     - Consult Neurology     - pepcid IV BID    - Reg " diet     - MRI Brain with gadolinium   ( with without )       VTE Risk Mitigation (From admission, onward)           Ordered     IP VTE LOW RISK PATIENT  Once         12/27/24 0707     Place sequential compression device  Until discontinued         12/27/24 0707                       On 12/27/2024, patient should be placed in hospital observation services under my care.             Vin Trinidad MD  Department of Hospital Medicine  [facility] - Emergency Dept

## 2024-12-27 NOTE — ASSESSMENT & PLAN NOTE
We are not sure exactly why she has the headache .       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   It may not .         PLAN      - IV Dilaudid PRN     - Consult Neurology     - pepcid IV BID    - Reg diet     - MRI Brain with gadolinium   ( with without )

## 2024-12-27 NOTE — PLAN OF CARE
Pt was explained METCALF. Pt verbalized understanding of METCALF and signed. METCALF scanned to .    12/27/24 1043   METCALF Message   Medicare Outpatient and Observation Notification regarding financial responsibility Given to patient/caregiver;Explained to patient/caregiver;Signed/date by patient/caregiver   Date METCALF was signed 12/27/24   Time METCALF was signed [id number]

## 2024-12-27 NOTE — ED PROVIDER NOTES
Encounter Date: 12/26/2024       History     Chief Complaint   Patient presents with    Headache     Pt hx of migraines.  Pt recent craniotomy and was advised to come to ED for worsening migraine symptoms      HPI  44-year-old presenting with headache.  Patient has a long history of headache which has led her to see neurosurgeon and have pineal cyst removal in [place].  Patient reports that since her surgery December 4th she continues to have headaches.  Was given trial of steroids but headache not improving.  Has stayed consistently feeling like a pressure behind her eyes and has tried multiple medications such as Fioricet and Tylenol with no relief.  Has had akathisia with Reglan and Compazine in the past.  Around 10 30 patient reports that she was having the same headache that she has been having but all of the sudden it became severely worse and she had blurry vision bilaterally.  Called neurosurgeon on-call for her [place]  Team and was told to come to the ER.    Review of patient's allergies indicates:   Allergen Reactions    Gluten protein Other (See Comments)     Sever stomach ache, very tired feeling    Prochlorperazine Other (See Comments) and Itching    Prochlorperazine edisylate      Anxiety, itching  Other reaction(s): Unknown     Past Medical History:   Diagnosis Date    Anxiety     Bipolar affective disorder, rapid cycling     bipolar 1    Depression     GERD (gastroesophageal reflux disease)     Migraine headache     Vaginal delivery     x1     Past Surgical History:   Procedure Laterality Date    ABDOMINAL SURGERY      exploratory laparotomy    HYSTERECTOMY  12/15/2017    partial     Family History   Problem Relation Name Age of Onset    No Known Problems Mother      Hypertension Father      No Known Problems Brother      No Known Problems Daughter       Social History     Tobacco Use    Smoking status: Never    Smokeless tobacco: Never   Substance Use Topics    Alcohol use: Not  Currently     Alcohol/week: 0.0 - 1.0 standard drinks of alcohol     Comment: occasional    Drug use: No     Review of Systems   Constitutional:  Negative for chills and fever.   HENT:  Negative for congestion and sore throat.    Eyes:  Positive for visual disturbance. Negative for redness.   Respiratory:  Negative for cough and shortness of breath.    Cardiovascular:  Negative for chest pain, palpitations and leg swelling.   Gastrointestinal:  Negative for abdominal pain, blood in stool, constipation, diarrhea, nausea and vomiting.   Genitourinary:  Negative for dysuria, frequency and hematuria.   Musculoskeletal:  Negative for back pain, joint swelling, neck pain and neck stiffness.   Skin:  Negative for rash and wound.   Neurological:  Positive for headaches. Negative for weakness and numbness.   Psychiatric/Behavioral:  Negative for confusion.        Physical Exam     Initial Vitals [12/27/24 0005]   BP Pulse Resp Temp SpO2   124/85 97 18 97.9 °F (36.6 °C) 98 %      MAP       --         Physical Exam    Nursing note and vitals reviewed.  Constitutional: She appears well-developed. She is not diaphoretic.   HENT:   Head: Normocephalic and atraumatic.   Eyes: Conjunctivae and EOM are normal. Pupils are equal, round, and reactive to light. Right eye exhibits no discharge. Left eye exhibits no discharge. No scleral icterus.   Neck: Neck supple. No tracheal deviation present.   Cardiovascular:  Normal rate and regular rhythm.           Pulmonary/Chest: Breath sounds normal. No stridor. No respiratory distress. She has no wheezes. She has no rhonchi. She has no rales.   Abdominal: Abdomen is soft. She exhibits no distension. There is no abdominal tenderness. There is no rebound and no guarding.   Musculoskeletal:         General: No edema.      Cervical back: Neck supple.     Neurological: She is alert and oriented to person, place, and time. She has normal strength. No cranial nerve deficit or sensory deficit. GCS  score is 15. GCS eye subscore is 4. GCS verbal subscore is 5. GCS motor subscore is 6.   No pronator drift. Coordination intact.    Skin: Skin is warm and dry.         ED Course   Procedures  Labs Reviewed   CBC W/ AUTO DIFFERENTIAL - Abnormal       Result Value    WBC 8.99      RBC 4.23      Hemoglobin 13.3      Hematocrit 41.6      MCV 98      MCH 31.4 (*)     MCHC 32.0      RDW 14.6 (*)     Platelets 420      MPV 10.1      Immature Granulocytes 0.3      Gran # (ANC) 4.4      Immature Grans (Abs) 0.03      Lymph # 3.7      Mono # 0.6      Eos # 0.3      Baso # 0.05      nRBC 0      Gran % 48.6      Lymph % 41.0      Mono % 6.3      Eosinophil % 3.2      Basophil % 0.6      Differential Method Automated     COMPREHENSIVE METABOLIC PANEL - Abnormal    Sodium 137      Potassium 4.1      Chloride 103      CO2 28      Glucose 65 (*)     BUN 8      Creatinine 0.7      Calcium 9.0      Total Protein 6.9      Albumin 4.3      Total Bilirubin 0.3      Alkaline Phosphatase 131      AST 14      ALT 26      eGFR >60.0      Anion Gap 6 (*)           Imaging Results              CT Head Without Contrast (Final result)  Result time 12/27/24 02:35:32      Final result by Abigail Hubbard MD (12/27/24 02:35:32)                   Impression:      Interval postoperative change of posterior craniotomy.  No definite CT evidence of acute intracranial abnormality at this time.  If the patient's headaches are sufficiently clinically suspicious, or associated with signs of elevated ICP, focal neurologic deficits, nausea, or vomiting, further evaluation with MRI can be performed if there are no clinical contraindications.      Electronically signed by: Abigail Hubbard MD  Date:    12/27/2024  Time:    02:35               Narrative:    EXAMINATION:  CT HEAD WITHOUT CONTRAST    CLINICAL HISTORY:  Subarachnoid hemorrhage (SAH) suspected;sudden onset headache, recently had pineal cyst removal nsgy;    TECHNIQUE:  Low dose axial images were  obtained through the head.  Coronal and sagittal reformations were also performed. Contrast was not administered.    COMPARISON:  Head CT 1/6/2023    FINDINGS:  There has been there of posterior craniotomy for reported resection of a pineal cyst noting definitive assessment for residual lesion is limited on this noncontrast CT exam.  There is trace extra-axial hypoattenuation about the craniotomy defect.  This presumably postoperative in etiology.  No evidence of acute intracranial hemorrhage, hydrocephalus, midline shift or mass effect.  The ventricular system is unchanged in size and configuration noting slightly diminutive caliber of the frontal horn of the right lateral ventricle relative to the left, unchanged.  Gray-white matter differentiation appears maintained at this time.  The basal cisterns are patent. The visualized paranasal sinuses and mastoid air cells are clear of acute process.                                       Medications   ondansetron injection 4 mg (4 mg Intravenous Given 12/27/24 0200)   HYDROmorphone injection 0.5 mg (0.5 mg Intravenous Given 12/27/24 0202)     Medical Decision Making  Amount and/or Complexity of Data Reviewed  Labs: ordered.  Radiology: ordered.    Risk  Prescription drug management.  Decision regarding hospitalization.             On my independent interpretation labs CBC, CMP within acceptable limits.    Per radiologyInterval postoperative change of posterior craniotomy. No definite CT evidence of acute intracranial abnormality at this time. If the patient's headaches are sufficiently clinically suspicious, or associated with signs of elevated ICP, focal neurologic deficits, nausea, or vomiting, further evaluation with MRI can be performed if there are no clinical contraindications.     Upon my evaluation of patient due to her thunderclap headache CT head completed.  CT head was completed within 3 hours of onset of patient's symptoms.  Patient is talking calmly and  without significant signs of pain therefore lower suspicion for SAH but with her description of sudden onset worsening of her headache within a few seconds and sudden bilateral blurry vision called CT and took the patient over for emergent CT immediately after my evaluation of her.  Although CT head without emergent symptoms unclear etiology of patient's persistent headache, worsening pressure and may need spinal tap versus MRI and further discussion with her neurosurgeons and local and Neurosurgery and Neurology team therefore discussed with hospitalist Dr. Trinidad at 3am  who has agreed to admit pt.     Patient reports her pain has completely resolved with Dilaudid and Zofran    Maribel Blackmon MD  Emergency Medicine Staff Physician                                   Clinical Impression:  Final diagnoses:  [R51.9] Nonintractable headache, unspecified chronicity pattern, unspecified headache type (Primary)          ED Disposition Condition    Admit Stable                Maribel Blackmon MD  12/27/24 0676

## 2024-12-27 NOTE — HPI
44-year-old presenting with headache.  Patient has a long history of headache which has led her to see neurosurgeon and have pineal cyst removal in [place].  Patient reports that since her surgery December 4th she continues to have headaches.  Was given trial of steroids but headache not improving.  Has stayed consistently feeling like a pressure behind her eyes and has tried multiple medications such as Fioricet and Tylenol with no relief.  Has had akathisia with Reglan and Compazine in the past.  Around 10 30 patient reports that she was having the same headache that she has been having but all of the sudden it became severely worse and she had blurry vision bilaterally.  Called neurosurgeon on-call for her [place]  Team and was told to come to the ER.        In our ER     CT scan was NEG    No bleed nothing abnormal       They gave her dialudid IV for her headache which worked and she was sleeping since       Sleeping but no distress in ER bed      Vitals were fine.        Labs all normal         Plan is to admit to OBS and get the MRI brain and maybe have neurology see her

## 2024-12-27 NOTE — PLAN OF CARE
Pt clear for DC from case management standpoint. Discharging to Home with NN    After MD puts DC orders in .          12/27/24 1603   Final Note   Assessment Type Final Discharge Note   Anticipated Discharge Disposition Home

## 2024-12-27 NOTE — PROGRESS NOTES
Patient is seen and examined this morning.  I have read the history and physical and agree with the assessment and plan as written by the nocturnist.  Neurology has been consulted.  Additionally, I have contacted the patient's neurosurgeon Dr. Rodríguez who agrees with our plan.  Given her nonfocal neurologic exam she is likely experiencing typical postop pain with a normal CT.  We will order MRI brain.  If this is normal we will discharge her with Medrol Dosepak.

## 2024-12-27 NOTE — NURSING
Spoke with Frank in MRI regarding stat MRI that was ordered this am.  He states they are doing stat cases from ED and will get to this stat when they can

## 2024-12-28 VITALS
HEIGHT: 64 IN | OXYGEN SATURATION: 98 % | HEART RATE: 53 BPM | BODY MASS INDEX: 26.57 KG/M2 | WEIGHT: 155.63 LBS | RESPIRATION RATE: 18 BRPM | DIASTOLIC BLOOD PRESSURE: 68 MMHG | TEMPERATURE: 98 F | SYSTOLIC BLOOD PRESSURE: 105 MMHG

## 2024-12-28 LAB
ALBUMIN SERPL BCP-MCNC: 3.8 G/DL (ref 3.5–5.2)
ALP SERPL-CCNC: 112 U/L (ref 55–135)
ALT SERPL W/O P-5'-P-CCNC: 20 U/L (ref 10–44)
ANION GAP SERPL CALC-SCNC: 5 MMOL/L (ref 8–16)
AST SERPL-CCNC: 10 U/L (ref 10–40)
BASOPHILS # BLD AUTO: 0.02 K/UL (ref 0–0.2)
BASOPHILS NFR BLD: 0.2 % (ref 0–1.9)
BILIRUB SERPL-MCNC: 0.3 MG/DL (ref 0.1–1)
BUN SERPL-MCNC: 12 MG/DL (ref 6–20)
CALCIUM SERPL-MCNC: 8.6 MG/DL (ref 8.7–10.5)
CHLORIDE SERPL-SCNC: 112 MMOL/L (ref 95–110)
CO2 SERPL-SCNC: 21 MMOL/L (ref 23–29)
CREAT SERPL-MCNC: 0.7 MG/DL (ref 0.5–1.4)
DIFFERENTIAL METHOD BLD: ABNORMAL
EOSINOPHIL # BLD AUTO: 0 K/UL (ref 0–0.5)
EOSINOPHIL NFR BLD: 0.1 % (ref 0–8)
ERYTHROCYTE [DISTWIDTH] IN BLOOD BY AUTOMATED COUNT: 14.4 % (ref 11.5–14.5)
EST. GFR  (NO RACE VARIABLE): >60 ML/MIN/1.73 M^2
GLUCOSE SERPL-MCNC: 109 MG/DL (ref 70–110)
HCT VFR BLD AUTO: 38 % (ref 37–48.5)
HGB BLD-MCNC: 12.7 G/DL (ref 12–16)
IMM GRANULOCYTES # BLD AUTO: 0.04 K/UL (ref 0–0.04)
IMM GRANULOCYTES NFR BLD AUTO: 0.3 % (ref 0–0.5)
LYMPHOCYTES # BLD AUTO: 1.6 K/UL (ref 1–4.8)
LYMPHOCYTES NFR BLD: 12.4 % (ref 18–48)
MAGNESIUM SERPL-MCNC: 2 MG/DL (ref 1.6–2.6)
MCH RBC QN AUTO: 31.4 PG (ref 27–31)
MCHC RBC AUTO-ENTMCNC: 33.4 G/DL (ref 32–36)
MCV RBC AUTO: 94 FL (ref 82–98)
MONOCYTES # BLD AUTO: 0.6 K/UL (ref 0.3–1)
MONOCYTES NFR BLD: 4.7 % (ref 4–15)
NEUTROPHILS # BLD AUTO: 10.3 K/UL (ref 1.8–7.7)
NEUTROPHILS NFR BLD: 82.3 % (ref 38–73)
NRBC BLD-RTO: 0 /100 WBC
PLATELET # BLD AUTO: 423 K/UL (ref 150–450)
PMV BLD AUTO: 9.5 FL (ref 9.2–12.9)
POTASSIUM SERPL-SCNC: 4 MMOL/L (ref 3.5–5.1)
PROT SERPL-MCNC: 6 G/DL (ref 6–8.4)
RBC # BLD AUTO: 4.04 M/UL (ref 4–5.4)
SODIUM SERPL-SCNC: 138 MMOL/L (ref 136–145)
WBC # BLD AUTO: 12.47 K/UL (ref 3.9–12.7)

## 2024-12-28 PROCEDURE — 25000003 PHARM REV CODE 250: Performed by: INTERNAL MEDICINE

## 2024-12-28 PROCEDURE — 80053 COMPREHEN METABOLIC PANEL: CPT | Performed by: INTERNAL MEDICINE

## 2024-12-28 PROCEDURE — 63600175 PHARM REV CODE 636 W HCPCS: Performed by: INTERNAL MEDICINE

## 2024-12-28 PROCEDURE — 36415 COLL VENOUS BLD VENIPUNCTURE: CPT | Performed by: INTERNAL MEDICINE

## 2024-12-28 PROCEDURE — G0378 HOSPITAL OBSERVATION PER HR: HCPCS

## 2024-12-28 PROCEDURE — 83735 ASSAY OF MAGNESIUM: CPT | Performed by: INTERNAL MEDICINE

## 2024-12-28 PROCEDURE — 85025 COMPLETE CBC W/AUTO DIFF WBC: CPT | Performed by: INTERNAL MEDICINE

## 2024-12-28 PROCEDURE — 96376 TX/PRO/DX INJ SAME DRUG ADON: CPT

## 2024-12-28 RX ORDER — METHYLPREDNISOLONE 4 MG/1
TABLET ORAL
Qty: 21 EACH | Refills: 0 | Status: SHIPPED | OUTPATIENT
Start: 2024-12-28 | End: 2025-01-18

## 2024-12-28 RX ADMIN — DIPHENHYDRAMINE HYDROCHLORIDE 25 MG: 50 INJECTION INTRAMUSCULAR; INTRAVENOUS at 05:12

## 2024-12-28 RX ADMIN — METOCLOPRAMIDE HYDROCHLORIDE 10 MG: 5 INJECTION INTRAMUSCULAR; INTRAVENOUS at 05:12

## 2024-12-28 RX ADMIN — SODIUM CHLORIDE 500 MG: 9 INJECTION, SOLUTION INTRAVENOUS at 05:12

## 2024-12-28 NOTE — NURSING
Patient off floor to MRI. Via wheelchair with radiology staff.  IVF's saline locked for transport and tele on hold.

## 2024-12-28 NOTE — NURSING
Pt verbalized understanding of discharge instructions. IV removed. Pt wheeled down to private vehicle with all personal belongings.

## 2025-01-01 PROBLEM — Z86.39 HISTORY OF PINEAL CYST: Status: ACTIVE | Noted: 2025-01-01

## 2025-01-01 NOTE — ASSESSMENT & PLAN NOTE
CT head negative apart from postoperative changes, MRI demonstrates the same, patient is seen by Neurology and case is discussed with the patient's neurosurgeon who recommends Medrol Dosepak.  Patient with no distress, no visual changes, no focal deficits

## 2025-01-01 NOTE — DISCHARGE SUMMARY
Counts include 234 beds at the [facility]  Discharge Summary      Patient Name: Shruthi Richard  MRN: [id number]  MARSHALL: [id number]  Patient Class: OP- Observation  Admission Date: 12/27/2024  Hospital Length of Stay: 0 days  Discharge Date and Time:  01/01/2025 12:14 PM  Attending Physician: No att. providers found   Discharging Provider: Michelle Vernon MD  Primary Care Provider: Domingo Baeza MD    Primary Care Team: Networked reference to record PCT     HPI:   44-year-old presenting with headache.  Patient has a long history of headache which has led her to see neurosurgeon and have pineal cyst removal in [place].  Patient reports that since her surgery December 4th she continues to have headaches.  Was given trial of steroids but headache not improving.  Has stayed consistently feeling like a pressure behind her eyes and has tried multiple medications such as Fioricet and Tylenol with no relief.  Has had akathisia with Reglan and Compazine in the past.  Around 10 30 patient reports that she was having the same headache that she has been having but all of the sudden it became severely worse and she had blurry vision bilaterally.  Called neurosurgeon on-call for her [place]  Team and was told to come to the ER.        In our ER     CT scan was NEG    No bleed nothing abnormal       They gave her dialudid IV for her headache which worked and she was sleeping since       Sleeping but no distress in ER bed      Vitals were fine.        Labs all normal         Plan is to admit to OBS and get the MRI brain and maybe have neurology see her     * No surgery found *      Hospital Course:   44-year-old presenting with headache. Patient has a long history of headache which has led her to see neurosurgeon and have pineal cyst removal in [place]. Patient reports that since her surgery December 4th she continues to have headaches. Was given trial of steroids but headache not improving. Has  stayed consistently feeling like a pressure behind her eyes and has tried multiple medications such as Fioricet and Tylenol with no relief. Neurology was consulted from the ED. Additionally, I contacted the patient's neurosurgeon Dr. Rodríguez who agrees with our plan to order brain MRI.  This revealed postoperative changes.  Neurology reviewed the images as well.  Per patient's neurosurgeon who recommended a Medrol Dosepak be sent to her pharmacy.  This was completed. Given her nonfocal neurologic exam she is likely experiencing typical postop pain after discussing the case with neurosurgeon.  In the morning of discharge patient was calm, cooperative and in no acute distress.  She had a nonfocal neurologic exam.  She was deemed appropriate for discharge home with follow up with Neurology and Neurosurgery.       Goals of Care Treatment Preferences:  Code Status: Full Code      SDOH Screening:  The patient was screened for utility difficulties, food insecurity, transport difficulties, housing insecurity, and interpersonal safety and there were no concerns identified this admission.     Consults:   Consults (From admission, onward)          Status Ordering Provider     Inpatient consult to Neurology  Once        Provider:  Eulalio Rubi MD    Completed [facility]            History of pineal cyst  CT head negative apart from postoperative changes, MRI demonstrates the same, patient is seen by Neurology and case is discussed with the patient's neurosurgeon who recommends Medrol Dosepak.  Patient with no distress, no visual changes, no focal deficits        Final Active Diagnoses:    Diagnosis Date Noted POA    History of pineal cyst [Z86.39] 01/01/2025 Unknown      Problems Resolved During this Admission:    Diagnosis Date Noted Date Resolved POA    PRINCIPAL PROBLEM:  Nonintractable headache [R51.9] 12/27/2024 12/27/2024 Yes       Discharged Condition: good    Disposition: Home or Self Care    Follow Up:    "Follow-up Information       Eulalio Rubi MD. Call in 1 week(s).    Specialty: Neurology  Why: Please contact office to schedule hospital follow up appt within 8-10 days .  Contact information:  [address]   [place] [address]  [phone]               Domingo Baeza MD. Call in 1 week(s).    Specialty: Family Medicine  Why: Please contact office within 8-10 to schedule hospital follow up appt .  Contact information:  [address]  [facility]  [place] [address]  [phone]                           Patient Instructions:   No discharge procedures on file.    Significant Diagnostic Studies: Labs: BMP: No results for input(s): "GLU", "NA", "K", "CL", "CO2", "BUN", "CREATININE", "CALCIUM", "MG" in the last 48 hours. and CBC No results for input(s): "WBC", "HGB", "HCT", "PLT" in the last 48 hours.    Pending Diagnostic Studies:       None           Medications:  Reconciled Home Medications:      Medication List        START taking these medications      methylPREDNISolone 4 mg tablet  Commonly known as: MEDROL DOSEPACK  use as directed            CONTINUE taking these medications      busPIRone 10 MG tablet  Commonly known as: BUSPAR  Take 1 tablet (10 mg total) by mouth 3 (three) times daily.     butalbital-acetaminophen-caffeine -40 mg -40 mg per tablet  Commonly known as: FIORICET, ESGIC  Take 1 tablet by mouth every 6 (six) hours as needed for Pain or Headaches.     clonazePAM 2 MG Tab  Commonly known as: KlonoPIN  Take 2 mg by mouth 2 (two) times daily as needed.     FLUoxetine 20 MG capsule  Take 20 mg by mouth once daily.     gabapentin 800 MG tablet  Commonly known as: NEURONTIN  Take 800 mg by mouth 3 (three) times daily.     lamoTRIgine 150 MG Tab  Commonly known as: LAMICTAL  Take 150 mg by mouth 2 (two) times daily.     magnesium 200 mg Tab  Take 1 tablet by mouth once daily.     naloxone 4 mg/actuation Spry  Commonly known as: NARCAN  1 spray by Nasal route " once.     oxyCODONE 5 MG immediate release tablet  Commonly known as: ROXICODONE  Take 5 mg by mouth every 6 (six) hours as needed for Pain.     promethazine 25 MG tablet  Commonly known as: PHENERGAN  Take 25 mg by mouth every 12 (twelve) hours.     REYVOW 100 mg tablet  Generic drug: lasmiditan  Take 100 mg by mouth as needed.     topiramate 50 MG tablet  Commonly known as: TOPAMAX  Take 1 tablet by mouth 2 (two) times daily.            STOP taking these medications      cyclobenzaprine 10 MG tablet  Commonly known as: FLEXERIL     pantoprazole 40 MG tablet  Commonly known as: PROTONIX              Indwelling Lines/Drains at time of discharge:   Lines/Drains/Airways       None                   Time spent on the discharge of patient: 35 minutes         Michelle Vernon MD  Department of Hospital Medicine  [facility]

## 2025-01-01 NOTE — HOSPITAL COURSE
44-year-old presenting with headache. Patient has a long history of headache which has led her to see neurosurgeon and have pineal cyst removal in [place]. Patient reports that since her surgery December 4th she continues to have headaches. Was given trial of steroids but headache not improving. Has stayed consistently feeling like a pressure behind her eyes and has tried multiple medications such as Fioricet and Tylenol with no relief. Neurology was consulted from the ED. Additionally, I contacted the patient's neurosurgeon Dr. Rodríguez who agrees with our plan to order brain MRI.  This revealed postoperative changes.  Neurology reviewed the images as well.  Per patient's neurosurgeon who recommended a Medrol Dosepak be sent to her pharmacy.  This was completed. Given her nonfocal neurologic exam she is likely experiencing typical postop pain after discussing the case with neurosurgeon.  In the morning of discharge patient was calm, cooperative and in no acute distress.  She had a nonfocal neurologic exam.  She was deemed appropriate for discharge home with follow up with Neurology and Neurosurgery.

## 2025-02-12 ENCOUNTER — PATIENT MESSAGE (OUTPATIENT)
Dept: RHEUMATOLOGY | Facility: CLINIC | Age: 45
End: 2025-02-12

## 2025-06-17 ENCOUNTER — PATIENT MESSAGE (OUTPATIENT)
Dept: RHEUMATOLOGY | Facility: CLINIC | Age: 45
End: 2025-06-17
Payer: MEDICARE

## 2025-07-01 DIAGNOSIS — G43.909 MIGRAINE, UNSPECIFIED, NOT INTRACTABLE, WITHOUT STATUS MIGRAINOSUS: Primary | ICD-10-CM

## 2025-07-14 ENCOUNTER — PATIENT MESSAGE (OUTPATIENT)
Dept: NEUROLOGY | Facility: CLINIC | Age: 45
End: 2025-07-14
Payer: MEDICARE

## 2025-07-16 ENCOUNTER — OFFICE VISIT (OUTPATIENT)
Dept: NEUROLOGY | Facility: CLINIC | Age: 45
End: 2025-07-16
Payer: MEDICARE

## 2025-07-16 VITALS
HEART RATE: 78 BPM | SYSTOLIC BLOOD PRESSURE: 99 MMHG | WEIGHT: 147.19 LBS | HEIGHT: 64 IN | BODY MASS INDEX: 25.13 KG/M2 | DIASTOLIC BLOOD PRESSURE: 64 MMHG

## 2025-07-16 DIAGNOSIS — E86.1 HYPOTENSION DUE TO HYPOVOLEMIA: ICD-10-CM

## 2025-07-16 DIAGNOSIS — M79.7 FIBROMYALGIA: ICD-10-CM

## 2025-07-16 DIAGNOSIS — R53.83 FATIGUE, UNSPECIFIED TYPE: ICD-10-CM

## 2025-07-16 DIAGNOSIS — G43.709 CHRONIC MIGRAINE WITHOUT AURA WITHOUT STATUS MIGRAINOSUS, NOT INTRACTABLE: Primary | ICD-10-CM

## 2025-07-16 DIAGNOSIS — R52 GENERALIZED PAIN: ICD-10-CM

## 2025-07-16 DIAGNOSIS — E67.2 HYPERVITAMINOSIS B6: ICD-10-CM

## 2025-07-16 DIAGNOSIS — G60.9 HEREDITARY AND IDIOPATHIC NEUROPATHY, UNSPECIFIED: ICD-10-CM

## 2025-07-16 PROCEDURE — 99999 PR PBB SHADOW E&M-EST. PATIENT-LVL IV: CPT | Mod: PBBFAC,,, | Performed by: STUDENT IN AN ORGANIZED HEALTH CARE EDUCATION/TRAINING PROGRAM

## 2025-07-16 PROCEDURE — 99215 OFFICE O/P EST HI 40 MIN: CPT | Mod: S$PBB,,, | Performed by: STUDENT IN AN ORGANIZED HEALTH CARE EDUCATION/TRAINING PROGRAM

## 2025-07-16 PROCEDURE — 99214 OFFICE O/P EST MOD 30 MIN: CPT | Mod: PBBFAC | Performed by: STUDENT IN AN ORGANIZED HEALTH CARE EDUCATION/TRAINING PROGRAM

## 2025-07-16 NOTE — PROGRESS NOTES
Patient ID: [id number]  Referring Physician: Clair Novoa,*    Chief Complaint/Reason for Consult: Headaches, fatigue, generalized pain  Subjective:     HPI:  Shruthi Richard is a 44 y.o. RH female who  has a past medical history of Anxiety, Bipolar affective disorder, rapid cycling, Depression, GERD (gastroesophageal reflux disease), Migraine headache, and Vaginal delivery. she is presenting today as a new patient for evaluation of multiple symptoms and second opinion about MS. she is accompanied by her mother.     Patient reports a history of headaches that began after a hysterectomy in 2017. headaches were occurring daily with sensitivity to bright lights, loud noises, and nausea. She reports having blackout curtains and being unable to engage in physical activity due to the headaches. She has tried numerous treatments including radiofrequency ablation on her neck, Botox injections for about 2 years, and various medications such as Emgality, Aimovig, Ajovy, Vyepti, Qulipta, Reyvow, Ubrelvy, and multiple triptans, all with limited success. She could not tolerate beta blockers due to hypotension. She also mentions trying nerve blocks in her neck.    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 Following the surgery, she had complications including excess blood and fluid around the surgical area. Her migraines changed, becoming more pressure-like and accompanied by ice pick-like sensations, which have since resolved. She now has a new type of headache described as a lightning jolt sensation, occurring multiple times daily, primarily in her temples. The jolts last for a few seconds but are severe enough to require her to lie down for hours afterward.    In the last 6 months, she reports new symptoms including severe fatigue that significantly impacts her daily activities, difficulty finding words, and muscle and joint pain, particularly in her legs.  She has been diagnosed with fibromyalgia and is taking gabapentin 400mg at night for this condition. She reports having skin rash on her face triggered by heat/sun.    She is currently taking several medications for headache prevention and mood, including topiramate 50mg daily (started 6-8 months ago), magnesium (long-term use), lamotrigine (for mood, long-term use), and Prozac. She reports side effects from Reyvow, describing symptoms similar to restless leg syndrome.    She expresses frustration with her current healthcare providers, stating that her primary care physician is primarily focused on medication refills, and her neurologist has said there is nothing else they can do for her. She is currently on a waitlist to be evaluated by another rheumatologist.    Headache Medication history:  AED Neuromodulators  MAOIs  Ergot Alkaloids    Acetazolamide (Diamox) [] Phenelzine (Nardil) [] Dihydroergotamine (Migranal) []   Carbamazepine (Tegretol) [] Tranylcypromine (Parnate) [] Ergotamine (Ergomar) []   Gabapentin (Neurontin) [x] Antihistamine/Serotonergic  Triptans    Lacosamide (Vimpat) [] Cyproheptadine (Periactin) [] Almotriptan (Axert) []   Lamotrigine (Lamictal) [x] Antihypertensives  Eletriptan (Relpax) [x]   Levatiracetam (Keppra) [] Atenolol (Tenormin) [] Frovatriptan (Frova) []   Oxcarbazepine (Trileptal) [] Bisoprostol (Zebeta) [] Naratriptan (Amerge) []   Levetiracetam (Keppra) [] Candesartan (Atacand) [] Rizatriptan (Maxalt) []   Pregabalin (Lyrica) [] Carvedilol (Coreg)  Sumatriptan (Imitrex) [x]   Topiramate (Topamax)  (Trokendi) [x] Diltiazem (Cardizem) [] Zolmitriptan (Zomig) []   Valproic Acid (Depakote) (Divalproex Sodium) [] Lisinopril (Prinivil, Zestril) [] Combo Abortives    Zonisamide (Zonegran) [] Metoprolol (Toprol) [] BC Powder    Muscle relaxants  Nadolol (Corgard) [] Butalbital and Acetaminophen (Bupap) []   Methocarbamol (Robaxin) [] Nebivolol (Bystolic) [] Butalbital, Acetaminophen, and  caffeine (Fioricet) [x]   Baclofen (Lioresal) [] Nicardipine (Cardene) []     Cyclobenzaprine (Flexeril) [] Nimodipine (Nimotop) [] Butalbital, Aspirin, and caffeine (Fiorinal) []   Tizanidine (Zanaflex) [] Propranolol (Inderal) [x]     Benzodiazepines  Telmisartan (Micardis) [] Butalbital, Caffeine, Acetaminophen, and Codeine (Fioricet with Codeine) []   Clonazepam (Klonopin) [x] Timolol (Blocadren) []     Alprazolam (Xanax) [] Verapamil (Calan, Verelan) [] Butalbital, Caffeine, Aspirin, and Codeine  (Fiorinal with Codeine) []   Diazepam (Valium) [] NSAIDs      Lorazepam (Ativan) [] Acetaminophen (Tylenol) []     Antidepressants   Acetylsalicylic Acid (Aspirin) [] Aspirin, Caffeine, and Acetaminophen (Excedrin) (Goodys) []   Amitriptyline (Elavil) [x] Celecoxib (Celebrex, ElYXYB) []     Bupropion (Wellbutrin) [] Diclofenac (Cambia) []     Citalopram (Celexa) [] Ibuprofen (Motrin, Advil) [] Acetaminophen, Caffeine, Pyrilamine maleate (Midol) []   Desipramine (Norpramin) [] Indomethacin (Indocin) []     Desvenlafazine (Pristiq) [] Ketoprofen (Orudis) [] Acetaminophen, Dichloralphenazone, and Isometheptene (Midrin) []   Doxepin (Sinequan) [] Ketorolac (Toradol, SPRIX) []     Duloxetine (Cymbalta) [] Naproxen (Anaprox, Aleve) []     Escitalopram (Lexapro) [] Meclofenamic Acid (Meclomen) [] Procedures    Fluoxetine (Prozac) [x] Meloxicam (Mobic) [] Greater occipital nerve block []   Imipramine (Tofranil) [] Monoclonals  Cervical radiofrequency ablation []   Nortriptyline (Pamelor) [] Erenumab-aooe (Aimovig) [x] Spenopalatine ganglion block []   Protriptyline (Vivactil) [] Galcanezumab (Emgality) [x] Occipital neuro stimulation []   Trazodone (Desyrel, Oleptro) [] Fremanazumab-vfrm (Ajovy) [] Cervical Epidural steroid injection []   Venlafaxine (Effexor) [] Eptinezumab (Vyepti) [x] Facet joint injections []   Oral CGRP inhibitors  Neuromodulation devices   Transforaminal epidural steroid injection []   Atogepant  (Qulipta) [x] Cefaly [] Cervical medial branch blocks []   Rimegepant (Nurtec) [x] Gamma Core [] Botox (x2 years) [x]   Ubrogepant (Ubrelvy) [x] Nerivio [] iovera []   Zavegepant (Zavzpret) [] Transcranial Magnetic stimulation [] Antiemetics    Other  Reyvow (side effects) [x] Prochlorperazine (Compazine) []   Memantine (Namenda) []   Metoclopramide (Reglan)  []   Magnesium Oxide [x]   Promethazine (Phenergan)  [x]   Riboflavin (B2) []   Ondansetron (Zofran) []   Co Enzyme Q10 []   Meclizine (Antivert, Dramamine) []     Review of Systems:  General: -fever, -chills, +fatigue, -weight gain, -weight loss  Eyes: -vision changes, -redness, -discharge, +photophobia  ENT: -ear pain, -nasal congestion, -sore throat, +phonophobia  Cardiovascular: -chest pain, -palpitations, -lower extremity edema  Respiratory: -cough, -shortness of breath  Gastrointestinal: -abdominal pain, +nausea, -vomiting, -diarrhea, -constipation, -blood in stool  Genitourinary: -dysuria, -hematuria, -frequency  Musculoskeletal: +joint pain, +muscle pain  Skin: +rash, -lesion, +heat intolerance  Neurological: +headache, -dizziness, -numbness, +tingling  Psychiatric: -anxiety, -depression, -sleep difficulty      Past Medical History:  -------------------------------------    Anxiety    Bipolar affective disorder, rapid cycling    bipolar 1    Depression    GERD (gastroesophageal reflux disease)    Migraine headache    Vaginal delivery    x1       Allergies:  Review of patient's allergies indicates:   Allergen Reactions    Gluten protein Other (See Comments)     Sever stomach ache, very tired feeling    Prochlorperazine Other (See Comments) and Itching    Prochlorperazine edisylate      Anxiety, itching  Other reaction(s): Unknown       Pertinent Family History:  Family History   Problem Relation Name Age of Onset    No Known Problems Mother      Hypertension Father      No Known Problems Brother      No Known Problems Daughter         Pertinent Social  History:  Social History[1]    Medications:  Current Outpatient Medications   Medication Instructions    busPIRone (BUSPAR) 10 mg, Oral, 3 times daily    butalbital-acetaminophen-caffeine -40 mg (FIORICET, ESGIC) -40 mg per tablet 1 tablet, Every 6 hours PRN    clonazePAM (KLONOPIN) 2 mg, 2 times daily PRN    FLUoxetine 20 mg, Daily    gabapentin (NEURONTIN) 800 mg, 3 times daily    lamoTRIgine (LAMICTAL) 150 mg, 2 times daily    magnesium 200 mg Tab 1 tablet, Daily    naloxone (NARCAN) 4 mg/actuation Spry 1 spray, Nasal, Once    oxyCODONE (ROXICODONE) 5 mg, Oral, Every 6 hours PRN    promethazine (PHENERGAN) 25 mg, Every 12 hours    REYVOW 100 mg, Oral, As needed (PRN)    topiramate (TOPAMAX) 50 MG tablet 1 tablet, 2 times daily        Objective:     Vitals:    07/16/25 1356   BP: 99/64   Pulse: 78        General:  Well-appearing, well-nourished, NAD, cooperative  MSK: no TTP on occipital, cervical paraspinal muscles or traps    Neurologic Exam:   Awake, alert and oriented x3  Speech spontaneous and fluent, intact comprehension.   Adequate fund of knowledge, vocabulary.    CN II - CN XII:  PERRLA. EOM intact. No Nystagmus. No ophthalmoplegia.   Facial sensation is normal to light touch.   Facial expression is full and symmetric.   Hearing is intact bilaterally.   Palate elevates symmetrically.   SCM and Trapezius full strength bilaterally.   Tongue is midline.     Motor:  Normal bulk and tone in all four limbs.   There are no atrophy or fasciculations. No tremor.     Shoulder  Abd Shoulder Add Elbow   Flex Elbow  Ext Wrist   Flex Wrist  Ext Finger  Flex Finger  Ext Finger  Abd Finger   Add IO Opposition   Right 5 5 5 5 5 5 5 5 5 5 5 5   Left 5 5 5 5 5 5 5 5 5 5 5 5      Hip  Flex Hip  Ext Thigh   Abd Thigh  Add Knee  Flex Knee  Ext Plantar  Flex Dorsiflex   Right 5    5 5 5 5   Left 5    5 5 5 5     Sensory:  Light touch: normal tactile sense throughout  Proprioception: proprioceptive sense reduced on  LUE  Vibration: slightly diminished on ankles  Romberg is Exam: impaired with swaying but no fall    DTRs:   Biceps Brachioradialis Triceps Sukhjinder Patellar Ankle Plantar   Right 2+ 2+  - 2+     Left 2+ 2+  - 2+       Coordination:  Finger to nose is normal bilaterally.  Normal fine finger movements and rapid alternating movements.    Gait:  Normal casual and tandem gait.    Pertinent lab results  Lab Results   Component Value Date    FOLATE 10.0 10/23/2020    VIQONOAW98 485 10/23/2020    THIAMINEBLOO 38 11/02/2020    VITAMINB6 81 (H) 11/02/2020     Lab Results   Component Value Date    LEADBLOOD <1.0 11/02/2020     Lab Results   Component Value Date    PATHINTPSPE REVIEWED 03/02/2021    PATHINTPSIF REVIEWED 03/02/2021     Lab Results   Component Value Date    ANASCREEN Negative <1:80 03/02/2021    DSDNA Negative 1:10 03/02/2021    ANTISSAANTIB 0.08 03/02/2021    RF 11.0 03/02/2021    SEDRATE 6 03/02/2021    PERINUCLEARP <1:20 03/02/2021    APAISOTYPEIG <9.40 03/02/2021    APAISOTYPEIG <9.40 03/02/2021    COMPLEMENTC4 38 05/20/2021    COMPLEMENTC3 108 05/20/2021    ANTIGLIADINA 3 11/02/2020    ANTIGLIADIN 2 11/02/2020    TTGIGA 2 11/02/2020    TTGIGG 3 11/02/2020    CELACIMMUNA 105 11/02/2020     Lab Results   Component Value Date    JUJ00PBQY Negative 03/02/2021    RPR Non-reactive 03/02/2021    HEPAIGG Negative 03/02/2021    HEPBSAG Negative 03/02/2021    HEPBSAB Positive (A) 03/02/2021    HEPBCAB Negative 03/02/2021    VARICELLAZOS 3.18 (H) 03/02/2021    STRONGANTIGG Negative 03/02/2021    TBGOLDPLUS Negative 03/02/2021     Lab Results   Component Value Date    IGGSERUM 921 03/02/2021     03/02/2021    IGM 89 03/02/2021    TSH 1.940 01/06/2023    WBC 12.47 12/28/2024    LYMPH 1.6 12/28/2024    LYMPH 12.4 (L) 12/28/2024    RBC 4.04 12/28/2024    HGB 12.7 12/28/2024    HCT 38.0 12/28/2024    MCV 94 12/28/2024     12/28/2024     12/28/2024    K 4.0 12/28/2024    CO2 21 (L) 12/28/2024    BUN 12  12/28/2024    CREATININE 0.7 12/28/2024    CALCIUM 8.6 (L) 12/28/2024    AST 10 12/28/2024    ALT 20 12/28/2024       Pertinent imaging results    *Images personally reviewed and interpreted:  12/27/2024  MRI Brain w/wo contrast:  No acute pathologies  Post-op changes secondary to pineal cyst resection    11/23/2020  MRI Thoracis Spine  w/wo contrast:  Unremarkable thoracic cord and spine    10/23/2020  MRI Cervical Spine wo contrast:  Reversal of cervical lordosis and disc herniation at C5-6 with subsequent canal and foraminal narrowing (L>R)      Other pertinent studies  None    Assessment:   Shruthi Richard is a 44 y.o. RH female with medical Hx mentioned above including intractable chronic migraines who presents with recent onset fatigue, and ongoing generalized pain and headaches.    - Reviewed brain MRI before and after surgery, noting removal of pineal gland cyst and no current abnormalities.  - Assessed neck MRI from 2020, identified bulging disc at C5-C6 level potentially contributing to headaches.  - Evaluated extensive headache history and previous treatments, including multiple migraine preventive medications and procedures.  - Considered fibromyalgia diagnosis as explanation for widespread pain and fatigue symptoms.  - Ruled out multiple sclerosis based on negative brain and spinal cord MRI.  - Suspect low BP (hypotension) as potential contributor to fatigue.  - Noted minimal reduction in sensory vibration on neurological exam.    - Explained that migraines often have normal test results and no identifiable cause.  - Discussed the difference between migraine-related brain changes and multiple sclerosis lesions.  - Patient to explore biofeedback therapy and relaxation methods for headache management.  - She will start riboflavin (vitamin B2) + CoQ10 supplement for migraine prevention.    - Clarified that fibromyalgia symptoms can be similar to those of other autoimmune conditions.    - Patient to  monitor hydration and increase fluid intake.  - Recommend wearing compression stockings or tight yoga shorts to help regulate BP.    - Ordered lab work to check for vitamin deficiencies and metabolic disturbances, including zinc, copper, and B12 levels.    1. Chronic migraine without aura without status migrainosus, not intractable    2. Fatigue, unspecified type    3. Generalized pain    4. idiopathic neuropathy, unspecified    5. Hypervitaminosis B6      Plan:     - Vitamin B6; Future  - Copper, Serum; Future  - Zinc; Future  - Vitamin B6; Future  - Vitamin B12; Future  - Protein Electrophoresis, Serum; Future  - Immunofixation, Serum; Future  - Heavy Metals Screen, Blood (Quantitative); Future          Disclaimer: This note was partly generated using dictation software which may occasionally result in transcription errors that are missed on review.      Time spent on this encounter: 66 minutes. This includes face to face time (obtaining history, documenting clinical information in the EMR, physical exam, discussing the plan with patient) and non-face to face time (such as preparing to see the patient (ie. Chart review, reviewing and interpreting previous labs and imaging), further EMR documentation, ordering tests, independently interpreting results and communicating results to the patient/family/caregiver, or care coordinator).           Angelica Hidalgo MD    [facility]  07/16/2025        [1]   Social History  Tobacco Use    Smoking status: Never    Smokeless tobacco: Never   Substance Use Topics    Alcohol use: Not Currently     Alcohol/week: 0.0 - 1.0 standard drinks of alcohol     Comment: occasional    Drug use: No

## 2025-07-16 NOTE — PATIENT INSTRUCTIONS
Non-Medication Migraine Treatments   Nutraceutical Options  Magnesium Oxide vs Glycinate - 400 mg daily    Riboflavin (Vitamin B2) - 400 mg daily    Coenzyme Q10 - 200 mg daily   Neuromodulation Devices Cefaly - https://www.cefaly.com/     Nerivio - https://nerivio.com/    [employer] Core - [email or web address]   Behavioral Treatment Relaxation  [email or web address]/[email or web address]/    Guided Meditation  [email or web address]    Biofeedback  [email or web address]   Topical Options Tiger Balm, Vicks VapoRub, Migrastick, Essential oils   Cooling Caps See below examples

## 2025-07-17 ENCOUNTER — PATIENT MESSAGE (OUTPATIENT)
Dept: NEUROLOGY | Facility: CLINIC | Age: 45
End: 2025-07-17
Payer: MEDICARE

## (undated) DEVICE — SOL NS 1000CC

## (undated) DEVICE — SUT CTD VICRYL 0 UND BR CT

## (undated) DEVICE — DEVICE N-SEAL LAPROSCOPIC

## (undated) DEVICE — TIP RUMI BLUE DISPOSABLE 5/BX

## (undated) DEVICE — SCISSOR CURVED ENDOPATH 5MM

## (undated) DEVICE — TUBING INSUFFLATION 10

## (undated) DEVICE — DRAPE STERI LONG

## (undated) DEVICE — CLOSURE SKIN STERI STRIP 1/2X4

## (undated) DEVICE — ELECTRODE REM PLYHSV RETURN 9

## (undated) DEVICE — SEE MEDLINE ITEM 146372

## (undated) DEVICE — SEE MEDLINE ITEM 154981

## (undated) DEVICE — OCCLUDER COLPO-PNEUMO STERILE

## (undated) DEVICE — GLOVE SURGICAL LATEX SZ 7

## (undated) DEVICE — BLANKET UPPER BODY 78.7X29.9IN

## (undated) DEVICE — KIT ANTIFOG

## (undated) DEVICE — SEE MEDLINE ITEM 157181

## (undated) DEVICE — TROCAR ENDOPATH XCEL 5X100MM

## (undated) DEVICE — SUT 2/0 36IN COATED VICRYL

## (undated) DEVICE — UNDERGLOVES BIOGEL PI SZ 7 LF

## (undated) DEVICE — COVER OVERHEAD SURG LT BLUE

## (undated) DEVICE — DRESSING ADH ISLAND 2.5 X 3

## (undated) DEVICE — SEE L#152161

## (undated) DEVICE — HOOK DISSECTING 5MM

## (undated) DEVICE — Device

## (undated) DEVICE — PAD PREP 50/CA

## (undated) DEVICE — NDL INSUF ULTRA VERESS 120MM

## (undated) DEVICE — SOL NACL .9P 500ML

## (undated) DEVICE — GOWN SURGICAL XX LARGE X LONG

## (undated) DEVICE — PACK LAPAROSCOPY/PELVISCOPY II

## (undated) DEVICE — SUT 3-0 CTD VICRYL 27IN PS

## (undated) DEVICE — SYR 50CC LL

## (undated) DEVICE — SUT VLOC 180 ABSORB ESTITCH

## (undated) DEVICE — IRRIGATOR ENDOSCOPY DISP.

## (undated) DEVICE — SEE MEDLINE ITEM 157117

## (undated) DEVICE — ADAPTER DISP HAND SWITCH

## (undated) DEVICE — SEE MEDLINE ITEM 146292

## (undated) DEVICE — SYR 10CC LUER LOCK

## (undated) DEVICE — CHLORAPREP W TINT 26ML APPL

## (undated) DEVICE — TROCAR ENDOPATH XCEL 11MM 10CM

## (undated) DEVICE — SUT RAPIDE 4-0

## (undated) DEVICE — MAT QUICK 40X30 FLOOR FLUID LF

## (undated) DEVICE — ENDOSTITCH INSTRUMENT

## (undated) DEVICE — SOL 9P NACL IRR PIC IL

## (undated) DEVICE — SEE MEDLINE ITEM 152622

## (undated) DEVICE — SUPPORT ULNA NERVE PROTECTOR

## (undated) DEVICE — TIP RUMI WHITE DISP UMW676

## (undated) DEVICE — UNDERGLOVES BIOGEL PI SZ 6 LF

## (undated) DEVICE — GLOVE SURG BIOGEL LATEX SZ 7.5

## (undated) DEVICE — TRAY FOLEY 16FR INFECTION CONT

## (undated) DEVICE — BLADE SURG CARBON STEEL SZ11

## (undated) DEVICE — PAD SANITARY OB STERILE

## (undated) DEVICE — CANISTER SUCTION 2 LTR